# Patient Record
Sex: FEMALE | Race: WHITE | NOT HISPANIC OR LATINO | Employment: OTHER | URBAN - METROPOLITAN AREA
[De-identification: names, ages, dates, MRNs, and addresses within clinical notes are randomized per-mention and may not be internally consistent; named-entity substitution may affect disease eponyms.]

---

## 2017-01-27 ENCOUNTER — HOSPITAL ENCOUNTER (OUTPATIENT)
Dept: CARDIOLOGY | Facility: HOSPITAL | Age: 72
Discharge: HOME | End: 2017-01-27

## 2017-03-23 ENCOUNTER — OFFICE VISIT (OUTPATIENT)
Dept: INTERNAL MEDICINE | Facility: CLINIC | Age: 72
End: 2017-03-23

## 2017-03-23 VITALS
HEART RATE: 88 BPM | WEIGHT: 133 LBS | DIASTOLIC BLOOD PRESSURE: 72 MMHG | BODY MASS INDEX: 26.11 KG/M2 | OXYGEN SATURATION: 98 % | SYSTOLIC BLOOD PRESSURE: 138 MMHG | RESPIRATION RATE: 16 BRPM | HEIGHT: 60 IN

## 2017-03-23 DIAGNOSIS — J30.1 SEASONAL ALLERGIC RHINITIS DUE TO POLLEN: ICD-10-CM

## 2017-03-23 DIAGNOSIS — Z12.39 ENCOUNTER FOR SCREENING BREAST EXAMINATION: ICD-10-CM

## 2017-03-23 DIAGNOSIS — Z13.9 SCREENING: ICD-10-CM

## 2017-03-23 DIAGNOSIS — Z12.39 ENCOUNTER FOR SPECIAL SCREENING EXAMINATION FOR NEOPLASM OF BREAST: ICD-10-CM

## 2017-03-23 DIAGNOSIS — Z78.0 MENOPAUSE: ICD-10-CM

## 2017-03-23 DIAGNOSIS — Z12.39 SCREENING BREAST EXAMINATION: Primary | ICD-10-CM

## 2017-03-23 DIAGNOSIS — F41.9 ANXIETY: Primary | ICD-10-CM

## 2017-03-23 PROCEDURE — 99214 OFFICE O/P EST MOD 30 MIN: CPT | Performed by: INTERNAL MEDICINE

## 2017-03-23 RX ORDER — FLUTICASONE PROPIONATE 50 MCG
2 SPRAY, SUSPENSION (ML) NASAL DAILY
Qty: 1 EACH | Refills: 0 | Status: SHIPPED | OUTPATIENT
Start: 2017-03-23 | End: 2017-04-22

## 2017-03-23 NOTE — PATIENT INSTRUCTIONS
Assessment/Plan   Diagnoses and all orders for this visit:    Anxiety    Screening  -     Ambulatory Referral For Screening Colonoscopy        BP at goal, think anxiety related - no meds indicated today.  She has agreed to schedule her colonoscopy.  Will rerefer to day.    HM - get old records, patient to .  Fu 6month with medicare wellness.

## 2017-03-23 NOTE — PROGRESS NOTES
Subjective   Cindy Mejia is a 71 y.o. female.     History of Present Illness   72 yo female with longstanding anxiety - stopped her lexapro last visit. BP better today, running low at home. Has her paperwork at  Home.  She was referred to Dr. Gonzalez.  She is scared because a family member had a complication from her colonoscopy.     Still having some stress incontinence today.  The following portions of the patient's history were reviewed and updated as appropriate: allergies, current medications, past family history, past medical history, past social history, past surgical history and problem list.    Review of Systems   HENT: Negative.    Respiratory: Negative.    Cardiovascular: Negative.    Gastrointestinal: Negative.    Genitourinary: Negative.         Stress incontinence   Musculoskeletal: Negative.    Neurological: Negative.    Hematological: Negative.    Psychiatric/Behavioral: Negative.    All other systems reviewed and are negative.      Objective   Physical Exam   Constitutional: She is oriented to person, place, and time. She appears well-developed and well-nourished. No distress.   HENT:   Head: Normocephalic.   Right Ear: External ear normal.   Left Ear: External ear normal.   Nose: Nose normal.   Mouth/Throat: No oropharyngeal exudate.   TM clear   Cardiovascular: Normal rate and regular rhythm.    Pulmonary/Chest: Effort normal and breath sounds normal. No respiratory distress.   Neurological: She is alert and oriented to person, place, and time.   Skin: She is not diaphoretic.   Psychiatric: She has a normal mood and affect. Her behavior is normal.   Nursing note and vitals reviewed.      Assessment/Plan   Diagnoses and all orders for this visit:    Anxiety    Screening  -     Ambulatory Referral For Screening Colonoscopy        BP at goal, think anxiety related - no meds indicated today.  She has agreed to schedule her colonoscopy.  Will rerefer to day.    HM - get old records, patient to pick  up.  Fu 6month with medicare wellness.      Mammogram and bone density ordered today.

## 2017-04-04 ENCOUNTER — APPOINTMENT (OUTPATIENT)
Dept: BONE DENSITY | Facility: HOSPITAL | Age: 72
End: 2017-04-04

## 2017-04-04 PROCEDURE — 77080 DXA BONE DENSITY AXIAL: CPT

## 2017-04-05 ENCOUNTER — TELEPHONE (OUTPATIENT)
Dept: INTERNAL MEDICINE | Facility: CLINIC | Age: 72
End: 2017-04-05

## 2017-04-05 NOTE — TELEPHONE ENCOUNTER
Patient notified.    ----- Message from Jeb Diaz MD sent at 4/4/2017  4:19 PM EDT -----  Mild osteopenia.   Make sure on calcium and vitamin D

## 2017-04-20 ENCOUNTER — OFFICE VISIT (OUTPATIENT)
Dept: GASTROENTEROLOGY | Facility: CLINIC | Age: 72
End: 2017-04-20

## 2017-04-20 VITALS
WEIGHT: 133.2 LBS | BODY MASS INDEX: 26.15 KG/M2 | HEIGHT: 60 IN | OXYGEN SATURATION: 94 % | HEART RATE: 98 BPM | TEMPERATURE: 98.1 F

## 2017-04-20 DIAGNOSIS — Z12.11 ENCOUNTER FOR SCREENING FOR MALIGNANT NEOPLASM OF COLON: ICD-10-CM

## 2017-04-20 DIAGNOSIS — K59.00 CONSTIPATION, UNSPECIFIED CONSTIPATION TYPE: Primary | ICD-10-CM

## 2017-04-20 PROCEDURE — 99203 OFFICE O/P NEW LOW 30 MIN: CPT | Performed by: INTERNAL MEDICINE

## 2017-04-20 NOTE — PROGRESS NOTES
PATIENT INFORMATION  Cindy Mejia       - 1945    CHIEF COMPLAINT  Chief Complaint   Patient presents with   • Constipation       HISTORY OF PRESENT ILLNESS  Constipation       70 yo with chronic constipation for over 20 years. BM are once every 3 days. No blood in the stool, abdominal pain or weight loss. No previous cls. No family history of colon cancer. She has tried colace before which has helped.    Severity is mild to moderate. No aggravating factors.  REVIEW OF SYSTEMS  Review of Systems   HENT: Positive for postnasal drip.    Gastrointestinal: Positive for constipation.   All other systems reviewed and are negative.        ACTIVE PROBLEMS  Patient Active Problem List    Diagnosis   • Menopause [Z78.0]   • Health maintenance alteration [Z78.9]   • Needs flu shot [Z23]   • Anxiety [F41.9]   • Uterine prolapse [N81.4]         PAST MEDICAL HISTORY  Past Medical History:   Diagnosis Date   • Anxiety    • Anxiety 2016   • Health maintenance alteration 2016   • Menopause 3/23/2017   • Needs flu shot 2016         SURGICAL HISTORY  Past Surgical History:   Procedure Laterality Date   • BLADDER SURGERY     • HYSTERECTOMY     • TUBAL ABDOMINAL LIGATION           FAMILY HISTORY  History reviewed. No pertinent family history.      SOCIAL HISTORY  Social History     Occupational History   • Not on file.     Social History Main Topics   • Smoking status: Never Smoker   • Smokeless tobacco: Not on file   • Alcohol use No   • Drug use: No   • Sexual activity: Not on file         CURRENT MEDICATIONS    Current Outpatient Prescriptions:   •  escitalopram (LEXAPRO) 10 MG tablet, Take 1 tablet by mouth daily., Disp: 30 tablet, Rfl: 0  •  fluticasone (FLONASE) 50 MCG/ACT nasal spray, 2 sprays into each nostril Daily for 30 days., Disp: 1 each, Rfl: 0    ALLERGIES  Penicillins    VITALS  Vitals:    17 1140   Pulse: 98   Temp: 98.1 °F (36.7 °C)   TempSrc: Oral   SpO2: 94%   Weight: 133 lb 3.2 oz  "(60.4 kg)   Height: 60\" (152.4 cm)       LAST RESULTS   Admission on 07/07/2016, Discharged on 07/07/2016   Component Date Value Ref Range Status   • Color,  07/07/2016 Yellow  Yellow, Straw Final   • Appearance,  07/07/2016 Clear  Clear Final   • pH,  07/07/2016 6.5  4.5 - 8.0 Final   • Specific Gravity,  07/07/2016 1.010  1.003 - 1.030 Final   • Glucose, UA 07/07/2016 Negative  Negative Final   • Ketones, UA 07/07/2016 Negative  Negative Final   • Bilirubin, UA 07/07/2016 Negative  Negative Final   • Blood,  07/07/2016 Trace* Negative Final   • Protein,  07/07/2016 Negative  Negative Final   • Leuk Esterase,  07/07/2016 Trace* Negative Final   • Nitrite,  07/07/2016 Negative  Negative Final   • Urobilinogen,  07/07/2016 0.2 E.U./dL  0.2 E.U./dL, 1.0 E.U./dL Final   • Urine Culture 07/07/2016 <25,000 CFU/mL Corynebacterium species*  Final      No definitive guidelines. All are susceptible to vancomycin. Resistance to penicillins & cephalosporins does occur.   • RBC,  07/07/2016 3-5* None Seen /HPF Final   • WBC,  07/07/2016 6-12* None Seen /HPF Final   • Bacteria,  07/07/2016 Trace* None Seen /HPF Final   • Squamous Epithelial Cells,  07/07/2016 3-6* None Seen, 0-2 /HPF Final   • Hyaline Casts,  07/07/2016 None Seen  None Seen /LPF Final   • WBC Clumps,  07/07/2016 Small/1+  None Seen /HPF Final   • Methodology 07/07/2016 Manual Light Microscopy   Final     Dexa Bone Density Axial    Result Date: 4/4/2017  Narrative: EXAM: DEXA scan  DATE: 04/04/2017  HISTORY: 71-year-old postmenopausal female for osteoporosis screening.  COMPARISON: DEXA scan 10/07/2004.  FINDINGS: L1-L4 total bone mineral density is 0.827 gm/cm2, with T score -2.0 and Z score 0.1, corresponding to the range of osteopenia. This represents a 14.3% decrease in bone mineral density since 10/07/2004.  The left femoral neck bone mineral density is 0.756 gm/cm2, with T score -0.8 and Z score 1.0, within normal limits. This " represents a 3.7% increase in bone mineral density since 10/07/2004.      Impression: 1. Osteopenia in the lumbar spine. 2. Normal bone mineral density in the left femoral neck. 3. 14.3% decrease in bone mineral density of the lumbar spine in comparison to 10/07/2004.  This report was finalized on 4/4/2017 10:06 AM by Dr. Aline Nichols MD.        PHYSICAL EXAM  Physical Exam   Constitutional: She is oriented to person, place, and time. She appears well-developed and well-nourished. No distress.   HENT:   Head: Normocephalic and atraumatic.   Mouth/Throat: Oropharynx is clear and moist.   Eyes: EOM are normal. Pupils are equal, round, and reactive to light.   Neck: Normal range of motion. No tracheal deviation present.   Cardiovascular: Normal rate, regular rhythm, normal heart sounds and intact distal pulses.  Exam reveals no gallop and no friction rub.    No murmur heard.  Pulmonary/Chest: Effort normal and breath sounds normal. No stridor. No respiratory distress. She has no wheezes. She has no rales. She exhibits no tenderness.   Abdominal: Soft. Bowel sounds are normal. She exhibits no distension. There is no tenderness. There is no rebound and no guarding.   Musculoskeletal: She exhibits no edema.   Lymphadenopathy:     She has no cervical adenopathy.   Neurological: She is alert and oriented to person, place, and time.   Skin: Skin is warm. She is not diaphoretic.   Psychiatric: She has a normal mood and affect. Her behavior is normal. Judgment and thought content normal.   Nursing note and vitals reviewed.      ASSESSMENT  Diagnoses and all orders for this visit:    Constipation, unspecified constipation type    Encounter for screening for malignant neoplasm of colon  -     Case Request; Standing  -     Case Request    Other orders  -     Implement Anesthesia orders day of procedure.; Standing  -     Obtain informed consent; Standing  -     Verify informed consent; Standing          PLAN  No Follow-up on  file.      High fiber diet. Can do miralax once daily.    Risks, benefits and alternatives discussed including but not limited to the complications of bleeding, perforation and sedation related problems.

## 2017-05-19 ENCOUNTER — ANESTHESIA EVENT (OUTPATIENT)
Dept: PERIOP | Facility: HOSPITAL | Age: 72
End: 2017-05-19

## 2017-05-22 ENCOUNTER — HOSPITAL ENCOUNTER (OUTPATIENT)
Facility: HOSPITAL | Age: 72
Setting detail: HOSPITAL OUTPATIENT SURGERY
Discharge: HOME | End: 2017-05-22
Attending: INTERNAL MEDICINE | Admitting: INTERNAL MEDICINE

## 2017-05-22 ENCOUNTER — ANESTHESIA (OUTPATIENT)
Dept: PERIOP | Facility: HOSPITAL | Age: 72
End: 2017-05-22

## 2017-05-22 VITALS
RESPIRATION RATE: 12 BRPM | DIASTOLIC BLOOD PRESSURE: 68 MMHG | HEIGHT: 61 IN | SYSTOLIC BLOOD PRESSURE: 140 MMHG | TEMPERATURE: 98.2 F | BODY MASS INDEX: 24.17 KG/M2 | HEART RATE: 85 BPM | WEIGHT: 128 LBS | OXYGEN SATURATION: 97 %

## 2017-05-22 DIAGNOSIS — Z12.11 ENCOUNTER FOR SCREENING FOR MALIGNANT NEOPLASM OF COLON: ICD-10-CM

## 2017-05-22 PROCEDURE — 25010000002 PROPOFOL 10 MG/ML EMULSION: Performed by: NURSE ANESTHETIST, CERTIFIED REGISTERED

## 2017-05-22 PROCEDURE — 45385 COLONOSCOPY W/LESION REMOVAL: CPT | Performed by: INTERNAL MEDICINE

## 2017-05-22 PROCEDURE — 45380 COLONOSCOPY AND BIOPSY: CPT | Performed by: INTERNAL MEDICINE

## 2017-05-22 RX ORDER — LIDOCAINE HYDROCHLORIDE 10 MG/ML
INJECTION, SOLUTION EPIDURAL; INFILTRATION; INTRACAUDAL; PERINEURAL
Status: COMPLETED
Start: 2017-05-22 | End: 2017-05-22

## 2017-05-22 RX ORDER — LIDOCAINE HYDROCHLORIDE 20 MG/ML
INJECTION, SOLUTION INFILTRATION; PERINEURAL AS NEEDED
Status: DISCONTINUED | OUTPATIENT
Start: 2017-05-22 | End: 2017-05-22 | Stop reason: SURG

## 2017-05-22 RX ORDER — SODIUM CHLORIDE 0.9 % (FLUSH) 0.9 %
1-10 SYRINGE (ML) INJECTION AS NEEDED
Status: DISCONTINUED | OUTPATIENT
Start: 2017-05-22 | End: 2017-05-22 | Stop reason: HOSPADM

## 2017-05-22 RX ORDER — SODIUM CHLORIDE, SODIUM LACTATE, POTASSIUM CHLORIDE, CALCIUM CHLORIDE 600; 310; 30; 20 MG/100ML; MG/100ML; MG/100ML; MG/100ML
9 INJECTION, SOLUTION INTRAVENOUS CONTINUOUS
Status: DISCONTINUED | OUTPATIENT
Start: 2017-05-22 | End: 2017-05-22 | Stop reason: HOSPADM

## 2017-05-22 RX ORDER — PROPOFOL 10 MG/ML
VIAL (ML) INTRAVENOUS AS NEEDED
Status: DISCONTINUED | OUTPATIENT
Start: 2017-05-22 | End: 2017-05-22 | Stop reason: SURG

## 2017-05-22 RX ORDER — LIDOCAINE HYDROCHLORIDE 10 MG/ML
0.5 INJECTION, SOLUTION EPIDURAL; INFILTRATION; INTRACAUDAL; PERINEURAL ONCE AS NEEDED
Status: COMPLETED | OUTPATIENT
Start: 2017-05-22 | End: 2017-05-22

## 2017-05-22 RX ADMIN — LIDOCAINE HYDROCHLORIDE 0.5 ML: 10 INJECTION, SOLUTION EPIDURAL; INFILTRATION; INTRACAUDAL; PERINEURAL at 10:11

## 2017-05-22 RX ADMIN — PROPOFOL 30 MG: 10 INJECTION, EMULSION INTRAVENOUS at 10:48

## 2017-05-22 RX ADMIN — PROPOFOL 50 MG: 10 INJECTION, EMULSION INTRAVENOUS at 10:26

## 2017-05-22 RX ADMIN — SODIUM CHLORIDE, POTASSIUM CHLORIDE, SODIUM LACTATE AND CALCIUM CHLORIDE 9 ML/HR: 600; 310; 30; 20 INJECTION, SOLUTION INTRAVENOUS at 10:10

## 2017-05-22 RX ADMIN — PROPOFOL 50 MG: 10 INJECTION, EMULSION INTRAVENOUS at 10:32

## 2017-05-22 RX ADMIN — PROPOFOL 50 MG: 10 INJECTION, EMULSION INTRAVENOUS at 10:28

## 2017-05-22 RX ADMIN — LIDOCAINE HYDROCHLORIDE 60 MG: 20 INJECTION, SOLUTION INFILTRATION; PERINEURAL at 10:26

## 2017-05-22 RX ADMIN — PROPOFOL 50 MG: 10 INJECTION, EMULSION INTRAVENOUS at 10:36

## 2017-05-22 RX ADMIN — PROPOFOL 50 MG: 10 INJECTION, EMULSION INTRAVENOUS at 10:43

## 2017-05-22 RX ADMIN — PROPOFOL 30 MG: 10 INJECTION, EMULSION INTRAVENOUS at 10:54

## 2017-05-22 RX ADMIN — SODIUM CHLORIDE, POTASSIUM CHLORIDE, SODIUM LACTATE AND CALCIUM CHLORIDE: 600; 310; 30; 20 INJECTION, SOLUTION INTRAVENOUS at 10:21

## 2017-06-01 ENCOUNTER — TELEPHONE (OUTPATIENT)
Dept: GASTROENTEROLOGY | Facility: CLINIC | Age: 72
End: 2017-06-01

## 2017-06-01 DIAGNOSIS — K63.5 COLON POLYPS: Primary | ICD-10-CM

## 2017-06-03 LAB
LAB AP CASE REPORT: NORMAL
LAB AP CLINICAL INFORMATION: NORMAL
Lab: NORMAL
PATH REPORT.FINAL DX SPEC: NORMAL

## 2017-08-25 ENCOUNTER — ANESTHESIA EVENT (OUTPATIENT)
Dept: PERIOP | Facility: HOSPITAL | Age: 72
End: 2017-08-25

## 2017-08-28 ENCOUNTER — ANESTHESIA (OUTPATIENT)
Dept: PERIOP | Facility: HOSPITAL | Age: 72
End: 2017-08-28

## 2017-08-28 ENCOUNTER — HOSPITAL ENCOUNTER (OUTPATIENT)
Facility: HOSPITAL | Age: 72
Setting detail: HOSPITAL OUTPATIENT SURGERY
Discharge: HOME | End: 2017-08-28
Attending: INTERNAL MEDICINE | Admitting: INTERNAL MEDICINE

## 2017-08-28 VITALS
RESPIRATION RATE: 16 BRPM | WEIGHT: 126.2 LBS | TEMPERATURE: 98 F | OXYGEN SATURATION: 97 % | DIASTOLIC BLOOD PRESSURE: 65 MMHG | HEART RATE: 79 BPM | HEIGHT: 61 IN | BODY MASS INDEX: 23.83 KG/M2 | SYSTOLIC BLOOD PRESSURE: 154 MMHG

## 2017-08-28 DIAGNOSIS — K63.5 COLON POLYPS: ICD-10-CM

## 2017-08-28 PROCEDURE — 45380 COLONOSCOPY AND BIOPSY: CPT | Performed by: INTERNAL MEDICINE

## 2017-08-28 PROCEDURE — 45381 COLONOSCOPY SUBMUCOUS NJX: CPT | Performed by: INTERNAL MEDICINE

## 2017-08-28 PROCEDURE — 25010000002 PROPOFOL 10 MG/ML EMULSION: Performed by: NURSE ANESTHETIST, CERTIFIED REGISTERED

## 2017-08-28 RX ORDER — LIDOCAINE HYDROCHLORIDE 10 MG/ML
0.5 INJECTION, SOLUTION EPIDURAL; INFILTRATION; INTRACAUDAL; PERINEURAL ONCE AS NEEDED
Status: COMPLETED | OUTPATIENT
Start: 2017-08-28 | End: 2017-08-28

## 2017-08-28 RX ORDER — LIDOCAINE HYDROCHLORIDE 20 MG/ML
INJECTION, SOLUTION INFILTRATION; PERINEURAL AS NEEDED
Status: DISCONTINUED | OUTPATIENT
Start: 2017-08-28 | End: 2017-08-28 | Stop reason: SURG

## 2017-08-28 RX ORDER — PROPOFOL 10 MG/ML
VIAL (ML) INTRAVENOUS AS NEEDED
Status: DISCONTINUED | OUTPATIENT
Start: 2017-08-28 | End: 2017-08-28 | Stop reason: SURG

## 2017-08-28 RX ORDER — MAGNESIUM HYDROXIDE 1200 MG/15ML
LIQUID ORAL AS NEEDED
Status: DISCONTINUED | OUTPATIENT
Start: 2017-08-28 | End: 2017-08-28 | Stop reason: HOSPADM

## 2017-08-28 RX ORDER — SODIUM CHLORIDE 0.9 % (FLUSH) 0.9 %
1-10 SYRINGE (ML) INJECTION AS NEEDED
Status: DISCONTINUED | OUTPATIENT
Start: 2017-08-28 | End: 2017-08-28 | Stop reason: HOSPADM

## 2017-08-28 RX ORDER — SODIUM CHLORIDE, SODIUM LACTATE, POTASSIUM CHLORIDE, CALCIUM CHLORIDE 600; 310; 30; 20 MG/100ML; MG/100ML; MG/100ML; MG/100ML
9 INJECTION, SOLUTION INTRAVENOUS CONTINUOUS
Status: DISCONTINUED | OUTPATIENT
Start: 2017-08-28 | End: 2017-08-28 | Stop reason: HOSPADM

## 2017-08-28 RX ADMIN — PROPOFOL 50 MG: 10 INJECTION, EMULSION INTRAVENOUS at 09:50

## 2017-08-28 RX ADMIN — PROPOFOL 50 MG: 10 INJECTION, EMULSION INTRAVENOUS at 09:58

## 2017-08-28 RX ADMIN — LIDOCAINE HYDROCHLORIDE 100 MG: 20 INJECTION, SOLUTION INFILTRATION; PERINEURAL at 08:28

## 2017-08-28 RX ADMIN — PROPOFOL 50 MG: 10 INJECTION, EMULSION INTRAVENOUS at 08:50

## 2017-08-28 RX ADMIN — SODIUM CHLORIDE, POTASSIUM CHLORIDE, SODIUM LACTATE AND CALCIUM CHLORIDE 9 ML/HR: 600; 310; 30; 20 INJECTION, SOLUTION INTRAVENOUS at 07:57

## 2017-08-28 RX ADMIN — LIDOCAINE HYDROCHLORIDE 0.5 ML: 10 INJECTION, SOLUTION EPIDURAL; INFILTRATION; INTRACAUDAL; PERINEURAL at 07:57

## 2017-08-28 RX ADMIN — PROPOFOL 50 MG: 10 INJECTION, EMULSION INTRAVENOUS at 09:32

## 2017-08-28 RX ADMIN — PROPOFOL 50 MG: 10 INJECTION, EMULSION INTRAVENOUS at 09:22

## 2017-08-28 RX ADMIN — PROPOFOL 50 MG: 10 INJECTION, EMULSION INTRAVENOUS at 09:39

## 2017-08-28 RX ADMIN — PROPOFOL 50 MG: 10 INJECTION, EMULSION INTRAVENOUS at 08:37

## 2017-08-28 RX ADMIN — PROPOFOL 50 MG: 10 INJECTION, EMULSION INTRAVENOUS at 10:31

## 2017-08-28 RX ADMIN — PROPOFOL 50 MG: 10 INJECTION, EMULSION INTRAVENOUS at 10:10

## 2017-08-28 RX ADMIN — PROPOFOL 50 MG: 10 INJECTION, EMULSION INTRAVENOUS at 09:04

## 2017-08-28 RX ADMIN — PROPOFOL 100 MG: 10 INJECTION, EMULSION INTRAVENOUS at 08:30

## 2017-08-28 RX ADMIN — PROPOFOL 50 MG: 10 INJECTION, EMULSION INTRAVENOUS at 08:42

## 2017-08-28 RX ADMIN — PROPOFOL 50 MG: 10 INJECTION, EMULSION INTRAVENOUS at 09:25

## 2017-08-28 RX ADMIN — PROPOFOL 50 MG: 10 INJECTION, EMULSION INTRAVENOUS at 09:29

## 2017-08-28 RX ADMIN — PROPOFOL 50 MG: 10 INJECTION, EMULSION INTRAVENOUS at 09:15

## 2017-08-28 RX ADMIN — PROPOFOL 50 MG: 10 INJECTION, EMULSION INTRAVENOUS at 09:09

## 2017-08-28 RX ADMIN — PROPOFOL 50 MG: 10 INJECTION, EMULSION INTRAVENOUS at 09:18

## 2017-08-28 RX ADMIN — PROPOFOL 50 MG: 10 INJECTION, EMULSION INTRAVENOUS at 09:00

## 2017-08-28 RX ADMIN — PROPOFOL 50 MG: 10 INJECTION, EMULSION INTRAVENOUS at 08:55

## 2017-08-28 RX ADMIN — PROPOFOL 50 MG: 10 INJECTION, EMULSION INTRAVENOUS at 10:25

## 2017-08-28 NOTE — ANESTHESIA POSTPROCEDURE EVALUATION
Patient: Cindy Mejia    Procedure Summary     Date Anesthesia Start Anesthesia Stop Room / Location    08/28/17 0827 1048 [facility] / [facility]       Procedure Diagnosis Surgeon Provider    COLONOSCOPY, polypectomy, biopsy, sclerotherapy injection (N/A ) Colon polyps  (Colon polyps [K63.5]) MD Kayla Ayers CRNA          Anesthesia Type: MAC  Last vitals  BP   131/71 (08/28/17 1110)    Temp        Pulse   84 (08/28/17 1110)   Resp   14 (08/28/17 1110)    SpO2   97 % (08/28/17 1110)      Post Anesthesia Care and Evaluation    Patient location during evaluation: PHASE II  Patient participation: complete - patient participated  Level of consciousness: awake and alert  Pain score: 0  Pain management: adequate  Airway patency: patent  Anesthetic complications: No anesthetic complications  PONV Status: none  Cardiovascular status: acceptable  Respiratory status: acceptable  Hydration status: acceptable

## 2017-08-28 NOTE — ANESTHESIA PREPROCEDURE EVALUATION
Anesthesia Evaluation     Patient summary reviewed and Nursing notes reviewed   no history of anesthetic complications:  NPO Solid Status: > 8 hours  NPO Liquid Status: > 8 hours     Airway   Mallampati: I  TM distance: >3 FB  Neck ROM: full  no difficulty expected  Dental - normal exam     Pulmonary - negative pulmonary ROS and normal exam    breath sounds clear to auscultation  Sleep apnea: snores, lightly.  Cardiovascular - negative cardio ROS and normal exam  Exercise tolerance: good (4-7 METS)    Rhythm: regular  Rate: normal        Neuro/Psych  (+) psychiatric history Anxiety,    GI/Hepatic/Renal/Endo - negative ROS     Musculoskeletal     Abdominal  - normal exam   Substance History - negative use     OB/GYN negative ob/gyn ROS         Other   (+) arthritis (fingers)                                   Anesthesia Plan    ASA 2     MAC     intravenous induction   Anesthetic plan and risks discussed with patient and spouse/significant other.

## 2017-08-30 LAB
LAB AP CASE REPORT: NORMAL
Lab: NORMAL
PATH REPORT.FINAL DX SPEC: NORMAL

## 2017-09-07 PROBLEM — K63.5 COLON POLYPS: Status: RESOLVED | Noted: 2017-06-01 | Resolved: 2017-09-07

## 2017-09-07 PROBLEM — D36.9 TUBULAR ADENOMA: Status: ACTIVE | Noted: 2017-09-07

## 2017-12-04 DIAGNOSIS — Z12.31 SCREENING MAMMOGRAM, ENCOUNTER FOR: ICD-10-CM

## 2017-12-04 DIAGNOSIS — Z80.3 FAMILY HISTORY OF BREAST CANCER: Primary | ICD-10-CM

## 2017-12-27 ENCOUNTER — HOSPITAL ENCOUNTER (OUTPATIENT)
Dept: MAMMOGRAPHY | Facility: HOSPITAL | Age: 72
Discharge: HOME | End: 2017-12-27
Attending: INTERNAL MEDICINE | Admitting: INTERNAL MEDICINE

## 2017-12-27 DIAGNOSIS — Z80.3 FAMILY HISTORY OF BREAST CANCER: ICD-10-CM

## 2017-12-27 DIAGNOSIS — Z12.31 SCREENING MAMMOGRAM, ENCOUNTER FOR: ICD-10-CM

## 2017-12-27 PROCEDURE — 77063 BREAST TOMOSYNTHESIS BI: CPT

## 2017-12-27 PROCEDURE — G0202 SCR MAMMO BI INCL CAD: HCPCS

## 2018-01-02 ENCOUNTER — TELEPHONE (OUTPATIENT)
Dept: INTERNAL MEDICINE | Facility: CLINIC | Age: 73
End: 2018-01-02

## 2018-01-10 ENCOUNTER — LAB (OUTPATIENT)
Dept: INTERNAL MEDICINE | Facility: CLINIC | Age: 73
End: 2018-01-10

## 2018-01-10 DIAGNOSIS — R63.5 WEIGHT GAIN: ICD-10-CM

## 2018-01-10 DIAGNOSIS — Z13.220 SCREENING, LIPID: Primary | ICD-10-CM

## 2018-01-10 DIAGNOSIS — R03.0 PREHYPERTENSION: ICD-10-CM

## 2018-01-10 DIAGNOSIS — Z13.220 SCREENING, LIPID: ICD-10-CM

## 2018-01-10 DIAGNOSIS — F41.9 ANXIETY: Primary | ICD-10-CM

## 2018-01-10 DIAGNOSIS — F41.9 ANXIETY: ICD-10-CM

## 2018-01-10 LAB
ALBUMIN SERPL-MCNC: 4.6 G/DL (ref 3.5–5.2)
ALBUMIN/GLOB SERPL: 2.2 G/DL
ALP SERPL-CCNC: 85 U/L (ref 40–129)
ALT SERPL-CCNC: 11 U/L (ref 5–33)
AST SERPL-CCNC: 18 U/L (ref 5–32)
BASOPHILS # BLD AUTO: 0.05 10*3/MM3 (ref 0–0.2)
BASOPHILS NFR BLD AUTO: 0.8 % (ref 0–2)
BILIRUB SERPL-MCNC: 0.4 MG/DL (ref 0.2–1.2)
BUN SERPL-MCNC: 14 MG/DL (ref 8–23)
BUN/CREAT SERPL: 24.6 (ref 7–25)
CALCIUM SERPL-MCNC: 9.5 MG/DL (ref 8.8–10.5)
CHLORIDE SERPL-SCNC: 104 MMOL/L (ref 98–107)
CO2 SERPL-SCNC: 29.1 MMOL/L (ref 22–29)
CREAT SERPL-MCNC: 0.57 MG/DL (ref 0.57–1)
EOSINOPHIL # BLD AUTO: 0.22 10*3/MM3 (ref 0.1–0.3)
EOSINOPHIL NFR BLD AUTO: 3.4 % (ref 0–4)
ERYTHROCYTE [DISTWIDTH] IN BLOOD BY AUTOMATED COUNT: 13 % (ref 11.5–14.5)
GLOBULIN SER CALC-MCNC: 2.1 GM/DL
GLUCOSE SERPL-MCNC: 95 MG/DL (ref 65–99)
HCT VFR BLD AUTO: 38.6 % (ref 37–47)
HGB BLD-MCNC: 12.5 G/DL (ref 12–16)
IMM GRANULOCYTES # BLD: 0.02 10*3/MM3 (ref 0–0.03)
IMM GRANULOCYTES NFR BLD: 0.3 % (ref 0–0.5)
LYMPHOCYTES # BLD AUTO: 0.86 10*3/MM3 (ref 0.6–4.8)
LYMPHOCYTES NFR BLD AUTO: 13.4 % (ref 20–45)
MCH RBC QN AUTO: 29.4 PG (ref 27–31)
MCHC RBC AUTO-ENTMCNC: 32.4 G/DL (ref 31–37)
MCV RBC AUTO: 90.8 FL (ref 81–99)
MONOCYTES # BLD AUTO: 0.64 10*3/MM3 (ref 0–1)
MONOCYTES NFR BLD AUTO: 10 % (ref 3–8)
NEUTROPHILS # BLD AUTO: 4.62 10*3/MM3 (ref 1.5–8.3)
NEUTROPHILS NFR BLD AUTO: 72.1 % (ref 45–70)
NRBC BLD AUTO-RTO: 0 /100 WBC (ref 0–0)
PLATELET # BLD AUTO: 315 10*3/MM3 (ref 140–500)
POTASSIUM SERPL-SCNC: 4.5 MMOL/L (ref 3.5–5.2)
PROT SERPL-MCNC: 6.7 G/DL (ref 6–8.5)
RBC # BLD AUTO: 4.25 10*6/MM3 (ref 4.2–5.4)
SODIUM SERPL-SCNC: 142 MMOL/L (ref 136–145)
TSH SERPL DL<=0.005 MIU/L-ACNC: 3.13 MIU/ML (ref 0.27–4.2)
WBC # BLD AUTO: 6.41 10*3/MM3 (ref 4.8–10.8)

## 2018-01-25 ENCOUNTER — OFFICE VISIT (OUTPATIENT)
Dept: INTERNAL MEDICINE | Facility: CLINIC | Age: 73
End: 2018-01-25

## 2018-01-25 VITALS
SYSTOLIC BLOOD PRESSURE: 150 MMHG | HEART RATE: 99 BPM | BODY MASS INDEX: 24.55 KG/M2 | HEIGHT: 61 IN | WEIGHT: 130 LBS | DIASTOLIC BLOOD PRESSURE: 70 MMHG | OXYGEN SATURATION: 98 % | RESPIRATION RATE: 18 BRPM

## 2018-01-25 DIAGNOSIS — L71.8 OTHER ROSACEA: ICD-10-CM

## 2018-01-25 DIAGNOSIS — Z00.00 MEDICARE ANNUAL WELLNESS VISIT, SUBSEQUENT: Primary | ICD-10-CM

## 2018-01-25 DIAGNOSIS — C18.9 CARCINOMA OF COLON (HCC): ICD-10-CM

## 2018-01-25 DIAGNOSIS — K64.8 OTHER HEMORRHOIDS: ICD-10-CM

## 2018-01-25 PROCEDURE — 99214 OFFICE O/P EST MOD 30 MIN: CPT | Performed by: INTERNAL MEDICINE

## 2018-01-25 PROCEDURE — G0439 PPPS, SUBSEQ VISIT: HCPCS | Performed by: INTERNAL MEDICINE

## 2018-01-25 RX ORDER — METRONIDAZOLE 10 MG/G
GEL TOPICAL DAILY
Qty: 60 G | Refills: 0 | Status: SHIPPED | OUTPATIENT
Start: 2018-01-25 | End: 2018-10-05

## 2018-01-25 NOTE — PATIENT INSTRUCTIONS
ppps    Medicare Wellness  Personal Prevention Plan of Service     Date of Office Visit:  2018  Encounter Provider:  Jeb Diaz MD  Place of Service:  [facility]  Patient Name: Cindy Mejia  :  1945    As part of the Medicare Wellness portion of your visit today, we are providing you with this personalized preventive plan of services (PPPS). This plan is based upon recommendations of the [place] Preventive Services Task Force (USPSTF) and the Advisory Committee on Immunization Practices (ACIP).    This lists the preventive care services that should be considered, and provides dates of when you are due. Items listed as completed are up-to-date and do not require any further intervention.    Health Maintenance   Topic Date Due   • TDAP/TD VACCINES (1 - Tdap) 1965   • PNEUMOCOCCAL VACCINES (65+ LOW/MEDIUM RISK) (1 of 2 - PCV13) 2011   • HEPATITIS C SCREENING  2016   • ZOSTER VACCINE  2016   • INFLUENZA VACCINE  2017   • COLONOSCOPY  2018   • MEDICARE ANNUAL WELLNESS  2019   • MAMMOGRAM  2019       Orders Placed This Encounter   Procedures   • CEA       Return in about 6 months (around 2018).

## 2018-01-25 NOTE — PROGRESS NOTES
QUICK REFERENCE INFORMATION:  The ABCs of the Annual Wellness Visit    Subsequent Medicare Wellness Visit    HEALTH RISK ASSESSMENT    1945    Recent Hospitalizations:  Recently treated at the following:  [facility].        Current Medical Providers:  Patient Care Team:  Jeb Diaz MD as PCP - General (Internal Medicine & Pediatrics)  Jeb Diaz MD as PCP - Claims Attributed        Smoking Status:  History   Smoking Status   • Never Smoker   Smokeless Tobacco   • Never Used       Alcohol Consumption:  History   Alcohol Use No       Depression Screen:   PHQ-2/PHQ-9 Depression Screening 1/25/2018   Little interest or pleasure in doing things 0   Feeling down, depressed, or hopeless 0   Total Score 0       Health Habits and Functional and Cognitive Screening:  Functional & Cognitive Status 1/25/2018   Do you have difficulty preparing food and eating? No   Do you have difficulty bathing yourself, getting dressed or grooming yourself? No   Do you have difficulty using the toilet? No   Do you have difficulty moving around from place to place? No   Do you have trouble with steps or getting out of a bed or a chair? No   In the past year have you fallen or experienced a near fall? No   Do you need help using the phone?  No   Are you deaf or do you have serious difficulty hearing?  No   Do you need help with transportation? No   Do you need help shopping? No   Do you need help preparing meals?  No   Do you need help with housework?  No   Do you need help with laundry? No   Do you need help taking your medications? No   Do you need help managing money? No   Have you felt unusual stress, anger or loneliness in the last month? No   Who do you live with? Spouse   If you need help, do you have trouble finding someone available to you? No   Have you been bothered in the last four weeks by sexual problems? No   Do you have difficulty concentrating, remembering or making decisions? No           Does the  patient have evidence of cognitive impairment? No    Aspirin use counseling: Does not need ASA (and currently is not on it)      Recent Lab Results:  CMP:  Lab Results   Component Value Date    GLU 95 01/10/2018    BUN 14 01/10/2018    CREATININE 0.57 01/10/2018    EGFRIFNONA 105 01/10/2018    EGFRIFAFRI 127 01/10/2018    BCR 24.6 01/10/2018     01/10/2018    K 4.5 01/10/2018    CO2 29.1 (H) 01/10/2018    CALCIUM 9.5 01/10/2018    PROTENTOTREF 6.7 01/10/2018    ALBUMIN 4.60 01/10/2018    LABGLOBREF 2.1 01/10/2018    LABIL2 2.2 01/10/2018    BILITOT 0.4 01/10/2018    ALKPHOS 85 01/10/2018    AST 18 01/10/2018    ALT 11 01/10/2018     Lipid Panel:     HbA1c:       Visual Acuity:  No exam data present    Age-appropriate Screening Schedule:  Refer to the list below for future screening recommendations based on patient's age, sex and/or medical conditions. Orders for these recommended tests are listed in the plan section. The patient has been provided with a written plan.    Health Maintenance   Topic Date Due   • TDAP/TD VACCINES (1 - Tdap) 11/21/1965   • PNEUMOCOCCAL VACCINES (65+ LOW/MEDIUM RISK) (1 of 2 - PCV13) 11/21/2011   • ZOSTER VACCINE  09/07/2016   • INFLUENZA VACCINE  08/01/2017   • COLONOSCOPY  02/28/2018   • MAMMOGRAM  12/27/2019        Subjective   History of Present Illness    Cindy Mejia is a 72 y.o. female who presents for an Subsequent Wellness Visit.  71 yo female here for medicare wellness    Also here for new facial rash to me. Comes and goes, but recently triggered by aquaphor. Denies pain, peeling. Previously told eczema. It is clearly getting worse.  She does see Lexi for dermatology.  Has not been seen for this particular rash. NO associated increase in mouth sores, aches, etc.     She denies any facial peeling.     She is also concerned about flair of hemorrhoids and trace blood/burning following recent colonoscopy. Use otc steroid and prep h without any pain, but is still having some  burning. She is stooling normally with normal bulk.  No large bleeds that she has seen.     She had colonoscopy with Dr. Gonzalez, scheduled for FU with her in February. She is having no symptoms but is very concerned about her next colonoscopy. No blood visible in stool. No weight change or change in appetite. Dad and mom with history of cancer as noted. She is very anxious today regarding that diagnosis.       The following portions of the patient's history were reviewed and updated as appropriate: allergies, current medications, past family history, past medical history, past social history, past surgical history and problem list.    No outpatient prescriptions prior to visit.     No facility-administered medications prior to visit.        Patient Active Problem List   Diagnosis   • Health maintenance alteration   • Needs flu shot   • Anxiety   • Menopause   • Uterine prolapse   • Tubular adenoma   • Medicare annual wellness visit, subsequent   • Other rosacea   • Carcinoma of colon   • Other hemorrhoids       Advance Care Planning:  power of  for healthcare on file,     Identification of Risk Factors:  Risk factors include: inactivity.    Review of Systems   Constitutional: Negative.  Negative for appetite change, fatigue and unexpected weight change.   HENT: Negative.    Gastrointestinal: Negative for blood in stool, constipation, nausea and vomiting.        Hemorrhoids, got painful after last colonscopy using otc prep h   Genitourinary: Negative.    Skin: Positive for color change and rash.   Neurological: Negative.  Negative for headaches.   Hematological: Negative.    Psychiatric/Behavioral: Negative.    All other systems reviewed and are negative.      Compared to one year ago, the patient feels her physical health is worse.  Compared to one year ago, the patient feels her mental health is the same.    Objective     Physical Exam   Constitutional: She appears well-developed.   HENT:   Head:  "Normocephalic.   Right Ear: External ear normal.   Left Ear: External ear normal.   Mouth/Throat: Oropharynx is clear and moist.   Eyes: Pupils are equal, round, and reactive to light. No scleral icterus.   Neck: Normal range of motion. Neck supple. No thyromegaly present.   Cardiovascular: Normal rate and regular rhythm.    Pulmonary/Chest: Effort normal. No respiratory distress.   Abdominal: Soft.   Skin:   Erythema and some scaline to glabella and bilateral nasolabial folds. NO induration. No acneiform lesion.   Psychiatric: She has a normal mood and affect. Her behavior is normal.   Nursing note and vitals reviewed.      Vitals:    01/25/18 0951   BP: 150/70   Pulse: 99   Resp: 18   SpO2: 98%   Weight: 59 kg (130 lb)   Height: 154.9 cm (61\")   Labs reviewed, TSH normal. CBC and CMP normal.   Pathology and GI notes reviewed.     Body mass index is 24.56 kg/(m^2).  Discussed the patient's BMI with her. BMI is within normal parameters. No follow-up required.    Assessment/Plan   Patient Self-Management and Personalized Health Advice  The patient has been provided with information about: diet and exercise and preventive services including:   · reassurance about colonoscopy.    Visit Diagnoses:    ICD-10-CM ICD-9-CM   1. Medicare annual wellness visit, subsequent Z00.00 V70.0   2. Other rosacea L71.8 695.3   3. Carcinoma of colon C18.9 153.9   4. Other hemorrhoids K64.8 455.6       Orders Placed This Encounter   Procedures   • CEA       Outpatient Encounter Prescriptions as of 1/25/2018   Medication Sig Dispense Refill   • hydrocortisone (PROCTO-JENS) 1 % cream rectal cream Insert  into the rectum 2 (Two) Times a Day. 1 tube 3   • metroNIDAZOLE (METROGEL) 1 % gel Apply  topically Daily. 60 g 0     No facility-administered encounter medications on file as of 1/25/2018.        I reassured patient that surveillance was appropriate for her colonic lesion, did electronically message her GI physician Dr. Ríos to ensure " we are on the same page  New meds for rash and hemorrhoids  Full medicare wellness questions done.       Reviewed use of high risk medication in the elderly: yes  Reviewed for potential of harmful drug interactions in the elderly: yes    Follow Up:  Return in about 6 months (around 7/25/2018).     An After Visit Summary and PPPS with all of these plans were given to the patient.

## 2018-01-26 LAB — CEA SERPL-MCNC: 1.98 NG/ML

## 2018-02-06 ENCOUNTER — TELEPHONE (OUTPATIENT)
Dept: INTERNAL MEDICINE | Facility: CLINIC | Age: 73
End: 2018-02-06

## 2018-02-06 NOTE — TELEPHONE ENCOUNTER
Patient is aware    ----- Message from Jeb Diaz MD sent at 1/26/2018  7:50 AM EST -----  Cancer marker is low. Let her know.  Waiting on response from GI, but so far keep scope appt.

## 2018-03-08 ENCOUNTER — OFFICE VISIT (OUTPATIENT)
Dept: GASTROENTEROLOGY | Facility: CLINIC | Age: 73
End: 2018-03-08

## 2018-03-08 VITALS
DIASTOLIC BLOOD PRESSURE: 68 MMHG | WEIGHT: 129.2 LBS | BODY MASS INDEX: 24.39 KG/M2 | HEIGHT: 61 IN | SYSTOLIC BLOOD PRESSURE: 148 MMHG

## 2018-03-08 DIAGNOSIS — K59.00 CONSTIPATION, UNSPECIFIED CONSTIPATION TYPE: Primary | ICD-10-CM

## 2018-03-08 PROCEDURE — 99213 OFFICE O/P EST LOW 20 MIN: CPT | Performed by: INTERNAL MEDICINE

## 2018-03-08 NOTE — PROGRESS NOTES
PATIENT INFORMATION  Cindy Mejia       - 1945    CHIEF COMPLAINT  Chief Complaint   Patient presents with   • Constipation       HISTORY OF PRESENT ILLNESS  Constipation       She is here today in follow up and is doing well. No new issues. She is due for her next flexible sigmoidoscopy. She has chronic constipation and is having bm every 3 days.      REVIEW OF SYSTEMS  Review of Systems   Gastrointestinal: Positive for constipation.   All other systems reviewed and are negative.        ACTIVE PROBLEMS  Patient Active Problem List    Diagnosis   • Medicare annual wellness visit, subsequent [Z00.00]   • Other rosacea [L71.8]   • Carcinoma of colon [C18.9]   • Other hemorrhoids [K64.8]   • Tubular adenoma [D36.9]   • Menopause [Z78.0]   • Health maintenance alteration [Z78.9]   • Needs flu shot [Z23]   • Anxiety [F41.9]   • Uterine prolapse [N81.4]         PAST MEDICAL HISTORY  Past Medical History:   Diagnosis Date   • Anxiety    • Anxiety 2016   • Carcinoma of colon 2018   • Colon polyp    • Constipation    • Health maintenance alteration 2016   • Medicare annual wellness visit, subsequent 2018   • Menopause 3/23/2017   • Needs flu shot 2016   • Other hemorrhoids 2018   • Other rosacea 2018         SURGICAL HISTORY  Past Surgical History:   Procedure Laterality Date   • BLADDER SURGERY     • COLONOSCOPY N/A 2017    Procedure: COLONOSCOPY, polypectomy;  Surgeon: Rabia Mcelroy MD;  Location: [facility] OR;  Service:    • COLONOSCOPY N/A 2017    Procedure: COLONOSCOPY, polypectomy, biopsy, sclerotherapy injection;  Surgeon: Rabia Mcelroy MD;  Location: [facility] OR;  Service:    • HYSTERECTOMY     • TUBAL ABDOMINAL LIGATION           FAMILY HISTORY  Family History   Problem Relation Age of Onset   • Breast cancer Mother    • Pancreatic cancer Father    • Thyroid disease Sister          SOCIAL HISTORY  Social History     Occupational History   • Not on file.  "    Social History Main Topics   • Smoking status: Never Smoker   • Smokeless tobacco: Never Used   • Alcohol use No   • Drug use: No   • Sexual activity: Defer         CURRENT MEDICATIONS    Current Outpatient Prescriptions:   •  hydrocortisone (PROCTO-JENS) 1 % cream rectal cream, Insert  into the rectum 2 (Two) Times a Day., Disp: 1 tube, Rfl: 3  •  metroNIDAZOLE (METROGEL) 1 % gel, Apply  topically Daily., Disp: 60 g, Rfl: 0  •  triamcinolone (KENALOG) 0.025 % cream, , Disp: , Rfl:     ALLERGIES  Penicillins    VITALS  Vitals:    03/08/18 1002   BP: 148/68   Weight: 58.6 kg (129 lb 3.2 oz)   Height: 154.9 cm (60.98\")       LAST RESULTS   Office Visit on 01/25/2018   Component Date Value Ref Range Status   • CEA 01/25/2018 1.98  ng/mL Final    Comment: CEA Reference Range:  Non Smokers:   Less than 3 ng/mL  Smokers:       Less than 5 ng/mL       No results found.    PHYSICAL EXAM  Physical Exam   Constitutional: She is oriented to person, place, and time. She appears well-developed and well-nourished. No distress.   HENT:   Head: Normocephalic and atraumatic.   Mouth/Throat: Oropharynx is clear and moist.   Eyes: EOM are normal. Pupils are equal, round, and reactive to light.   Neck: Normal range of motion. No tracheal deviation present.   Cardiovascular: Normal rate, regular rhythm, normal heart sounds and intact distal pulses.  Exam reveals no gallop and no friction rub.    No murmur heard.  Pulmonary/Chest: Effort normal and breath sounds normal. No stridor. No respiratory distress. She has no wheezes. She has no rales. She exhibits no tenderness.   Abdominal: Soft. Bowel sounds are normal. She exhibits no distension. There is no tenderness. There is no rebound and no guarding.   Musculoskeletal: She exhibits no edema.   Lymphadenopathy:     She has no cervical adenopathy.   Neurological: She is alert and oriented to person, place, and time.   Skin: Skin is warm. She is not diaphoretic.   Psychiatric: She has a " normal mood and affect. Her behavior is normal. Judgment and thought content normal.   Nursing note and vitals reviewed.      ASSESSMENT  Diagnoses and all orders for this visit:    Constipation, unspecified constipation type          PLAN  No Follow-up on file.    Add metamucil   Colace as needed  Schedule flexible sigmoidoscopy-she wants to wait until after Radha zurita discussed.

## 2018-05-24 ENCOUNTER — OFFICE VISIT (OUTPATIENT)
Dept: INTERNAL MEDICINE | Facility: CLINIC | Age: 73
End: 2018-05-24

## 2018-05-24 VITALS
RESPIRATION RATE: 16 BRPM | BODY MASS INDEX: 23.33 KG/M2 | OXYGEN SATURATION: 98 % | HEIGHT: 61 IN | HEART RATE: 73 BPM | DIASTOLIC BLOOD PRESSURE: 80 MMHG | WEIGHT: 123.6 LBS | SYSTOLIC BLOOD PRESSURE: 110 MMHG

## 2018-05-24 DIAGNOSIS — R07.89 OTHER CHEST PAIN: ICD-10-CM

## 2018-05-24 DIAGNOSIS — M25.511 RIGHT SHOULDER PAIN, UNSPECIFIED CHRONICITY: ICD-10-CM

## 2018-05-24 DIAGNOSIS — R25.1 TREMOR OF BOTH HANDS: Primary | ICD-10-CM
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
## 2018-05-24 PROCEDURE — 99215 OFFICE O/P EST HI 40 MIN: CPT | Performed by: INTERNAL MEDICINE

## 2018-05-24 RX ORDER — OMEPRAZOLE 20 MG/1
20 CAPSULE, DELAYED RELEASE ORAL DAILY
Qty: 30 CAPSULE | Refills: 1 | Status: SHIPPED | OUTPATIENT
Start: 2018-05-24 | End: 2018-10-05

## 2018-05-24 NOTE — PATIENT INSTRUCTIONS
Assessment/Plan   Cindy was seen today for shoulder pain.    Diagnoses and all orders for this visit:    Tremor of both hands  -     MRI Brain Without Contrast; Future  -     Ambulatory Referral to Neurology    Fall, initial encounter  -     MRI Brain Without Contrast; Future  -     Ambulatory Referral to Neurology    Right shoulder pain, unspecified chronicity    Other chest pain  -     omeprazole (PRILOSEC) 20 MG capsule; Take 1 capsule by mouth Daily.      Cindy Mejia is a 72 y.o. female who presents with numerous complaints.      1. Shoulder pain is likely MSK: no involvement of rotator cuff on exam.  Pain is located around scapula on back.  Would just monitor for now.  She has good ROM and pain lasts only for a little bit.  Could recommend PT.     2. Chest discomfort, very atypical.  While I was examining patient, she kept breathing in deeply to recreate the sensation.  I suspect a MSK or pleuritic component or esophageal component.  Will check ECG today for evaluation.      3. Fall from bent position backwards: will monitor.  No red flags or signs concerning for intracranial trauma.  No indication for imaging.  Educated patient on signs/symptoms to watch out for.     4. Tremors: Patient does have slight tremors on exam (R>L hand).  She had some cog-wheel rigidity and hand-writing was worse after a few lines on my exam. I am concerned about essential tremors vs parkinson's.  Will refer out to Neurology for evaluation and order MRI brain for further evaluation.        Dev Guevara MD  Resident provider   PGY-4  IM/Ped        Patient seen and examined with resident physician, as well as independently of resident physician. Agree with assessment and plan.    Will start work up. Labs reviewed from wellness in January. She has new cogwheeling and tremor, more anxiety. Worried atypical cp may be esophageal dysmotility. Start ppi.  Will recommend close FU 4 weeks. Possible swallow study should it progress.

## 2018-05-24 NOTE — PROGRESS NOTES
"Subjective   Cindy Mejia is a 72 y.o. female.     History of Present Illness     The following portions of the patient's history were reviewed and updated as appropriate: allergies, current medications, past family history, past medical history, past social history, past surgical history and problem list.     Cindy Mejia is a 72 y.o. female who presents with 4 problems.    She fell and hit her head on Sunday (no N/V, no LOC).  She was bent down and working on a cabinet and fell backwards (hit the back of her head).  She hit her head on concrete.      She has had right shoulder stinging (\"feels pulls like\").  This has been ongoing for 3 months.  If feels sensitive to rub her shoulder.  Shoulder pain is worse in the morning (unable to give a time length).  It pops up randomly.  Pain isn't better with anything b/c resolves so quickly.  No weakness with sensation.      She has an ache in the center of her chest.  It lasts for seconds.  It has been ongoing for 2 months.  She states it is related to the shoulder but can't describe how.  She says she \"maybe\" will have SOA with chest discomfort.  No pain on palpation.  No burning sensation.  Never had stress test before.  No GUADALUPE.  No PND.  It comes and goes and isn't associated with anything she can remark on.      She admits to shakiness.  She has problems writing name.  Hands are very shaky and she can't write very well.  She writes very small.  Feels balance is getting worse.  Right hand is worse than left.  She denies any shuffling gait.  She denies any FH of essential tremor or parkinson's.      Review of Systems   Constitutional: Negative for chills, diaphoresis and fever.   HENT: Negative for congestion and rhinorrhea.    Eyes: Negative for pain and redness.   Respiratory: Positive for shortness of breath. Negative for cough.    Cardiovascular: Positive for chest pain. Negative for palpitations and leg swelling.   Gastrointestinal: Negative for abdominal pain, " constipation, diarrhea, nausea and vomiting.   Neurological: Positive for tremors and headache. Negative for dizziness, seizures, syncope, light-headedness, numbness and memory problem.       Objective   Physical Exam   Constitutional: She is oriented to person, place, and time. She appears well-developed and well-nourished. No distress.   HENT:   Head: Normocephalic and atraumatic.   Right Ear: External ear normal.   Left Ear: External ear normal.   Nose: Nose normal.   Mouth/Throat: Oropharynx is clear and moist. No oropharyngeal exudate.   Eyes: Conjunctivae and EOM are normal. Pupils are equal, round, and reactive to light. Right eye exhibits no discharge. Left eye exhibits no discharge.   Neck: Normal range of motion. Neck supple.   Cardiovascular: Normal rate, regular rhythm and normal heart sounds.  Exam reveals no gallop and no friction rub.    No murmur heard.  Pulmonary/Chest: Effort normal and breath sounds normal. No respiratory distress. She has no wheezes. She has no rales. She exhibits no tenderness.   Abdominal: Soft. Bowel sounds are normal. She exhibits no distension and no mass. There is no tenderness. There is no rebound and no guarding. No hernia.   Musculoskeletal: Normal range of motion. She exhibits no tenderness or deformity.   Lymphadenopathy:     She has no cervical adenopathy.   Neurological: She is alert and oriented to person, place, and time. She exhibits normal muscle tone.   Positive for R>L hand tremor on exam.  ?cogwheel rigidity, pt couldn't relax.  Patient blinked eyes once when I flicked her head but did not blink afterwards.  Handwriting declines after a few lines on exam (messier on exam, somewhat smaller).  No issues with balance on exam.  Coordination intact.     Skin: Skin is warm. Capillary refill takes less than 2 seconds. No rash noted. She is not diaphoretic.   Psychiatric: She has a normal mood and affect. Her behavior is normal.   Nursing note and vitals  reviewed.    Assessment/Plan   Cindy was seen today for shoulder pain.    Diagnoses and all orders for this visit:    Tremor of both hands  -     MRI Brain Without Contrast; Future  -     Ambulatory Referral to Neurology    Fall, initial encounter  -     MRI Brain Without Contrast; Future  -     Ambulatory Referral to Neurology    Right shoulder pain, unspecified chronicity    Other chest pain  -     omeprazole (PRILOSEC) 20 MG capsule; Take 1 capsule by mouth Daily.      Cindy Mejia is a 72 y.o. female who presents with numerous complaints.      1. Shoulder pain is likely MSK: no involvement of rotator cuff on exam.  Pain is located around scapula on back.  Would just monitor for now.  She has good ROM and pain lasts only for a little bit.  Could recommend PT.     2. Chest discomfort, very atypical.  While I was examining patient, she kept breathing in deeply to recreate the sensation.  I suspect a MSK or pleuritic component or esophageal component.  Will check ECG today for evaluation.      3. Fall from bent position backwards: will monitor.  No red flags or signs concerning for intracranial trauma.  No indication for imaging.  Educated patient on signs/symptoms to watch out for.     4. Tremors: Patient does have slight tremors on exam (R>L hand).  She had some cog-wheel rigidity and hand-writing was worse after a few lines on my exam. I am concerned about essential tremors vs parkinson's.  Will refer out to Neurology for evaluation and order MRI brain for further evaluation.        Dev Guevara MD  Resident provider   PGY-4  IM/Ped        Patient seen and examined with resident physician, as well as independently of resident physician. Agree with assessment and plan.    Will start work up. Labs reviewed from wellness in January. She has new cogwheeling and tremor, more anxiety. Worried atypical cp may be esophageal dysmotility. Start ppi.  Will recommend close FU 4 weeks. Possible swallow study should it  progress.

## 2018-06-01 ENCOUNTER — APPOINTMENT (OUTPATIENT)
Dept: MRI IMAGING | Facility: HOSPITAL | Age: 73
End: 2018-06-01
Attending: INTERNAL MEDICINE

## 2018-06-01 RX ORDER — ALPRAZOLAM 0.25 MG/1
0.25 TABLET ORAL 2 TIMES DAILY PRN
Qty: 12 TABLET | Refills: 0 | Status: CANCELLED | OUTPATIENT
Start: 2018-06-01

## 2018-06-01 RX ORDER — ALPRAZOLAM 0.25 MG/1
0.25 TABLET ORAL 2 TIMES DAILY PRN
Qty: 12 TABLET | Refills: 0 | Status: SHIPPED | OUTPATIENT
Start: 2018-06-01 | End: 2018-06-04 | Stop reason: SDUPTHER

## 2018-06-04 NOTE — TELEPHONE ENCOUNTER
PT  AWARE,  DR CRISOSTOMO, PUT MEDICATION ON MED [facility] ON Friday 6/1/18 PRINTED SCRIPT BUT DID NOT SENT TO [facility], TEST TOMORROW,  I CALLED THIS TO JEAN CLAUDE       ----- Message from Eva Meza sent at 5/31/2018  4:07 PM EDT -----  Regarding: MED FOR MRI  ANDRESSA    Patient says she is supposed to get something from PCP for her MRI scheduled Tuesday June 5th.  Unsure of what it is.    [phone]  [facility]  [facility] - [facility]

## 2018-06-05 ENCOUNTER — HOSPITAL ENCOUNTER (OUTPATIENT)
Dept: MRI IMAGING | Facility: HOSPITAL | Age: 73
Discharge: HOME | End: 2018-06-05
Admitting: INTERNAL MEDICINE

## 2018-06-05 DIAGNOSIS — R25.1 TREMOR OF BOTH HANDS: ICD-10-CM
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
## 2018-06-05 PROCEDURE — 70551 MRI BRAIN STEM W/O DYE: CPT

## 2018-06-05 RX ORDER — ALPRAZOLAM 0.25 MG/1
0.25 TABLET ORAL 2 TIMES DAILY PRN
Qty: 12 TABLET | Refills: 0 | OUTPATIENT
Start: 2018-06-05 | End: 2018-10-05

## 2018-06-07 ENCOUNTER — TELEPHONE (OUTPATIENT)
Dept: INTERNAL MEDICINE | Facility: CLINIC | Age: 73
End: 2018-06-07

## 2018-06-07 NOTE — TELEPHONE ENCOUNTER
Patient advised. She is going to call us for f/u after neuro appt tomorrow.    ----- Message from Jeb Diaz MD sent at 6/6/2018  8:14 AM EDT -----  The MRI is in good shape.  I want to see her after her neurology consult.  She has typical changes found with age.

## 2018-06-12 ENCOUNTER — OFFICE VISIT (OUTPATIENT)
Dept: INTERNAL MEDICINE | Facility: CLINIC | Age: 73
End: 2018-06-12

## 2018-06-12 VITALS
OXYGEN SATURATION: 99 % | HEIGHT: 61 IN | RESPIRATION RATE: 16 BRPM | HEART RATE: 66 BPM | WEIGHT: 126 LBS | BODY MASS INDEX: 23.79 KG/M2

## 2018-06-12 DIAGNOSIS — R25.1 TREMOR OF BOTH HANDS: ICD-10-CM

## 2018-06-12 DIAGNOSIS — E53.8 B12 DEFICIENCY: Primary | ICD-10-CM

## 2018-06-12 PROCEDURE — 96372 THER/PROPH/DIAG INJ SC/IM: CPT | Performed by: INTERNAL MEDICINE

## 2018-06-12 PROCEDURE — 99214 OFFICE O/P EST MOD 30 MIN: CPT | Performed by: INTERNAL MEDICINE

## 2018-06-12 RX ORDER — CYANOCOBALAMIN 1000 UG/ML
1000 INJECTION, SOLUTION INTRAMUSCULAR; SUBCUTANEOUS
Status: DISCONTINUED | OUTPATIENT
Start: 2018-06-12 | End: 2019-09-24

## 2018-06-12 RX ORDER — PROPRANOLOL HCL 60 MG
60 CAPSULE, EXTENDED RELEASE 24HR ORAL DAILY
COMMUNITY
Start: 2018-06-08 | End: 2022-01-01

## 2018-06-12 RX ADMIN — CYANOCOBALAMIN 1000 MCG: 1000 INJECTION, SOLUTION INTRAMUSCULAR; SUBCUTANEOUS at 11:07

## 2018-06-12 NOTE — PROGRESS NOTES
Cindy Mejia is a 72 y.o. female, who presents with a chief complaint of   Chief Complaint   Patient presents with   • Neurologic Problem       HPI     71 yo female with pmhx recent tremor.  I reviewed her consult note [facility] neurolog[facility] and I am so grateful for their exam and reassurance.  She is taking propranolol with no real change.  She is not having dizziness.. No real change in tremor but only taken 4 days.    She is not taking alprazolam, made her very sleepy, had for MRI. She does have profound fatigue.      The following portions of the patient's history were reviewed and updated as appropriate: allergies, current medications, past family history, past medical history, past social history, past surgical history and problem list.    Allergies: Penicillins    Current Outpatient Prescriptions:   •  ALPRAZolam (XANAX) 0.25 MG tablet, Take 1 tablet by mouth 2 (Two) Times a Day As Needed for Anxiety (prior to scan)., Disp: 12 tablet, Rfl: 0  •  hydrocortisone (PROCTO-JENS) 1 % cream rectal cream, Insert  into the rectum 2 (Two) Times a Day., Disp: 1 tube, Rfl: 3  •  metroNIDAZOLE (METROGEL) 1 % gel, Apply  topically Daily., Disp: 60 g, Rfl: 0  •  omeprazole (PRILOSEC) 20 MG capsule, Take 1 capsule by mouth Daily., Disp: 30 capsule, Rfl: 1  •  propranolol LA (INDERAL LA) 60 MG 24 hr capsule, Take 60 mg by mouth., Disp: , Rfl:   •  triamcinolone (KENALOG) 0.025 % cream, , Disp: , Rfl:     Current Facility-Administered Medications:   •  cyanocobalamin injection 1,000 mcg, 1,000 mcg, Intramuscular, Q28 Days, Jeb Diaz MD  There are no discontinued medications.    Review of Systems   Constitutional: Positive for fatigue. Negative for activity change and diaphoresis.   Respiratory: Negative.    Gastrointestinal: Negative.    Genitourinary: Negative.    Skin: Negative.    Neurological: Positive for tremors. Negative for dizziness and light-headedness.   Hematological: Negative.    Psychiatric/Behavioral: The  "patient is nervous/anxious.    All other systems reviewed and are negative.            Pulse 66   Resp 16   Ht 154.9 cm (61\")   Wt 57.2 kg (126 lb)   SpO2 99%   BMI 23.81 kg/m²       Physical Exam   Constitutional: She is oriented to person, place, and time. She appears well-developed and well-nourished.   HENT:   Head: Normocephalic and atraumatic.   Right Ear: External ear normal.   Left Ear: External ear normal.   Eyes: EOM are normal. Pupils are equal, round, and reactive to light.   Neck: Normal range of motion. Neck supple. No thyromegaly present.   Cardiovascular: Normal rate, regular rhythm and normal heart sounds.    No murmur heard.  Pulmonary/Chest: Effort normal and breath sounds normal. No respiratory distress.   Musculoskeletal: She exhibits edema.   Neurological: She is alert and oriented to person, place, and time.   Some resting tremor   Skin: Skin is warm and dry.   Psychiatric: She has a normal mood and affect. Her behavior is normal.   Vitals reviewed.      Lab Results (most recent)     None      B12 <100 6/22/2018      Results for orders placed or performed in visit on 01/25/18   CEA   Result Value Ref Range    CEA 1.98 ng/mL           Cindy was seen today for neurologic problem.    Diagnoses and all orders for this visit:    B12 deficiency  -     cyanocobalamin injection 1,000 mcg; Inject 1 mL into the shoulder, thigh, or buttocks Every 28 (Twenty-Eight) Days.    Tremor of both hands      Increase fluids  BP by /82  Encouraged to stay on propranolol  Hopefully [id number] support her fatigue.  Shot today, and repeat one week  Then monthly  Start oral sublingual b12    Return in about 3 months (around 9/12/2018) for B12 shot one month.    Jeb Diaz MD  06/12/2018    "

## 2018-06-19 ENCOUNTER — CLINICAL SUPPORT (OUTPATIENT)
Dept: INTERNAL MEDICINE | Facility: CLINIC | Age: 73
End: 2018-06-19

## 2018-06-19 DIAGNOSIS — E53.8 VITAMIN B 12 DEFICIENCY: Primary | ICD-10-CM

## 2018-06-19 PROCEDURE — 96372 THER/PROPH/DIAG INJ SC/IM: CPT | Performed by: INTERNAL MEDICINE

## 2018-06-19 RX ORDER — CYANOCOBALAMIN 1000 UG/ML
1000 INJECTION, SOLUTION INTRAMUSCULAR; SUBCUTANEOUS
Status: DISCONTINUED | OUTPATIENT
Start: 2018-06-19 | End: 2019-09-24

## 2018-06-19 RX ADMIN — CYANOCOBALAMIN 1000 MCG: 1000 INJECTION, SOLUTION INTRAMUSCULAR; SUBCUTANEOUS at 09:12

## 2018-07-05 ENCOUNTER — OFFICE VISIT (OUTPATIENT)
Dept: INTERNAL MEDICINE | Facility: CLINIC | Age: 73
End: 2018-07-05

## 2018-07-05 ENCOUNTER — TELEPHONE (OUTPATIENT)
Dept: INTERNAL MEDICINE | Facility: CLINIC | Age: 73
End: 2018-07-05

## 2018-07-05 VITALS
OXYGEN SATURATION: 97 % | HEIGHT: 61 IN | DIASTOLIC BLOOD PRESSURE: 66 MMHG | HEART RATE: 67 BPM | WEIGHT: 126 LBS | BODY MASS INDEX: 23.79 KG/M2 | SYSTOLIC BLOOD PRESSURE: 114 MMHG | RESPIRATION RATE: 18 BRPM

## 2018-07-05 DIAGNOSIS — E53.8 B12 DEFICIENCY: ICD-10-CM

## 2018-07-05 DIAGNOSIS — R25.1 TREMOR OF BOTH HANDS: Primary | ICD-10-CM

## 2018-07-05 LAB — VIT B12 SERPL-MCNC: 354 PG/ML

## 2018-07-05 PROCEDURE — 99214 OFFICE O/P EST MOD 30 MIN: CPT | Performed by: INTERNAL MEDICINE

## 2018-07-05 PROCEDURE — 96372 THER/PROPH/DIAG INJ SC/IM: CPT | Performed by: INTERNAL MEDICINE

## 2018-07-05 RX ADMIN — CYANOCOBALAMIN 1000 MCG: 1000 INJECTION, SOLUTION INTRAMUSCULAR; SUBCUTANEOUS at 09:12

## 2018-07-05 NOTE — PATIENT INSTRUCTIONS
Vitamin B12 Deficiency  Vitamin B12 deficiency means that your body is not getting enough vitamin B12. Your body needs vitamin B12 for important bodily functions. If you do not have enough vitamin B12 in your body, you can have health problems.  Follow these instructions at home:  · Take supplements only as told by your doctor. Follow the directions carefully.  · Get any shots (injections) as told by your doctor. Do not miss your visits to the doctor.  · Eat lots of healthy foods that contain vitamin B12. Ask your doctor if you should work with someone who is trained in how food affects health (dietitian). Foods that contain vitamin B12 include:  ? Meat.  ? Meat from birds (poultry).  ? Fish.  ? Eggs.  ? Cereal and dairy products that are fortified. This means that vitamin B12 has been added to the food. Check the label on the package to see if the food is fortified.  · Do not drink too much (do not abuse) alcohol.  · Keep all follow-up visits as told by your doctor. This is important.  Contact a doctor if:  · Your symptoms come back.  Get help right away if:  · You have trouble breathing.  · You have chest pain.  · You get dizzy.  · You pass out (lose consciousness).  This information is not intended to replace advice given to you by your health care provider. Make sure you discuss any questions you have with your health care provider.  Document Released: 12/06/2012 Document Revised: 05/25/2017 Document Reviewed: 05/04/2016  [employer] Interactive Patient Education © 2018 [employer]    NH B12 <1      Cindy was seen today for tremors.    Diagnoses and all orders for this visit:    Tremor of both hands    B12 deficiency  -     Vitamin B12    Some confusion over B12 oral.   Will check level today  Start 1000 sublingual  Recommend healthy eating and include b12 in diet.   Shot today after level drawn  Reassurance about scan.   Continue propranolol does see to be helping.      Return in about 3 months (around  10/5/2018).    Jeb Diaz MD  07/05/2018

## 2018-07-05 NOTE — TELEPHONE ENCOUNTER
Patient notified.   ----- Message from Jeb Diaz MD sent at 7/5/2018  4:57 PM EDT -----  B12 better but we are not quite there. Tell her start oral B12 like I said, will recheck in 2 months on oral.

## 2018-07-05 NOTE — PROGRESS NOTES
Cindy Mejia is a 72 y.o. female, who presents with a chief complaint of   Chief Complaint   Patient presents with   • Tremors       HPI   71 yo female with tremor and cogwheeling, found with dr. Baxter to be very B12 def despite no anemia or macrocytosis. On oral B12 trial here for recheck. She heard me say that but has not started her pills. Had 2 shots so far.     She also has imaging study ordered with dr. Baxter.  She did have an episode of falling back since our last visit while pulling down a drawer.       The following portions of the patient's history were reviewed and updated as appropriate: allergies, current medications, past family history, past medical history, past social history, past surgical history and problem list.    Allergies: Penicillins    Current Outpatient Prescriptions:   •  ALPRAZolam (XANAX) 0.25 MG tablet, Take 1 tablet by mouth 2 (Two) Times a Day As Needed for Anxiety (prior to scan)., Disp: 12 tablet, Rfl: 0  •  hydrocortisone (PROCTO-JENS) 1 % cream rectal cream, Insert  into the rectum 2 (Two) Times a Day., Disp: 1 tube, Rfl: 3  •  metroNIDAZOLE (METROGEL) 1 % gel, Apply  topically Daily., Disp: 60 g, Rfl: 0  •  omeprazole (PRILOSEC) 20 MG capsule, Take 1 capsule by mouth Daily., Disp: 30 capsule, Rfl: 1  •  propranolol LA (INDERAL LA) 60 MG 24 hr capsule, Take 60 mg by mouth., Disp: , Rfl:   •  triamcinolone (KENALOG) 0.025 % cream, , Disp: , Rfl:     Current Facility-Administered Medications:   •  cyanocobalamin injection 1,000 mcg, 1,000 mcg, Intramuscular, Q28 Days, Jeb Diaz MD, 1,000 mcg at 06/12/18 1107  •  cyanocobalamin injection 1,000 mcg, 1,000 mcg, Intramuscular, Q28 Days, Jeb Diaz MD, 1,000 mcg at 06/19/18 0912  There are no discontinued medications.    Review of Systems   Constitutional: Negative.    HENT: Negative.    Respiratory: Negative.    Cardiovascular: Negative.    Gastrointestinal: Negative.    Neurological: Positive for dizziness and  "tremors.   Psychiatric/Behavioral: The patient is nervous/anxious.    All other systems reviewed and are negative.            /66   Pulse 67   Resp 18   Ht 154.9 cm (61\")   Wt 57.2 kg (126 lb)   SpO2 97%   BMI 23.81 kg/m²       Physical Exam   Constitutional: She is oriented to person, place, and time. She appears well-developed and well-nourished.   HENT:   Head: Normocephalic and atraumatic.   Right Ear: External ear normal.   Eyes: Pupils are equal, round, and reactive to light. Left eye exhibits no discharge.   Abdominal: Soft.   Musculoskeletal: She exhibits no edema.   Neurological: She is alert and oriented to person, place, and time.   I think tremor is better today  ++cogwheeling   Psychiatric: She has a normal mood and affect. Her behavior is normal.   Some forgetfulness today but oriented   Nursing note and vitals reviewed.      Lab Results (most recent)     None          Results for orders placed or performed in visit on 01/25/18   CEA   Result Value Ref Range    CEA 1.98 ng/mL     NH B12 <1      Cindy was seen today for tremors.    Diagnoses and all orders for this visit:    Tremor of both hands    B12 deficiency  -     Vitamin B12    Some confusion over B12 oral.   Will check level today  Start 1000 sublingual  Recommend healthy eating and include b12 in diet.   Shot today after level drawn  Reassurance about scan.   Continue propranolol does see to be helping.      Return in about 3 months (around 10/5/2018).    Jeb Diaz MD  07/05/2018    "

## 2018-10-05 ENCOUNTER — OFFICE VISIT (OUTPATIENT)
Dept: INTERNAL MEDICINE | Facility: CLINIC | Age: 73
End: 2018-10-05

## 2018-10-05 VITALS
HEIGHT: 61 IN | SYSTOLIC BLOOD PRESSURE: 150 MMHG | WEIGHT: 127 LBS | RESPIRATION RATE: 16 BRPM | TEMPERATURE: 98.4 F | HEART RATE: 65 BPM | BODY MASS INDEX: 23.98 KG/M2 | DIASTOLIC BLOOD PRESSURE: 80 MMHG | OXYGEN SATURATION: 96 %

## 2018-10-05 DIAGNOSIS — E53.8 B12 DEFICIENCY: Primary | ICD-10-CM

## 2018-10-05 DIAGNOSIS — F41.9 ANXIETY: ICD-10-CM

## 2018-10-05 DIAGNOSIS — Z23 NEEDS FLU SHOT: ICD-10-CM

## 2018-10-05 LAB — VIT B12 SERPL-MCNC: 857 PG/ML

## 2018-10-05 PROCEDURE — G0008 ADMIN INFLUENZA VIRUS VAC: HCPCS | Performed by: INTERNAL MEDICINE

## 2018-10-05 PROCEDURE — 99214 OFFICE O/P EST MOD 30 MIN: CPT | Performed by: INTERNAL MEDICINE

## 2018-10-05 PROCEDURE — 90662 IIV NO PRSV INCREASED AG IM: CPT | Performed by: INTERNAL MEDICINE

## 2018-10-05 RX ORDER — ESCITALOPRAM OXALATE 5 MG/1
TABLET ORAL
COMMUNITY
Start: 2018-07-23 | End: 2018-10-05

## 2018-10-05 RX ORDER — ESCITALOPRAM OXALATE 10 MG/1
10 TABLET ORAL DAILY
Qty: 30 TABLET | Refills: 3 | Status: SHIPPED | OUTPATIENT
Start: 2018-10-05 | End: 2019-02-01 | Stop reason: SDUPTHER

## 2018-10-05 NOTE — PATIENT INSTRUCTIONS
Cindy was seen today for follow-up.    Diagnoses and all orders for this visit:    B12 deficiency  -     Vitamin B12    Needs flu shot  -     Fluzone High Dose =>65Years    Anxiety  -     escitalopram (LEXAPRO) 10 MG tablet; Take 1 tablet by mouth Daily.        Due to concerns about absorption and if appropriately increased will stay on oral, otherwise start injections monthly fo rher.   Will increase her to lexapro 10 mg po daily    Return in about 3 months (around 1/5/2019).

## 2018-10-05 NOTE — PROGRESS NOTES
Cindy Mejia is a 72 y.o. female, who presents with a chief complaint of   Chief Complaint   Patient presents with   • Follow-up     Vit B 12 Def       HPI     73 yo female with recent PD followed Dr. Baxter at [facility]. She is still very anxious about her health and reports tremor with her writing. She is not yet on sinemet as her symptoms seems more from anxiety than her PD.    She is open to increasing her lexapro but is not getting much relief from her propranolol.     She is exercising regularly.     The following portions of the patient's history were reviewed and updated as appropriate: allergies, current medications, past family history, past medical history, past social history, past surgical history and problem list.    Allergies: Penicillins    Current Outpatient Prescriptions:   •  propranolol LA (INDERAL LA) 60 MG 24 hr capsule, Take 60 mg by mouth., Disp: , Rfl:   •  escitalopram (LEXAPRO) 10 MG tablet, Take 1 tablet by mouth Daily., Disp: 30 tablet, Rfl: 3    Current Facility-Administered Medications:   •  cyanocobalamin injection 1,000 mcg, 1,000 mcg, Intramuscular, Q28 Days, Jeb Diaz MD, 1,000 mcg at 06/12/18 1107  •  cyanocobalamin injection 1,000 mcg, 1,000 mcg, Intramuscular, Q28 Days, Jeb Diaz MD, 1,000 mcg at 07/05/18 0912  Medications Discontinued During This Encounter   Medication Reason   • ALPRAZolam (XANAX) 0.25 MG tablet *Therapy completed   • omeprazole (PRILOSEC) 20 MG capsule *Therapy completed   • triamcinolone (KENALOG) 0.025 % cream *Therapy completed   • metroNIDAZOLE (METROGEL) 1 % gel *Therapy completed   • hydrocortisone (PROCTO-JENS) 1 % cream rectal cream *Therapy completed   • escitalopram (LEXAPRO) 5 MG tablet        Review of Systems   Constitutional: Negative.    HENT: Negative.    Respiratory: Negative.    Cardiovascular: Negative.         Bp elevated today due to anxiety   Gastrointestinal: Negative.    Genitourinary: Negative.    Neurological:  "Positive for tremors.   Psychiatric/Behavioral: The patient is nervous/anxious.              /80   Pulse 65   Temp 98.4 °F (36.9 °C)   Resp 16   Ht 154.9 cm (61\")   Wt 57.6 kg (127 lb)   SpO2 96%   BMI 24.00 kg/m²       Physical Exam   Constitutional: She is oriented to person, place, and time. She appears well-developed and well-nourished.   HENT:   Head: Normocephalic and atraumatic.   Right Ear: External ear normal.   Left Ear: External ear normal.   Eyes: Pupils are equal, round, and reactive to light. EOM are normal.   Neck: Normal range of motion. Neck supple.   Cardiovascular: Normal rate, regular rhythm and normal heart sounds.    No murmur heard.  Pulmonary/Chest: Effort normal and breath sounds normal.   Musculoskeletal:   No resting tremor, some mild cogwheeling today. Normal gait   Neurological: She is alert and oriented to person, place, and time.   Skin: Skin is warm and dry.   Psychiatric: She has a normal mood and affect. Her behavior is normal.   Vitals reviewed.      Lab Results (most recent)     None          Results for orders placed or performed in visit on 07/05/18   Vitamin B12   Result Value Ref Range    Vitamin B-12 354 232-1,245 pg/mL           Cindy was seen today for follow-up.    Diagnoses and all orders for this visit:    B12 deficiency  -     Vitamin B12    Needs flu shot  -     Fluzone High Dose =>65Years    Anxiety  -     escitalopram (LEXAPRO) 10 MG tablet; Take 1 tablet by mouth Daily.        Due to concerns about absorption and if appropriately increased will stay on oral, otherwise start injections monthly fo rher.   Will increase her to lexapro 10 mg po daily    Return in about 3 months (around 1/5/2019).    Jeb Diaz MD  10/05/2018    "

## 2019-01-04 ENCOUNTER — OFFICE VISIT (OUTPATIENT)
Dept: INTERNAL MEDICINE | Facility: CLINIC | Age: 74
End: 2019-01-04

## 2019-01-04 VITALS
HEIGHT: 61 IN | OXYGEN SATURATION: 96 % | WEIGHT: 127 LBS | HEART RATE: 64 BPM | SYSTOLIC BLOOD PRESSURE: 140 MMHG | DIASTOLIC BLOOD PRESSURE: 80 MMHG | TEMPERATURE: 97.9 F | BODY MASS INDEX: 23.98 KG/M2 | RESPIRATION RATE: 16 BRPM

## 2019-01-04 DIAGNOSIS — F41.9 ANXIETY: Primary | ICD-10-CM

## 2019-01-04 DIAGNOSIS — E53.8 B12 DEFICIENCY: ICD-10-CM

## 2019-01-04 DIAGNOSIS — C18.9 CARCINOMA OF COLON (HCC): ICD-10-CM

## 2019-01-04 DIAGNOSIS — Z23 NEED FOR VACCINATION FOR PNEUMOCOCCUS: ICD-10-CM

## 2019-01-04 DIAGNOSIS — G20 PARKINSON DISEASE (HCC): ICD-10-CM

## 2019-01-04 DIAGNOSIS — I10 ESSENTIAL HYPERTENSION: ICD-10-CM

## 2019-01-04 PROBLEM — G20.A1 PARKINSON DISEASE: Status: ACTIVE | Noted: 2019-01-04

## 2019-01-04 PROCEDURE — G0009 ADMIN PNEUMOCOCCAL VACCINE: HCPCS | Performed by: INTERNAL MEDICINE

## 2019-01-04 PROCEDURE — 90670 PCV13 VACCINE IM: CPT | Performed by: INTERNAL MEDICINE

## 2019-01-04 PROCEDURE — 99214 OFFICE O/P EST MOD 30 MIN: CPT | Performed by: INTERNAL MEDICINE

## 2019-01-04 RX ORDER — UBIDECARENONE 75 MG
50 CAPSULE ORAL DAILY
COMMUNITY

## 2019-01-04 NOTE — PROGRESS NOTES
"      Cindy Mejia is a 73 y.o. female, who presents with a chief complaint of   Chief Complaint   Patient presents with   • Anxiety       HPI     74 yo female with new onset PD doing well carbi-dopa and improved. Does now see Dr. Ant Baxter anually.      She is using CBD oil and does think it calms her. She did not share this with her neurologist.     She is still taking her lexapro as well.  She does not feel like she is having panic attacks. She states she is aways nervous.     She does not have feelings of panic attacks. No SI or HI.    The following portions of the patient's history were reviewed and updated as appropriate: allergies, current medications, past family history, past medical history, past social history, past surgical history and problem list.    Allergies: Penicillins    Current Outpatient Medications:   •  escitalopram (LEXAPRO) 10 MG tablet, Take 1 tablet by mouth Daily., Disp: 30 tablet, Rfl: 3  •  propranolol LA (INDERAL LA) 60 MG 24 hr capsule, Take 60 mg by mouth., Disp: , Rfl:   •  vitamin B-12 (CYANOCOBALAMIN) 100 MCG tablet, Take 50 mcg by mouth Daily., Disp: , Rfl:     Current Facility-Administered Medications:   •  cyanocobalamin injection 1,000 mcg, 1,000 mcg, Intramuscular, Q28 Days, Jeb Diaz MD, 1,000 mcg at 06/12/18 1107  •  cyanocobalamin injection 1,000 mcg, 1,000 mcg, Intramuscular, Q28 Days, Jeb Diaz MD, 1,000 mcg at 07/05/18 0912  There are no discontinued medications.    Review of Systems   Constitutional: Negative.    HENT: Negative.    Respiratory: Negative.    Cardiovascular: Negative.    Gastrointestinal: Negative.    Genitourinary: Negative.    Musculoskeletal: Negative.    Neurological: Positive for tremors.        Very well controlled right now.   Psychiatric/Behavioral: Negative for dysphoric mood. The patient is nervous/anxious.              /80   Pulse 64   Temp 97.9 °F (36.6 °C)   Resp 16   Ht 154.9 cm (61\")   Wt 57.6 kg (127 lb)   " SpO2 96%   BMI 24.00 kg/m²       Physical Exam   Constitutional: She is oriented to person, place, and time. She appears well-developed and well-nourished.   HENT:   Head: Normocephalic and atraumatic.   Right Ear: External ear normal.   Left Ear: External ear normal.   Mouth/Throat: Oropharynx is clear and moist.   Eyes: EOM are normal. Pupils are equal, round, and reactive to light.   Neck: Normal range of motion. Neck supple. No thyromegaly present.   Cardiovascular: Normal rate, regular rhythm and normal heart sounds.   No murmur heard.  Pulmonary/Chest: Effort normal and breath sounds normal. No respiratory distress.   Musculoskeletal: She exhibits no edema.   Neurological: She is alert and oriented to person, place, and time.   Skin: Skin is warm and dry. Capillary refill takes less than 2 seconds.   Psychiatric: She has a normal mood and affect. Her behavior is normal.   Vitals reviewed.      Lab Results (most recent)     None          Results for orders placed or performed in visit on 10/05/18   Vitamin B12   Result Value Ref Range    Vitamin B-12 857 232-1,245 pg/mL           Cindy was seen today for anxiety.    Diagnoses and all orders for this visit:    Anxiety  -     TSH  -     Urinalysis With Culture If Indicated - Urine, Clean Catch    B12 deficiency  -     Vitamin B12  -     CBC & Differential    Parkinson disease (CMS/HCC)  -     Urinalysis With Culture If Indicated - Urine, Clean Catch    Essential hypertension  -     Comprehensive Metabolic Panel  -     CBC & Differential  -     Urinalysis With Culture If Indicated - Urine, Clean Catch    Carcinoma of colon (CMS/HCC)    Need for vaccination for pneumococcus  -     Pneumococcal Conjugate Vaccine 13-Valent All      Continue lexapro for now  Continue B12 sublingual for now  Ok for cbd oil  Reiterate need for colonoscopy, will send referral to ensure she gets scheduled  Prevnar today.    Return in about 4 months (around 5/4/2019), or if symptoms  worsen or fail to improve, for Recheck.    Jeb Diaz MD  01/04/2019

## 2019-01-09 DIAGNOSIS — N30.90 CYSTITIS: Primary | ICD-10-CM

## 2019-01-09 DIAGNOSIS — Z12.31 ENCOUNTER FOR SCREENING MAMMOGRAM FOR MALIGNANT NEOPLASM OF BREAST: ICD-10-CM

## 2019-01-09 DIAGNOSIS — Z78.0 MENOPAUSE: Primary | ICD-10-CM

## 2019-01-09 LAB
ALBUMIN SERPL-MCNC: 4.5 G/DL (ref 3.5–5.2)
ALBUMIN/GLOB SERPL: 1.7 G/DL
ALP SERPL-CCNC: 77 U/L (ref 40–129)
ALT SERPL-CCNC: 9 U/L (ref 5–33)
APPEARANCE UR: ABNORMAL
AST SERPL-CCNC: 17 U/L (ref 5–32)
BACTERIA #/AREA URNS HPF: ABNORMAL /HPF
BACTERIA UR CULT: ABNORMAL
BACTERIA UR CULT: ABNORMAL
BASOPHILS # BLD AUTO: 0.07 10*3/MM3 (ref 0–0.2)
BASOPHILS NFR BLD AUTO: 1 % (ref 0–2)
BILIRUB SERPL-MCNC: 0.4 MG/DL (ref 0.2–1.2)
BILIRUB UR QL STRIP: NEGATIVE
BUN SERPL-MCNC: 13 MG/DL (ref 8–23)
BUN/CREAT SERPL: 21.7 (ref 7–25)
CALCIUM SERPL-MCNC: 9.4 MG/DL (ref 8.8–10.5)
CASTS URNS MICRO: ABNORMAL
CASTS URNS QL MICRO: PRESENT /LPF
CHLORIDE SERPL-SCNC: 102 MMOL/L (ref 98–107)
CO2 SERPL-SCNC: 29 MMOL/L (ref 22–29)
COLOR UR: YELLOW
CREAT SERPL-MCNC: 0.6 MG/DL (ref 0.57–1)
CRYSTALS URNS MICRO: ABNORMAL
EOSINOPHIL # BLD AUTO: 0.24 10*3/MM3 (ref 0.1–0.3)
EOSINOPHIL NFR BLD AUTO: 3.4 % (ref 0–4)
EPI CELLS #/AREA URNS HPF: ABNORMAL /HPF
ERYTHROCYTE [DISTWIDTH] IN BLOOD BY AUTOMATED COUNT: 12.9 % (ref 11.5–14.5)
GLOBULIN SER CALC-MCNC: 2.6 GM/DL
GLUCOSE SERPL-MCNC: 88 MG/DL (ref 65–99)
GLUCOSE UR QL: NEGATIVE
HCT VFR BLD AUTO: 38.6 % (ref 37–47)
HGB BLD-MCNC: 12 G/DL (ref 12–16)
HGB UR QL STRIP: NEGATIVE
IMM GRANULOCYTES # BLD AUTO: 0.02 10*3/MM3 (ref 0–0.03)
IMM GRANULOCYTES NFR BLD AUTO: 0.3 % (ref 0–0.5)
KETONES UR QL STRIP: NEGATIVE
LEUKOCYTE ESTERASE UR QL STRIP: ABNORMAL
LYMPHOCYTES # BLD AUTO: 0.96 10*3/MM3 (ref 0.6–4.8)
LYMPHOCYTES NFR BLD AUTO: 13.7 % (ref 20–45)
MCH RBC QN AUTO: 29.3 PG (ref 27–31)
MCHC RBC AUTO-ENTMCNC: 31.1 G/DL (ref 31–37)
MCV RBC AUTO: 94.1 FL (ref 81–99)
MICRO URNS: ABNORMAL
MONOCYTES # BLD AUTO: 0.69 10*3/MM3 (ref 0–1)
MONOCYTES NFR BLD AUTO: 9.8 % (ref 3–8)
MUCOUS THREADS URNS QL MICRO: PRESENT /HPF
NEUTROPHILS # BLD AUTO: 5.03 10*3/MM3 (ref 1.5–8.3)
NEUTROPHILS NFR BLD AUTO: 71.8 % (ref 45–70)
NITRITE UR QL STRIP: POSITIVE
NRBC BLD AUTO-RTO: 0 /100 WBC (ref 0–0)
OTHER ANTIBIOTIC SUSC ISLT: ABNORMAL
PH UR STRIP: 6 [PH] (ref 5–7.5)
PLATELET # BLD AUTO: 299 10*3/MM3 (ref 140–500)
POTASSIUM SERPL-SCNC: 4.3 MMOL/L (ref 3.5–5.2)
PROT SERPL-MCNC: 7.1 G/DL (ref 6–8.5)
PROT UR QL STRIP: NEGATIVE
RBC # BLD AUTO: 4.1 10*6/MM3 (ref 4.2–5.4)
RBC #/AREA URNS HPF: ABNORMAL /HPF
SODIUM SERPL-SCNC: 140 MMOL/L (ref 136–145)
SP GR UR: 1.01 (ref 1–1.03)
TSH SERPL DL<=0.005 MIU/L-ACNC: 2.77 MIU/ML (ref 0.27–4.2)
UNIDENT CRYS URNS QL MICRO: PRESENT
URINALYSIS REFLEX: ABNORMAL
UROBILINOGEN UR STRIP-MCNC: 0.2 MG/DL (ref 0.2–1)
VIT B12 SERPL-MCNC: 978 PG/ML
WBC # BLD AUTO: 7.01 10*3/MM3 (ref 4.8–10.8)
WBC #/AREA URNS HPF: ABNORMAL /HPF

## 2019-01-09 RX ORDER — NITROFURANTOIN 25; 75 MG/1; MG/1
100 CAPSULE ORAL 2 TIMES DAILY
Qty: 14 CAPSULE | Refills: 0 | Status: SHIPPED | OUTPATIENT
Start: 2019-01-09 | End: 2019-09-24

## 2019-01-10 ENCOUNTER — TELEPHONE (OUTPATIENT)
Dept: INTERNAL MEDICINE | Facility: CLINIC | Age: 74
End: 2019-01-10

## 2019-01-10 NOTE — TELEPHONE ENCOUNTER
Spoke with patient she said that it is improving with hydrocortisone cream. Told patient to call me back if any any further symptoms.     ----- Message from Lilliam Oro sent at 1/9/2019  1:53 PM EST -----  Regarding: REACTION TO PNEUMONIA SHOT  Contact: [phone]  ANDRESSA PT    Patient said that she got a shot here last week and now she has a little red rash around the injection site.  She is asking that someone call her back. She is also inquiring about orders for her mammogram    Thanks!  lilliam

## 2019-01-11 ENCOUNTER — TELEPHONE (OUTPATIENT)
Dept: INTERNAL MEDICINE | Facility: CLINIC | Age: 74
End: 2019-01-11

## 2019-01-11 NOTE — TELEPHONE ENCOUNTER
Patient is aware and on meds      ----- Message from Jeb Diaz MD sent at 1/9/2019  4:19 PM EST -----  Vitamin B12 coming up. Her urine is infected, sent in antibiotic.  Her thyroid is good.

## 2019-01-11 NOTE — TELEPHONE ENCOUNTER
Advised Efrain to reschedule when actually due in April.    ----- Message from All Oro sent at 1/11/2019  3:14 PM EST -----  Regarding: DEXASCAN SCHEDULED FOR 01-14 ? important  KINDIG PT    I received a call from Efrain, in scheduling to say that this patient is scheduled for a dexascan on Monday, 01-14, and the insurance will not pay for this until April unless it is recoded as necessary, and the diagnosis is changed.    Please call him to change this before 4 pm today, or he is going to contact the patient to cancel it. ( [phone])    Thanks!  all

## 2019-01-14 ENCOUNTER — APPOINTMENT (OUTPATIENT)
Dept: BONE DENSITY | Facility: HOSPITAL | Age: 74
End: 2019-01-14
Attending: INTERNAL MEDICINE

## 2019-01-14 ENCOUNTER — HOSPITAL ENCOUNTER (OUTPATIENT)
Dept: MAMMOGRAPHY | Facility: HOSPITAL | Age: 74
Discharge: HOME | End: 2019-01-14
Attending: INTERNAL MEDICINE | Admitting: INTERNAL MEDICINE

## 2019-01-14 PROCEDURE — 77067 SCR MAMMO BI INCL CAD: CPT

## 2019-01-14 PROCEDURE — 77063 BREAST TOMOSYNTHESIS BI: CPT

## 2019-01-15 ENCOUNTER — TELEPHONE (OUTPATIENT)
Dept: INTERNAL MEDICINE | Facility: CLINIC | Age: 74
End: 2019-01-15

## 2019-01-15 NOTE — TELEPHONE ENCOUNTER
Patient is aware    ----- Message from Jeb Diaz MD sent at 1/15/2019  7:52 AM EST -----  Mammogram neg, repeat one year

## 2019-01-15 NOTE — TELEPHONE ENCOUNTER
----- Message from Jeb Diaz MD sent at 1/9/2019  4:45 PM EST -----  Regarding: RE: REACTION TO PNEUMONIA SHOT  Contact: [phone]  I think a local rreaction is okay, just needs to do warm compress and benadryl if itchy.  Her mammogram and dexa should be ordered.  ----- Message -----  From: Geena Olivera MA  Sent: 1/9/2019   2:44 PM  To: Jeb Diaz MD  Subject: FW: REACTION TO PNEUMONIA SHOT                   Would you put then order in for her Luciano..Dg  ----- Message -----  From: Lilliam Oro  Sent: 1/9/2019   1:53 PM  To: Eric Jeter [facility]  Subject: REACTION TO PNEUMONIA SHOT                       ANDRESSA PT    Patient said that she got a shot here last week and now she has a little red rash around the injection site.  She is asking that someone call her back. She is also inquiring about orders for her mammogram    Thanks!    Lilliam Peña said she had taken care of this and orders are in.dg

## 2019-01-15 NOTE — TELEPHONE ENCOUNTER
----- Message from Jeb Diaz MD sent at 1/9/2019  4:45 PM EST -----  Regarding: RE: REACTION TO PNEUMONIA SHOT  Contact: [phone]  I think a local rreaction is okay, just needs to do warm compress and benadryl if itchy.  Her mammogram and dexa should be ordered.  ----- Message -----  From: Geena Olivera MA  Sent: 1/9/2019   2:44 PM  To: Jeb Diaz MD  Subject: FW: REACTION TO PNEUMONIA SHOT                   Would you put then order in for her Luciano..Dg  ----- Message -----  From: All Oro  Sent: 1/9/2019   1:53 PM  To: Eric Jeter [facility]  Subject: REACTION TO PNEUMONIA SHOT                       ANDRESSA PT    Patient said that she got a shot here last week and now she has a little red rash around the injection site.  She is asking that someone call her back. She is also inquiring about orders for her mammogram    Thanks!  all

## 2019-02-01 DIAGNOSIS — F41.9 ANXIETY: ICD-10-CM

## 2019-02-01 RX ORDER — ESCITALOPRAM OXALATE 10 MG/1
TABLET ORAL
Qty: 90 TABLET | Refills: 1 | Status: SHIPPED | OUTPATIENT
Start: 2019-02-01 | End: 2019-08-02 | Stop reason: SDUPTHER

## 2019-04-05 ENCOUNTER — APPOINTMENT (OUTPATIENT)
Dept: BONE DENSITY | Facility: HOSPITAL | Age: 74
End: 2019-04-05
Attending: INTERNAL MEDICINE

## 2019-04-05 PROCEDURE — 77080 DXA BONE DENSITY AXIAL: CPT

## 2019-08-02 ENCOUNTER — TELEPHONE (OUTPATIENT)
Dept: INTERNAL MEDICINE | Facility: CLINIC | Age: 74
End: 2019-08-02

## 2019-08-02 DIAGNOSIS — F41.9 ANXIETY: ICD-10-CM

## 2019-08-02 RX ORDER — ESCITALOPRAM OXALATE 10 MG/1
10 TABLET ORAL DAILY
Qty: 90 TABLET | Refills: 0 | Status: SHIPPED | OUTPATIENT
Start: 2019-08-02 | End: 2019-12-30

## 2019-08-02 NOTE — TELEPHONE ENCOUNTER
----- Message from Eva Meza sent at 8/2/2019 10:41 AM EDT -----  Contact: patient  ANAND    Patient scheduled to see Dr. Woods as a [facility] transfer next month.  She has run out of escitalopram (LEXAPRO) 10 MG tablet as of today.  Please contact patient about same.    [facility]

## 2019-09-24 ENCOUNTER — OFFICE VISIT (OUTPATIENT)
Dept: INTERNAL MEDICINE | Facility: CLINIC | Age: 74
End: 2019-09-24

## 2019-09-24 VITALS
TEMPERATURE: 97.6 F | HEIGHT: 61 IN | RESPIRATION RATE: 16 BRPM | BODY MASS INDEX: 23.33 KG/M2 | WEIGHT: 123.6 LBS | HEART RATE: 68 BPM | SYSTOLIC BLOOD PRESSURE: 124 MMHG | OXYGEN SATURATION: 98 % | DIASTOLIC BLOOD PRESSURE: 76 MMHG

## 2019-09-24 DIAGNOSIS — N39.46 MIXED STRESS AND URGE URINARY INCONTINENCE: ICD-10-CM

## 2019-09-24 DIAGNOSIS — E53.8 B12 DEFICIENCY: ICD-10-CM

## 2019-09-24 DIAGNOSIS — F41.9 ANXIETY: ICD-10-CM

## 2019-09-24 DIAGNOSIS — Z13.220 SCREENING FOR HYPERLIPIDEMIA: ICD-10-CM

## 2019-09-24 DIAGNOSIS — G20 PARKINSON DISEASE (HCC): ICD-10-CM

## 2019-09-24 DIAGNOSIS — I10 ESSENTIAL HYPERTENSION: Primary | ICD-10-CM

## 2019-09-24 DIAGNOSIS — Z13.29 SCREENING FOR HYPOTHYROIDISM: ICD-10-CM

## 2019-09-24 PROBLEM — I83.813 VARICOSE VEINS OF BOTH LOWER EXTREMITIES WITH PAIN: Status: ACTIVE | Noted: 2019-09-24

## 2019-09-24 PROBLEM — Z00.00 MEDICARE ANNUAL WELLNESS VISIT, SUBSEQUENT: Status: RESOLVED | Noted: 2018-01-25 | Resolved: 2019-09-24

## 2019-09-24 PROBLEM — G25.2 ACTION TREMOR: Status: ACTIVE | Noted: 2018-07-23

## 2019-09-24 PROCEDURE — 99214 OFFICE O/P EST MOD 30 MIN: CPT | Performed by: INTERNAL MEDICINE

## 2019-09-24 NOTE — PROGRESS NOTES
Cindy Mejia is a 73 y.o. female, who presents with a chief complaint of   Chief Complaint   Patient presents with   • Follow-up     Joe transfer   • Anxiety   • Hypertension       72 yo F with anxiety and Parkinsons here to establish care. She is previous patient of Dr. Diaz. She has Parkinson's and was diagnosed two years ago. She feels well on her medication and sees Dr. Baxter. She is taking Sinemet. Does feel that her tremor is worse with anxiety as well. Tolerates her Lexapro.     She has varicose veins and has been seeing dermatologist/vein doctor for this.    She has chronic HTN and is taking Proprancolol for this. She does well with this. No CP or SOB.      She is s/p bladder and uterine prolapse. She still has some bladder leakage with coughing and straining and urgency at night. No pain. Does wear a pad.     She does take B12 oral tablet daily. Feels well on this.     No longer getting pap smears. DEXA normal in 4/2019. Mild ostoepenia in her lumbar spine. Hips are normal.          The following portions of the patient's history were reviewed and updated as appropriate: allergies, current medications, past family history, past medical history, past social history, past surgical history and problem list.    Allergies: Penicillins    Current Outpatient Medications:   •  carbidopa-levodopa (SINEMET)  MG per tablet, Take 1 tablet by mouth 3 (Three) Times a Day., Disp: , Rfl:   •  escitalopram (LEXAPRO) 10 MG tablet, Take 1 tablet by mouth Daily., Disp: 90 tablet, Rfl: 0  •  propranolol LA (INDERAL LA) 60 MG 24 hr capsule, Take 60 mg by mouth., Disp: , Rfl:   •  vitamin B-12 (CYANOCOBALAMIN) 100 MCG tablet, Take 50 mcg by mouth Daily., Disp: , Rfl:   No current facility-administered medications for this visit.   Medications Discontinued During This Encounter   Medication Reason   • nitrofurantoin, macrocrystal-monohydrate, (MACROBID) 100 MG capsule *Therapy completed   • cyanocobalamin  "injection 1,000 mcg    • cyanocobalamin injection 1,000 mcg        Review of Systems   Constitutional: Negative for chills, fatigue and fever.   HENT: Negative for congestion and rhinorrhea.    Respiratory: Negative for cough and shortness of breath.    Gastrointestinal: Negative for abdominal pain, diarrhea and nausea.   Genitourinary: Negative for difficulty urinating and dysuria.   Neurological: Positive for tremors.   Psychiatric/Behavioral: Negative for sleep disturbance. The patient is nervous/anxious.              /76 (BP Location: Left arm, Patient Position: Sitting, Cuff Size: Adult)   Pulse 68   Temp 97.6 °F (36.4 °C) (Oral)   Resp 16   Ht 154.9 cm (61\")   Wt 56.1 kg (123 lb 9.6 oz)   SpO2 98%   BMI 23.35 kg/m²       Physical Exam   Constitutional: She is oriented to person, place, and time. She appears well-developed and well-nourished. No distress.   HENT:   Head: Normocephalic and atraumatic.   Right Ear: External ear normal.   Left Ear: External ear normal.   Mouth/Throat: Oropharynx is clear and moist. No oropharyngeal exudate.   Eyes: Conjunctivae are normal. Right eye exhibits no discharge. Left eye exhibits no discharge. No scleral icterus.   Neck: Neck supple.   Cardiovascular: Normal rate, regular rhythm and normal heart sounds. Exam reveals no gallop and no friction rub.   No murmur heard.  Pulmonary/Chest: Effort normal and breath sounds normal. No respiratory distress. She has no wheezes. She has no rales.   Abdominal: Soft. Bowel sounds are normal. She exhibits no distension and no mass. There is no tenderness. There is no guarding.   Lymphadenopathy:     She has no cervical adenopathy.   Neurological: She is alert and oriented to person, place, and time.   Skin: Skin is warm. No rash noted.   Psychiatric: She has a normal mood and affect. Her behavior is normal.   Nursing note and vitals reviewed.        Results for orders placed or performed in visit on 01/04/19   Urine " culture, Comprehensive - ,   Result Value Ref Range    Urine Culture Final report (A)     Result 1 Escherichia coli (A)     Susceptibility Testing Comment    Vitamin B12   Result Value Ref Range    Vitamin B-12 978 232-1,245 pg/mL   Comprehensive Metabolic Panel   Result Value Ref Range    Glucose 88 65 - 99 mg/dL    BUN 13 8 - 23 mg/dL    Creatinine 0.60 0.57 - 1.00 mg/dL    eGFR Non African Am 98 >60 mL/min/1.73    eGFR African Am 119 >60 mL/min/1.73    BUN/Creatinine Ratio 21.7 7.0 - 25.0    Sodium 140 136 - 145 mmol/L    Potassium 4.3 3.5 - 5.2 mmol/L    Chloride 102 98 - 107 mmol/L    Total CO2 29.0 22.0 - 29.0 mmol/L    Calcium 9.4 8.8 - 10.5 mg/dL    Total Protein 7.1 6.0 - 8.5 g/dL    Albumin 4.50 3.50 - 5.20 g/dL    Globulin 2.6 gm/dL    A/G Ratio 1.7 g/dL    Total Bilirubin 0.4 0.2 - 1.2 mg/dL    Alkaline Phosphatase 77 40 - 129 U/L    AST (SGOT) 17 5 - 32 U/L    ALT (SGPT) 9 5 - 33 U/L   TSH   Result Value Ref Range    TSH 2.770 0.270 - 4.200 mIU/mL   Urinalysis With Culture If Indicated - Urine, Clean Catch   Result Value Ref Range    Specific Gravity, UA 1.014 1.005 - 1.030    pH, UA 6.0 5.0 - 7.5    Color, UA Yellow Yellow    Appearance, UA Cloudy (A) Clear    Leukocytes, UA 1+ (A) Negative    Protein Negative Negative/Trace    Glucose, UA Negative Negative    Ketones Negative Negative    Blood, UA Negative Negative    Bilirubin, UA Negative Negative    Urobilinogen, UA 0.2 0.2 - 1.0 mg/dL    Nitrite, UA Positive (A) Negative    Microscopic Examination See below:     Urinalysis Reflex Comment    Microscopic Examination -   Result Value Ref Range    WBC, UA 6-10 (A) 0 - 5 /hpf    RBC, UA 0-2 0 - 2 /hpf    Epithelial Cells (non renal) 0-10 0 - 10 /hpf    Casts Present (A) None seen /lpf    Cast Type Hyaline casts N/A    Crystals, UA Present (A) N/A    Crystal Type Amorphous Sediment N/A    Mucus, UA Present Not Estab. /hpf    Bacteria, UA Moderate (A) None seen/Few /hpf   CBC & Differential   Result  Value Ref Range    WBC 7.01 4.80 - 10.80 10*3/mm3    RBC 4.10 (L) 4.20 - 5.40 10*6/mm3    Hemoglobin 12.0 12.0 - 16.0 g/dL    Hematocrit 38.6 37.0 - 47.0 %    MCV 94.1 81.0 - 99.0 fL    MCH 29.3 27.0 - 31.0 pg    MCHC 31.1 31.0 - 37.0 g/dL    RDW 12.9 11.5 - 14.5 %    Platelets 299 140 - 500 10*3/mm3    Neutrophil Rel % 71.8 (H) 45.0 - 70.0 %    Lymphocyte Rel % 13.7 (L) 20.0 - 45.0 %    Monocyte Rel % 9.8 (H) 3.0 - 8.0 %    Eosinophil Rel % 3.4 0.0 - 4.0 %    Basophil Rel % 1.0 0.0 - 2.0 %    Neutrophils Absolute 5.03 1.50 - 8.30 10*3/mm3    Lymphocytes Absolute 0.96 0.60 - 4.80 10*3/mm3    Monocytes Absolute 0.69 0.00 - 1.00 10*3/mm3    Eosinophils Absolute 0.24 0.10 - 0.30 10*3/mm3    Basophils Absolute 0.07 0.00 - 0.20 10*3/mm3    Immature Granulocyte Rel % 0.3 0.0 - 0.5 %    Immature Grans Absolute 0.02 0.00 - 0.03 10*3/mm3    nRBC 0.0 0.0 - 0.0 /100 WBC           Cindy was seen today for follow-up, anxiety and hypertension.    Diagnoses and all orders for this visit:    Essential hypertension  -     CBC & Differential  -     Comprehensive Metabolic Panel  -     Urinalysis With Culture If Indicated - Urine, Clean Catch    Parkinson disease (CMS/HCC)    Anxiety    B12 deficiency  -     Vitamin B12    Mixed stress and urge urinary incontinence    Screening for hyperlipidemia  -     Lipid Panel With LDL / HDL Ratio    Screening for hypothyroidism  -     TSH Rfx On Abnormal To Free T4      HTN is under good control and patient will continue to monitor with home BP cuff. No side effects from medications. Will continue current regimen of Propranolol. Will follow up in 6 months      Dr. Baxter is seeing her for Parkinson's.     Will send to PT for her urinary urgency. Check urine today.     Anxiety is stable on Lexapro. Will continue at current dose.     Dr. Mcelroy wanted her to have repeat c-scope. She was noted to have possible colon cancer on the first on and then repeated in 3 months. Patient to call and  schedule appointment.       Return in about 6 months (around 3/24/2020) for Recheck, Medicare Wellness.    Kacie Woods MD  09/24/2019

## 2019-09-25 ENCOUNTER — TELEPHONE (OUTPATIENT)
Dept: INTERNAL MEDICINE | Facility: CLINIC | Age: 74
End: 2019-09-25

## 2019-09-25 LAB
ALBUMIN SERPL-MCNC: 4.5 G/DL (ref 3.5–5.2)
ALBUMIN/GLOB SERPL: 2.1 G/DL
ALP SERPL-CCNC: 67 U/L (ref 39–117)
ALT SERPL-CCNC: 10 U/L (ref 1–33)
AST SERPL-CCNC: 15 U/L (ref 1–32)
BASOPHILS # BLD AUTO: 0.07 10*3/MM3 (ref 0–0.2)
BASOPHILS NFR BLD AUTO: 1 % (ref 0–1.5)
BILIRUB SERPL-MCNC: 0.4 MG/DL (ref 0.2–1.2)
BUN SERPL-MCNC: 12 MG/DL (ref 8–23)
BUN/CREAT SERPL: 21.4 (ref 7–25)
CALCIUM SERPL-MCNC: 9.2 MG/DL (ref 8.6–10.5)
CHLORIDE SERPL-SCNC: 103 MMOL/L (ref 98–107)
CHOLEST SERPL-MCNC: 196 MG/DL (ref 0–200)
CO2 SERPL-SCNC: 27.9 MMOL/L (ref 22–29)
CREAT SERPL-MCNC: 0.56 MG/DL (ref 0.57–1)
EOSINOPHIL # BLD AUTO: 0.25 10*3/MM3 (ref 0–0.4)
EOSINOPHIL NFR BLD AUTO: 3.5 % (ref 0.3–6.2)
ERYTHROCYTE [DISTWIDTH] IN BLOOD BY AUTOMATED COUNT: 12.7 % (ref 12.3–15.4)
GLOBULIN SER CALC-MCNC: 2.1 GM/DL
GLUCOSE SERPL-MCNC: 88 MG/DL (ref 65–99)
HCT VFR BLD AUTO: 35.9 % (ref 34–46.6)
HDLC SERPL-MCNC: 51 MG/DL (ref 40–60)
HGB BLD-MCNC: 11.7 G/DL (ref 12–15.9)
IMM GRANULOCYTES # BLD AUTO: 0.02 10*3/MM3 (ref 0–0.05)
IMM GRANULOCYTES NFR BLD AUTO: 0.3 % (ref 0–0.5)
LDLC SERPL CALC-MCNC: 122 MG/DL (ref 0–100)
LDLC/HDLC SERPL: 2.39 {RATIO}
LYMPHOCYTES # BLD AUTO: 0.92 10*3/MM3 (ref 0.7–3.1)
LYMPHOCYTES NFR BLD AUTO: 12.9 % (ref 19.6–45.3)
MCH RBC QN AUTO: 29.1 PG (ref 26.6–33)
MCHC RBC AUTO-ENTMCNC: 32.6 G/DL (ref 31.5–35.7)
MCV RBC AUTO: 89.3 FL (ref 79–97)
MONOCYTES # BLD AUTO: 0.74 10*3/MM3 (ref 0.1–0.9)
MONOCYTES NFR BLD AUTO: 10.4 % (ref 5–12)
NEUTROPHILS # BLD AUTO: 5.11 10*3/MM3 (ref 1.7–7)
NEUTROPHILS NFR BLD AUTO: 71.9 % (ref 42.7–76)
NRBC BLD AUTO-RTO: 0 /100 WBC (ref 0–0.2)
PLATELET # BLD AUTO: 290 10*3/MM3 (ref 140–450)
POTASSIUM SERPL-SCNC: 4.6 MMOL/L (ref 3.5–5.2)
PROT SERPL-MCNC: 6.6 G/DL (ref 6–8.5)
RBC # BLD AUTO: 4.02 10*6/MM3 (ref 3.77–5.28)
SODIUM SERPL-SCNC: 144 MMOL/L (ref 136–145)
TRIGL SERPL-MCNC: 116 MG/DL (ref 0–150)
TSH SERPL DL<=0.005 MIU/L-ACNC: 2.02 UIU/ML (ref 0.27–4.2)
VIT B12 SERPL-MCNC: >2000 PG/ML (ref 211–946)
VLDLC SERPL CALC-MCNC: 23.2 MG/DL
WBC # BLD AUTO: 7.11 10*3/MM3 (ref 3.4–10.8)

## 2019-09-25 NOTE — TELEPHONE ENCOUNTER
----- Message from Kacie Woods MD sent at 9/25/2019  1:57 PM EDT -----  Labs look good. Cholesterol just a little high. Would keep working on diet and exercise. Repeat blood work in 6 months.

## 2019-12-30 ENCOUNTER — OFFICE VISIT (OUTPATIENT)
Dept: INTERNAL MEDICINE | Facility: CLINIC | Age: 74
End: 2019-12-30

## 2019-12-30 VITALS
RESPIRATION RATE: 16 BRPM | HEART RATE: 86 BPM | BODY MASS INDEX: 23.03 KG/M2 | WEIGHT: 122 LBS | HEIGHT: 61 IN | DIASTOLIC BLOOD PRESSURE: 86 MMHG | TEMPERATURE: 98.6 F | SYSTOLIC BLOOD PRESSURE: 106 MMHG | OXYGEN SATURATION: 92 %

## 2019-12-30 DIAGNOSIS — M85.80 OSTEOPENIA, UNSPECIFIED LOCATION: ICD-10-CM

## 2019-12-30 DIAGNOSIS — N39.46 MIXED STRESS AND URGE URINARY INCONTINENCE: ICD-10-CM

## 2019-12-30 DIAGNOSIS — F41.9 ANXIETY: ICD-10-CM

## 2019-12-30 DIAGNOSIS — E53.8 B12 DEFICIENCY: ICD-10-CM

## 2019-12-30 DIAGNOSIS — G20 PARKINSON DISEASE (HCC): Primary | ICD-10-CM

## 2019-12-30 DIAGNOSIS — Z00.00 ROUTINE HEALTH MAINTENANCE: ICD-10-CM

## 2019-12-30 DIAGNOSIS — D36.9 TUBULAR ADENOMA: ICD-10-CM

## 2019-12-30 PROBLEM — I83.93 ASYMPTOMATIC VARICOSE VEINS OF BOTH LOWER EXTREMITIES: Status: ACTIVE | Noted: 2019-09-24

## 2019-12-30 PROBLEM — I83.93 ASYMPTOMATIC VARICOSE VEINS OF BOTH LOWER EXTREMITIES: Status: RESOLVED | Noted: 2019-09-24 | Resolved: 2019-12-30

## 2019-12-30 PROCEDURE — 99214 OFFICE O/P EST MOD 30 MIN: CPT | Performed by: NURSE PRACTITIONER

## 2019-12-30 NOTE — PROGRESS NOTES
"Subjective    Cindy Mejia is a 74 y.o. female presenting today for   Chief Complaint   Patient presents with   • Establish Care   • Hypertension       History of Present Illness     Cindy Mejia presents today as a new patient to me to establish care.   Prior PCP was Kacie Woods and prior to that Jeb Diaz, and her current/chronic health conditions include:    Patient Active Problem List   Diagnosis   • Anxiety   • Tubular adenoma   • Tremor of both hands   • B12 deficiency   • Parkinson disease (CMS/HCC)   • Action tremor   • Mixed stress and urge urinary incontinence       Patient Care Team:  Shannon Nguyen APRN as PCP - General (Family Medicine)  Jeb Diaz MD as PCP - Claims Attributed  Ant Baxter MD as Consulting Physician (Neurology)  Rabia Mcelroy MD as Consulting Physician (Gastroenterology)      Current Outpatient Medications:   •  carbidopa-levodopa (SINEMET)  MG per tablet, Take 0.5 tablets by mouth 3 (Three) Times a Day., Disp: , Rfl:   •  propranolol LA (INDERAL LA) 60 MG 24 hr capsule, Take 60 mg by mouth., Disp: , Rfl:   •  vitamin B-12 (CYANOCOBALAMIN) 100 MCG tablet, Take 50 mcg by mouth Daily., Disp: , Rfl:     Parkinson - f/b Dr. Baxter, on Sinemet and margarita this fine; she does feel that her tremors and difficulty w/ voluntary movements seem to be worsening and she is having more trouble w/ ADLs; she reports Propranolol was prescribed for tremors and she has never been diagnosed w/ HTN; she does check BP and \"it is always fine\"    She is s/p bladder/uterine prolapse repair. She still has some leakage and stress incontinence. PT was previously recommended but she did not follow through with this.    H/O tubular adenoma in the colon. Past due for c-scope but has not scheduled.    Vit B12 def on supp and more than sufficient w/ last labs.    Previous h/o social anxiety. Stopped  Lexapro and doing well w/o.    New complaints today include: none    The following " "portions of the patient's history were reviewed and updated as appropriate: allergies, current medications, past family history, past medical history, past social history, past surgical history and problem list.    Review of Systems   Constitutional: Negative.    HENT: Negative.    Eyes: Negative.    Respiratory: Negative.    Cardiovascular: Negative.    Gastrointestinal: Negative.    Endocrine: Negative.    Genitourinary: Positive for urgency and urinary incontinence.   Musculoskeletal: Positive for arthralgias and neck pain.   Skin: Negative.    Neurological: Positive for tremors.   Hematological: Negative.    Psychiatric/Behavioral: Negative.        Objective    Vitals:    12/30/19 0803   BP: 106/86   BP Location: Left arm   Patient Position: Sitting   Cuff Size: Adult   Pulse: 86   Resp: 16   Temp: 98.6 °F (37 °C)   TempSrc: Oral   SpO2: 92%   Weight: 55.3 kg (122 lb)   Height: 154.9 cm (61\")     Body mass index is 23.05 kg/m².  Nursing notes and vitals reviewed.    Physical Exam   Constitutional: She is oriented to person, place, and time. She appears well-developed and well-nourished. No distress.   HENT:   Right Ear: Hearing, tympanic membrane, external ear and ear canal normal.   Left Ear: Hearing, tympanic membrane, external ear and ear canal normal.   Nose: Nose normal.   Mouth/Throat: Uvula is midline, oropharynx is clear and moist and mucous membranes are normal.   Neck: Neck supple. No thyroid mass and no thyromegaly present.   Cardiovascular: Regular rhythm, S1 normal, S2 normal and normal pulses. Exam reveals no gallop and no friction rub.   No murmur heard.  Pulmonary/Chest: Effort normal and breath sounds normal. She has no wheezes. She has no rhonchi. She has no rales.   Lymphadenopathy:     She has no cervical adenopathy.   Neurological: She is alert and oriented to person, place, and time. No cranial nerve deficit or sensory deficit.   Psychiatric: She has a normal mood and affect. Her speech is " normal and behavior is normal. She is attentive.       Assessment and Plan    Cindy was seen today for establish care and hypertension.    Diagnoses and all orders for this visit:    Parkinson disease (CMS/[facility])  -     Ambulatory Referral to Physical Therapy Evaluate and treat, Pelvic Floor, Neuro  -     Ambulatory Referral to Occupational Therapy    Mixed stress and urge urinary incontinence  -     Ambulatory Referral to Physical Therapy Evaluate and treat, Pelvic Floor, Neuro  -     Ambulatory Referral to Occupational Therapy    Tubular adenoma    B12 deficiency    Anxiety    1. Continue to follow  W/ Dr. Baxter. Will refer to PT/OT.    2. Refer to PT    3. Advised to schedule c-scope w/ Dr. Mcelroy ASAP.    4. Continue supp.    5. Stable w/o med.    Medications Discontinued During This Encounter   Medication Reason   • escitalopram (LEXAPRO) 10 MG tablet Historical Med - Therapy completed       Return in about 3 months (around 3/30/2020) for Medicare Wellness.

## 2020-01-04 ENCOUNTER — RESULTS ENCOUNTER (OUTPATIENT)
Dept: INTERNAL MEDICINE | Facility: CLINIC | Age: 75
End: 2020-01-04

## 2020-01-04 DIAGNOSIS — Z00.00 ROUTINE HEALTH MAINTENANCE: ICD-10-CM

## 2020-01-04 DIAGNOSIS — G20 PARKINSON DISEASE (HCC): ICD-10-CM

## 2020-01-04 DIAGNOSIS — N39.46 MIXED STRESS AND URGE URINARY INCONTINENCE: ICD-10-CM

## 2020-01-04 DIAGNOSIS — E53.8 B12 DEFICIENCY: ICD-10-CM

## 2020-01-04 DIAGNOSIS — M85.80 OSTEOPENIA, UNSPECIFIED LOCATION: ICD-10-CM

## 2020-01-06 ENCOUNTER — APPOINTMENT (OUTPATIENT)
Dept: PHYSICAL THERAPY | Facility: HOSPITAL | Age: 75
End: 2020-01-06

## 2020-01-06 ENCOUNTER — APPOINTMENT (OUTPATIENT)
Dept: OCCUPATIONAL THERAPY | Facility: HOSPITAL | Age: 75
End: 2020-01-06

## 2020-01-08 ENCOUNTER — APPOINTMENT (OUTPATIENT)
Dept: PHYSICAL THERAPY | Facility: HOSPITAL | Age: 75
End: 2020-01-08

## 2020-01-08 ENCOUNTER — APPOINTMENT (OUTPATIENT)
Dept: OCCUPATIONAL THERAPY | Facility: HOSPITAL | Age: 75
End: 2020-01-08

## 2020-01-10 ENCOUNTER — APPOINTMENT (OUTPATIENT)
Dept: OCCUPATIONAL THERAPY | Facility: HOSPITAL | Age: 75
End: 2020-01-10

## 2020-01-10 ENCOUNTER — APPOINTMENT (OUTPATIENT)
Dept: PHYSICAL THERAPY | Facility: HOSPITAL | Age: 75
End: 2020-01-10

## 2020-01-15 ENCOUNTER — HOSPITAL ENCOUNTER (OUTPATIENT)
Dept: PHYSICAL THERAPY | Facility: HOSPITAL | Age: 75
Setting detail: THERAPIES SERIES
Discharge: HOME | End: 2020-01-15

## 2020-01-15 ENCOUNTER — HOSPITAL ENCOUNTER (OUTPATIENT)
Dept: OCCUPATIONAL THERAPY | Facility: HOSPITAL | Age: 75
Setting detail: THERAPIES SERIES
Discharge: HOME | End: 2020-01-15

## 2020-01-15 DIAGNOSIS — R29.898 UPPER EXTREMITY WEAKNESS: ICD-10-CM

## 2020-01-15 DIAGNOSIS — G20 PARKINSON DISEASE (HCC): Primary | ICD-10-CM

## 2020-01-15 DIAGNOSIS — Z74.09 IMPAIRED FUNCTIONAL MOBILITY, BALANCE, GAIT, AND ENDURANCE: ICD-10-CM

## 2020-01-15 DIAGNOSIS — R29.6 MULTIPLE FALLS: ICD-10-CM

## 2020-01-15 PROCEDURE — 97161 PT EVAL LOW COMPLEX 20 MIN: CPT

## 2020-01-15 PROCEDURE — 97165 OT EVAL LOW COMPLEX 30 MIN: CPT

## 2020-01-15 NOTE — THERAPY EVALUATION
.Outpatient Physical Therapy Neuro Initial Evaluation   Jil Villagomez     Patient Name: Cindy Mejia  : 1945  MRN: [id number]  Today's Date: 1/15/2020      Visit Date: 01/15/2020    Patient Active Problem List   Diagnosis   • Anxiety   • Tubular adenoma   • Tremor of both hands   • B12 deficiency   • Parkinson disease (CMS/HCC)   • Action tremor   • Mixed stress and urge urinary incontinence        Past Medical History:   Diagnosis Date   • Anxiety 2016   • Asymptomatic varicose veins of both lower extremities 2019   • Carcinoma of colon (CMS/HCC) 2018   • Colon polyp    • Constipation    • Menopause 3/23/2017   • Mixed stress and urge urinary incontinence    • Other hemorrhoids 2018   • Other rosacea 2018   • Uterine prolapse 2016        Past Surgical History:   Procedure Laterality Date   • BLADDER SURGERY     • COLONOSCOPY N/A 2017    Procedure: COLONOSCOPY, polypectomy;  Surgeon: Rabia Mcelroy MD;  Location: [facility] OR;  Service:    • COLONOSCOPY N/A 2017    Procedure: COLONOSCOPY, polypectomy, biopsy, sclerotherapy injection;  Surgeon: Rabia Mcelroy MD;  Location: [facility] OR;  Service:    • HYSTERECTOMY     • TUBAL ABDOMINAL LIGATION           Visit Dx:     ICD-10-CM ICD-9-CM   1. Parkinson disease (CMS/HCC) G20 332.0   2. Multiple falls R29.6 V15.88   3. Impaired functional mobility, balance, gait, and endurance Z74.09 V49.89       Patient History     Row Name 01/15/20 1500             History    Chief Complaint  Balance Problems;Falls/history of falls  -      Date Current Problem(s) Began  -- diagnosed with PD 3 years ago  -      Brief Description of Current Complaint  Patient diagnosed with Parkinsons Disease at age 71.  She reports noticing a decline in balance over last year. Has had 2 falls, one forward and one backwards.  Patient reports no dizziness, no freezing.   Was having dystonic movements of right LE, but improved once MD adjusted meds.   Patient reports she has anxiety, especially in social settings such as therapy clinic.  Patient has not been currently exercising but would like to, has elliptical at home.  -      Previous treatment for THIS PROBLEM  Medication  -      Patient/Caregiver Goals  Improve mobility  -      Current Tobacco Use  no  -      Smoking Status  never  -LH      Patient's Rating of General Health  Good  -      Hand Dominance  right-handed  -      Occupation/sports/leisure activities  retired hairdresser  -      Patient seeing anyone else for problem(s)?  occupational therapist  -      How has patient tried to help current problem?  went to one Parkinsons specific exercise class but felt it was depressing and left  -LH         Pain     Pain Location  -- reports no pain  -         Fall Risk Assessment    Any falls in the past year:  Yes  -      Number of falls reported in the last 12 months  2  -      Factors that contributed to the fall:  Tripped shoe caught on floor, missed rail on steps and fell back  -      Does patient have a fear of falling  Yes (comment)  -         Services    Prior Rehab/Home Health Experiences  No  -      Are you currently receiving Home Health services  No  -         Daily Activities    Primary Language  English  -      Are you able to read  Yes  -      Are you able to write  Yes  -      How does patient learn best?  Listening  -      Teaching needs identified  Home Exercise Program;Management of Condition  -      Patient is concerned about/has problems with  Walking;Performing home management (household chores, shopping, care of dependents);Performing job responsibilities/community activities (work, school,  -      Does patient have problems with the following?  Anxiety  -      Barriers to learning  None  -      Pt Participated in POC and Goals  Yes  -         Safety    Are you being hurt, hit, or frightened by anyone at home or in your life?  No  -       "Are you being neglected by a caregiver  No  -        User Key  (r) = Recorded By, (t) = Taken By, (c) = Cosigned By    Initials Name Provider Type     Leatha Iverson, PT Physical Therapist              PT Neuro     Row Name 01/15/20 1500             Home Living    Living Arrangements  house  -      Home Accessibility  stairs to enter home;stairs within home  -      Living Environment Comment  patient reports full flight of steps with rail.  Lives with .  Does not have or use any assistive device.  -         Vision-Basic Assessment    Current Vision  Wears glasses all the time  -         Cognition    Overall Cognitive Status  WFL  -LH      Memory  Appears intact  -      Orientation Level  Oriented X4  -      Safety Judgment  Good awareness of safety precautions  -      Deficits  Fully aware of deficits  -LH         Sensation    Additional Comments  Patient reports numbness and tingling in left hand/fingers - has EMG scheduled   -LH         Proprioception    Proprioception  intact bilateral LE  -LH         Posture/Observations    Posture- WNL  Posture is WNL  -LH      Posture/Observations Comments  noted limited cervical motion with mobility, patient holding neck stiff. Patient c/o neck pain, \"jarring sensation when I walk.\"  coinciding with numbness and pain and tingling down arm.  -LH         Coordination    Coordination WNL?  WNL toe tapping, heel-shin, finger to nose eyes open and closed  -         General ROM    GENERAL ROM COMMENTS  B LE AROM WFL  -LH         MMT (Manual Muscle Testing)    Rt Lower Ext  Rt Hip Flexion;Rt Hip Extension;Rt Hip ABduction;Rt Hip ADduction;Rt Knee Extension;Rt Knee Flexion;Rt Ankle Plantarflexion;Rt Ankle Dorsiflexion  -      Lt Lower Ext  Lt Hip Flexion;Lt Hip Extension;Lt Hip ABduction;Lt Hip ADduction;Lt Knee Extension;Lt Knee Flexion;Lt Ankle Plantarflexion;Lt Ankle Dorsiflexion  -         MMT Right Lower Ext    Rt Hip Flexion MMT, Gross " Movement  (4/5) good  -LH      Rt Hip Extension MMT, Gross Movement  (3+/5) fair plus  -LH      Rt Hip ABduction MMT, Gross Movement  (4-/5) good minus  -LH      Rt Hip ADduction MMT, Gross Movement  (4/5) good  -LH      Rt Knee Extension MMT, Gross Movement  (4+/5) good plus  -LH      Rt Knee Flexion MMT, Gross Movement  (4-/5) good minus  -LH      Rt Ankle Plantarflexion MMT, Gross Movement  (4-/5) good minus  -LH      Rt Ankle Dorsiflexion MMT, Gross Movement  (4+/5) good plus  -LH         MMT Left Lower Ext    Lt Hip Flexion MMT, Gross Movement  (4/5) good  -LH      Lt Hip Extension MMT, Gross Movement  (3+/5) fair plus  -LH      Lt Hip ABduction MMT, Gross Movement  (4-/5) good minus  -LH      Lt Hip ADduction MMT, Gross Movement  (4/5) good  -LH      Lt Knee Extension MMT, Gross Movement  (4+/5) good plus  -LH      Lt Knee Flexion MMT, Gross Movement  (4-/5) good minus  -LH      Lt Ankle Plantarflexion MMT, Gross Movement  (4-/5) good minus  -LH      Lt Ankle Dorsiflexion MMT, Gross Movement  (4+/5) good plus  -LH         Transfers    Sit-Stand [place] (Transfers)  independent  -      Stand-Sit [place] (Transfers)  independent  -         Gait/Stairs Assessment/Training    [place] Level (Gait)  independent  -      Deviations/Abnormal Patterns (Gait)  -- decreased arm swing bilaterally - increases with cues  -         Balance Skills Training    SLS  15 seconds on left LE, 29 seconds on right LE  -      [place]  feet together, eyes open and eyes closed x 60 seconds on firm surface and foam pad  -      Sharpened [place]  tandem x 60 seconds bilaterally  -      Balance Comments  manual perturbations:  good response in anterior/posterior direction.  Stepping mechanism utilized to regain balance with lateral perturbations right.  -        User Key  (r) = Recorded By, (t) = Taken By, (c) = Cosigned By    Initials Name Provider Type     Leatha Iverson, PT Physical Therapist                   Therapy Education  Education Details: issued written HEP for SLS bilaterally, balance progression on firm and soft surfaces (pillow), using kitchen counter for balance/support asneeded.   Educated on importance of exercise in slowing the progression of her disease - patient reports MD has also educated her on this.  Given: HEP  Program: New  How Provided: Verbal, Demonstration, Written  Provided to: Patient  Level of Understanding: Teach back education performed, Verbalized, Demonstrated    PT OP Goals     Row Name 01/15/20 1500          PT Short Term Goals    STG Date to Achieve  01/29/20  -     STG 1  Patient to be independent with HEP in order to maintain and improve upon gains made in therapy.  -     STG 1 Progress  New  -[facility] 2  Patient to perform gait with head turns on level and unlevel surfaces independently to demonstrate safety with communty mobility.  -     STG 2 Progress  New  [facility]        Long Term Goals    LTG Date to Achieve  02/12/20  -     LTG 1  Patient to score at least 20/32 on mini Bestest to demonstrate decreased fall risk.  -     LTG 1 Progress  New  -     LTG 2  Patient to transition from skilled PT services to regular independent/community exercise program in order to maintain progress and slow progression of disease.  -     LTG 2 Progress  New  [facility]        Time Calculation    PT Goal Re-Cert Due Date  02/12/20  -       User Key  (r) = Recorded By, (t) = Taken By, (c) = Cosigned By    Initials Name Provider Type     Leatha Iverson, PT Physical Therapist          PT Assessment/Plan     Row Name 01/15/20 1500          PT Assessment    Functional Limitations  Impaired gait;Decreased safety during functional activities  -     Impairments  Balance;Gait;Muscle strength  -     Assessment Comments  Mrs. Mejia presents today with a diagnosis of Parkinsons disease and complaints of decreased balance.  She demonstrates Timed Up and Go, 5x Sit to Stand and 6  minute walk test scores all within normal limits for her age.  She does demonstrate increased SLS on right LE compared to left, however both are above average for her age.  Mrs. Mejia demonstrates mild lower extremity weakness and decreased arm swing during gait.  She would benefit from skilled PT services to develop and implement HEP, progressing strength and dynamic balance with a transition to independent/community exercise.  -     Please refer to paper survey for additional self-reported information  Yes  -     Rehab Potential  Good  -     Patient/caregiver participated in establishment of treatment plan and goals  Yes  -     Patient would benefit from skilled therapy intervention  Yes  -LH        PT Plan    PT Frequency  1x/week  -     Predicted Duration of Therapy Intervention (Therapy Eval)  4 weeks  -     Planned CPT's?  PT THER PROC EA 15 MIN: 24244;PT NEUROMUSC RE-EDUCATION EA 15 MIN: 10514;PT GAIT TRAINING EA 15 MIN: 88127  -     Physical Therapy Interventions (Optional Details)  balance training;gait training;home exercise program;neuromuscular re-education;patient/family education;postural re-education;strengthening  -     PT Plan Comments  Patient to perform HEP twice daily, scheduled to return next week.  -       User Key  (r) = Recorded By, (t) = Taken By, (c) = Cosigned By    Initials Name Provider Type     Leatha Iverson, PT Physical Therapist           Outcome Measure Options: 5x Sit to Stand, 6 Minute Walk Test, Timed Up and Go (TUG)  5 Times Sit to Stand  5 Times Sit to Stand (seconds): 8.18 seconds  5 Times Sit to Stand Comments: norm for her age group 12.6 seconds  6 Minute Walk Test  Distance: 1440  Timed Up and Go (TUG)  TUG Test 1: 10.34 seconds  TUG Test 2: 9.34 seconds  Timed Up and Go Comments: norm for her age group: 7.7 +/- 2.3    Time Calculation:   Start Time: 1000  Stop Time: 1100  Time Calculation (min): 60 min   Therapy Charges for Today     Code  Description Service Date Service Provider Modifiers Qty    88208215093 HC PT EVAL LOW COMPLEXITY 4 1/15/2020 Leatha Iverson, PT GP 1          PT G-Codes  Outcome Measure Options: 5x Sit to Stand, 6 Minute Walk Test, Timed Up and Go (TUG)  TUG Test 1: 10.34 seconds  TUG Test 2: 9.34 seconds         Leatha Iverson, PT  1/15/2020

## 2020-01-22 ENCOUNTER — HOSPITAL ENCOUNTER (OUTPATIENT)
Dept: PHYSICAL THERAPY | Facility: HOSPITAL | Age: 75
Setting detail: THERAPIES SERIES
Discharge: HOME | End: 2020-01-22

## 2020-01-22 ENCOUNTER — HOSPITAL ENCOUNTER (OUTPATIENT)
Dept: OCCUPATIONAL THERAPY | Facility: HOSPITAL | Age: 75
Setting detail: THERAPIES SERIES
Discharge: HOME | End: 2020-01-22

## 2020-01-22 DIAGNOSIS — G20 PARKINSON DISEASE (HCC): Primary | ICD-10-CM

## 2020-01-22 DIAGNOSIS — R29.6 MULTIPLE FALLS: ICD-10-CM

## 2020-01-22 DIAGNOSIS — R29.898 UPPER EXTREMITY WEAKNESS: ICD-10-CM

## 2020-01-22 DIAGNOSIS — Z74.09 IMPAIRED FUNCTIONAL MOBILITY, BALANCE, GAIT, AND ENDURANCE: ICD-10-CM

## 2020-01-22 PROCEDURE — 97110 THERAPEUTIC EXERCISES: CPT

## 2020-01-22 PROCEDURE — 97530 THERAPEUTIC ACTIVITIES: CPT

## 2020-01-22 NOTE — THERAPY TREATMENT NOTE
Outpatient Occupational Therapy Neuro Treatment Note   Jil Villagomez     Patient Name: Cindy Mejia  : 1945  MRN: [id number]  Today's Date: 2020       Visit Date: 2020    Patient Active Problem List   Diagnosis   • Anxiety   • Tubular adenoma   • Tremor of both hands   • B12 deficiency   • Parkinson disease (CMS/HCC)   • Action tremor   • Mixed stress and urge urinary incontinence        Past Medical History:   Diagnosis Date   • Anxiety 2016   • Asymptomatic varicose veins of both lower extremities 2019   • Carcinoma of colon (CMS/HCC) 2018   • Colon polyp    • Constipation    • Menopause 3/23/2017   • Mixed stress and urge urinary incontinence    • Other hemorrhoids 2018   • Other rosacea 2018   • Uterine prolapse 2016        Past Surgical History:   Procedure Laterality Date   • BLADDER SURGERY     • COLONOSCOPY N/A 2017    Procedure: COLONOSCOPY, polypectomy;  Surgeon: Rabia Mcelroy MD;  Location: [facility] OR;  Service:    • COLONOSCOPY N/A 2017    Procedure: COLONOSCOPY, polypectomy, biopsy, sclerotherapy injection;  Surgeon: Rabia Mcelroy MD;  Location: [facility] OR;  Service:    • HYSTERECTOMY     • TUBAL ABDOMINAL LIGATION           Visit Dx:    ICD-10-CM ICD-9-CM   1. Parkinson disease (CMS/HCC) G20 332.0   2. Upper extremity weakness R29.898 729.89                   OT Assessment/Plan     Row Name 20 1440          OT Assessment    Impairments  Coordination;Muscle strength  -SD     Assessment Comments  Education provided regarding BUE rom program, functional strengthening program and strategies to compensate for BUE tremors.  Minimal improvement was noted during fmc utilizing proximal/distal weight to dampen tremors. Pt manged FMC activity most effectively when stabilizing UE during activity. Pt with generalized weakness of BUE's. Improvement of UE strength can also allow for improved activity tolerance and functional coordination.  -SD     OT  Diagnosis  BUE tremors, weakness  -SD        OT Plan    OT Frequency  1x/week  -SD     OT Plan Comments  continue with plan  -SD       User Key  (r) = Recorded By, (t) = Taken By, (c) = Cosigned By    Initials Name Provider Type    Camilo Potter OTR Occupational Therapist          OT Goals     Row Name 01/22/20 1000          OT Short Term Goals    STG Date to Achieve  01/29/20  -SD     STG 1  Pt to be independent with HEP for hand rom/strength to increase independence with adl tasks.  -SD     STG 1 Progress  Ongoing;Progressing  -SD     STG 2  Pt to improve functional coordination for writing activity using compensatory strategies to allow for participation in preferred leisure activity (soduko)  -SD     STG 2 Progress  Ongoing  -SD        Long Term Goals    LTG Date to Achieve  02/12/20  -SD     LTG 1  Pt to utilize AE/compensatory strategies to increase functional indepence with light grooming/adl tasks.  -SD     LTG 1 Progress  Ongoing  -SD     LTG 2  Pt to improve Bilateral hand strength by 5# to allow for improved performance of home management activity.  -SD     LTG 2 Progress  Ongoing  -SD       User Key  (r) = Recorded By, (t) = Taken By, (c) = Cosigned By    Initials Name Provider Type    Camilo Potter, OTR Occupational Therapist                 Therapy Education  Education Details: Education regarding strategies to compensate for BUE tremors and weakness during daily tasks. Pt provided with BUE HEP using light resistive theraband.  Given: HEP, Symptoms/condition management  Program: Reinforced, Progressed(progressed with BUE HEP for strengthening with theraband)  How Provided: Verbal, Demonstration  Provided to: Patient  Level of Understanding: Teach back education performed, Verbalized, Demonstrated    Modalities     Row Name 01/22/20 1045             Subjective Comments    Subjective Comments  Pt c/o joint stiffness. Education provided regarding benefits of MH to address joint stiffness   -SD         Moist Heat    MH Applied  Yes  -SD      Location  Bilateral hands  -SD      Rx Minutes  12 mins  -SD      MH Prior to Rx  Yes  -SD        User Key  (r) = Recorded By, (t) = Taken By, (c) = Cosigned By    Initials Name Provider Type    Camilo Potter OTR Occupational Therapist        OT Exercises     Row Name 01/22/20 1418             Exercise 1    Exercise Name 1  AROM program of BUE's  -SD         Exercise 2    Exercise Name 2  Functional coordination activity   -SD         Exercise 3    Exercise Name 3  UE strengthening (BUE HEP for strengthening using yellow and red theraband for horzontal abd/add, elbow flexion/extension)  -SD      Cueing 3  Verbal;Tactile;Demo  -SD      Equipment 3  Theraband  -SD      Resistance 3  Yellow;Red  -SD         Exercise 4    Exercise Name 4  Education with strategies to compensate for BUE tremors. Distal and proximal cuff weight did not seem to improve her functional coordination.  Pt performs best with stabilizing BUE's during activities.  -SD        User Key  (r) = Recorded By, (t) = Taken By, (c) = Cosigned By    Initials Name Provider Type    Camilo Potter OTR Occupational Therapist                      Time Calculation:   OT Start Time: 1045  OT Stop Time: 1133  OT Time Calculation (min): 48 min     Therapy Charges for Today     Code Description Service Date Service Provider Modifiers Qty    89591876468  OT THERAPEUTIC ACT EA 15 MIN 1/22/2020 Camilo Da Silva OTR GO 2    [id number]  OT HOT OR COLD PACK TREAT [facility] 1/22/2020 Camilo Da Silva OTR GO 1                    MILA Corcoran  1/22/2020

## 2020-01-22 NOTE — THERAPY TREATMENT NOTE
"    Outpatient Physical Therapy Neuro Treatment Note   [place]     Patient Name: Cindy Mejia  : 1945  MRN: [id number]  Today's Date: 2020      Visit Date: 2020    Visit Dx:    ICD-10-CM ICD-9-CM   1. Parkinson disease (CMS/HCC) G20 332.0   2. Multiple falls R29.6 V15.88   3. Impaired functional mobility, balance, gait, and endurance Z74.09 V49.89       Patient Active Problem List   Diagnosis   • Anxiety   • Tubular adenoma   • Tremor of both hands   • B12 deficiency   • Parkinson disease (CMS/HCC)   • Action tremor   • Mixed stress and urge urinary incontinence     PT Assessment/Plan     Row Name 20 1000          PT Assessment    Assessment Comments  Patient performed exercises with good technique - reports she has done all of these exercises in past except for HS curls.  Visible anxiety, trembling noted throughout session.  -        PT Plan    PT Plan Comments  Patient scheduled to return next week, to add todays strengthening exercises to previously prescribed balance exercises.  -       User Key  (r) = Recorded By, (t) = Taken By, (c) = Cosigned By    Initials Name Provider Type     Leatha Iverson, PT Physical Therapist             OP Exercises     Row Name 20 1101 20 1000          Subjective Comments    Subjective Comments  --  Patient reports she has been doing balance exercises at home but has not seen any change yet.  Patient c/o anxiety/nerves, \"I'm so nervous, its making me shake all over.\"  -        Total Minutes    36386 - PT Therapeutic Exercise Minutes  44  -LH  --        Exercise 1    Exercise Name 1  --  SLR - bilateral  -     Cueing 1  --  Verbal;Tactile  -     Reps 1  --  20  -LH     Time 1  --  2#  -        Exercise 2    Exercise Name 2  --  sidelying hip abduction - bilateral  -     Cueing 2  --  Verbal;Tactile  -     Reps 2  --  20  -LH     Time 2  --  2#  -        Exercise 3    Exercise Name 3  --  sidelying hip adduction  - " bilateral  -LH     Cueing 3  --  Verbal;Tactile  -LH     Reps 3  --  20  -LH        Exercise 4    Exercise Name 4  --  prone hip extension - bilateral  -LH     Cueing 4  --  Verbal;Tactile  -LH     Reps 4  --  20  -LH        Exercise 5    Exercise Name 5  --  squats - no UE support  -LH     Cueing 5  --  Verbal;Demo  -LH     Reps 5  --  20  -LH        Exercise 6    Exercise Name 6  --  heel raises - one hand, fingertip support  -LH     Cueing 6  --  Verbal;Demo  -LH     Reps 6  --  20  -LH        Exercise 7    Exercise Name 7  --  standing hip abduction - one UE support - bilateral  -LH     Cueing 7  --  Verbal;Demo  -LH     Reps 7  --  20  -LH     Time 7  --  2#  -LH        Exercise 8    Exercise Name 8  --  standing hamstring curls - one UE support, bilateral  -LH     Cueing 8  --  Verbal;Demo  -LH     Reps 8  --  20  -LH     Time 8  --  2#  -LH       User Key  (r) = Recorded By, (t) = Taken By, (c) = Cosigned By    Initials Name Provider Type     Leatha Iverson, PT Physical Therapist        Therapy Education  Education Details: issued written HEP for todays strengthening exercises, to add to balance exercises, performing twice daily.  Given: HEP  Program: Progressed  How Provided: Verbal, Demonstration, Written  Provided to: Patient  Level of Understanding: Teach back education performed, Verbalized, Demonstrated    Time Calculation:   Start Time: 1000  Stop Time: 1045  Time Calculation (min): 45 min   Therapy Charges for Today     Code Description Service Date Service Provider Modifiers Qty    72724167039  PT THER PROC EA 15 MIN 1/22/2020 Leatha Iverson, PT GP 3        Leatha Iverson PT  1/22/2020

## 2020-01-29 ENCOUNTER — HOSPITAL ENCOUNTER (OUTPATIENT)
Dept: PHYSICAL THERAPY | Facility: HOSPITAL | Age: 75
Setting detail: THERAPIES SERIES
Discharge: HOME | End: 2020-01-29

## 2020-01-29 ENCOUNTER — HOSPITAL ENCOUNTER (OUTPATIENT)
Dept: OCCUPATIONAL THERAPY | Facility: HOSPITAL | Age: 75
Setting detail: THERAPIES SERIES
Discharge: HOME | End: 2020-01-29

## 2020-01-29 DIAGNOSIS — G20 PARKINSON DISEASE (HCC): ICD-10-CM

## 2020-01-29 DIAGNOSIS — R29.898 UPPER EXTREMITY WEAKNESS: Primary | ICD-10-CM

## 2020-01-29 PROCEDURE — 97110 THERAPEUTIC EXERCISES: CPT

## 2020-01-29 PROCEDURE — 97530 THERAPEUTIC ACTIVITIES: CPT

## 2020-01-29 NOTE — THERAPY TREATMENT NOTE
Outpatient Occupational Therapy Hand Treatment Note  [facility]     Patient Name: Cindy Mejia  : 1945  MRN: [id number]  Today's Date: 2020         Visit Date: 2020  Patient Active Problem List   Diagnosis   • Anxiety   • Tubular adenoma   • Tremor of both hands   • B12 deficiency   • Parkinson disease (CMS/HCC)   • Action tremor   • Mixed stress and urge urinary incontinence         Visit Dx:    ICD-10-CM ICD-9-CM   1. Upper extremity weakness R29.898 729.89   2. Parkinson disease (CMS/HCC) G20 332.0          Hand Therapy (last 24 hours)      Hand Eval     Row Name 20 1100             Left Flexion AROM    II- MP AROM  86  -SD      II- PIP AROM  54  -SD      II- DIP AROM  46  -SD      II- RAI Left Flexion AROM  186  -SD      III- MP AROM  95  -SD      III- PIP AROM  46  -SD      III- DIP AROM  41  -SD      III- RAI Left Flexion AROM  182  -SD      IV- MP AROM  95  -SD      IV- PIP AROM  40  -SD      IV- DIP AROM  29  -SD      IV- RAI Left Flexion AROM  164  -SD      V- MP AROM  95  -SD      V- PIP AROM  45  -SD      V- DIP AROM  30  -SD      V- RAI Left Flexion AROM  170  -SD         Right Flexion AROM    II- MP AROM  85  -SD      II- PIP AROM  55  -SD      II- DIP AROM  31  -SD      II- RAI Right Flexion AROM  171  -SD      III- MP AROM  91  -SD      III- PIP AROM  51  -SD      III- DIP AROM  31  -SD      III- RAI Right Flexion AROM  173  -SD      IV- MP AROM  86  -SD      IV- PIP AROM  61  -SD      IV- DIP AROM  30  -SD      IV- RAI Right Flexion AROM  177  -SD      V- MP AROM  91  -SD      V- PIP AROM  41  -SD      V- DIP AROM  34  -SD      V- RAI Right Flexion AROM  166  -SD         Right Thumb AROM    MP Flexion - AROM  50  -SD      IP Flexion - AROM  36  -SD         Left Thumb AROM    MP Flexion - AROM  61  -SD      IP Flexion - AROM  36  -SD          Strength Right    # Reps  1  -SD      Right Rung  2  -SD      Right  Test 1  20  -SD       Strength Average Right  20   -SD          Strength Left    # Reps  1  -SD      Left Rung  2  -SD      Left  Test 1  16  -SD       Strength Average Left  16  -SD        User Key  (r) = Recorded By, (t) = Taken By, (c) = Cosigned By    Initials Name Provider Type    Camilo Potter OTCARRIE Occupational Therapist                  OT Assessment/Plan     Row Name 01/29/20 1616          OT Assessment    Assessment Comments  Pt demonstrated improve hand rom and functional  strength; however, she still has significant joint stiffness and limted rom in bilateral hands. Education provided regarding HEP/use of MH to address joint stiffness/limited rom and compensatory strategies/use of equipment to increase her function with basic daily tasks.   -SD     OT Diagnosis  UE tremors, bilateral hand weakness/joint stiffness  -SD        OT Plan    OT Plan Comments  Anticipate one more visit in OT for further education regarding compensatory strategies for tremors and BUE joint stiffness/weakness  -SD       User Key  (r) = Recorded By, (t) = Taken By, (c) = Cosigned By    Initials Name Provider Type    Camilo Potter OTR Occupational Therapist          Modalities     Row Name 01/29/20 1045             Subjective Comments    Subjective Comments  Pt states mh helps to decrease her stiffness in her hands. Rec use of mh at home   -SD         Moist Heat    MH Applied  Yes  -SD      Location  Bilateral hands  -SD      Rx Minutes  12 mins  -SD      MH Prior to Rx  Yes  -SD        User Key  (r) = Recorded By, (t) = Taken By, (c) = Cosigned By    Initials Name Provider Type    Camilo Potter OTR Occupational Therapist        OT Exercises     Row Name 01/29/20 1130             Exercise 1    Exercise Name 1  AROM program of BUE's  -SD         Exercise 2    Exercise Name 2  Functional coordination activity   -SD         Exercise 3    Exercise Name 3  Education regarding strategies/use of adaptive equipment to compensate for joint  stiffness/weakness in hand and due to UE tremors  -SD      Cueing 3  Verbal;Tactile;Demo  -SD        User Key  (r) = Recorded By, (t) = Taken By, (c) = Cosigned By    Initials Name Provider Type    Camilo Potter OTR Occupational Therapist          OT Goals     Row Name 01/29/20 1100          OT Short Term Goals    STG Date to Achieve  01/29/20  -SD     STG 1  Pt to be independent with HEP for hand rom/strength to increase independence with adl tasks.  -SD     STG 1 Progress  Ongoing;Progressing  -SD     STG 2  Pt to improve functional coordination for writing activity using compensatory strategies to allow for participation in preferred leisure activity (soduko)  -SD     STG 2 Progress  Ongoing;Progressing  -SD        Long Term Goals    LTG Date to Achieve  02/12/20  -SD     LTG 1  Pt to utilize AE/compensatory strategies to increase functional indepence with light grooming/adl tasks.  -SD     LTG 1 Progress  Ongoing;Progressing  -SD     LTG 2  Pt to improve Bilateral hand strength by 5# to allow for improved performance of home management activity.  -SD     LTG 2 Progress  Ongoing;Progressing  -SD       User Key  (r) = Recorded By, (t) = Taken By, (c) = Cosigned By    Initials Name Provider Type    Camilo Potter OTR Occupational Therapist          Therapy Education  Education Details: Reinforced use of MH followed by BUE rom program to address joint stiffness of hand. Education with strategies and use of adaptive equipment to compensate for Bilateral hand weakness, tremors and limited rom during daily tasks.  Given: HEP, Symptoms/condition management  Program: Reinforced  How Provided: Verbal, Demonstration  Provided to: Patient  Level of Understanding: Teach back education performed, Verbalized, Demonstrated               Time Calculation:   OT Start Time: 1045  OT Stop Time: 1130  OT Time Calculation (min): 45 min     Therapy Charges for Today     Code Description Service Date Service Provider  Modifiers Qty    39078522047  OT THERAPEUTIC ACT EA 15 MIN 1/29/2020 Camilo Da Silva, MILA GO 2    [id number]  OT HOT OR COLD PACK TREAT MCARE 1/29/2020 Camilo Da Silva OTR GO 1                  MILA Corcoran  1/29/2020

## 2020-01-29 NOTE — THERAPY TREATMENT NOTE
Outpatient Physical Therapy Neuro Treatment Note   [facility]     Patient Name: Cindy Mejia  : 1945  MRN: [id number]  Today's Date: 2020      Visit Date: 2020    Visit Dx:  No diagnosis found.    Patient Active Problem List   Diagnosis   • Anxiety   • Tubular adenoma   • Tremor of both hands   • B12 deficiency   • Parkinson disease (CMS/HCC)   • Action tremor   • Mixed stress and urge urinary incontinence     PT Assessment/Plan     Row Name 20 1300          PT Assessment    Assessment Comments  Patient demonstrates good technique with current and new exercises - reports she is familiar with all exercises from doing them in past  -        PT Plan    PT Plan Comments  Patient scheduled for one more visit.  Perform MiniBestest  and gait with head turns next visit  -       User Key  (r) = Recorded By, (t) = Taken By, (c) = Cosigned By    Initials Name Provider Type     Leatha Iverson, PT Physical Therapist             OP Exercises     Row Name 20 1321 20 1300          Subjective Comments    Subjective Comments  --  Patient reports she has done her exercises at home.  Reports the squats bothered her knees so she has stopped that particular exercise.   -        Total Minutes    25473 - PT Therapeutic Exercise Minutes  40  -LH  --        Exercise 1    Exercise Name 1  --  SLR - bilateral  -LH     Cueing 1  --  Verbal  -LH     Reps 1  --  20  -LH     Time 1  --  2#  -LH        Exercise 2    Exercise Name 2  --  sidelying hip abduction - bilateral  -LH     Cueing 2  --  Verbal;Tactile  -     Reps 2  --  20  -LH     Time 2  --  2#  -LH        Exercise 3    Exercise Name 3  --  sidelying hip adduction  - bilateral  -LH     Cueing 3  --  Verbal  -LH     Reps 3  --  20  -LH        Exercise 4    Exercise Name 4  --  prone hip extension - bilateral  -LH     Cueing 4  --  Verbal;Tactile  -LH     Reps 4  --  20  -LH        Exercise 5    Exercise Name 5  --  HS curls with  ball  -     Cueing 5  --  Verbal;Demo  -     Reps 5  --  20  -LH        Exercise 6    Exercise Name 6  --  bridges with ball  -     Cueing 6  --  Demo;Verbal  -     Reps 6  --  20  -LH        Exercise 7    Exercise Name 7  --  Ab roll out with ball (forward)  -     Cueing 7  --  Verbal;Demo;Tactile  -     Reps 7  --  20  -LH        Exercise 8    Exercise Name 8  --  seated MIP on ball with one UE support and CGA  -     Cueing 8  --  Demo;Verbal  -     Reps 8  --  20  -LH        Exercise 9    Exercise Name 9  --  Seated ball walk outs with one UE support  -     Cueing 9  --  Verbal;Demo  -     Reps 9  --  20  -LH       User Key  (r) = Recorded By, (t) = Taken By, (c) = Cosigned By    Initials Name Provider Type     Leatha Iverson, PT Physical Therapist          Time Calculation:   Start Time: 1000  Stop Time: 1045  Time Calculation (min): 45 min   Therapy Charges for Today     Code Description Service Date Service Provider Modifiers Qty    42261686488  PT THER PROC EA 15 MIN 1/29/2020 Leatha Iverson, PT GP 3        Leatha Iverson PT  1/29/2020

## 2020-02-05 ENCOUNTER — HOSPITAL ENCOUNTER (OUTPATIENT)
Dept: OCCUPATIONAL THERAPY | Facility: HOSPITAL | Age: 75
Setting detail: THERAPIES SERIES
Discharge: HOME | End: 2020-02-05

## 2020-02-05 ENCOUNTER — HOSPITAL ENCOUNTER (OUTPATIENT)
Dept: PHYSICAL THERAPY | Facility: HOSPITAL | Age: 75
Setting detail: THERAPIES SERIES
Discharge: HOME | End: 2020-02-05

## 2020-02-05 DIAGNOSIS — R29.6 MULTIPLE FALLS: Primary | ICD-10-CM

## 2020-02-05 DIAGNOSIS — G20 PARKINSON DISEASE (HCC): ICD-10-CM

## 2020-02-05 DIAGNOSIS — Z74.09 IMPAIRED FUNCTIONAL MOBILITY, BALANCE, GAIT, AND ENDURANCE: ICD-10-CM

## 2020-02-05 DIAGNOSIS — R29.898 UPPER EXTREMITY WEAKNESS: Primary | ICD-10-CM

## 2020-02-05 PROCEDURE — 97530 THERAPEUTIC ACTIVITIES: CPT

## 2020-02-05 PROCEDURE — 97110 THERAPEUTIC EXERCISES: CPT

## 2020-02-05 NOTE — THERAPY DISCHARGE NOTE
Outpatient Occupational Therapy Ortho Treatment Note/Discharge Summary   [place]     Patient Name: Cindy Mejia  : 1945  MRN: [id number]  Today's Date: 2020        Visit Date: 2020    Patient Active Problem List   Diagnosis   • Anxiety   • Tubular adenoma   • Tremor of both hands   • B12 deficiency   • Parkinson disease (CMS/HCC)   • Action tremor   • Mixed stress and urge urinary incontinence        Past Medical History:   Diagnosis Date   • Anxiety 2016   • Asymptomatic varicose veins of both lower extremities 2019   • Carcinoma of colon (CMS/HCC) 2018   • Colon polyp    • Constipation    • Menopause 3/23/2017   • Mixed stress and urge urinary incontinence    • Other hemorrhoids 2018   • Other rosacea 2018   • Uterine prolapse 2016        Past Surgical History:   Procedure Laterality Date   • BLADDER SURGERY     • COLONOSCOPY N/A 2017    Procedure: COLONOSCOPY, polypectomy;  Surgeon: Rabia Mcelroy MD;  Location: [facility] OR;  Service:    • COLONOSCOPY N/A 2017    Procedure: COLONOSCOPY, polypectomy, biopsy, sclerotherapy injection;  Surgeon: Rabia Mcelroy MD;  Location: [facility] OR;  Service:    • HYSTERECTOMY     • TUBAL ABDOMINAL LIGATION           Visit Dx:    ICD-10-CM ICD-9-CM   1. Upper extremity weakness R29.898 729.89   2. Parkinson disease (CMS/HCC) G20 332.0                        OT Assessment/Plan     Row Name 20 1121          OT Assessment    Assessment Comments  Pt continues with joint stiffness of bilateral hands and mild UE tremors. Education provided regarding compensatory strategies and use of adaptive equipment to increase function of daily tasks. Pt reports large handled utensils/pens improve her function. Pt demonstrated independence with HEP  -SD     OT Diagnosis  UE tremors, joint stiffness of hands  -SD        OT Plan    OT Plan Comments  Pt to continue with HEP. Rec discharge from clinic based services  -SD       User Key   (r) = Recorded By, (t) = Taken By, (c) = Cosigned By    Initials Name Provider Type    Camilo Potter, OTCARRIE Occupational Therapist           OT Goals     Row Name 02/05/20 1045          OT Short Term Goals    STG Date to Achieve  01/29/20  -SD     STG 1  Pt to be independent with HEP for hand rom/strength to increase independence with adl tasks.  -SD     STG 1 Progress  Met  -SD     STG 2  Pt to improve functional coordination for writing activity using compensatory strategies to allow for participation in preferred leisure activity (soduko)  -SD     STG 2 Progress  Met  -SD     STG 2 Progress Comments  Pt report large handled writing utensils help to compensate for joint stiffness of hands  -SD        Long Term Goals    LTG Date to Achieve  02/12/20  -SD     LTG 1  Pt to utilize AE/compensatory strategies to increase functional indepence with light grooming/adl tasks.  -SD     LTG 1 Progress  Met  -SD     LTG 1 Progress Comments  Education provided regarding compensatory strategies/adaptive equipment  -SD     LTG 2  Pt to improve Bilateral hand strength by 5# to allow for improved performance of home management activity.  -SD     LTG 2 Progress  Progressing;Not Met  -SD     LTG 2 Progress Comments  Pt has HEP for hand strengthening   -SD       User Key  (r) = Recorded By, (t) = Taken By, (c) = Cosigned By    Initials Name Provider Type    Camilo Potter OTR Occupational Therapist          Modalities     Row Name 02/05/20 1030             Moist Heat    MH Applied  Yes  -SD      Location  Bilateral hands  -SD      Rx Minutes  12 mins  -SD      MH Prior to Rx  Yes  -SD        User Key  (r) = Recorded By, (t) = Taken By, (c) = Cosigned By    Initials Name Provider Type    Camilo Potter OTR Occupational Therapist          OT Exercises     Row Name 02/05/20 1045             Exercise 1    Exercise Name 1  AROM program of BUE's  -SD         Exercise 2    Exercise Name 2  Functional coordination activity    -SD         Exercise 3    Exercise Name 3  Education regarding strategies/use of adaptive equipment to compensate for joint stiffness/weakness in hand and due to UE tremors  -SD      Cueing 3  Verbal;Tactile;Demo  -SD         Exercise 4    Exercise Name 4  hand strengthening activity  -SD      Equipment 4  Other digiflex  -SD      Resistance 4  Yellow  -SD         Exercise 5    Exercise Name 5  Review of HEP for hand strengthening with theraputty and BUE strengthening with theraband  -SD        User Key  (r) = Recorded By, (t) = Taken By, (c) = Cosigned By    Initials Name Provider Type    Camilo Potter OTR Occupational Therapist                        Time Calculation:   OT Start Time: 1030  OT Stop Time: 1115  OT Time Calculation (min): 45 min     Therapy Charges for Today     Code Description Service Date Service Provider Modifiers Qty    68026951823  OT THERAPEUTIC ACT EA 15 MIN 2/5/2020 Camilo Da Silva OTR GO 2    [id number]  OT HOT OR COLD PACK TREAT [facility]ARE 2/5/2020 Camilo Da Silva OTR GO 1                OP OT Discharge Summary  Date of Discharge: 02/05/20  Reason for Discharge: Maximum functional potential achieved  Outcomes Achieved: Patient able to partially acheive established goals  Discharge Destination: Home with home program        MILA Corcoran  2/5/2020

## 2020-02-05 NOTE — THERAPY DISCHARGE NOTE
"      Outpatient Physical Therapy Neuro Treatment Note/Discharge Summary   [facility]     Patient Name: Cindy Mejia  : 1945  MRN: [id number]  Today's Date: 2020      Visit Date: 2020    Visit Dx:    ICD-10-CM ICD-9-CM   1. Multiple falls R29.6 V15.88   2. Parkinson disease (CMS/HCC) G20 332.0   3. Impaired functional mobility, balance, gait, and endurance Z74.09 V49.89       Patient Active Problem List   Diagnosis   • Anxiety   • Tubular adenoma   • Tremor of both hands   • B12 deficiency   • Parkinson disease (CMS/HCC)   • Action tremor   • Mixed stress and urge urinary incontinence       PT Assessment/Plan     Row Name 20 1000          PT Assessment    Assessment Comments  Mrs. Mejia has met her long term goals and is independent with her HEP, reporting no questions/concerns.  Skilled PT no longer indicated at this time.  -        PT Plan    PT Plan Comments  Patient to continue with home/community exercise.  Recommend return in one year for reassessment or sooner if any concerns arise.  -       User Key  (r) = Recorded By, (t) = Taken By, (c) = Cosigned By    Initials Name Provider Type     Leatha Iverson, PT Physical Therapist               OP Exercises     Row Name 20 1038 20 1000          Subjective Comments    Subjective Comments  --  Patient reports she has not done exercise \"as much as I should\" but reports no questions/concerns.  \"I know how to do them all.  I was doing them before and I know how, I just need to make myself do them.\"    -        Total Minutes    28275 - PT Therapeutic Exercise Minutes  30  -  --        Exercise 1    Exercise Name 1  --  Mini-BESTest administered - see paper score sheet in chart for details.  -       User Key  (r) = Recorded By, (t) = Taken By, (c) = Cosigned By    Initials Name Provider Type     Leatha Iverson, PT Physical Therapist                        PT OP Goals     Row Name 20 1000          PT Short " Term Goals    STG Date to Achieve  01/29/20  -     STG 1  Patient to be independent with HEP in order to maintain and improve upon gains made in therapy.  -     STG 1 Progress  Met  -     STG 2  Patient to perform gait with head turns on level and unlevel surfaces independently to demonstrate safety with communty mobility.  -     STG 2 Progress  Not Met unable to assess outdoor due to weather  -        Long Term Goals    LTG Date to Achieve  02/12/20  -     LTG 1  Patient to score at least 20/32 on mini Bestest to demonstrate decreased fall risk.  -     LTG 1 Progress  Met  -     LTG 2  Patient to transition from skilled PT services to regular independent/community exercise program in order to maintain progress and slow progression of disease.  -     LTG 2 Progress  Met  -       User Key  (r) = Recorded By, (t) = Taken By, (c) = Cosigned By    Initials Name Provider Type     Leatha Iverson, PT Physical Therapist          Therapy Education  Education Details: Reinforced importance of regular exercise to maintain current strength and balance and to assist in slowing progression of disease.  Given: HEP  Program: Reinforced  How Provided: Verbal  Provided to: Patient  Level of Understanding: Teach back education performed, Verbalized    Outcome Measure Options: Mini-Best Test  Mini Best  Mini Best Score/Comments: 20/28 - see paper score sheet in chart for details    Time Calculation:   Start Time: 1000  Stop Time: 1030  Time Calculation (min): 30 min     Therapy Charges for Today     Code Description Service Date Service Provider Modifiers Qty    98827565189 HC PT THER PROC EA 15 MIN 2/5/2020 Leatha Iverson, PT GP 2          PT G-Codes  Outcome Measure Options: Mini-Best Test     OP PT Discharge Summary  Date of Discharge: 02/05/20  Reason for Discharge: Maximum functional potential achieved, All goals achieved  Outcomes Achieved: Able to achieve all goals within established timeline(with  exception of outdoor/unlevel gait - unable to assess due to weather.  Patient reports no concerns.)  Discharge Destination: Home with home program        Leatha Iverson, PT  2/5/2020

## 2020-02-25 ENCOUNTER — OFFICE VISIT (OUTPATIENT)
Dept: INTERNAL MEDICINE | Facility: CLINIC | Age: 75
End: 2020-02-25

## 2020-02-25 VITALS
OXYGEN SATURATION: 98 % | BODY MASS INDEX: 23.22 KG/M2 | TEMPERATURE: 98.5 F | DIASTOLIC BLOOD PRESSURE: 68 MMHG | HEIGHT: 61 IN | RESPIRATION RATE: 14 BRPM | HEART RATE: 72 BPM | WEIGHT: 123 LBS | SYSTOLIC BLOOD PRESSURE: 116 MMHG

## 2020-02-25 DIAGNOSIS — G20 PARKINSON DISEASE (HCC): ICD-10-CM
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
## 2020-02-25 PROCEDURE — 99213 OFFICE O/P EST LOW 20 MIN: CPT | Performed by: NURSE PRACTITIONER

## 2020-02-25 RX ORDER — CITALOPRAM 20 MG/1
20 TABLET ORAL DAILY
COMMUNITY
End: 2020-06-18

## 2020-02-25 NOTE — PROGRESS NOTES
"Subjective   Cindy Mejia is a 74 y.o. female presenting today for   Chief Complaint   Patient presents with   • Parkinson's Disease   • Fall     more recent, last week, hit head, denies LOC, pt unsure how she fell       History of Present Illness     Pt presents today reporting that she has fallen twice since her previous visit in December. The most recent fall was one week ago. She denies dizziness prior to falling. She feels that her \"feet got tangled up.\" She did hit the back of her head during this fall. No LOC. No HA or dizziness since then. She did not seek eval at time of fall.        The following portions of the patient's history were reviewed and updated as appropriate: allergies, current medications, past family history, past medical history, past social history, past surgical history and problem list.    Review of Systems   HENT: Negative.    Eyes: Negative.    Respiratory: Negative.    Cardiovascular: Negative.    Gastrointestinal: Negative.    Endocrine: Negative.    Genitourinary: Negative.    Musculoskeletal: Negative.    Skin: Negative.    Neurological: Positive for tremors, weakness and numbness. Negative for dizziness, syncope, headache and confusion.   Hematological: Negative.    Psychiatric/Behavioral: Negative.        Objective   Vitals:    02/25/20 1116   BP: 116/68   BP Location: Left arm   Patient Position: Sitting   Cuff Size: Adult   Pulse: 72   Resp: 14   Temp: 98.5 °F (36.9 °C)   TempSrc: Oral   SpO2: 98%   Weight: 55.8 kg (123 lb)   Height: 154.9 cm (61\")     Body mass index is 23.24 kg/m².  Nursing notes and vitals reviewed.    Physical Exam   Constitutional: She is oriented to person, place, and time. She appears well-developed and well-nourished. No distress.   HENT:   Right Ear: Hearing, tympanic membrane, external ear and ear canal normal.   Left Ear: Hearing, tympanic membrane, external ear and ear canal normal.   Nose: Nose normal.   Mouth/Throat: Uvula is midline, oropharynx " is clear and moist and mucous membranes are normal.   Neck: Neck supple. No thyroid mass and no thyromegaly present.   Cardiovascular: Regular rhythm, S1 normal, S2 normal and normal pulses. Exam reveals no gallop and no friction rub.   No murmur heard.  Pulmonary/Chest: Effort normal and breath sounds normal. She has no wheezes. She has no rhonchi. She has no rales.   Lymphadenopathy:     She has no cervical adenopathy.   Neurological: She is alert and oriented to person, place, and time. No cranial nerve deficit or sensory deficit.   Psychiatric: She has a normal mood and affect. Her speech is normal and behavior is normal. Thought content normal. She is attentive.         Assessment/Plan   Cindy was seen today for parkinson's disease and fall.    Diagnoses and all orders for this visit:    Fall, initial encounter    Parkinson disease (CMS/[facility])      Remote fall. No residual S&S. Neuro exam no change form baseline.   Advised to keep March f/u w/ Neuro. Will defer to him in terms of further diagnostic imaging r/t balance.  Advised to seek immediate medical attention for any fall involving a blow to the head.  Counseled regarding fall risk and safety precautions at home.      Return for As Previously Scheduled.

## 2020-06-18 ENCOUNTER — OFFICE VISIT (OUTPATIENT)
Dept: INTERNAL MEDICINE | Facility: CLINIC | Age: 75
End: 2020-06-18

## 2020-06-18 VITALS
HEIGHT: 61 IN | DIASTOLIC BLOOD PRESSURE: 78 MMHG | HEART RATE: 72 BPM | OXYGEN SATURATION: 95 % | TEMPERATURE: 96.1 F | WEIGHT: 121.2 LBS | BODY MASS INDEX: 22.88 KG/M2 | SYSTOLIC BLOOD PRESSURE: 120 MMHG | RESPIRATION RATE: 16 BRPM

## 2020-06-18 DIAGNOSIS — Z12.31 ENCOUNTER FOR SCREENING MAMMOGRAM FOR MALIGNANT NEOPLASM OF BREAST: ICD-10-CM

## 2020-06-18 DIAGNOSIS — F41.9 ANXIETY: ICD-10-CM

## 2020-06-18 DIAGNOSIS — Z12.11 SCREENING FOR MALIGNANT NEOPLASM OF COLON: ICD-10-CM

## 2020-06-18 DIAGNOSIS — D36.9 TUBULAR ADENOMA: ICD-10-CM

## 2020-06-18 DIAGNOSIS — Z23 NEED FOR PNEUMOCOCCAL VACCINATION: ICD-10-CM

## 2020-06-18 DIAGNOSIS — Z00.00 ENCOUNTER FOR MEDICARE ANNUAL WELLNESS EXAM: Primary | ICD-10-CM

## 2020-06-18 DIAGNOSIS — G20 PARKINSON DISEASE (HCC): ICD-10-CM

## 2020-06-18 PROCEDURE — 90732 PPSV23 VACC 2 YRS+ SUBQ/IM: CPT | Performed by: NURSE PRACTITIONER

## 2020-06-18 PROCEDURE — G0009 ADMIN PNEUMOCOCCAL VACCINE: HCPCS | Performed by: NURSE PRACTITIONER

## 2020-06-18 PROCEDURE — 99213 OFFICE O/P EST LOW 20 MIN: CPT | Performed by: NURSE PRACTITIONER

## 2020-06-18 PROCEDURE — G0439 PPPS, SUBSEQ VISIT: HCPCS | Performed by: NURSE PRACTITIONER

## 2020-06-18 RX ORDER — ESCITALOPRAM OXALATE 10 MG/1
10 TABLET ORAL DAILY
Qty: 90 TABLET | Refills: 3 | Status: SHIPPED | OUTPATIENT
Start: 2020-06-18 | End: 2022-01-01

## 2020-06-18 RX ORDER — ESCITALOPRAM OXALATE 10 MG/1
10 TABLET ORAL DAILY
COMMUNITY
Start: 2020-03-25 | End: 2020-06-18 | Stop reason: SDUPTHER

## 2020-06-18 NOTE — PROGRESS NOTES
The ABCs of the Annual Wellness Visit  Subsequent Medicare Wellness Visit    Chief Complaint   Patient presents with   • Medicare Wellness-subsequent       Subjective   History of Present Illness:  Cindy Mejia is a 74 y.o. female who presents for a Subsequent Medicare Wellness Visit.    HEALTH RISK ASSESSMENT    Recent Hospitalizations:  No hospitalization(s) within the last year.    Current Medical Providers:  Patient Care Team:  Shannon Nguyen APRN as PCP - General (Family Medicine)  Ant Baxter MD as PCP - Claims Attributed  Ant Baxter MD as Consulting Physician (Neurology)  Rabia Mcelroy MD as Consulting Physician (Gastroenterology)    Smoking Status:  Social History     Tobacco Use   Smoking Status Never Smoker   Smokeless Tobacco Never Used       Alcohol Consumption:  Social History     Substance and Sexual Activity   Alcohol Use No       Depression Screen:   PHQ-2/PHQ-9 Depression Screening 6/18/2020   Little interest or pleasure in doing things 0   Feeling down, depressed, or hopeless 0   Total Score 0       Fall Risk Screen:  MASONADI Fall Risk Assessment was completed, and patient is at MODERATE risk for falls. Assessment completed on:6/18/2020    Health Habits and Functional and Cognitive Screening:  Functional & Cognitive Status 6/18/2020   Do you have difficulty preparing food and eating? No   Do you have difficulty bathing yourself, getting dressed or grooming yourself? No   Do you have difficulty using the toilet? No   Do you have difficulty moving around from place to place? No   Do you have trouble with steps or getting out of a bed or a chair? No   Current Diet Well Balanced Diet   Dental Exam Up to date   Eye Exam Up to date   Exercise (times per week) 5 times per week   Current Exercise Activities Include Walking   Do you need help using the phone?  No   Are you deaf or do you have serious difficulty hearing?  No   Do you need help with transportation? No   Do you  need help shopping? No   Do you need help preparing meals?  No   Do you need help with housework?  No   Do you need help with laundry? No   Do you need help taking your medications? No   Do you need help managing money? No   Do you ever drive or ride in a car without wearing a seat belt? No   Have you felt unusual stress, anger or loneliness in the last month? No   Who do you live with? Spouse   If you need help, do you have trouble finding someone available to you? No   Have you been bothered in the last four weeks by sexual problems? No   Do you have difficulty concentrating, remembering or making decisions? No         Does the patient have evidence of cognitive impairment? No    Asprin use counseling:Does not need ASA (and currently is not on it)    Age-appropriate Screening Schedule:  Refer to the list below for future screening recommendations based on patient's age, sex and/or medical conditions. Orders for these recommended tests are listed in the plan section. The patient has been provided with a written plan.    Health Maintenance   Topic Date Due   • COLONOSCOPY  02/28/2018   • ZOSTER VACCINE (1 of 2) 10/02/2020 (Originally 11/21/1996)   • TDAP/TD VACCINES (1 - Tdap) 06/18/2021 (Originally 11/21/1956)   • INFLUENZA VACCINE  08/01/2020   • MAMMOGRAM  01/14/2021          The following portions of the patient's history were reviewed and updated as appropriate: allergies, current medications, past family history, past medical history, past social history, past surgical history and problem list.    Outpatient Medications Prior to Visit   Medication Sig Dispense Refill   • carbidopa-levodopa (SINEMET)  MG per tablet Take 0.5 tablets by mouth Daily.     • propranolol LA (INDERAL LA) 60 MG 24 hr capsule Take 60 mg by mouth.     • vitamin B-12 (CYANOCOBALAMIN) 100 MCG tablet Take 50 mcg by mouth Daily.     • escitalopram (LEXAPRO) 10 MG tablet Take 10 mg by mouth Daily.     • citalopram (CeleXA) 20 MG tablet  "Take 20 mg by mouth Daily.       No facility-administered medications prior to visit.        Patient Active Problem List   Diagnosis   • Anxiety   • Tubular adenoma   • Tremor of both hands   • B12 deficiency   • Parkinson disease (CMS/HCC)   • Action tremor   • Mixed stress and urge urinary incontinence     Chronic Health Conditions Addressed:  Pt reports her anxiety is currently well controlled w/ 10mg of Lexapro. She tried to d/c and this was not successful. She restarted at 20mg and this caused some SEs.    She has prior h/o tubular adenoma. Past due for f/u scope. Has not scheduled.      Advanced Care Planning:  ACP discussion was held with the patient during this visit. Patient has an advance directive (not in EMR), copy requested.    Review of Systems   Constitutional: Negative.    HENT: Negative.    Eyes: Negative.    Respiratory: Negative.    Cardiovascular: Negative.    Gastrointestinal: Negative.    Endocrine: Negative.    Genitourinary: Negative.    Musculoskeletal: Negative.    Skin: Negative.    Allergic/Immunologic: Negative.    Neurological: Positive for tremors.   Hematological: Negative.    Psychiatric/Behavioral: The patient is nervous/anxious.        Compared to one year ago, the patient feels her physical health is worse.  Compared to one year ago, the patient feels her mental health is the same.    Reviewed chart for potential of high risk medication in the elderly: yes  Reviewed chart for potential of harmful drug interactions in the elderly:yes    Objective         Vitals:    06/18/20 0837   BP: 120/78   BP Location: Left arm   Patient Position: Sitting   Cuff Size: Adult   Pulse: 72   Resp: 16   Temp: 96.1 °F (35.6 °C)   TempSrc: Temporal   SpO2: 95%   Weight: 55 kg (121 lb 3.2 oz)   Height: 154.9 cm (61\")       Body mass index is 22.9 kg/m².  Discussed the patient's BMI with her. The BMI is in the acceptable range.    Physical Exam   Constitutional: She is oriented to person, place, and " time. She appears well-developed and well-nourished. No distress.   HENT:   Head: Normocephalic.   Right Ear: Hearing, tympanic membrane, external ear and ear canal normal.   Left Ear: Hearing, external ear and ear canal normal. Tympanic membrane is scarred.   Nose: Nose normal. No mucosal edema or rhinorrhea.   Mouth/Throat: Uvula is midline, oropharynx is clear and moist and mucous membranes are normal. No tonsillar exudate.   Eyes: Pupils are equal, round, and reactive to light. Conjunctivae, EOM and lids are normal.   Neck: Normal range of motion. Neck supple. No thyroid mass and no thyromegaly present.   Cardiovascular: Regular rhythm, S1 normal and S2 normal. Exam reveals no gallop and no friction rub.   No murmur heard.  Pulmonary/Chest: Effort normal and breath sounds normal. She has no wheezes. She has no rhonchi. She has no rales.   Abdominal: Soft. Bowel sounds are normal. There is no hepatomegaly. There is no tenderness. There is no guarding. No hernia.   Musculoskeletal: Normal range of motion. She exhibits no edema or deformity.   Lymphadenopathy:     She has no cervical adenopathy.   Neurological: She is alert and oriented to person, place, and time. She has normal strength and normal reflexes. She displays tremor. No cranial nerve deficit or sensory deficit.   Skin: Skin is warm, dry and intact. No lesion and no rash noted.   Psychiatric: She has a normal mood and affect. Her speech is normal and behavior is normal. Thought content normal. She is attentive.             Assessment/Plan   Medicare Risks and Personalized Health Plan  CMS Preventative Services Quick Reference  Advance Directive Discussion  Breast Cancer/Mammogram Screening  Cardiovascular risk  Colon Cancer Screening  Depression/Dysphoria  Immunizations Discussed/Encouraged (specific immunizations; adacel Tdap, Pneumococcal 23 and Shingrix )  Osteoprorosis Risk    The above risks/problems have been discussed with the patient.  Pertinent  information has been shared with the patient in the After Visit Summary.  Follow up plans and orders are seen below in the Assessment/Plan Section.    Diagnoses and all orders for this visit:    1. Encounter for Medicare annual wellness exam (Primary)    2. Screening for malignant neoplasm of colon  -     Mammo Screening Digital Tomosynthesis Bilateral With CAD; Future    3. Tubular adenoma  -     Ambulatory Referral to Gastroenterology    4. Parkinson disease (CMS/[facility])    5. Anxiety  -     escitalopram (LEXAPRO) 10 MG tablet; Take 1 tablet by mouth Daily.  Dispense: 90 tablet; Refill: 3    6. Encounter for screening mammogram for malignant neoplasm of breast   -     Mammo Screening Digital Tomosynthesis Bilateral With CAD; Future    7. Need for pneumococcal vaccination  -     Pneumococcal Polysaccharide Vaccine 23-Valent Greater Than or Equal To 3yo Subcutaneous / IM      Pt had labs done two days ago but these results are not yet available to review. Will call.  Preventative counseling completed.   Lexapro refilled.  Except as noted above, pt will continue current medications as noted in the medication list.  Continue to follow with specialty care as directed by them.      Follow Up:  Return in about 1 year (around 6/18/2021) for Medicare Wellness.     An After Visit Summary and PPPS were given to the patient.

## 2020-06-19 ENCOUNTER — TELEPHONE (OUTPATIENT)
Dept: INTERNAL MEDICINE | Facility: CLINIC | Age: 75
End: 2020-06-19

## 2020-06-19 DIAGNOSIS — N30.00 ACUTE CYSTITIS WITHOUT HEMATURIA: Primary | ICD-10-CM

## 2020-06-19 DIAGNOSIS — E55.9 VITAMIN D DEFICIENCY: ICD-10-CM

## 2020-06-19 LAB
25(OH)D3+25(OH)D2 SERPL-MCNC: 19.9 NG/ML (ref 30–100)
ALBUMIN SERPL-MCNC: 4.5 G/DL (ref 3.5–5.2)
ALBUMIN/GLOB SERPL: 2 G/DL
ALP SERPL-CCNC: 57 U/L (ref 39–117)
ALT SERPL-CCNC: 6 U/L (ref 1–33)
APPEARANCE UR: ABNORMAL
AST SERPL-CCNC: 13 U/L (ref 1–32)
BACTERIA #/AREA URNS HPF: ABNORMAL /HPF
BACTERIA UR CULT: ABNORMAL
BASOPHILS # BLD AUTO: 0.06 10*3/MM3 (ref 0–0.2)
BASOPHILS NFR BLD AUTO: 0.8 % (ref 0–1.5)
BILIRUB SERPL-MCNC: 0.3 MG/DL (ref 0.2–1.2)
BILIRUB UR QL STRIP: NEGATIVE
BUN SERPL-MCNC: 18 MG/DL (ref 8–23)
BUN/CREAT SERPL: 27.3 (ref 7–25)
CALCIUM SERPL-MCNC: 9.3 MG/DL (ref 8.6–10.5)
CHLORIDE SERPL-SCNC: 103 MMOL/L (ref 98–107)
CHOLEST SERPL-MCNC: 173 MG/DL (ref 0–200)
CHOLEST/HDLC SERPL: 3.39 {RATIO}
CO2 SERPL-SCNC: 31 MMOL/L (ref 22–29)
COLOR UR: YELLOW
CREAT SERPL-MCNC: 0.66 MG/DL (ref 0.57–1)
EOSINOPHIL # BLD AUTO: 0.23 10*3/MM3 (ref 0–0.4)
EOSINOPHIL NFR BLD AUTO: 3.3 % (ref 0.3–6.2)
EPI CELLS #/AREA URNS HPF: ABNORMAL /HPF (ref 0–10)
ERYTHROCYTE [DISTWIDTH] IN BLOOD BY AUTOMATED COUNT: 13.1 % (ref 12.3–15.4)
GLOBULIN SER CALC-MCNC: 2.2 GM/DL
GLUCOSE SERPL-MCNC: 95 MG/DL (ref 65–99)
GLUCOSE UR QL: NEGATIVE
HCT VFR BLD AUTO: 35 % (ref 34–46.6)
HCV AB S/CO SERPL IA: <0.1 S/CO RATIO (ref 0–0.9)
HDLC SERPL-MCNC: 51 MG/DL (ref 40–60)
HGB BLD-MCNC: 11.5 G/DL (ref 12–15.9)
HGB UR QL STRIP: NEGATIVE
IMM GRANULOCYTES # BLD AUTO: 0.02 10*3/MM3 (ref 0–0.05)
IMM GRANULOCYTES NFR BLD AUTO: 0.3 % (ref 0–0.5)
KETONES UR QL STRIP: NEGATIVE
LDLC SERPL CALC-MCNC: 99 MG/DL (ref 0–100)
LEUKOCYTE ESTERASE UR QL STRIP: ABNORMAL
LYMPHOCYTES # BLD AUTO: 0.8 10*3/MM3 (ref 0.7–3.1)
LYMPHOCYTES NFR BLD AUTO: 11.3 % (ref 19.6–45.3)
MCH RBC QN AUTO: 29.3 PG (ref 26.6–33)
MCHC RBC AUTO-ENTMCNC: 32.9 G/DL (ref 31.5–35.7)
MCV RBC AUTO: 89.1 FL (ref 79–97)
MICRO URNS: ABNORMAL
MONOCYTES # BLD AUTO: 0.71 10*3/MM3 (ref 0.1–0.9)
MONOCYTES NFR BLD AUTO: 10 % (ref 5–12)
MUCOUS THREADS URNS QL MICRO: PRESENT /HPF
NEUTROPHILS # BLD AUTO: 5.25 10*3/MM3 (ref 1.7–7)
NEUTROPHILS NFR BLD AUTO: 74.3 % (ref 42.7–76)
NITRITE UR QL STRIP: POSITIVE
NRBC BLD AUTO-RTO: 0 /100 WBC (ref 0–0.2)
OTHER ANTIBIOTIC SUSC ISLT: ABNORMAL
PH UR STRIP: 7 [PH] (ref 5–7.5)
PLATELET # BLD AUTO: 287 10*3/MM3 (ref 140–450)
POTASSIUM SERPL-SCNC: 4.5 MMOL/L (ref 3.5–5.2)
PROT SERPL-MCNC: 6.7 G/DL (ref 6–8.5)
PROT UR QL STRIP: ABNORMAL
RBC # BLD AUTO: 3.93 10*6/MM3 (ref 3.77–5.28)
RBC #/AREA URNS HPF: ABNORMAL /HPF (ref 0–2)
SODIUM SERPL-SCNC: 139 MMOL/L (ref 136–145)
SP GR UR: 1.02 (ref 1–1.03)
TRIGL SERPL-MCNC: 116 MG/DL (ref 0–150)
TSH SERPL DL<=0.005 MIU/L-ACNC: 1.84 UIU/ML (ref 0.45–4.5)
URINALYSIS REFLEX: ABNORMAL
UROBILINOGEN UR STRIP-MCNC: 1 MG/DL (ref 0.2–1)
VIT B12 SERPL-MCNC: 1867 PG/ML (ref 211–946)
VLDLC SERPL CALC-MCNC: 23.2 MG/DL
WBC # BLD AUTO: 7.07 10*3/MM3 (ref 3.4–10.8)
WBC #/AREA URNS HPF: >30 /HPF (ref 0–5)

## 2020-06-19 RX ORDER — SULFAMETHOXAZOLE AND TRIMETHOPRIM 800; 160 MG/1; MG/1
1 TABLET ORAL 2 TIMES DAILY
Qty: 14 TABLET | Refills: 0 | Status: SHIPPED | OUTPATIENT
Start: 2020-06-19 | End: 2020-06-26

## 2020-06-19 RX ORDER — ERGOCALCIFEROL 1.25 MG/1
50000 CAPSULE ORAL WEEKLY
Qty: 15 CAPSULE | Refills: 3 | Status: SHIPPED | OUTPATIENT
Start: 2020-06-19 | End: 2022-01-01

## 2020-07-10 ENCOUNTER — HOSPITAL ENCOUNTER (OUTPATIENT)
Dept: MAMMOGRAPHY | Facility: HOSPITAL | Age: 75
Discharge: HOME | End: 2020-07-10
Admitting: NURSE PRACTITIONER

## 2020-07-10 DIAGNOSIS — Z12.11 SCREENING FOR MALIGNANT NEOPLASM OF COLON: ICD-10-CM

## 2020-07-10 DIAGNOSIS — Z12.31 ENCOUNTER FOR SCREENING MAMMOGRAM FOR MALIGNANT NEOPLASM OF BREAST: ICD-10-CM

## 2020-07-10 PROCEDURE — 77063 BREAST TOMOSYNTHESIS BI: CPT

## 2020-07-10 PROCEDURE — 77067 SCR MAMMO BI INCL CAD: CPT

## 2020-12-19 ENCOUNTER — HOSPITAL ENCOUNTER (EMERGENCY)
Facility: HOSPITAL | Age: 75
Discharge: HOME | End: 2020-12-19
Attending: EMERGENCY MEDICINE | Admitting: EMERGENCY MEDICINE

## 2020-12-19 ENCOUNTER — APPOINTMENT (OUTPATIENT)
Dept: CT IMAGING | Facility: HOSPITAL | Age: 75
End: 2020-12-19

## 2020-12-19 VITALS
SYSTOLIC BLOOD PRESSURE: 183 MMHG | DIASTOLIC BLOOD PRESSURE: 90 MMHG | HEART RATE: 79 BPM | OXYGEN SATURATION: 98 % | TEMPERATURE: 98.1 F | RESPIRATION RATE: 16 BRPM

## 2020-12-19 DIAGNOSIS — S09.90XA MINOR HEAD INJURY WITHOUT LOSS OF CONSCIOUSNESS, INITIAL ENCOUNTER: ICD-10-CM

## 2020-12-19 DIAGNOSIS — S00.03XA HEMATOMA OF SCALP, INITIAL ENCOUNTER: Primary | ICD-10-CM

## 2020-12-19 PROCEDURE — 70450 CT HEAD/BRAIN W/O DYE: CPT

## 2020-12-19 PROCEDURE — 99282 EMERGENCY DEPT VISIT SF MDM: CPT

## 2020-12-19 NOTE — ED PROVIDER NOTES
Pt presents to the ED c/o  head injury today.  Patient reports that she tripped and hit her forehead on a brick fireplace.  She has a hematoma on the right side of her forehead.  She denies loss of consciousness.  She has had no nausea or vomiting.  She takes no blood thinner medications.  She denies neck pain.  She had some mild right knee pain prior to arrival that has now improved.     On exam,   Awake and alert, no acute distress, fully oriented.  Right forehead hematoma measuring approximately 4 cm.  Pupils 3 mm reactive bilaterally.  No midline cervical spine tenderness.  The right knee is without point tenderness.  No lacerations or abrasions.     Plan: Obtain CT head without contrast to evaluate for possible intracranial injury.    Ct Head Without Contrast    Result Date: 12/19/2020  EXAM:  CT HEAD WO CONTRAST-  HISTORY:  Fall, head trauma. Swelling on right forehead.  COMPARISON:  MR brain 06/05/2018.  TECHNIQUE: Noncontrast images of the brain were obtained. Reformatted images were reviewed. Radiation dose reduction techniques were utilized, including automated exposure control and exposure modulation based on body size.  FINDINGS:   The ventricles and sulci are within normal limits for patient age.  No acute intracranial hemorrhage or pathologic extra-axial collection is identified.  There is no midline shift or mass effect.  The basal cisterns are patent. Focal areas of hypoattenuation within the left basal ganglia are unchanged from prior MRI. Additional patchy areas of subcortical and periventricular hypoattenuation are suggestive of small vessel ischemic disease, similar to prior MRI when accounting for differences in modality. There is calcific intracranial atherosclerosis.  There is a small anterior right frontal subgaleal hematoma. The calvarium is intact.  The mastoid air cells and visualized portions of the paranasal sinuses are clear.      1.  Small anterior right frontal subgaleal hematoma. No  acute intracranial hemorrhage. 2.  Small vessel ischemic disease, not significantly changed.  Discussed with CHRISTINA Johns at 4:06 PM.  This report was finalized on 12/19/2020 4:06 PM by Dr. Ruchi Blair M.D.          ED Course as of Dec 19 1620   Sat Dec 19, 2020   1508 Patient presents with mechanical fall and head injury.  Plan obtain head CT to rule out intracranial hemorrhage.    [EE]   1606 I discussed CT findings with Dr. Blair.  Patient has no acute intracranial bleed or fracture.  Patient does have a hematoma to the forehead.    [EE]   1619 Patient updated on reassuring CT findings without evidence of intracranial hemorrhage.  I discussed return precautions and the need for PCP follow-up.    [JR]   1620 I independently interpreted the CT head without contrast in PACS that shows no evidence of intracranial hemorrhage, right frontal scalp hematoma.    [JR]      ED Course User Index  [EE] Clark Mcdaniel PA  [JR] Santi Hardin MD         Final diagnoses:   Hematoma of scalp, initial encounter   Minor head injury without loss of consciousness, initial encounter         DISCHARGE    Patient discharged in stable condition.    Reviewed implications of results, diagnosis, meds, responsibility to follow up, warning signs and symptoms of possible worsening, potential complications and reasons to return to ER.    Patient/Family voiced understanding of above instructions.    Discussed plan for discharge, as there is no emergent indication for admission. Patient referred to primary care provider for BP management due to today's BP. Pt/family is agreeable and understands need for follow up and repeat testing.  Pt is aware that discharge does not mean that nothing is wrong but it indicates no emergency is present that requires admission and they must continue care with follow-up as given below or physician of their choice.     FOLLOW-UP  Shannon Nguyen, APRN  [address]  [address]  [place]  [id number]  [phone]      As needed         Medication List      No changes were made to your prescriptions during this visit.             I wore a surgical mask, gloves, and eye protection during this patient encounter.  Patient also wearing a surgical mask.  Hand hygeine performed before and after seeing the patient.     Attestation:  The LIBRA and I have discussed this patient's history, physical exam, and treatment plan.  I have reviewed the documentation and personally had a face to face interaction with the patient. I affirm the documentation and agree with the treatment and plan.  The attached note describes my personal findings.            Santi Hardin MD  12/19/20 9940       Santi Hardin MD  12/19/20 [id number]

## 2020-12-19 NOTE — ED TRIAGE NOTES
Trip and fall into fireplace.  Hit right forehead.  No blood thinners.  No loc.  C/o right knee pain    Patient was placed in face mask during first look triage.  Patient was wearing a face mask throughout encounter.  I wore personal protective equipment throughout the encounter.  Hand hygiene was performed before and after patient encounter.      Abdomen soft, non-tender, no guarding.

## 2020-12-19 NOTE — ED PROVIDER NOTES
EMERGENCY DEPARTMENT ENCOUNTER    Room Number:  09/09  Date of encounter:  12/19/2020  PCP: Shannon Nguyen APRN  Historian: Patient      I used full protective equipment while examining this patient.  This includes face mask, gloves and protective eyewear.  I washed my hands before entering the room and immediately upon leaving the room      HPI:  Chief Complaint: Fall  A complete HPI/ROS/PMH/PSH/SH/FH are unobtainable due to: Nothing    Context: Cindy Mejia is a 75 y.o. female who presents to the ED c/o head injury sustained just prior to arrival.  Patient states she tripped over her shoes and fell forward onto a brick fireplace.  Patient hit the right side of her forehead on the break.  She denies any loss of consciousness, numbness, tingling.  She denies any nausea, vomiting.  She denies any neck pain.  Patient states she does have a history of Parkinson's and generally has poor balance.  Patient also states she hit her right knee, however has been ambulatory since the fall.  She does not take any blood thinners.    Review of Medical Records  Reviewed patient's last office visit with her hand surgeon from 8/31/2020.  Patient being treated for numbness in hands.    PAST MEDICAL HISTORY  Active Ambulatory Problems     Diagnosis Date Noted   • Anxiety 09/22/2016   • Tubular adenoma 09/07/2017   • Tremor of both hands 05/24/2018   • B12 deficiency 06/12/2018   • Parkinson disease (CMS/HCC) 01/04/2019   • Action tremor 07/23/2018   • Mixed stress and urge urinary incontinence      Resolved Ambulatory Problems     Diagnosis Date Noted   • Needs flu shot 09/22/2016   • Uterine prolapse 06/21/2016   • Colon polyps 06/01/2017   • Medicare annual wellness visit, subsequent 01/25/2018   • Asymptomatic varicose veins of both lower extremities 09/24/2019     Past Medical History:   Diagnosis Date   • Carcinoma of colon (CMS/HCC) 1/25/2018   • Colon polyp    • Constipation    • Menopause 3/23/2017   • Other  hemorrhoids 1/25/2018   • Other rosacea 1/25/2018   • Parkinson's disease (CMS/HCC)          PAST SURGICAL HISTORY  Past Surgical History:   Procedure Laterality Date   • BLADDER SURGERY     • COLONOSCOPY N/A 5/22/2017    Procedure: COLONOSCOPY, polypectomy;  Surgeon: Rabia Mcelroy MD;  Location: [facility];  Service:    • COLONOSCOPY N/A 8/28/2017    Procedure: COLONOSCOPY, polypectomy, biopsy, sclerotherapy injection;  Surgeon: Rabia Mcelroy MD;  Location: [facility] OR;  Service:    • HYSTERECTOMY     • TUBAL ABDOMINAL LIGATION           FAMILY HISTORY  Family History   Problem Relation Age of Onset   • Breast cancer Mother    • Pancreatic cancer Father    • Thyroid disease Sister          SOCIAL HISTORY  Social History     Socioeconomic History   • Marital status:      Spouse name: Not on file   • Number of children: Not on file   • Years of education: Not on file   • Highest education level: Not on file   Tobacco Use   • Smoking status: Never Smoker   • Smokeless tobacco: Never Used   Substance and Sexual Activity   • Alcohol use: No   • Drug use: No   • Sexual activity: Defer   Social History Narrative        Mother never took chemo after seeing  get sick with pancreatic cancer treatment         ALLERGIES  Penicillins        REVIEW OF SYSTEMS  All systems reviewed and negative except for those discussed in HPI.       PHYSICAL EXAM    I have reviewed the triage vital signs and nursing notes.    ED Triage Vitals [12/19/20 1429]   Temp Heart Rate Resp BP SpO2   98.1 °F (36.7 °C) 76 16 175/66 98 %      Temp src Heart Rate Source Patient Position BP Location FiO2 (%)   Tympanic Monitor -- -- --       Physical Exam  GENERAL: Alert, oriented, not distressed  HENT: Hematoma noted to right side of forehead.  No cervical tenderness or vertebral step-off.  EYES: no scleral icterus, EOMI  CV: regular rhythm, regular rate, no murmur  RESPIRATORY: normal effort, CTA  ABDOMEN: soft,  nontender  MUSCULOSKELETAL: no deformity, FROM, no calf swelling or tenderness  NEURO: alert, moves all extremities, follows commands.  Mild tremor at rest  SKIN: warm, dry    RADIOLOGY  Ct Head Without Contrast    Result Date: 12/19/2020  EXAM:  CT HEAD WO CONTRAST-  HISTORY:  Fall, head trauma. Swelling on right forehead.  COMPARISON:  MR brain 06/05/2018.  TECHNIQUE: Noncontrast images of the brain were obtained. Reformatted images were reviewed. Radiation dose reduction techniques were utilized, including automated exposure control and exposure modulation based on body size.  FINDINGS:   The ventricles and sulci are within normal limits for patient age.  No acute intracranial hemorrhage or pathologic extra-axial collection is identified.  There is no midline shift or mass effect.  The basal cisterns are patent. Focal areas of hypoattenuation within the left basal ganglia are unchanged from prior MRI. Additional patchy areas of subcortical and periventricular hypoattenuation are suggestive of small vessel ischemic disease, similar to prior MRI when accounting for differences in modality. There is calcific intracranial atherosclerosis.  There is a small anterior right frontal subgaleal hematoma. The calvarium is intact.  The mastoid air cells and visualized portions of the paranasal sinuses are clear.      1.  Small anterior right frontal subgaleal hematoma. No acute intracranial hemorrhage. 2.  Small vessel ischemic disease, not significantly changed.  Discussed with CHRISTINA Johns at 4:06 PM.  This report was finalized on 12/19/2020 4:06 PM by Dr. Ruchi Blair M.D.        I ordered the above noted radiological studies. Reviewed by me and discussed with radiologist.  See dictation for official radiology interpretation.      PROGRESS, DATA ANALYSIS, CONSULTS, AND MEDICAL DECISION MAKING    All labs have been independently reviewed by me.  All radiology studies have been reviewed by me and discussed with  radiologist dictating the report.   EKG's independently viewed and interpreted by me.  Discussion below represents my analysis of pertinent findings related to patient's condition, differential diagnosis, treatment plan and final disposition.    I have discussed case with Dr. Hardin, emergency room physician.  He has performed his own bedside examination and agrees with treatment plan.    ED Course as of Dec 19 1957   Sat Dec 19, 2020   1508 Patient presents with mechanical fall and head injury.  Plan obtain head CT to rule out intracranial hemorrhage.    [EE]   1606 I discussed CT findings with Dr. Blair.  Patient has no acute intracranial bleed or fracture.  Patient does have a hematoma to the forehead.    [EE]   1619 Patient updated on reassuring CT findings without evidence of intracranial hemorrhage.  I discussed return precautions and the need for PCP follow-up.    [JR]   1620 I independently interpreted the CT head without contrast in PACS that shows no evidence of intracranial hemorrhage, right frontal scalp hematoma.    [JR]      ED Course User Index  [EE] Clark Mcdaniel PA  [JR] Santi Hardin MD       AS OF 19:57 EST VITALS:    BP - (!) 183/90  HR - 79  TEMP - 98.1 °F (36.7 °C) (Tympanic)  O2 SATS - 98%        DIAGNOSIS  Final diagnoses:   Hematoma of scalp, initial encounter   Minor head injury without loss of consciousness, initial encounter         DISPOSITION  Discharged           Clark Mcdaniel PA  12/19/20 1958

## 2021-06-03 ENCOUNTER — TRANSCRIBE ORDERS (OUTPATIENT)
Dept: ADMINISTRATIVE | Facility: HOSPITAL | Age: 76
End: 2021-06-03

## 2021-06-03 DIAGNOSIS — N28.89 KIDNEY MASS: Primary | ICD-10-CM

## 2021-06-08 ENCOUNTER — APPOINTMENT (OUTPATIENT)
Dept: GENERAL RADIOLOGY | Facility: HOSPITAL | Age: 76
End: 2021-06-08

## 2021-06-08 ENCOUNTER — HOSPITAL ENCOUNTER (OUTPATIENT)
Facility: HOSPITAL | Age: 76
Setting detail: OBSERVATION
Discharge: HOME HEALTH | End: 2021-06-10
Attending: EMERGENCY MEDICINE | Admitting: HOSPITALIST

## 2021-06-08 DIAGNOSIS — A41.9 SEPSIS, DUE TO UNSPECIFIED ORGANISM, UNSPECIFIED WHETHER ACUTE ORGAN DYSFUNCTION PRESENT (HCC): ICD-10-CM

## 2021-06-08 DIAGNOSIS — N39.0 URINARY TRACT INFECTION WITHOUT HEMATURIA, SITE UNSPECIFIED: Primary | ICD-10-CM

## 2021-06-08 LAB
ALBUMIN SERPL-MCNC: 3.4 G/DL (ref 3.5–5.2)
ALBUMIN/GLOB SERPL: 1.2 G/DL
ALP SERPL-CCNC: 64 U/L (ref 39–117)
ALT SERPL W P-5'-P-CCNC: 30 U/L (ref 1–33)
AMORPH URATE CRY URNS QL MICRO: ABNORMAL /HPF
AMPHET+METHAMPHET UR QL: NEGATIVE
AMPHETAMINES UR QL: NEGATIVE
ANION GAP SERPL CALCULATED.3IONS-SCNC: 10 MMOL/L (ref 5–15)
AST SERPL-CCNC: 70 U/L (ref 1–32)
BACTERIA UR QL AUTO: ABNORMAL /HPF
BARBITURATES UR QL SCN: NEGATIVE
BASOPHILS # BLD AUTO: 0.03 10*3/MM3 (ref 0–0.2)
BASOPHILS NFR BLD AUTO: 0.2 % (ref 0–1.5)
BENZODIAZ UR QL SCN: NEGATIVE
BILIRUB SERPL-MCNC: 0.7 MG/DL (ref 0–1.2)
BILIRUB UR QL STRIP: NEGATIVE
BUN SERPL-MCNC: 26 MG/DL (ref 8–23)
BUN/CREAT SERPL: 40.6 (ref 7–25)
BUPRENORPHINE SERPL-MCNC: NEGATIVE NG/ML
CALCIUM SPEC-SCNC: 8.9 MG/DL (ref 8.6–10.5)
CANNABINOIDS SERPL QL: NEGATIVE
CHLORIDE SERPL-SCNC: 97 MMOL/L (ref 98–107)
CLARITY UR: ABNORMAL
CO2 SERPL-SCNC: 24 MMOL/L (ref 22–29)
COCAINE UR QL: NEGATIVE
COLOR UR: YELLOW
CREAT SERPL-MCNC: 0.64 MG/DL (ref 0.57–1)
D-LACTATE SERPL-SCNC: 1.5 MMOL/L (ref 0.5–2)
DEPRECATED RDW RBC AUTO: 42.7 FL (ref 37–54)
EOSINOPHIL # BLD AUTO: 0 10*3/MM3 (ref 0–0.4)
EOSINOPHIL NFR BLD AUTO: 0 % (ref 0.3–6.2)
ERYTHROCYTE [DISTWIDTH] IN BLOOD BY AUTOMATED COUNT: 12.9 % (ref 12.3–15.4)
ETHANOL BLD-MCNC: <10 MG/DL (ref 0–10)
ETHANOL UR QL: <0.01 %
GFR SERPL CREATININE-BSD FRML MDRD: 91 ML/MIN/1.73
GLOBULIN UR ELPH-MCNC: 2.8 GM/DL
GLUCOSE SERPL-MCNC: 261 MG/DL (ref 65–99)
GLUCOSE UR STRIP-MCNC: NEGATIVE MG/DL
HCT VFR BLD AUTO: 35.3 % (ref 34–46.6)
HGB BLD-MCNC: 11.2 G/DL (ref 12–15.9)
HGB UR QL STRIP.AUTO: ABNORMAL
HYALINE CASTS UR QL AUTO: ABNORMAL /LPF
IMM GRANULOCYTES # BLD AUTO: 0.09 10*3/MM3 (ref 0–0.05)
IMM GRANULOCYTES NFR BLD AUTO: 0.7 % (ref 0–0.5)
KETONES UR QL STRIP: ABNORMAL
L PNEUMO1 AG UR QL IA: NEGATIVE
LEUKOCYTE ESTERASE UR QL STRIP.AUTO: ABNORMAL
LYMPHOCYTES # BLD AUTO: 0.28 10*3/MM3 (ref 0.7–3.1)
LYMPHOCYTES NFR BLD AUTO: 2.1 % (ref 19.6–45.3)
MAGNESIUM SERPL-MCNC: 1.6 MG/DL (ref 1.6–2.4)
MCH RBC QN AUTO: 28.7 PG (ref 26.6–33)
MCHC RBC AUTO-ENTMCNC: 31.7 G/DL (ref 31.5–35.7)
MCV RBC AUTO: 90.5 FL (ref 79–97)
METHADONE UR QL SCN: NEGATIVE
MONOCYTES # BLD AUTO: 0.71 10*3/MM3 (ref 0.1–0.9)
MONOCYTES NFR BLD AUTO: 5.3 % (ref 5–12)
NEUTROPHILS NFR BLD AUTO: 12.35 10*3/MM3 (ref 1.7–7)
NEUTROPHILS NFR BLD AUTO: 91.7 % (ref 42.7–76)
NITRITE UR QL STRIP: POSITIVE
NRBC BLD AUTO-RTO: 0 /100 WBC (ref 0–0.2)
OPIATES UR QL: NEGATIVE
OXYCODONE UR QL SCN: NEGATIVE
PCP UR QL SCN: NEGATIVE
PH UR STRIP.AUTO: 5.5 [PH] (ref 4.5–8)
PLATELET # BLD AUTO: 193 10*3/MM3 (ref 140–450)
PMV BLD AUTO: 9.7 FL (ref 6–12)
POTASSIUM SERPL-SCNC: 3.6 MMOL/L (ref 3.5–5.2)
PROCALCITONIN SERPL-MCNC: 0.35 NG/ML (ref 0–0.25)
PROPOXYPH UR QL: NEGATIVE
PROT SERPL-MCNC: 6.2 G/DL (ref 6–8.5)
PROT UR QL STRIP: ABNORMAL
QT INTERVAL: 322 MS
RBC # BLD AUTO: 3.9 10*6/MM3 (ref 3.77–5.28)
RBC # UR: ABNORMAL /HPF
REF LAB TEST METHOD: ABNORMAL
S PNEUM AG SPEC QL LA: NEGATIVE
SARS-COV-2 RNA PNL SPEC NAA+PROBE: NOT DETECTED
SODIUM SERPL-SCNC: 131 MMOL/L (ref 136–145)
SP GR UR STRIP: 1.03 (ref 1–1.03)
SQUAMOUS #/AREA URNS HPF: ABNORMAL /HPF
TRICYCLICS UR QL SCN: NEGATIVE
TROPONIN T SERPL-MCNC: <0.01 NG/ML (ref 0–0.03)
TSH SERPL DL<=0.05 MIU/L-ACNC: 0.62 UIU/ML (ref 0.27–4.2)
UROBILINOGEN UR QL STRIP: ABNORMAL
WBC # BLD AUTO: 13.46 10*3/MM3 (ref 3.4–10.8)
WBC UR QL AUTO: ABNORMAL /HPF

## 2021-06-08 PROCEDURE — 83735 ASSAY OF MAGNESIUM: CPT | Performed by: EMERGENCY MEDICINE

## 2021-06-08 PROCEDURE — 83605 ASSAY OF LACTIC ACID: CPT | Performed by: EMERGENCY MEDICINE

## 2021-06-08 PROCEDURE — 85025 COMPLETE CBC W/AUTO DIFF WBC: CPT | Performed by: EMERGENCY MEDICINE

## 2021-06-08 PROCEDURE — 87088 URINE BACTERIA CULTURE: CPT | Performed by: EMERGENCY MEDICINE

## 2021-06-08 PROCEDURE — 99285 EMERGENCY DEPT VISIT HI MDM: CPT | Performed by: EMERGENCY MEDICINE

## 2021-06-08 PROCEDURE — 25010000002 VANCOMYCIN 1 G RECONSTITUTED SOLUTION 1 EACH VIAL: Performed by: INTERNAL MEDICINE

## 2021-06-08 PROCEDURE — G0378 HOSPITAL OBSERVATION PER HR: HCPCS

## 2021-06-08 PROCEDURE — 96361 HYDRATE IV INFUSION ADD-ON: CPT

## 2021-06-08 PROCEDURE — 87186 SC STD MICRODIL/AGAR DIL: CPT | Performed by: EMERGENCY MEDICINE

## 2021-06-08 PROCEDURE — 96365 THER/PROPH/DIAG IV INF INIT: CPT

## 2021-06-08 PROCEDURE — 81001 URINALYSIS AUTO W/SCOPE: CPT | Performed by: EMERGENCY MEDICINE

## 2021-06-08 PROCEDURE — 87086 URINE CULTURE/COLONY COUNT: CPT | Performed by: EMERGENCY MEDICINE

## 2021-06-08 PROCEDURE — C9803 HOPD COVID-19 SPEC COLLECT: HCPCS

## 2021-06-08 PROCEDURE — 99284 EMERGENCY DEPT VISIT MOD MDM: CPT

## 2021-06-08 PROCEDURE — 87899 AGENT NOS ASSAY W/OPTIC: CPT | Performed by: INTERNAL MEDICINE

## 2021-06-08 PROCEDURE — 84145 PROCALCITONIN (PCT): CPT | Performed by: EMERGENCY MEDICINE

## 2021-06-08 PROCEDURE — 25010000002 VANCOMYCIN 750 MG RECONSTITUTED SOLUTION: Performed by: INTERNAL MEDICINE

## 2021-06-08 PROCEDURE — 82077 ASSAY SPEC XCP UR&BREATH IA: CPT | Performed by: EMERGENCY MEDICINE

## 2021-06-08 PROCEDURE — 94799 UNLISTED PULMONARY SVC/PX: CPT

## 2021-06-08 PROCEDURE — 99220 PR INITIAL OBSERVATION CARE/DAY 70 MINUTES: CPT | Performed by: INTERNAL MEDICINE

## 2021-06-08 PROCEDURE — 93005 ELECTROCARDIOGRAM TRACING: CPT | Performed by: EMERGENCY MEDICINE

## 2021-06-08 PROCEDURE — 25010000002 CEFEPIME-DEXTROSE 2-5 GM-%(50ML) RECONSTITUTED SOLUTION: Performed by: INTERNAL MEDICINE

## 2021-06-08 PROCEDURE — 25010000002 PIPERACILLIN SOD-TAZOBACTAM PER 1 G: Performed by: EMERGENCY MEDICINE

## 2021-06-08 PROCEDURE — 93010 ELECTROCARDIOGRAM REPORT: CPT | Performed by: INTERNAL MEDICINE

## 2021-06-08 PROCEDURE — 84484 ASSAY OF TROPONIN QUANT: CPT | Performed by: EMERGENCY MEDICINE

## 2021-06-08 PROCEDURE — 84443 ASSAY THYROID STIM HORMONE: CPT | Performed by: EMERGENCY MEDICINE

## 2021-06-08 PROCEDURE — 80306 DRUG TEST PRSMV INSTRMNT: CPT | Performed by: EMERGENCY MEDICINE

## 2021-06-08 PROCEDURE — 80053 COMPREHEN METABOLIC PANEL: CPT | Performed by: EMERGENCY MEDICINE

## 2021-06-08 PROCEDURE — P9612 CATHETERIZE FOR URINE SPEC: HCPCS

## 2021-06-08 PROCEDURE — 87040 BLOOD CULTURE FOR BACTERIA: CPT | Performed by: EMERGENCY MEDICINE

## 2021-06-08 PROCEDURE — 71046 X-RAY EXAM CHEST 2 VIEWS: CPT

## 2021-06-08 PROCEDURE — 87635 SARS-COV-2 COVID-19 AMP PRB: CPT | Performed by: EMERGENCY MEDICINE

## 2021-06-08 RX ORDER — PROPRANOLOL HCL 60 MG
60 CAPSULE, EXTENDED RELEASE 24HR ORAL DAILY
Status: DISCONTINUED | OUTPATIENT
Start: 2021-06-08 | End: 2021-06-10 | Stop reason: HOSPADM

## 2021-06-08 RX ORDER — SODIUM CHLORIDE 0.9 % (FLUSH) 0.9 %
10 SYRINGE (ML) INJECTION AS NEEDED
Status: DISCONTINUED | OUTPATIENT
Start: 2021-06-08 | End: 2021-06-10 | Stop reason: HOSPADM

## 2021-06-08 RX ORDER — ACETAMINOPHEN 160 MG/5ML
650 SOLUTION ORAL EVERY 4 HOURS PRN
Status: DISCONTINUED | OUTPATIENT
Start: 2021-06-08 | End: 2021-06-10 | Stop reason: HOSPADM

## 2021-06-08 RX ORDER — SODIUM CHLORIDE 9 MG/ML
100 INJECTION, SOLUTION INTRAVENOUS CONTINUOUS
Status: DISCONTINUED | OUTPATIENT
Start: 2021-06-08 | End: 2021-06-09

## 2021-06-08 RX ORDER — SODIUM CHLORIDE 0.9 % (FLUSH) 0.9 %
10 SYRINGE (ML) INJECTION EVERY 12 HOURS SCHEDULED
Status: DISCONTINUED | OUTPATIENT
Start: 2021-06-08 | End: 2021-06-10 | Stop reason: HOSPADM

## 2021-06-08 RX ORDER — ACETAMINOPHEN 650 MG/1
650 SUPPOSITORY RECTAL EVERY 4 HOURS PRN
Status: DISCONTINUED | OUTPATIENT
Start: 2021-06-08 | End: 2021-06-10 | Stop reason: HOSPADM

## 2021-06-08 RX ORDER — ONDANSETRON 4 MG/1
4 TABLET, FILM COATED ORAL EVERY 6 HOURS PRN
Status: DISCONTINUED | OUTPATIENT
Start: 2021-06-08 | End: 2021-06-10 | Stop reason: HOSPADM

## 2021-06-08 RX ORDER — PRAMIPEXOLE DIHYDROCHLORIDE 0.25 MG/1
0.25 TABLET ORAL 3 TIMES DAILY
Status: DISCONTINUED | OUTPATIENT
Start: 2021-06-08 | End: 2021-06-10 | Stop reason: HOSPADM

## 2021-06-08 RX ORDER — SODIUM CHLORIDE 9 MG/ML
40 INJECTION, SOLUTION INTRAVENOUS AS NEEDED
Status: DISCONTINUED | OUTPATIENT
Start: 2021-06-08 | End: 2021-06-10 | Stop reason: HOSPADM

## 2021-06-08 RX ORDER — CEFEPIME HYDROCHLORIDE 2 G/50ML
2 INJECTION, SOLUTION INTRAVENOUS EVERY 12 HOURS
Status: DISCONTINUED | OUTPATIENT
Start: 2021-06-08 | End: 2021-06-10 | Stop reason: HOSPADM

## 2021-06-08 RX ORDER — ACETAMINOPHEN 500 MG
1000 TABLET ORAL ONCE
Status: COMPLETED | OUTPATIENT
Start: 2021-06-08 | End: 2021-06-08

## 2021-06-08 RX ORDER — LANOLIN ALCOHOL/MO/W.PET/CERES
1000 CREAM (GRAM) TOPICAL DAILY
Status: DISCONTINUED | OUTPATIENT
Start: 2021-06-08 | End: 2021-06-10 | Stop reason: HOSPADM

## 2021-06-08 RX ORDER — PRAMIPEXOLE DIHYDROCHLORIDE 0.25 MG/1
0.25 TABLET ORAL DAILY
COMMUNITY
End: 2022-01-01

## 2021-06-08 RX ORDER — ACETAMINOPHEN 325 MG/1
650 TABLET ORAL EVERY 4 HOURS PRN
Status: DISCONTINUED | OUTPATIENT
Start: 2021-06-08 | End: 2021-06-10 | Stop reason: HOSPADM

## 2021-06-08 RX ORDER — ONDANSETRON 2 MG/ML
4 INJECTION INTRAMUSCULAR; INTRAVENOUS EVERY 6 HOURS PRN
Status: DISCONTINUED | OUTPATIENT
Start: 2021-06-08 | End: 2021-06-10 | Stop reason: HOSPADM

## 2021-06-08 RX ADMIN — CEFEPIME HYDROCHLORIDE 2 G: 2 INJECTION, SOLUTION INTRAVENOUS at 17:05

## 2021-06-08 RX ADMIN — PRAMIPEXOLE DIHYDROCHLORIDE 0.25 MG: 0.25 TABLET ORAL at 16:07

## 2021-06-08 RX ADMIN — SODIUM CHLORIDE 1000 MG: 900 INJECTION, SOLUTION INTRAVENOUS at 08:37

## 2021-06-08 RX ADMIN — PRAMIPEXOLE DIHYDROCHLORIDE 0.25 MG: 0.25 TABLET ORAL at 20:49

## 2021-06-08 RX ADMIN — PRAMIPEXOLE DIHYDROCHLORIDE 0.25 MG: 0.25 TABLET ORAL at 08:56

## 2021-06-08 RX ADMIN — CARBIDOPA AND LEVODOPA 1 TABLET: 25; 100 TABLET ORAL at 08:56

## 2021-06-08 RX ADMIN — SODIUM CHLORIDE, PRESERVATIVE FREE 10 ML: 5 INJECTION INTRAVENOUS at 20:51

## 2021-06-08 RX ADMIN — SODIUM CHLORIDE 100 ML/HR: 9 INJECTION, SOLUTION INTRAVENOUS at 17:04

## 2021-06-08 RX ADMIN — Medication 1000 MCG: at 08:56

## 2021-06-08 RX ADMIN — SODIUM CHLORIDE, POTASSIUM CHLORIDE, SODIUM LACTATE AND CALCIUM CHLORIDE 1000 ML: 600; 310; 30; 20 INJECTION, SOLUTION INTRAVENOUS at 04:41

## 2021-06-08 RX ADMIN — SODIUM CHLORIDE 750 MG: 900 INJECTION, SOLUTION INTRAVENOUS at 20:49

## 2021-06-08 RX ADMIN — ACETAMINOPHEN 1000 MG: 500 TABLET, FILM COATED ORAL at 04:41

## 2021-06-08 RX ADMIN — SODIUM CHLORIDE, PRESERVATIVE FREE 10 ML: 5 INJECTION INTRAVENOUS at 08:37

## 2021-06-08 NOTE — ED NOTES
This RN preformed inguinal skin care of pt's groin folds along with under the breasts. Bilateral build up noted. Pt had some redness noted near sacral area but was blanchable.      Elo Gavin, RN  06/08/21 0603       Elo Gavin RN  06/08/21 0603

## 2021-06-08 NOTE — ED PROVIDER NOTES
Subjective   75-year-old female presents by EMS with chief complaint of weakness, dysuria, urinary incontinence all for about 2 to 3 days.  Patient and her  report that patient has been fighting a urinary tract infection for several weeks.  Has been off antibiotics for about 2 weeks and is planned to have some sort of urology scan this Friday.  Not currently on antibiotics.  She reports that about 2 days ago she gradually started to become more weak, has developed urinary incontinence, dysuria.  Called EMS tonight because she was too weak to get herself off the toilet.  Patient was not aware that she had fevers but did have one upon arrival here.  She denies nausea or vomiting but states she has had decreased appetite over the past couple of days.  Patient also noted to have oxygen saturations 88% on room air upon arrival.  She denies chest pain or shortness of breath.  Denies cough or congestion.  EMS initially reported altered mental status but patient has been awake, alert, fully oriented throughout her time here and provides most of her history herself.          Review of Systems   Constitutional: Positive for activity change, appetite change and fatigue.   HENT: Negative.    Eyes: Negative.    Respiratory: Negative.    Cardiovascular: Negative.    Gastrointestinal: Negative.    Genitourinary:        Per HPI   Musculoskeletal:        Generalized weakness, otherwise negative   Skin: Negative.    Neurological:        Parkinson's   Psychiatric/Behavioral: Negative.        Past Medical History:   Diagnosis Date   • Anxiety 9/22/2016   • Asymptomatic varicose veins of both lower extremities 9/24/2019   • Carcinoma of colon (CMS/HCC) 1/25/2018   • Colon polyp    • Constipation    • Menopause 3/23/2017   • Mixed stress and urge urinary incontinence    • Other hemorrhoids 1/25/2018   • Other rosacea 1/25/2018   • Parkinson's disease (CMS/[facility])    • Uterine prolapse 6/21/2016       Allergies   Allergen Reactions    • Penicillins Rash       Past Surgical History:   Procedure Laterality Date   • BLADDER SURGERY     • COLONOSCOPY N/A 5/22/2017    Procedure: COLONOSCOPY, polypectomy;  Surgeon: Rabia Mcelroy MD;  Location: [facility] OR;  Service:    • COLONOSCOPY N/A 8/28/2017    Procedure: COLONOSCOPY, polypectomy, biopsy, sclerotherapy injection;  Surgeon: Rabia Mcelroy MD;  Location: [facility] OR;  Service:    • HYSTERECTOMY     • TUBAL ABDOMINAL LIGATION         Family History   Problem Relation Age of Onset   • Breast cancer Mother    • Pancreatic cancer Father    • Thyroid disease Sister        Social History     Socioeconomic History   • Marital status:      Spouse name: Not on file   • Number of children: Not on file   • Years of education: Not on file   • Highest education level: Not on file   Tobacco Use   • Smoking status: Never Smoker   • Smokeless tobacco: Never Used   Substance and Sexual Activity   • Alcohol use: No   • Drug use: No   • Sexual activity: Defer           Objective   Physical Exam  Constitutional:       General: She is not in acute distress.     Appearance: She is ill-appearing.   HENT:      Head: Normocephalic and atraumatic.      Nose: Nose normal. No congestion.      Mouth/Throat:      Mouth: Mucous membranes are moist.      Pharynx: Oropharynx is clear.   Eyes:      Extraocular Movements: Extraocular movements intact.      Pupils: Pupils are equal, round, and reactive to light.   Cardiovascular:      Rate and Rhythm: Normal rate and regular rhythm.      Pulses: Normal pulses.      Heart sounds: Normal heart sounds.   Pulmonary:      Effort: Pulmonary effort is normal. No respiratory distress.      Comments: Diminished throughout  Abdominal:      General: There is no distension.      Palpations: Abdomen is soft.      Tenderness: There is no abdominal tenderness. There is no right CVA tenderness, left CVA tenderness, guarding or rebound.   Musculoskeletal:         General: No tenderness,  deformity or signs of injury.      Cervical back: Normal range of motion and neck supple. No rigidity or tenderness.   Skin:     General: Skin is warm and dry.      Capillary Refill: Capillary refill takes less than 2 seconds.   Neurological:      General: No focal deficit present.      Mental Status: She is oriented to person, place, and time. Mental status is at baseline.   Psychiatric:         Mood and Affect: Mood normal.         Behavior: Behavior normal.         Thought Content: Thought content normal.         Judgment: Judgment normal.         Procedures           ED Course  ED Course as of Jun 08 0626 Tue Jun 08, 2021   0450 EKG at 0449 shows sinus rhythm, rate 96, normal ID and QTC, no ST segment changes.  No acute findings.    [TD]   0624 Patient ill-appearing.  Patient's primary complaint is her generalized weakness.  Does appear to have urinary tract infection and suspect urosepsis, however patient also appears to have infiltrate on x-ray.  She was satting 80% on room air upon arrival but in no distress.  She does not have any subjective shortness of breath.  No chest pain.  Will cover with broad-spectrum antibiotics.  CMP results delayed because of lab equipment issue.    [TD]      ED Course User Index  [TD] German Camilo MD                                           [facility]    Final diagnoses:   Urinary tract infection without hematuria, site unspecified   Sepsis, due to unspecified organism, unspecified whether acute organ dysfunction present (CMS/[facility])       ED Disposition  ED Disposition     ED Disposition Condition Comment    Decision to Admit  Level of Care: Med/Surg [1]   Diagnosis: Urinary tract infection without hematuria, site unspecified [[id number]]   Admitting Physician: PATRICIA LEE [[id number]]   Attending Physician: PATRICIA LEE [[id number]]            No follow-up provider specified.       Medication List      No changes were made to your prescriptions during this visit.          Leslee  German QUINTANILLA MD  06/08/21 0627

## 2021-06-08 NOTE — PLAN OF CARE
Goal Outcome Evaluation:  Plan of Care Reviewed With: patient        Progress: no change  Outcome Summary: pt arrived to unit very weak - pt falling asleep during admission assessment - possible home health due to Parkinsons and inability to properly care for self and lives with older spouse

## 2021-06-08 NOTE — CASE MANAGEMENT/SOCIAL WORK
Continued Stay Note  MIRNA Corey     Patient Name: Cindy Mejia  MRN: [id number]  Today's Date: 6/8/2021    Admit Date: 6/8/2021    Discharge Plan     Row Name 06/08/21 1601       Plan    Plan  Discharge to STR    Plan Comments  I received a call back from the patients  and I advised him of my conversation with the patient regarding her discharge and asked if he knew which agency provided her home health.  He stated that he was unsure and would get back to me later today with the name.  I received a call from the patients nurse, TIGIST Kumar who stated that the patients family requested a referral to TCU.  I confirmed this with the patient.  A short while later I received a follow up phone call from the patients  who advised that the previous home health was [facility].  He also confirmed the request for TCU.  I called Eva from TCU and left a message regarding a referral to TCU.  CM will continue to follow.        Discharge Codes    No documentation.             Eva Banda RN

## 2021-06-08 NOTE — CONSULTS
Adult Nutrition  Assessment/PES    Patient Name:  Cindy Mejia  YOB: 1945  MRN: [id number]  Admit Date:  6/8/2021    Assessment Date:  6/8/2021    Recommend: Add Boost Plus 1 per day chocolate ( drinks at home 3 per week) to supplement po   Comments:    Pt meets criteria for moderate malnutrition based on physical exam with muscle wasting, fat loss, reduced po, and reported wt loss.    Reason for Assessment     Row Name 06/08/21 1337          Reason for Assessment    Reason For Assessment  identified at risk by screening criteria     Diagnosis  infection/sepsis Sepsis, parkinson, UTI     Identified At Risk by Screening Criteria  MST SCORE 2+         Nutrition/Diet History     Row Name 06/08/21 1337          Nutrition/Diet History    Typical Food/Fluid Intake  Spoke w pt at bedside. NKFA. Denies issue chew/swallowing. Somtimes issues w pills. Pt reports her  cooks but she reports self feeding. Pt likes samson boost drinks 3 times per week. Pt hungry at visit lunch prefs obtained & meal provided         Anthropometrics     Row Name 06/08/21 1339          Anthropometrics    Weight  -- 115#        Body Mass Index (BMI)    BMI Assessment  BMI 17-18.4: protein-energy malnutrition grade I         Labs/Tests/Procedures/Meds     Row Name 06/08/21 1340          Labs/Procedures/Meds    Lab Results Reviewed  reviewed     Lab Results Comments  Na 131 L, glu 261 H, BUn 26 H        Diagnostic Tests/Procedures    Diagnostic Test/Procedure Reviewed  reviewed        Medications    Pertinent Medications Reviewed  reviewed     Pertinent Medications Comments  B12         Physical Findings     Row Name 06/08/21 1340          Physical Findings    Overall Physical Appearance  loss of muscle mass;loss of subcutaneous fat         Estimated/Assessed Needs     Row Name 06/08/21 1340          Estimated/Assessed Needs    Additional Documentation  Calorie Requirements (Group);Fluid Requirements (Group);Protein Requirements  (Group)        Calorie Requirements    Estimated Calorie Need Method  [place]     Estimated Calorie Requirement Comment  3904-7691 kcal ( mifflin 1.2-1.4)        Protein Requirements    Est Protein Requirement Amount (gms/kg)  1.0 gm protein 52 gm pro        Fluid Requirements    Estimated Fluid Requirement Method  RDA Method 0296-2081 ml         Nutrition Prescription Ordered     Row Name 06/08/21 1342          Nutrition Prescription PO    Current PO Diet  Regular         Evaluation of Received Nutrient/Fluid Intake     Row Name 06/08/21 1343          Fluid Intake Evaluation    Oral Fluid (mL)  -- insufficient data        PO Evaluation    Number of Days PO Intake Evaluated  Insufficient Data           Malnutrition Severity Assessment     Row Name 06/08/21 1343          Malnutrition Severity Assessment    Malnutrition Type  Acute Disease or Injury - Related Malnutrition        Insufficient Energy Intake     Insufficient Energy Intake   < or equal to 50% of est. energy requirement for > or equal to 5d)        Unintentional Weight Loss     Unintentional Weight Loss   -- reports about 10# wt loss, no EMR data available past 3 to 6 months        Muscle Loss    [place] Region  Moderate - slight depression     Dorsal Hand Region  Moderate - slight depression     Posterior Calf Region  Moderate - some roundness, slight firmness        Fat Loss    Upper Arm Region  Moderate - some fat tissue, not ample        Criteria Met (Must meet criteria for severity in at least 2 of these categories: M Wasting, Fat Loss, Fluid, Secondary Signs, Wt. Status, Intake)    Patient meets criteria for   Moderate (non-severe) Malnutrition           Problem/Interventions:  Problem 1     Row Name 06/08/21 2781          Nutrition Diagnoses Problem 1    Problem 1  Malnutrition     Etiology (related to)  Medical Diagnosis;Factors Affecting Nutrition     Signs/Symptoms (evidenced by)  Report/Observation MSA               Intervention Goal      Row Name 06/08/21 1345          Intervention Goal    General  Meet nutritional needs for age/condition     PO  Establish PO;PO intake (%)     PO Intake %  50 % or greater     Weight  Maintain weight         Nutrition Intervention     Row Name 06/08/21 1345          Nutrition Intervention    RD/Tech Action  Interview for preference;Encourage intake;Recommend/ordered;Supplement provided     Recommended/Ordered  Supplement         Nutrition Prescription     Row Name 06/08/21 1345          Nutrition Prescription PO    PO Prescription  Begin/change supplement     Supplement  Boost Plus     Supplement Frequency  Daily         Education/Evaluation     Row Name 06/08/21 1345          Education    Education  Other (comment) encouraged po , prefs, & add oral supplement. pt agreeable        Monitor/Evaluation    Monitor  Per protocol;I&O;PO intake;Supplement intake;Pertinent labs;Weight;Symptoms           Electronically signed by:  Sarah Spear RD  06/08/21 13:46 EDT

## 2021-06-08 NOTE — PLAN OF CARE
Goal Outcome Evaluation:           Progress: improving     Pt had to be NT suctioned d/t wet, sounded voice.  Assessed swallow. Pt able to drink thin liquid, and pills whole with applesauce.  Pt mentioned she had a CT scheduled, for Friday at [facility] d/t frequent UTI's from Dr. Purcell, urology.  Dr. Harp notified; waiting on urine cx for further orders.  Family expressed concern for rehab after admission to improve increased weakness, pt was ambulatory with walker before recent events, PT consulted for evaluation.

## 2021-06-08 NOTE — H&P
[facility] HOSPITALIST     Shannon Nguyen APRN    CHIEF COMPLAINT:     Weakness     HISTORY OF PRESENT ILLNESS:    Patient is a 76 y/o female with PMH of parkinson's disease and incontinence who presented to the ED due to worsening weakness. Patient reports she has been being seen for recurrent UTI and incontinence. She was treated with an unknown antibiotic about a month ago and referred to urology. She saw Dr. Purcell on Friday but he did not place her on any antibiotics at that time. Over the past couple days patient has become progressively more weak and has developed dysuria, increased urinary frequency. Patients symptoms were severe and was unable to get off of the toilet overnight. She denies any hematuria but does report subjective fever and chills and was found to have a temp of 100.7F in the ED.       Past Medical History:   Diagnosis Date   • Anxiety 9/22/2016   • Asymptomatic varicose veins of both lower extremities 9/24/2019   • Carcinoma of colon (CMS/HCC) 1/25/2018   • Colon polyp    • Constipation    • Menopause 3/23/2017   • Mixed stress and urge urinary incontinence    • Other hemorrhoids 1/25/2018   • Other rosacea 1/25/2018   • Parkinson's disease (CMS/HCC)    • Uterine prolapse 6/21/2016     Past Surgical History:   Procedure Laterality Date   • BLADDER SURGERY     • COLONOSCOPY N/A 5/22/2017    Procedure: COLONOSCOPY, polypectomy;  Surgeon: Rabia Mcelroy MD;  Location: [facility];  Service:    • COLONOSCOPY N/A 8/28/2017    Procedure: COLONOSCOPY, polypectomy, biopsy, sclerotherapy injection;  Surgeon: Rabia Mcelroy MD;  Location: [facility] OR;  Service:    • HYSTERECTOMY     • TUBAL ABDOMINAL LIGATION       Family History   Problem Relation Age of Onset   • Breast cancer Mother    • Pancreatic cancer Father    • Thyroid disease Sister      Social History     Tobacco Use   • Smoking status: Never Smoker   • Smokeless tobacco: Never Used   Substance Use Topics   • Alcohol  "use: No   • Drug use: No     Medications Prior to Admission   Medication Sig Dispense Refill Last Dose   • pramipexole (MIRAPEX) 0.25 MG tablet Take 0.25 mg by mouth 3 (Three) Times a Day.      • carbidopa-levodopa (SINEMET)  MG per tablet Take 0.5 tablets by mouth Daily.      • escitalopram (LEXAPRO) 10 MG tablet Take 1 tablet by mouth Daily. 90 tablet 3    • propranolol LA (INDERAL LA) 60 MG 24 hr capsule Take 60 mg by mouth.      • vitamin B-12 (CYANOCOBALAMIN) 100 MCG tablet Take 50 mcg by mouth Daily.      • vitamin D (ERGOCALCIFEROL) 1.25 MG (89681 UT) capsule capsule Take 1 capsule by mouth 1 (One) Time Per Week. 15 capsule 3      Allergies:  Penicillins    Immunization History   Administered Date(s) Administered   • COVID-19 (PFIZER) 02/23/2021, 03/16/2021   • Fluzone High Dose =>65 Years ([employer] ONLY) 10/05/2018   • Pneumococcal Conjugate 13-Valent (PCV13) 01/04/2019   • Pneumococcal Polysaccharide (PPSV23) 06/18/2020   • Td 10/06/1998           REVIEW OF SYSTEMS:    Please see the above history of present illness for pertinent positives and negatives.  The remainder of the patient's systems have been reviewed and are negative.       Vital Signs  Temp:  [98.9 °F (37.2 °C)-100.7 °F (38.2 °C)] 98.9 °F (37.2 °C)  Heart Rate:  [92-99] 92  Resp:  [16] 16  BP: (106-135)/(45-58) 106/45    Flowsheet Rows      First Filed Value   Admission Height  154.9 cm (61\") Documented at 06/08/2021 0431   Admission Weight  52.2 kg (115 lb) Documented at 06/08/2021 0431             Physical Exam:    Physical Exam  Vitals reviewed.   Constitutional:       General: She is not in acute distress.     Appearance: She is normal weight.   HENT:      Head: Normocephalic and atraumatic.      Mouth/Throat:      Mouth: Mucous membranes are moist.   Eyes:      General: No scleral icterus.     Pupils: Pupils are equal, round, and reactive to light.   Cardiovascular:      Rate and Rhythm: Normal rate and regular rhythm.      Heart " sounds: No murmur heard.     Pulmonary:      Effort: Pulmonary effort is normal. No respiratory distress.      Comments: Diminished aeration in the bases  Abdominal:      General: Bowel sounds are normal. There is no distension.      Palpations: Abdomen is soft.      Tenderness: There is no abdominal tenderness.   Skin:     General: Skin is warm and dry.      Findings: No rash.   Neurological:      Mental Status: She is alert and oriented to person, place, and time. Mental status is at baseline.   Psychiatric:         Mood and Affect: Mood normal.         Behavior: Behavior normal.               Results Review:    I reviewed the patient's new clinical results.  Lab Results (most recent)     Procedure Component Value Units Date/Time    Comprehensive Metabolic Panel [[id number]]  (Abnormal) Collected: 06/08/21 0439    Specimen: Blood Updated: 06/08/21 0546     Glucose 261 mg/dL      BUN 26 mg/dL      Creatinine 0.64 mg/dL      Sodium 131 mmol/L      Potassium 3.6 mmol/L      Chloride 97 mmol/L      CO2 24.0 mmol/L      Calcium 8.9 mg/dL      Total Protein 6.2 g/dL      Albumin 3.40 g/dL      ALT (SGPT) 30 U/L      AST (SGOT) 70 U/L      Alkaline Phosphatase 64 U/L      Total Bilirubin 0.7 mg/dL      eGFR Non African Amer 91 mL/min/1.73      Globulin 2.8 gm/dL      A/G Ratio 1.2 g/dL      BUN/Creatinine Ratio 40.6     Anion Gap 10.0 mmol/L     Narrative:      GFR Normal >60  Chronic Kidney Disease <60  Kidney Failure <15      Ethanol [[id number]] Collected: 06/08/21 0439    Specimen: Blood Updated: 06/08/21 0542     Ethanol <10 mg/dL      Ethanol % <0.010 %     Magnesium [[id number]]  (Normal) Collected: 06/08/21 0439    Specimen: Blood Updated: 06/08/21 0542     Magnesium 1.6 mg/dL     Troponin [[id number]]  (Normal) Collected: 06/08/21 0439    Specimen: Blood Updated: 06/08/21 0538     Troponin T <0.010 ng/mL     Narrative:      Troponin T Reference Range:  <= 0.03 ng/mL-   Negative for AMI  >0.03 ng/mL-     Abnormal for  myocardial necrosis.  Clinicians would have to utilize clinical acumen, EKG, Troponin and serial changes to determine if it is an Acute Myocardial Infarction or myocardial injury due to an underlying chronic condition.       Results may be falsely decreased if patient taking Biotin.      COVID-19,[employer] IN-HOUSE,Nasal Swab No Transport Media 3-4 HR TAT - Swab, Nasal Cavity [[id number]]  (Normal) Collected: 06/08/21 0440    Specimen: Swab from Nasal Cavity Updated: 06/08/21 0537     COVID19 Not Detected    Narrative:      Fact sheet for providers: https://www.fda.gov/media/908119/download     Fact sheet for patients: https://www.fda.gov/media/551145/download    Test performed by PCR.    Consider negative results in combination with clinical observations, patient history, and epidemiological information.    Urinalysis, Microscopic Only - Urine, Catheter [[id number]]  (Abnormal) Collected: 06/08/21 0441    Specimen: Urine, Catheter Updated: 06/08/21 0528     RBC, UA None Seen /HPF      WBC, UA 21-30 /HPF      Bacteria, UA 4+ /HPF      Squamous Epithelial Cells, UA 0-2 /HPF      Hyaline Casts, UA None Seen /LPF      Amorphous Crystals, UA Small/1+ /HPF      Methodology Manual Light Microscopy    Urine Culture - Urine, Urine, Catheter [[id number]] Collected: 06/08/21 0441    Specimen: Urine, Catheter Updated: 06/08/21 0528    Procalcitonin [[id number]]  (Abnormal) Collected: 06/08/21 0439    Specimen: Blood Updated: 06/08/21 0525     Procalcitonin 0.35 ng/mL     Narrative:      Results may be falsely decreased if patient taking Biotin.     TSH [[id number]]  (Normal) Collected: 06/08/21 0439    Specimen: Blood Updated: 06/08/21 0525     TSH 0.625 uIU/mL     Urine Drug Screen - Urine, Clean Catch [[id number]]  (Normal) Collected: 06/08/21 0440    Specimen: Urine, Clean Catch Updated: 06/08/21 0524     THC, Screen, Urine Negative     Phencyclidine (PCP), Urine Negative     Cocaine Screen, Urine Negative     Methamphetamine, Ur  Negative     Opiate Screen Negative     Amphetamine Screen, Urine Negative     Benzodiazepine Screen, Urine Negative     Tricyclic Antidepressants Screen Negative     Methadone Screen, Urine Negative     Barbiturates Screen, Urine Negative     Oxycodone Screen, Urine Negative     Propoxyphene Screen Negative     Buprenorphine, Screen, Urine Negative    Narrative:      Urine drug screen results are to be used for medical purposes only.  They are not to be used for legal purposes such as employment testing.  Negative results do not necessarily mean the complete absence of a subtance, but rather that the result is less than the cutoff for that substance.  Positive results are unconfirmed and considered Preliminary Positive.  [facility] does not automatically confirm Postitive Unconfirmed results.  The physician may request (order) an Unconfirmed Positive result to be sent out for confirmation.      Negative Thresholds for Drugs Screened:    THC screen, urine                          50 ng/ml  Phenycyclidine (PCP), urine                25 ng/ml  Cocaine screen, urine                     150 ng/ml  Methamphetamine, urine                    500 ng/ml  Opiate screen, urine                      100 ng/ml  Amphetamine screen, urine                 500 ng/ml  Benzodiazepine screen, urine              150 ng/ml  Tricyclic Antidepressants screen, urine   300 ng/ml  Methadone screen, urine                   200 ng/ml  Barbiturates screen, urine                200 ng/ml  Oxycodone screen, urine                   100 ng/ml  Propoxyphene screen, urine                300 ng/ml  Buprenorphine screen, urine                10 ng/ml    Lactic Acid, Plasma [[id number]]  (Normal) Collected: 06/08/21 0439    Specimen: Blood Updated: 06/08/21 0517     Lactate 1.5 mmol/L     Urinalysis With Culture If Indicated - Urine, Catheter [[id number]]  (Abnormal) Collected: 06/08/21 0441    Specimen: Urine, Catheter Updated: 06/08/21 0516      Color, UA Yellow     Appearance, UA Cloudy     pH, UA 5.5     Specific [place], UA 1.030     Comment: Result obtained by Refractometer        Glucose, UA Negative     Ketones, UA 40 mg/dL (2+)     Bilirubin, UA Negative     Blood, UA Moderate (2+)     Protein, UA >=300 mg/dL (3+)     Leuk Esterase, UA Small (1+)     Nitrite, UA Positive     Urobilinogen, UA 1.0 E.U./dL    CBC & Differential [[id number]]  (Abnormal) Collected: 06/08/21 0439    Specimen: Blood Updated: 06/08/21 0456    Narrative:      The following orders were created for panel order CBC & Differential.  Procedure                               Abnormality         Status                     ---------                               -----------         ------                     CBC Auto Differential[[id number]]        Abnormal            Final result                 Please view results for these tests on the individual orders.    CBC Auto Differential [[id number]]  (Abnormal) Collected: 06/08/21 0439    Specimen: Blood Updated: 06/08/21 0456     WBC 13.46 10*3/mm3      RBC 3.90 10*6/mm3      Hemoglobin 11.2 g/dL      Hematocrit 35.3 %      MCV 90.5 fL      MCH 28.7 pg      MCHC 31.7 g/dL      RDW 12.9 %      RDW-SD 42.7 fl      MPV 9.7 fL      Platelets 193 10*3/mm3      Neutrophil % 91.7 %      Lymphocyte % 2.1 %      Monocyte % 5.3 %      Eosinophil % 0.0 %      Basophil % 0.2 %      Immature Grans % 0.7 %      Neutrophils, Absolute 12.35 10*3/mm3      Lymphocytes, Absolute 0.28 10*3/mm3      Monocytes, Absolute 0.71 10*3/mm3      Eosinophils, Absolute 0.00 10*3/mm3      Basophils, Absolute 0.03 10*3/mm3      Immature Grans, Absolute 0.09 10*3/mm3      nRBC 0.0 /100 WBC     Blood Culture - Blood, Arm, Right [[id number]] Collected: 06/08/21 0439    Specimen: Blood from Arm, Right Updated: 06/08/21 0452    Blood Culture - Blood, Arm, Left [[id number]] Collected: 06/08/21 0439    Specimen: Blood from Arm, Left Updated: 06/08/21 0452          Imaging  Results (Most Recent)     Procedure Component Value Units Date/Time    XR Chest 2 View [[id number]] Collected: 06/08/21 0547     Updated: 06/08/21 0549    Narrative:      CHEST X-RAY, 6/8/2021      HISTORY:    75-year-old female in the ED with confusion/mental status change. Elevated white blood cell count. COVID-19 testing is negative.    TECHNIQUE:  AP and lateral upright chest series.      FINDINGS:  Diffusely increased interstitial markings with mild patchy airspace disease in the lung bases. This may represent pulmonary edema or diffuse pneumonitis. There is no dense airspace consolidation or visible pleural effusion. Heart size is normal.      Impression:      Diffusely increased interstitial opacity with patchy airspace disease in the lung bases. The findings may represent pulmonary edema or pneumonitis.    Signer Name: Srikanth Raman MD   Signed: 6/8/2021 5:47 AM   Workstation Name: [id number]    [facility]        reviewed    ECG/EMG Results (most recent)     Procedure Component Value Units Date/Time    ECG 12 Lead [[id number]] Collected: 06/08/21 0449     Updated: 06/08/21 0451     QT Interval 322 ms     Narrative:      HEART RATE= 96  bpm  RR Interval= 624  ms  NV Interval= 158  ms  P Horizontal Axis= 16  deg  P Front Axis= 9  deg  QRSD Interval= 73  ms  QT Interval= 322  ms  QRS Axis= -3  deg  T Wave Axis=   deg  - BORDERLINE ECG -  Sinus rhythm  Consider left ventricular hypertrophy  Electronically Signed By:   Date and Time of Study: 2021-06-08 04:49:53        EKG personally reviewed and shows NSR with no acute st-t changes     Assessment/Plan   Active Hospital Problems    Diagnosis  POA   • **Sepsis, unspecified organism (CMS/HCC) [A41.9]  Yes   • Urinary tract infection without hematuria [N39.0]  Yes   • Parkinson disease (CMS/HCC) [G20]  Yes   • B12 deficiency [E53.8]  Yes      Resolved Hospital Problems   No resolved problems to display.       Sepsis likely d/t acute  UTI- POA and possible pneumonia   - Temp 100.7 F, WBC 13, HR 99  - previous urine grew E coli and proteus   - no recent culture data available  -  to find out what antibiotic she received previously  - patient following with urology  - urine cx- pending  - blood cx- pending  - check strep and legionella antigens  - started on Vanc and Zosyn in the ED, will change to Cefepime  - may be able to de-escalate pending results     Parkinson's disease  - continue propranolol, Pramipexole and sinemet     DVT Prophylaxis  - SCDs    I discussed the patient's findings and my recommendations with patient and her .       This patient has been examined wearing appropriate Personal Protective Equipment  06/08/21      Lillie Rausch DO  06/08/21  06:16 EDT

## 2021-06-08 NOTE — ED NOTES
Pt's brief changed at this time by this RN and TIGIST Hill. PT pulled up in bed and resituated.      Elo Gavin RN  06/08/21 0600

## 2021-06-08 NOTE — CASE MANAGEMENT/SOCIAL WORK
Discharge Planning Assessment   Jil Villagomez     Patient Name: Cindy Mejia  MRN: [id number]  Today's Date: 6/8/2021    Admit Date: 6/8/2021    Discharge Needs Assessment     Row Name 06/08/21 1227       Living Environment    Lives With  spouse    Name(s) of Who Lives With Patient  Erlin, spouse    Current Living Arrangements  home/apartment/condo    Duration at Residence  25 Years    Potentially Unsafe Housing Conditions  -- none    Primary Care Provided by  self;spouse/significant other    Provides Primary Care For  no one    Family Caregiver if Needed  spouse    Family Caregiver Names  Erlin, spouse    Quality of Family Relationships  unable to assess       Resource/Environmental Concerns    Resource/Environmental Concerns  none    Transportation Concerns  car, none       Transition Planning    Patient/Family Anticipates Transition to  home with family    Patient/Family Anticipated Services at Transition  home health care    Transportation Anticipated  family or friend will provide       Discharge Needs Assessment    Readmission Within the Last 30 Days  no previous admission in last 30 days    Current Outpatient/Agency/Support Group  -- none    Equipment Currently Used at Home  shower chair;grab bar;walker, rolling    Concerns to be Addressed  no discharge needs identified    Anticipated Changes Related to Illness  none    Equipment Needed After Discharge  none    Outpatient/Agency/Support Group Needs  homecare agency    Discharge Facility/Level of Care Needs  home with home health    Current Discharge Risk  chronically ill        Discharge Plan     Row Name 06/08/21 1255       Plan    Plan Comments  I spoke with the patient at bedside with her permission. I introduced myself and explained my role as . I verified the information on the facesheet and her PCP as EMMA Lorenz.  The patient stated that she uses [facility] in [place] as her pharmacy and she is able to obtain and afford her  medications.  She stated that she has an advanced directive and will provide the paperwork at her convenience.   She advised that she lives in a two story house with her .  She has lived there for the past 25 years.  There are 2 steps to enter into the home through the garage and she is able to enter and maneuver around the home with no issues.  She is independent with her ADL’s.  She stated that she doesn’t drive or have a car.  Her  will pick her up at discharge.  She stated that she has a rolling walker, bedside commode and a shower chair.  She denied the need for further DME or other community resources currently.  She stated that she recently finished PT through home health but couldn’t recall the name of the home health agency.  She advised that the therapy was set up by her PCP.  I contacted the PCP’s office and they advised that they had not set the patient up for PT.  I attempted to contact the patient’s  and left a message for him to return my call. The patient will discharge home with family with home health and she agreeable to that plan.  She will consider short term rehab if needed depending on her hospital course.  She had no further question or concerns at this time.  CM will continue to follow for needs.    Row Name 06/08/21 1232       Plan    Plan  Discharge home with home health        Continued Care and Services - Admitted Since 6/8/2021    Coordination has not been started for this encounter.         Demographic Summary     Row Name 06/08/21 1226       General Information    Admission Type  observation    Arrived From  home    Referral Source  admission list    Reason for Consult  discharge planning    Preferred Language  English     Used During This Interaction  no       Contact Information    Permission Granted to Share Info With          Functional Status    No documentation.       Psychosocial    No documentation.       Abuse/Neglect    No  documentation.       Legal    No documentation.       Substance Abuse    No documentation.       Patient Forms    No documentation.           Eva Banda RN

## 2021-06-09 ENCOUNTER — APPOINTMENT (OUTPATIENT)
Dept: CT IMAGING | Facility: HOSPITAL | Age: 76
End: 2021-06-09

## 2021-06-09 PROBLEM — E44.0 MODERATE MALNUTRITION (HCC): Status: ACTIVE | Noted: 2021-06-09

## 2021-06-09 LAB
ANION GAP SERPL CALCULATED.3IONS-SCNC: 8.6 MMOL/L (ref 5–15)
BASOPHILS # BLD AUTO: 0.04 10*3/MM3 (ref 0–0.2)
BASOPHILS NFR BLD AUTO: 0.4 % (ref 0–1.5)
BUN SERPL-MCNC: 17 MG/DL (ref 8–23)
BUN/CREAT SERPL: 43.6 (ref 7–25)
CALCIUM SPEC-SCNC: 8.7 MG/DL (ref 8.6–10.5)
CHLORIDE SERPL-SCNC: 106 MMOL/L (ref 98–107)
CO2 SERPL-SCNC: 25.4 MMOL/L (ref 22–29)
CREAT SERPL-MCNC: 0.39 MG/DL (ref 0.57–1)
DEPRECATED RDW RBC AUTO: 44.5 FL (ref 37–54)
EOSINOPHIL # BLD AUTO: 0.35 10*3/MM3 (ref 0–0.4)
EOSINOPHIL NFR BLD AUTO: 3.3 % (ref 0.3–6.2)
ERYTHROCYTE [DISTWIDTH] IN BLOOD BY AUTOMATED COUNT: 13.1 % (ref 12.3–15.4)
GFR SERPL CREATININE-BSD FRML MDRD: >150 ML/MIN/1.73
GLUCOSE SERPL-MCNC: 100 MG/DL (ref 65–99)
HCT VFR BLD AUTO: 30.7 % (ref 34–46.6)
HGB BLD-MCNC: 9.4 G/DL (ref 12–15.9)
IMM GRANULOCYTES # BLD AUTO: 0.05 10*3/MM3 (ref 0–0.05)
IMM GRANULOCYTES NFR BLD AUTO: 0.5 % (ref 0–0.5)
LYMPHOCYTES # BLD AUTO: 0.62 10*3/MM3 (ref 0.7–3.1)
LYMPHOCYTES NFR BLD AUTO: 5.8 % (ref 19.6–45.3)
MAGNESIUM SERPL-MCNC: 1.8 MG/DL (ref 1.6–2.4)
MCH RBC QN AUTO: 28.6 PG (ref 26.6–33)
MCHC RBC AUTO-ENTMCNC: 30.6 G/DL (ref 31.5–35.7)
MCV RBC AUTO: 93.3 FL (ref 79–97)
MONOCYTES # BLD AUTO: 0.73 10*3/MM3 (ref 0.1–0.9)
MONOCYTES NFR BLD AUTO: 6.9 % (ref 5–12)
NEUTROPHILS NFR BLD AUTO: 8.84 10*3/MM3 (ref 1.7–7)
NEUTROPHILS NFR BLD AUTO: 83.1 % (ref 42.7–76)
NRBC BLD AUTO-RTO: 0 /100 WBC (ref 0–0.2)
PLATELET # BLD AUTO: 157 10*3/MM3 (ref 140–450)
PMV BLD AUTO: 10.1 FL (ref 6–12)
POTASSIUM SERPL-SCNC: 3.4 MMOL/L (ref 3.5–5.2)
PROCALCITONIN SERPL-MCNC: 3.16 NG/ML (ref 0–0.25)
RBC # BLD AUTO: 3.29 10*6/MM3 (ref 3.77–5.28)
SODIUM SERPL-SCNC: 140 MMOL/L (ref 136–145)
VANCOMYCIN SERPL-MCNC: 12.8 MCG/ML (ref 5–40)
WBC # BLD AUTO: 10.63 10*3/MM3 (ref 3.4–10.8)

## 2021-06-09 PROCEDURE — 97161 PT EVAL LOW COMPLEX 20 MIN: CPT

## 2021-06-09 PROCEDURE — G0378 HOSPITAL OBSERVATION PER HR: HCPCS

## 2021-06-09 PROCEDURE — 74176 CT ABD & PELVIS W/O CONTRAST: CPT

## 2021-06-09 PROCEDURE — 80202 ASSAY OF VANCOMYCIN: CPT | Performed by: INTERNAL MEDICINE

## 2021-06-09 PROCEDURE — 25010000002 VANCOMYCIN 750 MG RECONSTITUTED SOLUTION: Performed by: INTERNAL MEDICINE

## 2021-06-09 PROCEDURE — 84145 PROCALCITONIN (PCT): CPT | Performed by: NURSE PRACTITIONER

## 2021-06-09 PROCEDURE — 99225 PR SBSQ OBSERVATION CARE/DAY 25 MINUTES: CPT | Performed by: INTERNAL MEDICINE

## 2021-06-09 PROCEDURE — 25010000002 CEFEPIME-DEXTROSE 2-5 GM-%(50ML) RECONSTITUTED SOLUTION: Performed by: INTERNAL MEDICINE

## 2021-06-09 PROCEDURE — 85025 COMPLETE CBC W/AUTO DIFF WBC: CPT | Performed by: INTERNAL MEDICINE

## 2021-06-09 PROCEDURE — 80048 BASIC METABOLIC PNL TOTAL CA: CPT | Performed by: INTERNAL MEDICINE

## 2021-06-09 PROCEDURE — 83735 ASSAY OF MAGNESIUM: CPT | Performed by: INTERNAL MEDICINE

## 2021-06-09 PROCEDURE — 96361 HYDRATE IV INFUSION ADD-ON: CPT

## 2021-06-09 PROCEDURE — 94799 UNLISTED PULMONARY SVC/PX: CPT

## 2021-06-09 RX ADMIN — SODIUM CHLORIDE 100 ML/HR: 9 INJECTION, SOLUTION INTRAVENOUS at 04:28

## 2021-06-09 RX ADMIN — SODIUM CHLORIDE, PRESERVATIVE FREE 10 ML: 5 INJECTION INTRAVENOUS at 08:31

## 2021-06-09 RX ADMIN — SODIUM CHLORIDE 750 MG: 900 INJECTION, SOLUTION INTRAVENOUS at 08:30

## 2021-06-09 RX ADMIN — Medication 1000 MCG: at 08:30

## 2021-06-09 RX ADMIN — CEFEPIME HYDROCHLORIDE 2 G: 2 INJECTION, SOLUTION INTRAVENOUS at 04:28

## 2021-06-09 RX ADMIN — CEFEPIME HYDROCHLORIDE 2 G: 2 INJECTION, SOLUTION INTRAVENOUS at 16:58

## 2021-06-09 RX ADMIN — CARBIDOPA AND LEVODOPA 1 TABLET: 25; 100 TABLET ORAL at 08:30

## 2021-06-09 RX ADMIN — PRAMIPEXOLE DIHYDROCHLORIDE 0.25 MG: 0.25 TABLET ORAL at 16:58

## 2021-06-09 RX ADMIN — PRAMIPEXOLE DIHYDROCHLORIDE 0.25 MG: 0.25 TABLET ORAL at 08:30

## 2021-06-09 RX ADMIN — SODIUM CHLORIDE, PRESERVATIVE FREE 10 ML: 5 INJECTION INTRAVENOUS at 21:20

## 2021-06-09 RX ADMIN — PRAMIPEXOLE DIHYDROCHLORIDE 0.25 MG: 0.25 TABLET ORAL at 21:18

## 2021-06-09 RX ADMIN — PROPRANOLOL HYDROCHLORIDE 60 MG: 60 CAPSULE, EXTENDED RELEASE ORAL at 08:30

## 2021-06-09 NOTE — PAYOR COMM NOTE
"Cindy Hamilton (75 y.o. Female)     ATTN: NURSE REVIEWER  RE: UPDATED CLINICALS  REF#[id number]  PLS REPLY TO DINORA GRIER [phone] FAX# [phone]      Date of Birth Social Security Number Address Home Phone MRN    1945  [address] [id number] [phone] [id number]    Yarsanism Marital Status          None        Admission Date Admission Type Admitting Provider Attending Provider Department, Room/Bed    6/8/21 Emergency Lillie Rausch DO Maddox, Birrilla, DO [facility] MED SURG, 1418/1    Discharge Date Discharge Disposition Discharge Destination                       Attending Provider: Lillie Rausch DO    Allergies: Penicillins    Isolation: None   Infection: None   Code Status: CPR    Ht: 154.9 cm (61\")   Wt: 52.2 kg (115 lb)    Admission Cmt: None   Principal Problem: Sepsis, unspecified organism (CMS/[facility]) [A41.9]                 Active Insurance as of 6/8/2021     Primary Coverage     Payor Plan Insurance Group Employer/Plan Group    [facility] MEDICARE REPLACEMENT [facility] MEDICARE REPLACEMENT [id number]     Payor Plan Address Payor Plan Phone Number Payor Plan Fax Number Effective Dates    [address] [address]   1/1/2021 - None Entered    [facility] [id number]       Subscriber Name Subscriber Birth Date Member ID       CINDY HAMILTON 1945 [id number]                 Emergency Contacts      (Rel.) Home Phone Work Phone Mobile Phone    Jd Haimlton (Spouse) [phone] -- --            Vital Signs (last day)     Date/Time   Temp   Temp src   Pulse   Resp   BP   Patient Position   SpO2    06/09/21 1058   97.7 (36.5)   Oral   79   16   155/74   Sitting   95    06/09/21 0830   --   --   78   --   160/75   --   --    06/09/21 0628   97.9 (36.6)   Oral   77   17   169/71   Lying   94    06/08/21 1900   97.3 (36.3)   Oral   73   16   121/60   Lying   94    06/08/21 1543   97.6 (36.4)   Oral   69   16   105/53   Sitting   90    " 06/08/21 0855   --   --   76   --   93/55   --   --    06/08/21 0819   --   --   --   --   --   --   93    06/08/21 0653   98.1 (36.7)   Oral   83   16   128/51   Lying   94    06/08/21 05:52:17   98.9 (37.2)   Oral   92   16   106/45   Lying   95    06/08/21 0516   --   --   --   --   --   --   95    06/08/21 0431   (!) 100.7 (38.2)   Oral   99   16   135/58   Lying   (!) 88              Lines, Drains & Airways    Active LDAs     Name:   Placement date:   Placement time:   Site:   Days:    Peripheral IV 06/08/21 0438 Left Hand   06/08/21 0438    Hand   1                  Current Facility-Administered Medications   Medication Dose Route Frequency Provider Last Rate Last Admin   • acetaminophen (TYLENOL) tablet 650 mg  650 mg Oral Q4H PRN Gatito Rauscha, DO        Or   • acetaminophen (TYLENOL) 160 MG/5ML solution 650 mg  650 mg Oral Q4H PRN Gatito Rauscha, DO        Or   • acetaminophen (TYLENOL) suppository 650 mg  650 mg Rectal Q4H PRN Rausch, Brendalla, DO       • carbidopa-levodopa (SINEMET)  MG per tablet 1 tablet  1 tablet Oral Daily Rausch, Birrilla DO   1 tablet at 06/09/21 0830   • cefepime (MAXIPIME) IVPB 2 g/50ml D5W (premix)  2 g Intravenous Q12H Gatito Rauscha, DO   2 g at 06/09/21 0428   • ondansetron (ZOFRAN) tablet 4 mg  4 mg Oral Q6H PRN RauschBrenda connerlla, DO        Or   • ondansetron (ZOFRAN) injection 4 mg  4 mg Intravenous Q6H PRN Gatito Rauscha, DO       • Pharmacy to dose vancomycin   Does not apply Continuous PRN Rausch, Brendalla, DO       • pramipexole (MIRAPEX) tablet 0.25 mg  0.25 mg Oral TID Gatito Rauscha, DO   0.25 mg at 06/09/21 0830   • propranolol LA (INDERAL LA) 24 hr capsule 60 mg  60 mg Oral Daily Raucsh, Birrilla, DO   60 mg at 06/09/21 0830   • sodium chloride 0.9 % flush 10 mL  10 mL Intravenous PRN Rausch, Birrilla, DO       • sodium chloride 0.9 % flush 10 mL  10 mL Intravenous Q12H Rausch, Birrilla, DO   10 mL at 06/09/21 0831   • sodium chloride 0.9 %  infusion 40 mL  40 mL Intravenous PRN Rausch, Birrilla, DO       • sodium chloride 0.9 % infusion  100 mL/hr Intravenous Continuous Rausch, Birrilla,  mL/hr at 06/09/21 0428 100 mL/hr at 06/09/21 0428   • vitamin B-12 (CYANOCOBALAMIN) tablet 1,000 mcg  1,000 mcg Oral Daily Rausch, Birrilla, DO   1,000 mcg at 06/09/21 0830     Blood Administration Record (From admission, onward)    None          Lab Results (last 24 hours)     Procedure Component Value Units Date/Time    Urine Culture - Urine, Urine, Catheter [[id number]]  (Abnormal) Collected: 06/08/21 0441    Specimen: Urine, Catheter Updated: 06/09/21 1232     Urine Culture >100,000 CFU/mL Escherichia coli    Basic Metabolic Panel [[id number]]  (Abnormal) Collected: 06/09/21 0406    Specimen: Blood Updated: 06/09/21 0552     Glucose 100 mg/dL      BUN 17 mg/dL      Creatinine 0.39 mg/dL      Sodium 140 mmol/L      Potassium 3.4 mmol/L      Chloride 106 mmol/L      CO2 25.4 mmol/L      Calcium 8.7 mg/dL      eGFR Non African Amer >150 mL/min/1.73      BUN/Creatinine Ratio 43.6     Anion Gap 8.6 mmol/L     Narrative:      GFR Normal >60  Chronic Kidney Disease <60  Kidney Failure <15      Magnesium [[id number]]  (Normal) Collected: 06/09/21 0406    Specimen: Blood Updated: 06/09/21 0552     Magnesium 1.8 mg/dL     Procalcitonin [[id number]]  (Abnormal) Collected: 06/09/21 0406    Specimen: Blood Updated: 06/09/21 0523     Procalcitonin 3.16 ng/mL     Narrative:      Results may be falsely decreased if patient taking Biotin.     Blood Culture - Blood, Arm, Right [[id number]] Collected: 06/08/21 0439    Specimen: Blood from Arm, Right Updated: 06/09/21 0501     Blood Culture No growth at 24 hours    Blood Culture - Blood, Arm, Left [[id number]] Collected: 06/08/21 0439    Specimen: Blood from Arm, Left Updated: 06/09/21 0501     Blood Culture No growth at 24 hours    CBC Auto Differential [[id number]]  (Abnormal) Collected: 06/09/21 0406    Specimen: Blood Updated:  06/09/21 0445     WBC 10.63 10*3/mm3      RBC 3.29 10*6/mm3      Hemoglobin 9.4 g/dL      Hematocrit 30.7 %      MCV 93.3 fL      MCH 28.6 pg      MCHC 30.6 g/dL      RDW 13.1 %      RDW-SD 44.5 fl      MPV 10.1 fL      Platelets 157 10*3/mm3      Neutrophil % 83.1 %      Lymphocyte % 5.8 %      Monocyte % 6.9 %      Eosinophil % 3.3 %      Basophil % 0.4 %      Immature Grans % 0.5 %      Neutrophils, Absolute 8.84 10*3/mm3      Lymphocytes, Absolute 0.62 10*3/mm3      Monocytes, Absolute 0.73 10*3/mm3      Eosinophils, Absolute 0.35 10*3/mm3      Basophils, Absolute 0.04 10*3/mm3      Immature Grans, Absolute 0.05 10*3/mm3      nRBC 0.0 /100 WBC         Imaging Results (Last 24 Hours)     ** No results found for the last 24 hours. **        ECG/EMG Results (last 24 hours)     ** No results found for the last 24 hours. **        Operative/Procedure Notes (last 24 hours) (Notes from 06/08/21 1531 through 06/09/21 1531)    No notes of this type exist for this encounter.         Physician Progress Notes (last 24 hours) (Notes from 06/08/21 1531 through 06/09/21 1531)    No notes of this type exist for this encounter.         Consult Notes (last 24 hours) (Notes from 06/08/21 1531 through 06/09/21 1531)    No notes of this type exist for this encounter.

## 2021-06-09 NOTE — PLAN OF CARE
Goal Outcome Evaluation:  Plan of Care Reviewed With: patient        Progress: improving     Pt spent entire day in chair, ambulated to restroom, more independent with ADL's.  CT performed today.  No complaints of pain.

## 2021-06-09 NOTE — PLAN OF CARE
Problem: Adult Inpatient Plan of Care  Goal: Plan of Care Review  Outcome: Ongoing, Progressing  Flowsheets (Taken 6/9/2021 5875)  Progress: improving  Plan of Care Reviewed With: patient  Outcome Summary: patient more oriented throughout night shift.  Patient alert in room, expressed desire to walk again and is fearful she will have to relearn from scratch   Goal Outcome Evaluation:  Plan of Care Reviewed With: patient        Progress: improving  Outcome Summary: patient more oriented throughout night shift.  Patient alert in room, expressed desire to walk again and is fearful she will have to relearn from scratch

## 2021-06-09 NOTE — THERAPY DISCHARGE NOTE
Patient Name: Cindy Mejia  : 1945    MRN: [id number]                              Today's Date: 2021       Admit Date: 2021    Visit Dx:     ICD-10-CM ICD-9-CM   1. Urinary tract infection without hematuria, site unspecified  N39.0 599.0   2. Sepsis, due to unspecified organism, unspecified whether acute organ dysfunction present (CMS/[facility])  A41.9 038.9     995.91     Patient Active Problem List   Diagnosis   • Anxiety   • Tubular adenoma   • Tremor of both hands   • B12 deficiency   • Parkinson disease (CMS/[facility])   • Action tremor   • Mixed stress and urge urinary incontinence   • Urinary tract infection without hematuria   • Sepsis, unspecified organism (CMS/[facility])     Past Medical History:   Diagnosis Date   • Anxiety 2016   • Asymptomatic varicose veins of both lower extremities 2019   • Carcinoma of colon (CMS/[facility]) 2018   • Colon polyp    • Constipation    • Menopause 3/23/2017   • Mixed stress and urge urinary incontinence    • Other hemorrhoids 2018   • Other rosacea 2018   • Parkinson's disease (CMS/[facility])    • Uterine prolapse 2016     Past Surgical History:   Procedure Laterality Date   • BLADDER SURGERY     • COLONOSCOPY N/A 2017    Procedure: COLONOSCOPY, polypectomy;  Surgeon: Rabia Mcelroy MD;  Location: [facility];  Service:    • COLONOSCOPY N/A 2017    Procedure: COLONOSCOPY, polypectomy, biopsy, sclerotherapy injection;  Surgeon: Rabia Mcelroy MD;  Location: [facility] OR;  Service:    • HYSTERECTOMY     • TUBAL ABDOMINAL LIGATION       General Information     Row Name 21 0941          Physical Therapy Time and Intention    Document Type  discharge evaluation/summary  -JW     Mode of Treatment  physical therapy  -     Row Name 21 0941          General Information    Patient Profile Reviewed  yes  -JW     Prior Level of Function  independent:;all household mobility prior to 1 month ago independent with use of rollator.  reports increased  "weakness and difficulty with mobility over the past 1 month  -     Existing Precautions/Restrictions  fall  -     Barriers to Rehab  -- Parkinson's  -     Row Name 06/09/21 0941          Living Environment    Lives With  spouse  -     Row Name 06/09/21 0941          Home Main Entrance    Number of Stairs, Main Entrance  two  -     Stair Railings, Main Entrance  -- pt reports rail is \"being installed\"  -     Row Name 06/09/21 0941          Stairs Within Home, Primary    Stairs Comment, Within Home, Primary  pt reports one flight of steps from basement, does not have to access  -     Row Name 06/09/21 0941          Cognition    Orientation Status (Cognition)  oriented x 3  -       User Key  (r) = Recorded By, (t) = Taken By, (c) = Cosigned By    Initials Name Provider Type    Cat Padgett, PT Physical Therapist        Mobility     Row Name 06/09/21 0941          Bed Mobility    Bed Mobility  other (see comments) deferred-up in chair  -     Row Name 06/09/21 0941          Sit-Stand Transfer    Sit-Stand [place] (Transfers)  supervision  -     Assistive Device (Sit-Stand Transfers)  walker, front-wheeled  -     Row Name 06/09/21 0941          Gait/Stairs (Locomotion)    [place] Level (Gait)  standby assist  -     Assistive Device (Gait)  walker, front-wheeled  -     Distance in Feet (Gait)  244  -     Bilateral Gait Deviations  forward flexed posture  -     Comment (Gait/Stairs)  pt requires increased time during direction changes, reports as baseline.  pt with no episodes of balance loss.  noted occasional variations in gait speed during mobility however able to correct with cues.  -       User Key  (r) = Recorded By, (t) = Taken By, (c) = Cosigned By    Initials Name Provider Type    Cat Padgett, PT Physical Therapist        Obj/Interventions     Row Name 06/09/21 0941          Range of Motion Comprehensive    Comment, General Range of Motion  LE ROM WFL " "bilaterally  -[facility] Name 06/09/21 0941          Strength Comprehensive (MMT)    Comment, General Manual Muscle Testing (MMT) Assessment  LE strength 4/5 bilaterally  -[facility] Name 06/09/21 0941          Sensory Assessment (Somatosensory)    Sensory Assessment (Somatosensory)  sensation intact  -       User Key  (r) = Recorded By, (t) = Taken By, (c) = Cosigned By    Initials Name Provider Type    Cat Padgett, PT Physical Therapist        Goals/Plan    No documentation.       Clinical Impression     [facility] Name 06/09/21 0941          Pain    Additional Documentation  -- no c/o pain  -JW     Row Name 06/09/21 0941          Plan of Care Review    Plan of Care Reviewed With  patient  -     Outcome Summary  Physical therapy evaluation complete.  patient performs sit to/from stand with SBA and gait with rolling walker x244 feet with SBA.  Patient requires increased time for direction changes, however no episodes of balance loss noted throughout gait activities.  Discussed recommendation for use of rolling walker instead of rollator upon return home, pt in agreement.  Recommend home health PT services at discharge.  NO further PT needs in acute care setting, recommend continued ambulation with nursing staff as tolerated.  -JW     Row Name 06/09/21 0941          Therapy Assessment/Plan (PT)    Patient/Family Therapy Goals Statement (PT)  \"go home\"  -     Criteria for Skilled Interventions Met (PT)  no problems identified which require skilled intervention  -[facility] Name 06/09/21 0941          Positioning and Restraints    Pre-Treatment Position  sitting in chair/recliner  -     Post Treatment Position  chair  -     In Chair  reclined;call light within reach;encouraged to call for assist  -       User Key  (r) = Recorded By, (t) = Taken By, (c) = Cosigned By    Initials Name Provider Type    Cat Padgett, PT Physical Therapist        Outcome Measures     [facility] Name 06/09/21 0941          How " much help from another person do you currently need...    Turning from your back to your side while in flat bed without using bedrails?  3  -JW     Moving from lying on back to sitting on the side of a flat bed without bedrails?  3  -JW     Moving to and from a bed to a chair (including a wheelchair)?  3  -JW     Standing up from a chair using your arms (e.g., wheelchair, bedside chair)?  3  -JW     Climbing 3-5 steps with a railing?  3  -JW     To walk in hospital room?  3  -JW     AM-PAC 6 Clicks Score (PT)  18  -     Row Name 06/09/21 0941          Functional Assessment    Outcome Measure Options  AM-PAC 6 Clicks Basic Mobility (PT)  -       User Key  (r) = Recorded By, (t) = Taken By, (c) = Cosigned By    Initials Name Provider Type    Cat Padgett, PT Physical Therapist        Physical Therapy Education                 Title: PT OT SLP Therapies (Resolved)     Topic: Physical Therapy (Resolved)     Point: Mobility training (Resolved)     Learning Progress Summary           Patient Acceptance, E,TB, VU by  at 6/9/2021 1041                               User Key     Initials Effective Dates Name Provider Type Discipline     04/03/18 -  Cat Mcgee, PT Physical Therapist PT              PT Recommendation and Plan     Plan of Care Reviewed With: patient  Outcome Summary: Physical therapy evaluation complete.  patient performs sit to/from stand with SBA and gait with rolling walker x244 feet with SBA.  Patient requires increased time for direction changes, however no episodes of balance loss noted throughout gait activities.  Discussed recommendation for use of rolling walker instead of rollator upon return home, pt in agreement.  Recommend home health PT services at discharge.  NO further PT needs in acute care setting, recommend continued ambulation with nursing staff as tolerated.     Time Calculation:   PT Charges     Row Name 06/09/21 0930             Time Calculation    Start Time  0941  -       Stop Time  1002  -DUNCAN      Time Calculation (min)  21 min  -DUNCAN      PT Received On  06/09/21  -DUNCAN        User Key  (r) = Recorded By, (t) = Taken By, (c) = Cosigned By    Initials Name Provider Type    Cat Padgett, PT Physical Therapist        Therapy Charges for Today     Code Description Service Date Service Provider Modifiers Qty    [id number] HC PT EVAL LOW COMPLEXITY 1 6/9/2021 Cat Mcgee, DESHAUN GP 1          PT G-Codes  Outcome Measure Options: AM-PAC 6 Clicks Basic Mobility (PT)  AM-PAC 6 Clicks Score (PT): 18    PT Discharge Summary  Anticipated Discharge Disposition (PT): home with home health, home with assist    Cat Mcgee, DESHAUN  6/9/2021

## 2021-06-09 NOTE — CASE MANAGEMENT/SOCIAL WORK
Continued Stay Note  MIRNA Corey     Patient Name: Cindy Mejia  MRN: [id number]  Today's Date: 6/9/2021    Admit Date: 6/8/2021    Discharge Plan     Row Name 06/09/21 1306       Plan    Plan  Discharge home with HH vs. STR    Plan Comments  I spoke with the patients  via the phone and he advised that the patient may want to go home with home health and their preference is [facility].  I also spoke with the patient and she advised that she is ready to go home.  The patients  provided me with updated insurance cards and I faxed copies to Registration.  I called Mata from [facility] and made the referral for home nursing, PT and OT.  She accepted the patient into their home health services and asked if we would contact her when the patient discharge.  CM will continue to follow for needs.        Discharge Codes    No documentation.             Eva Banda RN

## 2021-06-09 NOTE — PLAN OF CARE
Goal Outcome Evaluation:  Plan of Care Reviewed With: patient           Outcome Summary: Physical therapy evaluation complete.  patient performs sit to/from stand with SBA and gait with rolling walker x244 feet with SBA.  Patient requires increased time for direction changes, however no episodes of balance loss noted throughout gait activities.  Discussed recommendation for use of rolling walker instead of rollator upon return home, pt in agreement.  Recommend home health PT services at discharge.  NO further PT needs in acute care setting, recommend continued ambulation with nursing staff as tolerated.

## 2021-06-09 NOTE — PROGRESS NOTES
"Hospital Medicine Team    LOS 0 days      Patient Care Team:  Shannon Nguyen APRN as PCP - General (Family Medicine)  Ant Baxter MD as Consulting Physician (Neurology)  Rabia Mcelroy MD as Consulting Physician (Gastroenterology)          Chief Complaint: Weakness confusion    Subjective      Much improved today sitting in chair was able to work and walk well per therapy.  Mental status much improved  Review of Systems   Constitutional: Negative for fatigue and fever.       Objective    Vital Signs  Temp:  [97.3 °F (36.3 °C)-97.9 °F (36.6 °C)] 97.7 °F (36.5 °C)  Heart Rate:  [69-79] 79  Resp:  [16-17] 16  BP: (105-169)/(53-75) 155/74  Oxygen Therapy  SpO2: 95 %  Pulse Oximetry Type: Continuous  Device (Oxygen Therapy): room air  Flow (L/min): 2  Flowsheet Rows      First Filed Value   Admission Height  154.9 cm (61\") Documented at 06/08/2021 0431   Admission Weight  52.2 kg (115 lb) Documented at 06/08/2021 0431              Physical Exam:  Physical Exam  Constitutional:       General: She is not in acute distress.  Cardiovascular:      Rate and Rhythm: Normal rate.   Abdominal:      General: There is no distension.      Palpations: Abdomen is soft.      Tenderness: There is no abdominal tenderness.   Musculoskeletal:      Right lower leg: No edema.      Left lower leg: No edema.   Skin:     General: Skin is warm.   Neurological:      Mental Status: She is alert.      Comments: Alert and oriented x3   Psychiatric:         Mood and Affect: Mood normal.       Results Review:    I reviewed the patient's new clinical results.    Results from last 7 days   Lab Units 06/09/21  0406 06/08/21  0439   WBC 10*3/mm3 10.63 13.46*   HEMOGLOBIN g/dL 9.4* 11.2*   HEMATOCRIT % 30.7* 35.3   PLATELETS 10*3/mm3 157 193     Results from last 7 days   Lab Units 06/09/21  0406 06/08/21 0439   SODIUM mmol/L 140 131*   POTASSIUM mmol/L 3.4* 3.6   CHLORIDE mmol/L 106 97*   CO2 mmol/L 25.4 24.0   BUN mg/dL 17 26* "   CREATININE mg/dL 0.39* 0.64   CALCIUM mg/dL 8.7 8.9   BILIRUBIN mg/dL  --  0.7   ALK PHOS U/L  --  64   ALT (SGPT) U/L  --  30   AST (SGOT) U/L  --  70*   GLUCOSE mg/dL 100* 261*     Results from last 7 days   Lab Units 06/09/21  0406 06/08/21  0439   MAGNESIUM mg/dL 1.8 1.6       Microbiology Results (last 10 days)     Procedure Component Value - Date/Time    S. Pneumo Ag Urine or CSF - Urine, Urine, Catheter In/Out [[id number]]  (Normal) Collected: 06/08/21 1047    Lab Status: Final result Specimen: Urine, Catheter In/Out Updated: 06/08/21 1120     Strep Pneumo Ag Negative    Legionella Antigen, Urine - Urine, Urine, Catheter In/Out [[id number]]  (Normal) Collected: 06/08/21 1047    Lab Status: Final result Specimen: Urine, Catheter In/Out Updated: 06/08/21 1119     LEGIONELLA ANTIGEN, URINE Negative    Urine Culture - Urine, Urine, Catheter [[id number]]  (Abnormal) Collected: 06/08/21 0441    Lab Status: Preliminary result Specimen: Urine, Catheter Updated: 06/09/21 1232     Urine Culture >100,000 CFU/mL Escherichia coli    COVID-19,[facility] IN-HOUSE,Nasal Swab No Transport Media 3-4 HR TAT - Swab, Nasal Cavity [[id number]]  (Normal) Collected: 06/08/21 0440    Lab Status: Final result Specimen: Swab from Nasal Cavity Updated: 06/08/21 0537     COVID19 Not Detected    Narrative:      Fact sheet for providers: https://www.fda.gov/media/624547/download     Fact sheet for patients: https://www.fda.gov/media/693580/download    Test performed by PCR.    Consider negative results in combination with clinical observations, patient history, and epidemiological information.    Blood Culture - Blood, Arm, Right [[id number]] Collected: 06/08/21 0439    Lab Status: Preliminary result Specimen: Blood from Arm, Right Updated: 06/09/21 0501     Blood Culture No growth at 24 hours    Blood Culture - Blood, Arm, Left [[id number]] Collected: 06/08/21 0439    Lab Status: Preliminary result Specimen: Blood from Arm, Left Updated:  06/09/21 0501     Blood Culture No growth at 24 hours                  XR Chest 2 View    Result Date: 6/8/2021  Diffusely increased interstitial opacity with patchy airspace disease in the lung bases. The findings may represent pulmonary edema or pneumonitis. Signer Name: Srikanth Raman MD  Signed: 6/8/2021 5:47 AM  Workstation [facility]  [facility]      ECG/EMG Results (most recent)     Procedure Component Value Units Date/Time    ECG 12 Lead [[id number]] Collected: 06/08/21 0449     Updated: 06/08/21 1906     QT Interval 322 ms     Narrative:      HEART RATE= 96  bpm  RR Interval= 624  ms  NE Interval= 158  ms  P Horizontal Axis= 16  deg  P Front Axis= 9  deg  QRSD Interval= 73  ms  QT Interval= 322  ms  QRS Axis= -3  deg  T Wave Axis=   deg  - BORDERLINE ECG -  Sinus rhythm  Consider left ventricular hypertrophy  NO PRIOR TRACING AVAILABLE FOR COMPARISON  Electronically Signed By: Benjamin Serrano ([facility]) 08-Jun-2021 19:05:41  Date and Time of Study: 2021-06-08 04:49:53            X-rays, labs reviewed personally by physician.    Medication Review:   I have reviewed the patient's current medication list    Scheduled Meds  carbidopa-levodopa, 1 tablet, Oral, Daily  cefepime, 2 g, Intravenous, Q12H  pramipexole, 0.25 mg, Oral, TID  propranolol LA, 60 mg, Oral, Daily  sodium chloride, 10 mL, Intravenous, Q12H  vitamin B-12, 1,000 mcg, Oral, Daily        Meds Infusions  Pharmacy to dose vancomycin,   sodium chloride, 100 mL/hr, Last Rate: 100 mL/hr (06/09/21 0428)        Meds PRN  •  acetaminophen **OR** acetaminophen **OR** acetaminophen  •  ondansetron **OR** ondansetron  •  Pharmacy to dose vancomycin  •  sodium chloride  •  sodium chloride            Assessment/Plan  (MDM)    Active Hospital Problems:  Active Hospital Problems    Diagnosis  POA   • **Sepsis, unspecified organism (CMS/HCC) [A41.9]  Yes   • Urinary tract infection without hematuria [N39.0]  Yes   • Parkinson  disease (CMS/HCC) [G20]  Yes   • B12 deficiency [E53.8]  Yes      Resolved Hospital Problems   No resolved problems to display.     Sepsis likely d/t acute UTI- POA and probable pneumonia   -Sepsis improved  - UCx growing GNR  - CT abd 6/9/21: no acute abdominal abnormality, LLL consolidation  - continue Cefepime and can stop Vancomycin  - de-escalate abx based on Cx  - urology had been following for recurrent UTI and had planned CT abd, did and did not show urinary issue, obstructive stone etc       Parkinson's disease  - continue propranolol, Pramipexole and sinemet         This patient has been examined wearing appropriate Personal Protective Equipment. 06/09/21      Plan for disposition:home possibly 1 day if UCx results and sensitive to PO abx    Electronically signed by Torin Harp DO, 06/09/21, 15:31 EDT.

## 2021-06-10 ENCOUNTER — READMISSION MANAGEMENT (OUTPATIENT)
Dept: CALL CENTER | Facility: HOSPITAL | Age: 76
End: 2021-06-10

## 2021-06-10 VITALS
DIASTOLIC BLOOD PRESSURE: 67 MMHG | WEIGHT: 115 LBS | SYSTOLIC BLOOD PRESSURE: 169 MMHG | TEMPERATURE: 98.6 F | OXYGEN SATURATION: 92 % | RESPIRATION RATE: 16 BRPM | HEART RATE: 78 BPM | HEIGHT: 61 IN | BODY MASS INDEX: 21.71 KG/M2

## 2021-06-10 LAB
BACTERIA SPEC AEROBE CULT: ABNORMAL
PROCALCITONIN SERPL-MCNC: 1.34 NG/ML (ref 0–0.25)

## 2021-06-10 PROCEDURE — G0378 HOSPITAL OBSERVATION PER HR: HCPCS

## 2021-06-10 PROCEDURE — 25010000002 CEFEPIME-DEXTROSE 2-5 GM-%(50ML) RECONSTITUTED SOLUTION: Performed by: INTERNAL MEDICINE

## 2021-06-10 PROCEDURE — 84145 PROCALCITONIN (PCT): CPT | Performed by: HOSPITALIST

## 2021-06-10 PROCEDURE — 96366 THER/PROPH/DIAG IV INF ADDON: CPT

## 2021-06-10 PROCEDURE — 96367 TX/PROPH/DG ADDL SEQ IV INF: CPT

## 2021-06-10 PROCEDURE — 99217 PR OBSERVATION CARE DISCHARGE MANAGEMENT: CPT | Performed by: HOSPITALIST

## 2021-06-10 RX ORDER — CEFUROXIME AXETIL 500 MG/1
500 TABLET ORAL 2 TIMES DAILY
Qty: 8 TABLET | Refills: 0 | Status: SHIPPED | OUTPATIENT
Start: 2021-06-10 | End: 2021-06-14

## 2021-06-10 RX ADMIN — PRAMIPEXOLE DIHYDROCHLORIDE 0.25 MG: 0.25 TABLET ORAL at 08:50

## 2021-06-10 RX ADMIN — PROPRANOLOL HYDROCHLORIDE 60 MG: 60 CAPSULE, EXTENDED RELEASE ORAL at 08:50

## 2021-06-10 RX ADMIN — CARBIDOPA AND LEVODOPA 1 TABLET: 25; 100 TABLET ORAL at 08:50

## 2021-06-10 RX ADMIN — Medication 1000 MCG: at 08:50

## 2021-06-10 RX ADMIN — CEFEPIME HYDROCHLORIDE 2 G: 2 INJECTION, SOLUTION INTRAVENOUS at 05:36

## 2021-06-10 RX ADMIN — SODIUM CHLORIDE, PRESERVATIVE FREE 10 ML: 5 INJECTION INTRAVENOUS at 09:07

## 2021-06-10 NOTE — DISCHARGE SUMMARY
Cindy Mejia  1945  [id number]    Hospitalists Discharge Summary    Date of Admission: 6/8/2021  Date of Discharge:  6/10/2021    Primary Discharge Diagnoses:  1.  E coli UTI  2.  LLL Pneumonia    Secondary Discharge Diagnoses:  1.  Parkinson's Disease  2.  Vitamin D Deficiency    History of Present Illness (taken from H&P):  Patient is a 76 y/o female with PMH of parkinson's disease and incontinence who presented to the ED due to worsening weakness. Patient reports she has been being seen for recurrent UTI and incontinence. She was treated with an unknown antibiotic about a month ago and referred to urology. She saw Dr. Purcell on Friday but he did not place her on any antibiotics at that time. Over the past couple days patient has become progressively more weak and has developed dysuria, increased urinary frequency. Patients symptoms were severe and was unable to get off of the toilet overnight. She denies any hematuria but does report subjective fever and chills and was found to have a temp of 100.7F in the ED.     Hospital Course:  Ms. Mejia was admitted to the Med/Surg unit.  She was covered w/ broad spectrum abx, but there was no evidence of sepsis.  Antibiotic therapy was de-escalated once culture data became available.  CT of the Abd/Pelvis was performed to try and elucidate abnormality causing frequent UTI.  None was found.  I discussed case w/ Dr. Purcell, and he will have the office call for an appointment for cystoscopy.    PCP  Patient Care Team:  Shannon Nguyen APRN as PCP - General (Family Medicine)  Ant Baxter MD as Consulting Physician (Neurology)  Rabia Mcelroy MD as Consulting Physician (Gastroenterology)    Consults:   Consults     No orders found from 5/10/2021 to 6/9/2021.          Operations and Procedures Performed:       CT Abdomen Pelvis Without Contrast    Result Date: 6/9/2021  Narrative: CT Abdomen Pelvis WO INDICATION: UTI, sepsis, burning with urination  TECHNIQUE: CT of the abdomen and pelvis without IV contrast. Coronal and sagittal reconstructions were obtained.  Radiation dose reduction techniques included automated exposure control or exposure modulation based on body size. Count of known CT and cardiac nuc med studies performed in previous 12 months: 0. COMPARISON: None available. FINDINGS: Abdomen: Area of gross consolidation is seen at the left lung base. There are gallstones. 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 There is a large left renal cyst. There is a prominent right adrenal adenoma. There are nonobstructing left renal calculi. There is a small fat-containing umbilical hernia. Pelvis: The patient's appendix is not definitively identified, but there are no secondary inflammatory signs. Bowel appears unremarkable. No acute osseous abnormality.     Impression: 1. There is pneumonia in the left lower lung. 2. No acute intra-abdominal abnormality is detected. Signer Name: Erin Estrada MD  Signed: 6/9/2021 4:22 PM  Workstation Name: [id number]  [facility]    XR Chest 2 View    Result Date: 6/8/2021  Narrative: CHEST X-RAY, 6/8/2021  HISTORY:  75-year-old female in the ED with confusion/mental status change. Elevated white blood cell count. COVID-19 testing is negative. TECHNIQUE: AP and lateral upright chest series.  FINDINGS: Diffusely increased interstitial markings with mild patchy airspace disease in the lung bases. This may represent pulmonary edema or diffuse pneumonitis. There is no dense airspace consolidation or visible pleural effusion. Heart size is normal.     Impression: Diffusely increased interstitial opacity with patchy airspace disease in the lung bases. The findings may represent pulmonary edema or pneumonitis. Signer Name: Srikanth Raman MD  Signed: 6/8/2021 5:47 AM  Workstation Name: [id number]  [facility]      Allergies:  is allergic to  penicillins.    Yong  not reviewed    Discharge Medications:     Discharge Medications      New Medications      Instructions Start Date   cefuroxime 500 MG tablet  Commonly known as: CEFTIN   500 mg, Oral, 2 Times Daily         Continue These Medications      Instructions Start Date   carbidopa-levodopa  MG per tablet  Commonly known as: SINEMET   0.5 tablets, Oral, Daily      escitalopram 10 MG tablet  Commonly known as: LEXAPRO   10 mg, Oral, Daily      pramipexole 0.25 MG tablet  Commonly known as: MIRAPEX   0.25 mg, Oral, 3 Times Daily      propranolol LA 60 MG 24 hr capsule  Commonly known as: INDERAL LA   60 mg, Oral, Daily      vitamin B-12 100 MCG tablet  Commonly known as: CYANOCOBALAMIN   50 mcg, Oral, Daily      vitamin D 1.25 MG (95814 UT) capsule capsule  Commonly known as: ERGOCALCIFEROL   50,000 Units, Oral, Weekly             Last Lab Results:   Lab Results (most recent)     Procedure Component Value Units Date/Time    Urine Culture - Urine, Urine, Catheter [[id number]]  (Abnormal)  (Susceptibility) Collected: 06/08/21 0441    Specimen: Urine, Catheter Updated: 06/10/21 1116     Urine Culture >100,000 CFU/mL Escherichia coli    Susceptibility      Escherichia coli      ZENA      Ampicillin Susceptible      Ampicillin + Sulbactam Susceptible      Cefazolin Susceptible      Cefepime Susceptible      Ceftazidime Susceptible      Ceftriaxone Susceptible      Gentamicin Susceptible      Levofloxacin Susceptible      Nitrofurantoin Susceptible      Piperacillin + Tazobactam Susceptible      Tetracycline Susceptible      Trimethoprim + Sulfamethoxazole Susceptible               Linear View                   Procalcitonin [[id number]]  (Abnormal) Collected: 06/10/21 0913    Specimen: Blood Updated: 06/10/21 0951     Procalcitonin 1.34 ng/mL     Narrative:      Results may be falsely decreased if patient taking Biotin.     Blood Culture - Blood, Arm, Right [[id number]] Collected: 06/08/21 0431     Specimen: Blood from Arm, Right Updated: 06/10/21 0501     Blood Culture No growth at 2 days    Blood Culture - Blood, Arm, Left [[id number]] Collected: 06/08/21 0439    Specimen: Blood from Arm, Left Updated: 06/10/21 0501     Blood Culture No growth at 2 days    Vancomycin, Random [[id number]]  (Normal) Collected: 06/09/21 1910    Specimen: Blood Updated: 06/09/21 1939     Vancomycin Random 12.80 mcg/mL     Narrative:      Therapeutic Ranges for Vancomycin    Vancomycin Random   5.0-40.0 mcg/mL  Vancomycin Trough   5.0-20.0 mcg/mL  Vancomycin Peak     20.0-40.0 mcg/mL    Basic Metabolic Panel [[id number]]  (Abnormal) Collected: 06/09/21 0406    Specimen: Blood Updated: 06/09/21 0552     Glucose 100 mg/dL      BUN 17 mg/dL      Creatinine 0.39 mg/dL      Sodium 140 mmol/L      Potassium 3.4 mmol/L      Chloride 106 mmol/L      CO2 25.4 mmol/L      Calcium 8.7 mg/dL      eGFR Non African Amer >150 mL/min/1.73      BUN/Creatinine Ratio 43.6     Anion Gap 8.6 mmol/L     Narrative:      GFR Normal >60  Chronic Kidney Disease <60  Kidney Failure <15      Magnesium [[id number]]  (Normal) Collected: 06/09/21 0406    Specimen: Blood Updated: 06/09/21 0552     Magnesium 1.8 mg/dL     Procalcitonin [[id number]]  (Abnormal) Collected: 06/09/21 0406    Specimen: Blood Updated: 06/09/21 0523     Procalcitonin 3.16 ng/mL     Narrative:      Results may be falsely decreased if patient taking Biotin.     CBC Auto Differential [[id number]]  (Abnormal) Collected: 06/09/21 0406    Specimen: Blood Updated: 06/09/21 0445     WBC 10.63 10*3/mm3      RBC 3.29 10*6/mm3      Hemoglobin 9.4 g/dL      Hematocrit 30.7 %      MCV 93.3 fL      MCH 28.6 pg      MCHC 30.6 g/dL      RDW 13.1 %      RDW-SD 44.5 fl      MPV 10.1 fL      Platelets 157 10*3/mm3      Neutrophil % 83.1 %      Lymphocyte % 5.8 %      Monocyte % 6.9 %      Eosinophil % 3.3 %      Basophil % 0.4 %      Immature Grans % 0.5 %      Neutrophils, Absolute 8.84 10*3/mm3       Lymphocytes, Absolute 0.62 10*3/mm3      Monocytes, Absolute 0.73 10*3/mm3      Eosinophils, Absolute 0.35 10*3/mm3      Basophils, Absolute 0.04 10*3/mm3      Immature Grans, Absolute 0.05 10*3/mm3      nRBC 0.0 /100 WBC     S. Pneumo Ag Urine or CSF - Urine, Urine, Catheter In/Out [[id number]]  (Normal) Collected: 06/08/21 1047    Specimen: Urine, Catheter In/Out Updated: 06/08/21 1120     Strep Pneumo Ag Negative    Legionella Antigen, Urine - Urine, Urine, Catheter In/Out [[id number]]  (Normal) Collected: 06/08/21 1047    Specimen: Urine, Catheter In/Out Updated: 06/08/21 1119     LEGIONELLA ANTIGEN, URINE Negative    Comprehensive Metabolic Panel [[id number]]  (Abnormal) Collected: 06/08/21 0439    Specimen: Blood Updated: 06/08/21 0546     Glucose 261 mg/dL      BUN 26 mg/dL      Creatinine 0.64 mg/dL      Sodium 131 mmol/L      Potassium 3.6 mmol/L      Chloride 97 mmol/L      CO2 24.0 mmol/L      Calcium 8.9 mg/dL      Total Protein 6.2 g/dL      Albumin 3.40 g/dL      ALT (SGPT) 30 U/L      AST (SGOT) 70 U/L      Alkaline Phosphatase 64 U/L      Total Bilirubin 0.7 mg/dL      eGFR Non African Amer 91 mL/min/1.73      Globulin 2.8 gm/dL      A/G Ratio 1.2 g/dL      BUN/Creatinine Ratio 40.6     Anion Gap 10.0 mmol/L     Narrative:      GFR Normal >60  Chronic Kidney Disease <60  Kidney Failure <15      Ethanol [[id number]] Collected: 06/08/21 0439    Specimen: Blood Updated: 06/08/21 0542     Ethanol <10 mg/dL      Ethanol % <0.010 %     Magnesium [[id number]]  (Normal) Collected: 06/08/21 0439    Specimen: Blood Updated: 06/08/21 0542     Magnesium 1.6 mg/dL     Troponin [[id number]]  (Normal) Collected: 06/08/21 0439    Specimen: Blood Updated: 06/08/21 0538     Troponin T <0.010 ng/mL     Narrative:      Troponin T Reference Range:  <= 0.03 ng/mL-   Negative for AMI  >0.03 ng/mL-     Abnormal for myocardial necrosis.  Clinicians would have to utilize clinical acumen, EKG, Troponin and serial changes to  determine if it is an Acute Myocardial Infarction or myocardial injury due to an underlying chronic condition.       Results may be falsely decreased if patient taking Biotin.      COVID-19,[employer] IN-HOUSE,Nasal Swab No Transport Media 3-4 HR TAT - Swab, Nasal Cavity [[id number]]  (Normal) Collected: 06/08/21 0440    Specimen: Swab from Nasal Cavity Updated: 06/08/21 0537     COVID19 Not Detected    Narrative:      Fact sheet for providers: https://www.fda.gov/media/139163/download     Fact sheet for patients: https://www.fda.gov/media/766974/download    Test performed by PCR.    Consider negative results in combination with clinical observations, patient history, and epidemiological information.    Urinalysis, Microscopic Only - Urine, Catheter [[id number]]  (Abnormal) Collected: 06/08/21 0441    Specimen: Urine, Catheter Updated: 06/08/21 0528     RBC, UA None Seen /HPF      WBC, UA 21-30 /HPF      Bacteria, UA 4+ /HPF      Squamous Epithelial Cells, UA 0-2 /HPF      Hyaline Casts, UA None Seen /LPF      Amorphous Crystals, UA Small/1+ /HPF      Methodology Manual Light Microscopy    TSH [[id number]]  (Normal) Collected: 06/08/21 0439    Specimen: Blood Updated: 06/08/21 0525     TSH 0.625 uIU/mL     Urine Drug Screen - Urine, Clean Catch [[id number]]  (Normal) Collected: 06/08/21 0440    Specimen: Urine, Clean Catch Updated: 06/08/21 0524     THC, Screen, Urine Negative     Phencyclidine (PCP), Urine Negative     Cocaine Screen, Urine Negative     Methamphetamine, Ur Negative     Opiate Screen Negative     Amphetamine Screen, Urine Negative     Benzodiazepine Screen, Urine Negative     Tricyclic Antidepressants Screen Negative     Methadone Screen, Urine Negative     Barbiturates Screen, Urine Negative     Oxycodone Screen, Urine Negative     Propoxyphene Screen Negative     Buprenorphine, Screen, Urine Negative    Narrative:      Urine drug screen results are to be used for medical purposes only.  They are not to  be used for legal purposes such as employment testing.  Negative results do not necessarily mean the complete absence of a subtance, but rather that the result is less than the cutoff for that substance.  Positive results are unconfirmed and considered Preliminary Positive.  [facility] does not automatically confirm Postitive Unconfirmed results.  The physician may request (order) an Unconfirmed Positive result to be sent out for confirmation.      Negative Thresholds for Drugs Screened:    THC screen, urine                          50 ng/ml  Phenycyclidine (PCP), urine                25 ng/ml  Cocaine screen, urine                     150 ng/ml  Methamphetamine, urine                    500 ng/ml  Opiate screen, urine                      100 ng/ml  Amphetamine screen, urine                 500 ng/ml  Benzodiazepine screen, urine              150 ng/ml  Tricyclic Antidepressants screen, urine   300 ng/ml  Methadone screen, urine                   200 ng/ml  Barbiturates screen, urine                200 ng/ml  Oxycodone screen, urine                   100 ng/ml  Propoxyphene screen, urine                300 ng/ml  Buprenorphine screen, urine                10 ng/ml    Lactic Acid, Plasma [[id number]]  (Normal) Collected: 06/08/21 0439    Specimen: Blood Updated: 06/08/21 0517     Lactate 1.5 mmol/L     Urinalysis With Culture If Indicated - Urine, Catheter [[id number]]  (Abnormal) Collected: 06/08/21 0441    Specimen: Urine, Catheter Updated: 06/08/21 0516     Color, UA Yellow     Appearance, UA Cloudy     pH, UA 5.5     Specific [place], UA 1.030     Comment: Result obtained by Refractometer        Glucose, UA Negative     Ketones, UA 40 mg/dL (2+)     Bilirubin, UA Negative     Blood, UA Moderate (2+)     Protein, UA >=300 mg/dL (3+)     Leuk Esterase, UA Small (1+)     Nitrite, UA Positive     Urobilinogen, UA 1.0 E.U./dL    CBC & Differential [[id number]]  (Abnormal) Collected: 06/08/21 0439     Specimen: Blood Updated: 06/08/21 0456    Narrative:      The following orders were created for panel order CBC & Differential.  Procedure                               Abnormality         Status                     ---------                               -----------         ------                     CBC Auto Differential[[id number]]        Abnormal            Final result                 Please view results for these tests on the individual orders.    CBC Auto Differential [[id number]]  (Abnormal) Collected: 06/08/21 0439    Specimen: Blood Updated: 06/08/21 0456     WBC 13.46 10*3/mm3      RBC 3.90 10*6/mm3      Hemoglobin 11.2 g/dL      Hematocrit 35.3 %      MCV 90.5 fL      MCH 28.7 pg      MCHC 31.7 g/dL      RDW 12.9 %      RDW-SD 42.7 fl      MPV 9.7 fL      Platelets 193 10*3/mm3      Neutrophil % 91.7 %      Lymphocyte % 2.1 %      Monocyte % 5.3 %      Eosinophil % 0.0 %      Basophil % 0.2 %      Immature Grans % 0.7 %      Neutrophils, Absolute 12.35 10*3/mm3      Lymphocytes, Absolute 0.28 10*3/mm3      Monocytes, Absolute 0.71 10*3/mm3      Eosinophils, Absolute 0.00 10*3/mm3      Basophils, Absolute 0.03 10*3/mm3      Immature Grans, Absolute 0.09 10*3/mm3      nRBC 0.0 /100 WBC         Imaging Results (Most Recent)     Procedure Component Value Units Date/Time    CT Abdomen Pelvis Without Contrast [[id number]] Collected: 06/09/21 1622     Updated: 06/09/21 1624    Narrative:      CT Abdomen Pelvis WO    INDICATION:   UTI, sepsis, burning with urination    TECHNIQUE:   CT of the abdomen and pelvis without IV contrast. Coronal and sagittal reconstructions were obtained.  Radiation dose reduction techniques included automated exposure control or exposure modulation based on body size. Count of known CT and cardiac nuc  med studies performed in previous 12 months: 0.     COMPARISON:   None available.    FINDINGS:  Abdomen: Area of gross consolidation is seen at the left lung base. There are gallstones.  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 There is a large left renal cyst. There is a prominent right adrenal adenoma. There are  nonobstructing left renal calculi. There is a small fat-containing umbilical hernia.    Pelvis: The patient's appendix is not definitively identified, but there are no secondary inflammatory signs. Bowel appears unremarkable. No acute osseous abnormality.      Impression:      1. There is pneumonia in the left lower lung.  2. No acute intra-abdominal abnormality is detected.      Signer Name: Erin Estrada MD   Signed: 6/9/2021 4:22 PM   Workstation Name: [id number]    [facility]    XR Chest 2 View [[id number]] Collected: 06/08/21 0547     Updated: 06/08/21 0549    Narrative:      CHEST X-RAY, 6/8/2021      HISTORY:    75-year-old female in the ED with confusion/mental status change. Elevated white blood cell count. COVID-19 testing is negative.    TECHNIQUE:  AP and lateral upright chest series.      FINDINGS:  Diffusely increased interstitial markings with mild patchy airspace disease in the lung bases. This may represent pulmonary edema or diffuse pneumonitis. There is no dense airspace consolidation or visible pleural effusion. Heart size is normal.      Impression:      Diffusely increased interstitial opacity with patchy airspace disease in the lung bases. The findings may represent pulmonary edema or pneumonitis.    Signer Name: Srikanth Raman MD   Signed: 6/8/2021 5:47 AM   Workstation Name: [id number]    [facility]          PROCEDURES      Condition on Discharge:  Stable    Physical Exam at Discharge  Vital Signs  Temp:  [98 °F (36.7 °C)-98.9 °F (37.2 °C)] 98.6 °F (37 °C)  Heart Rate:  [77-79] 78  Resp:  [16] 16  BP: (143-169)/(61-67) 169/67    Physical Exam:  Physical Exam   Constitutional: Patient appears in no acute distress   Cardiovascular: Regular rate, regular rhythm, S1 normal and S2 normal.   Exam reveals no gallop and no friction rub.  No murmur heard.  Pulmonary/Chest: Lungs are diminished to auscultation bilaterally. No respiratory distress. No wheezes. No rhonchi. No rales.   Abdominal: Soft. Bowel sounds are normal. There is no tenderness.   Musculoskeletal: Normal Muscle tone  Extremities: No edema. No asymmetry.  Neurological: Cranial nerves II-XII are grossly intact with no focal deficits.    Discharge Disposition:  Home    Visiting Nurse:    Yes     Home PT/OT:  Yes     Home Safety Evaluation:  Yes     DME  None    Discharge Diet:      Dietary Orders (From admission, onward)     Start     Ordered    06/08/21 1800  Dietary Nutrition Supplement: Boost Plus (Ensure Enlive, Ensure Plus); chocolate  Daily With Dinner     Question Answer Comment   Select Supplement: Boost Plus (Ensure Enlive, Ensure Plus)    Flavor: chocolate        06/08/21 1347    06/08/21 0649  Diet Regular  Diet Effective Now     Question:  Diet Texture / Consistency  Answer:  Regular    06/08/21 0648                Activity at Discharge:  As tolerated    Follow-up Appointments  Future Appointments   Date Time Provider Department Center   6/11/2021 10:00 AM [facility]AG CT 1 BH [facility]     Additional Instructions for the Follow-ups that You Need to Schedule     Discharge Follow-up with PCP   As directed       Currently Documented PCP:    Shannon Nguyen APRN    PCP Phone Number:    [phone]     Follow Up Details: 1-2 weeks         Discharge Follow-up with Specified Provider: Dr. Purcell's office will call with an appointment for outpatient cystoscopy.   As directed      To: Dr. Purcell's office will call with an appointment for outpatient cystoscopy.               Test Results Pending at Discharge  Pending Labs     Order Current Status    Blood Culture - Blood, Arm, Left Preliminary result    Blood Culture - Blood, Arm, Right Preliminary result           Hollis Beauchamp MD  06/10/21  13:24 EDT

## 2021-06-10 NOTE — OUTREACH NOTE
Prep Survey      Responses   Confucianism [facility] patient discharged from?  [place]   Is LACE score < 7 ?  Yes   Emergency Room discharge w/ pulse ox?  No   Eligibility  [facility]   Date of Admission  06/08/21   Date of Discharge  06/10/21   Discharge Disposition  Home or Self Care   Discharge diagnosis  Sepsis likely d/t acute UTI   Does the patient have one of the following disease processes/diagnoses(primary or secondary)?  Sepsis   Does the patient have Home health ordered?  Yes   What is the Home health agency?   [facility]    Is there a DME ordered?  No   Prep survey completed?  Yes          Keeley Madsen RN

## 2021-06-10 NOTE — DISCHARGE INSTR - APPOINTMENTS
Dr. Miguel Angel Purcell's office will call patient with an appointment for outpatient cystoscopy.    Follow up with EMMA Lorenz in 1-2 weeks. [phone]

## 2021-06-10 NOTE — CASE MANAGEMENT/SOCIAL WORK
Continued Stay Note  MIRNA Corey     Patient Name: Cindy Mejia  MRN: [id number]  Today's Date: 6/10/2021    Admit Date: 6/8/2021    Discharge Plan     Row Name 06/10/21 1357       Plan    Plan  plan home with [facility]    Plan Comments  Follow up visit, patient is sitting up in chair with lunch tray. Reviewed plan home with [facility] to follow. She is agreeable to plan. LVM for Mata/Tami  to inform pateint willdc home today. Will continue to follow.        Discharge Codes    No documentation.       Expected Discharge Date and Time     Expected Discharge Date Expected Discharge Time    Jeff 10, 2021             Bobby Collier RN

## 2021-06-10 NOTE — CASE MANAGEMENT/SOCIAL WORK
Case Management Discharge Note      Final Note: dc home w Tami          Selected Continued Care - Discharged on 6/10/2021 Admission date: 6/8/2021 - Discharge disposition: Home-Health Care Svc    Destination    No services have been selected for the patient.              Durable Medical Equipment    No services have been selected for the patient.              Dialysis/Infusion    No services have been selected for the patient.              Home Medical Care Coordination complete    Service Provider Selected Services Address Phone Fax Patient Preferred    [facility]  Home Health Services centralized phone line, [phone] --          Therapy    No services have been selected for the patient.              Community Resources    No services have been selected for the patient.              Community & DME    No services have been selected for the patient.                       Final Discharge Disposition Code: 06 - home with home health care

## 2021-06-10 NOTE — PLAN OF CARE
Goal Outcome Evaluation:  Plan of Care Reviewed With: patient           Outcome Summary: patient resting at this time, antibiotics given , patient up with assist to bathroom, vital signs stable, cont to monitor

## 2021-06-11 ENCOUNTER — TRANSITIONAL CARE MANAGEMENT TELEPHONE ENCOUNTER (OUTPATIENT)
Dept: CALL CENTER | Facility: HOSPITAL | Age: 76
End: 2021-06-11

## 2021-06-11 NOTE — OUTREACH NOTE
Call Center TCM Note      Responses   [facility] patient discharged from?  [place]   Does the patient have one of the following disease processes/diagnoses(primary or secondary)?  Sepsis   TCM attempt successful?  Yes   Call start time  1157   Call end time  1209   Discharge diagnosis  Sepsis likely d/t acute UTI   Person spoke with today (if not patient) and relationship  Isra-   Meds reviewed with patient/caregiver?  Yes   Is the patient having any side effects they believe may be caused by any medication additions or changes?  No   Does the patient have all medications related to this admission filled (includes all antibiotics, inhalers, nebulizers,steroids,etc.)  Yes   Prescription comments  Urology has called in a different ABX   Is the patient taking all medications as directed (includes completed medication regime)?  Yes   Comments regarding appointments  Appt with Dr. Purcell, urology, is on 6/24/21   Does the patient have a primary care provider?   Yes   Does the patient have an appointment with their PCP within 7 days of discharge?  No   What is preventing the patient from scheduling follow up appointments within 7 days of discharge?  -- [There was no hospital d/c f/u appt available with PCP]   Nursing Interventions  Advised patient to make appointment [Routed appt request to PCP office]   Has the patient kept scheduled appointments due by today?  N/A   What is the Home health agency?   [facility]   Psychosocial issues?  No   Did the patient receive a copy of their discharge instructions?  Yes   Nursing interventions  Reviewed instructions with patient   What is the patient's perception of their health status since discharge?  Improving   Nursing interventions  Nurse provided patient education   Is the patient/caregiver able to teach back Sepsis?  S - Shivering,fever or very cold, S - Sleepy, difficult to arouse,confused, S - Short of breath   Nursing interventions  Nurse provided patient  education   Is patient/caregiver able to teach back steps to recovery at home?  Set small, achievable goals for return to baseline health, Rest and regain strength, Eat a balanced diet   Is the patient/caregiver able to teach back signs and symptoms of worsening condition:  Fever, Hyperthermia, Shortness of breath/rapid respiratory rate, Altered mental status(confusion/coma)   If the patient is a current smoker, are they able to teach back resources for cessation?  Not a smoker   Is the patient/caregiver able to teach back the hierarchy of who to call/visit for symptoms/problems? PCP, Specialist, Home health nurse, Urgent Care, ED, 911  Yes   TCM call completed?  Yes          Mariella Null RN    6/11/2021, 12:09 EDT

## 2021-06-13 LAB
BACTERIA SPEC AEROBE CULT: NORMAL
BACTERIA SPEC AEROBE CULT: NORMAL

## 2021-06-17 ENCOUNTER — OFFICE VISIT (OUTPATIENT)
Dept: INTERNAL MEDICINE | Facility: CLINIC | Age: 76
End: 2021-06-17

## 2021-06-17 VITALS
BODY MASS INDEX: 20.69 KG/M2 | OXYGEN SATURATION: 97 % | HEIGHT: 61 IN | SYSTOLIC BLOOD PRESSURE: 124 MMHG | TEMPERATURE: 97.6 F | RESPIRATION RATE: 19 BRPM | HEART RATE: 70 BPM | DIASTOLIC BLOOD PRESSURE: 68 MMHG | WEIGHT: 109.6 LBS

## 2021-06-17 DIAGNOSIS — Z09 ENCOUNTER FOR EXAMINATION FOLLOWING TREATMENT AT HOSPITAL: Primary | ICD-10-CM

## 2021-06-17 DIAGNOSIS — N30.00 ACUTE CYSTITIS WITHOUT HEMATURIA: ICD-10-CM

## 2021-06-17 DIAGNOSIS — T36.95XA ANTIBIOTIC-ASSOCIATED DIARRHEA: ICD-10-CM

## 2021-06-17 DIAGNOSIS — R73.09 ELEVATED GLUCOSE: ICD-10-CM

## 2021-06-17 DIAGNOSIS — J18.9 PNEUMONIA OF LEFT LOWER LOBE DUE TO INFECTIOUS ORGANISM: ICD-10-CM

## 2021-06-17 DIAGNOSIS — K52.1 ANTIBIOTIC-ASSOCIATED DIARRHEA: ICD-10-CM

## 2021-06-17 PROCEDURE — 1111F DSCHRG MED/CURRENT MED MERGE: CPT | Performed by: NURSE PRACTITIONER

## 2021-06-17 PROCEDURE — 99495 TRANSJ CARE MGMT MOD F2F 14D: CPT | Performed by: NURSE PRACTITIONER

## 2021-06-17 RX ORDER — NITROFURANTOIN 25; 75 MG/1; MG/1
50 CAPSULE ORAL DAILY
COMMUNITY
Start: 2021-06-11 | End: 2022-01-01

## 2021-06-17 RX ORDER — MIRABEGRON 50 MG/1
TABLET, FILM COATED, EXTENDED RELEASE ORAL
COMMUNITY
Start: 2021-04-21 | End: 2022-01-01

## 2021-06-17 NOTE — PROGRESS NOTES
"Sahil Mejia is a 75 y.o. female presenting today for follow up of   Chief Complaint   Patient presents with   • Hospital Follow Up Visit      Was seen at ED for UTI       History of Present Illness     Patient Active Problem List   Diagnosis   • Anxiety   • Tubular adenoma   • Tremor of both hands   • B12 deficiency   • Parkinson disease (CMS/HCC)   • Action tremor   • Mixed stress and urge urinary incontinence   • Urinary tract infection without hematuria   • Moderate malnutrition (CMS/HCC)       Current Outpatient Medications on File Prior to Visit   Medication Sig   • carbidopa-levodopa (SINEMET)  MG per tablet Take 0.5 tablets by mouth Daily.   • nitrofurantoin, macrocrystal-monohydrate, (MACROBID) 100 MG capsule    • pramipexole (MIRAPEX) 0.25 MG tablet Take 0.25 mg by mouth 3 (Three) Times a Day.   • propranolol LA (INDERAL LA) 60 MG 24 hr capsule Take 60 mg by mouth Daily.   • vitamin B-12 (CYANOCOBALAMIN) 100 MCG tablet Take 50 mcg by mouth Daily.   • vitamin D (ERGOCALCIFEROL) 1.25 MG (70566 UT) capsule capsule Take 1 capsule by mouth 1 (One) Time Per Week.   • conjugated estrogens (PREMARIN) 0.625 MG/GM vaginal cream USE A PEA-SIZED AMOUNT ON FINGERTIP AND PLACE IN THE VAGINAL OPENING 3 X PER WEEK.   • escitalopram (LEXAPRO) 10 MG tablet Take 1 tablet by mouth Daily.   • Mirabegron ER (MYRBETRIQ) 25 MG tablet sustained-release 24 hour 24 hr tablet Take 25 mg by mouth Daily.   • Myrbetriq 50 MG tablet sustained-release 24 hour 24 hr tablet      No current facility-administered medications on file prior to visit.        Date of Admission: 06/08/2021  Date of Discharge: 06/10/2021.    Recurrent UTI and LLL pneumonia. Hospital notes, labs, and imaging reviewed.    Since d/c:  She is scheduled for cystoscopy w/ Dr. Purcell one week from today.    She reports she is feeling \"a lot better.\"  Denies fever/chills, SOB, CP, abd pain, pain/swelling in her LEs. No dysuria or frequency.    The " "following portions of the patient's history were reviewed and updated as appropriate: allergies, current medications, past family history, past medical history, past social history, past surgical history and problem list.    Review of Systems   Constitutional: Negative for chills and fever.   Respiratory: Negative for cough and shortness of breath.    Cardiovascular: Negative for chest pain, palpitations and leg swelling.   Gastrointestinal: Positive for diarrhea. Negative for abdominal pain, constipation, nausea and vomiting.   Genitourinary: Negative for dysuria and frequency.   Neurological: Positive for tremors. Negative for dizziness and headache.       Objective   Vitals:    06/17/21 1101   BP: 124/68   Pulse: 70   Resp: 19   Temp: 97.6 °F (36.4 °C)   TempSrc: Temporal   SpO2: 97%   Weight: 49.7 kg (109 lb 9.6 oz)   Height: 154.9 cm (61\")       BP Readings from Last 3 Encounters:   06/17/21 124/68   06/10/21 169/67   12/19/20 (!) 183/90        Wt Readings from Last 3 Encounters:   06/17/21 49.7 kg (109 lb 9.6 oz)   06/08/21 52.2 kg (115 lb)   06/18/20 55 kg (121 lb 3.2 oz)        Body mass index is 20.71 kg/m².  Nursing notes and vitals reviewed.    Physical Exam  Constitutional:       General: She is not in acute distress.     Appearance: Normal appearance. She is well-developed.   HENT:      Head: Normocephalic.      Right Ear: Hearing, tympanic membrane, ear canal and external ear normal.      Left Ear: Hearing, tympanic membrane, ear canal and external ear normal.      Nose: Nose normal. No mucosal edema or rhinorrhea.      Mouth/Throat:      Mouth: Mucous membranes are moist.      Pharynx: Oropharynx is clear. Uvula midline.   Eyes:      General: Lids are normal.      Extraocular Movements: Extraocular movements intact.      Conjunctiva/sclera: Conjunctivae normal.      Pupils: Pupils are equal, round, and reactive to light.   Neck:      Thyroid: No thyroid mass or thyromegaly.   Cardiovascular:      Rate " and Rhythm: Regular rhythm.      Pulses: Normal pulses.      Heart sounds: S1 normal and S2 normal. No murmur heard.   No friction rub. No gallop.    Pulmonary:      Effort: Pulmonary effort is normal.      Breath sounds: Normal breath sounds. No wheezing, rhonchi or rales.   Abdominal:      General: Bowel sounds are normal.      Palpations: Abdomen is soft.      Tenderness: There is no abdominal tenderness. There is no guarding.      Hernia: No hernia is present.   Musculoskeletal:         General: No deformity. Normal range of motion.      Cervical back: Normal range of motion and neck supple.      Right lower leg: No edema.      Left lower leg: No edema.   Lymphadenopathy:      Cervical: No cervical adenopathy.   Skin:     General: Skin is warm and dry.      Findings: No lesion or rash.   Neurological:      General: No focal deficit present.      Mental Status: She is alert and oriented to person, place, and time.      Cranial Nerves: No cranial nerve deficit.      Sensory: No sensory deficit.      Motor: Motor function is intact.      Coordination: Coordination is intact.      Gait: Gait normal.      Deep Tendon Reflexes: Reflexes are normal and symmetric.   Psychiatric:         Attention and Perception: She is attentive.         Mood and Affect: Mood and affect normal.         Speech: Speech normal.         Behavior: Behavior normal.         Thought Content: Thought content normal.           Assessment/Plan   Diagnoses and all orders for this visit:    1. Encounter for examination following treatment at hospital (Primary)    2. Pneumonia of left lower lobe due to infectious organism  Comments:  - repeat CXR in one week  Orders:  -     CBC & Differential  -     Comprehensive Metabolic Panel  -     XR Chest PA & Lateral; Future    3. Acute cystitis without hematuria  Comments:  - complete abx   - f/u w/ Dr. Purcell  - check urine today  Orders:  -     CBC & Differential  -     Comprehensive Metabolic Panel  -      Urinalysis With Microscopic - Urine, Clean Catch  -     Urine Culture - , Urine, Clean Catch    4. Elevated glucose  -     Hemoglobin A1c    5. Antibiotic-associated diarrhea  Comments:  - advised to start an OTC probiotic        Except as noted above, pt will continue current medications as noted in the medication list. I will continue to authorize refills as needed.      The plan of care was discussed. All questions were answered. Patient verbalized understanding.

## 2021-06-18 LAB
ALBUMIN SERPL-MCNC: 4.6 G/DL (ref 3.5–5.2)
ALBUMIN/GLOB SERPL: 2.2 G/DL
ALP SERPL-CCNC: 63 U/L (ref 39–117)
ALT SERPL-CCNC: <5 U/L (ref 1–33)
AST SERPL-CCNC: 13 U/L (ref 1–32)
BASOPHILS # BLD AUTO: 0.06 10*3/MM3 (ref 0–0.2)
BASOPHILS NFR BLD AUTO: 0.8 % (ref 0–1.5)
BILIRUB SERPL-MCNC: 0.4 MG/DL (ref 0–1.2)
BUN SERPL-MCNC: 8 MG/DL (ref 8–23)
BUN/CREAT SERPL: 16 (ref 7–25)
CALCIUM SERPL-MCNC: 9.9 MG/DL (ref 8.6–10.5)
CHLORIDE SERPL-SCNC: 99 MMOL/L (ref 98–107)
CO2 SERPL-SCNC: 32.7 MMOL/L (ref 22–29)
CREAT SERPL-MCNC: 0.5 MG/DL (ref 0.57–1)
EOSINOPHIL # BLD AUTO: 0.36 10*3/MM3 (ref 0–0.4)
EOSINOPHIL NFR BLD AUTO: 4.9 % (ref 0.3–6.2)
ERYTHROCYTE [DISTWIDTH] IN BLOOD BY AUTOMATED COUNT: 12.6 % (ref 12.3–15.4)
GLOBULIN SER CALC-MCNC: 2.1 GM/DL
GLUCOSE SERPL-MCNC: 98 MG/DL (ref 65–99)
HBA1C MFR BLD: 5.5 % (ref 4.8–5.6)
HCT VFR BLD AUTO: 36 % (ref 34–46.6)
HGB BLD-MCNC: 11.7 G/DL (ref 12–15.9)
IMM GRANULOCYTES # BLD AUTO: 0.04 10*3/MM3 (ref 0–0.05)
IMM GRANULOCYTES NFR BLD AUTO: 0.5 % (ref 0–0.5)
LYMPHOCYTES # BLD AUTO: 0.67 10*3/MM3 (ref 0.7–3.1)
LYMPHOCYTES NFR BLD AUTO: 9.2 % (ref 19.6–45.3)
MCH RBC QN AUTO: 29.1 PG (ref 26.6–33)
MCHC RBC AUTO-ENTMCNC: 32.5 G/DL (ref 31.5–35.7)
MCV RBC AUTO: 89.6 FL (ref 79–97)
MONOCYTES # BLD AUTO: 0.63 10*3/MM3 (ref 0.1–0.9)
MONOCYTES NFR BLD AUTO: 8.6 % (ref 5–12)
NEUTROPHILS # BLD AUTO: 5.55 10*3/MM3 (ref 1.7–7)
NEUTROPHILS NFR BLD AUTO: 76 % (ref 42.7–76)
NRBC BLD AUTO-RTO: 0 /100 WBC (ref 0–0.2)
PLATELET # BLD AUTO: 352 10*3/MM3 (ref 140–450)
POTASSIUM SERPL-SCNC: 4.3 MMOL/L (ref 3.5–5.2)
PROT SERPL-MCNC: 6.7 G/DL (ref 6–8.5)
RBC # BLD AUTO: 4.02 10*6/MM3 (ref 3.77–5.28)
SODIUM SERPL-SCNC: 143 MMOL/L (ref 136–145)
UNABLE TO VOID: NORMAL
WBC # BLD AUTO: 7.31 10*3/MM3 (ref 3.4–10.8)

## 2021-06-24 ENCOUNTER — HOSPITAL ENCOUNTER (OUTPATIENT)
Dept: GENERAL RADIOLOGY | Facility: HOSPITAL | Age: 76
Discharge: HOME | End: 2021-06-24
Admitting: NURSE PRACTITIONER

## 2021-06-24 DIAGNOSIS — J18.9 PNEUMONIA OF LEFT LOWER LOBE DUE TO INFECTIOUS ORGANISM: ICD-10-CM

## 2021-06-24 LAB
APPEARANCE UR: ABNORMAL
BACTERIA #/AREA URNS HPF: ABNORMAL /HPF
BACTERIA UR CULT: ABNORMAL
BACTERIA UR CULT: ABNORMAL
BILIRUB UR QL STRIP: NEGATIVE
COLOR UR: YELLOW
EPI CELLS #/AREA URNS HPF: ABNORMAL /HPF
GLUCOSE UR QL: NEGATIVE
HGB UR QL STRIP: NEGATIVE
KETONES UR QL STRIP: NEGATIVE
LEUKOCYTE ESTERASE UR QL STRIP: ABNORMAL
NITRITE UR QL STRIP: NEGATIVE
PH UR STRIP: 7 [PH] (ref 5–8)
PROT UR QL STRIP: NEGATIVE
RBC #/AREA URNS HPF: ABNORMAL /HPF
SP GR UR: 1.01 (ref 1–1.03)
UROBILINOGEN UR STRIP-MCNC: ABNORMAL MG/DL
WBC #/AREA URNS HPF: ABNORMAL /HPF
YEAST #/AREA URNS HPF: ABNORMAL /HPF

## 2021-06-24 PROCEDURE — 71046 X-RAY EXAM CHEST 2 VIEWS: CPT

## 2022-01-01 ENCOUNTER — APPOINTMENT (OUTPATIENT)
Dept: GENERAL RADIOLOGY | Facility: HOSPITAL | Age: 77
End: 2022-01-01

## 2022-01-01 ENCOUNTER — OFFICE VISIT (OUTPATIENT)
Dept: CARDIOLOGY | Facility: CLINIC | Age: 77
End: 2022-01-01

## 2022-01-01 ENCOUNTER — HOSPITAL ENCOUNTER (OUTPATIENT)
Facility: HOSPITAL | Age: 77
Setting detail: OBSERVATION
Discharge: HOME | End: 2022-10-02
Attending: EMERGENCY MEDICINE | Admitting: STUDENT IN AN ORGANIZED HEALTH CARE EDUCATION/TRAINING PROGRAM

## 2022-01-01 ENCOUNTER — TELEPHONE (OUTPATIENT)
Dept: INTERNAL MEDICINE | Facility: CLINIC | Age: 77
End: 2022-01-01

## 2022-01-01 ENCOUNTER — HOSPITAL ENCOUNTER (OUTPATIENT)
Facility: HOSPITAL | Age: 77
Setting detail: OBSERVATION
Discharge: HOME HEALTH | End: 2022-06-21
Attending: EMERGENCY MEDICINE | Admitting: HOSPITALIST

## 2022-01-01 ENCOUNTER — OFFICE VISIT (OUTPATIENT)
Dept: INTERNAL MEDICINE | Facility: CLINIC | Age: 77
End: 2022-01-01

## 2022-01-01 ENCOUNTER — HOSPITAL ENCOUNTER (OUTPATIENT)
Dept: MAMMOGRAPHY | Facility: HOSPITAL | Age: 77
Discharge: HOME | End: 2022-03-30

## 2022-01-01 ENCOUNTER — TRANSCRIBE ORDERS (OUTPATIENT)
Dept: ADMINISTRATIVE | Facility: HOSPITAL | Age: 77
End: 2022-01-01

## 2022-01-01 ENCOUNTER — TRANSITIONAL CARE MANAGEMENT TELEPHONE ENCOUNTER (OUTPATIENT)
Dept: CALL CENTER | Facility: HOSPITAL | Age: 77
End: 2022-01-01

## 2022-01-01 ENCOUNTER — OUTSIDE FACILITY SERVICE (OUTPATIENT)
Dept: INTERNAL MEDICINE | Facility: CLINIC | Age: 77
End: 2022-01-01
Payer: MEDICARE

## 2022-01-01 ENCOUNTER — READMISSION MANAGEMENT (OUTPATIENT)
Dept: CALL CENTER | Facility: HOSPITAL | Age: 77
End: 2022-01-01

## 2022-01-01 ENCOUNTER — APPOINTMENT (OUTPATIENT)
Dept: MRI IMAGING | Facility: HOSPITAL | Age: 77
End: 2022-01-01

## 2022-01-01 ENCOUNTER — APPOINTMENT (OUTPATIENT)
Dept: CT IMAGING | Facility: HOSPITAL | Age: 77
End: 2022-01-01

## 2022-01-01 ENCOUNTER — TELEPHONE (OUTPATIENT)
Dept: CARDIOLOGY | Facility: CLINIC | Age: 77
End: 2022-01-01

## 2022-01-01 ENCOUNTER — HOSPITAL ENCOUNTER (OUTPATIENT)
Dept: CARDIOLOGY | Facility: HOSPITAL | Age: 77
Discharge: HOME | End: 2022-04-04

## 2022-01-01 ENCOUNTER — HOSPITAL ENCOUNTER (OUTPATIENT)
Dept: INFUSION THERAPY | Facility: HOSPITAL | Age: 77
Setting detail: INFUSION SERIES
Discharge: HOME | End: 2022-11-07

## 2022-01-01 ENCOUNTER — HOSPITAL ENCOUNTER (OUTPATIENT)
Dept: INFUSION THERAPY | Facility: HOSPITAL | Age: 77
Discharge: HOME | End: 2022-05-02
Admitting: NURSE PRACTITIONER

## 2022-01-01 ENCOUNTER — HOSPITAL ENCOUNTER (OUTPATIENT)
Dept: ULTRASOUND IMAGING | Facility: HOSPITAL | Age: 77
Discharge: HOME | End: 2022-08-12
Admitting: NURSE PRACTITIONER

## 2022-01-01 ENCOUNTER — APPOINTMENT (OUTPATIENT)
Dept: BONE DENSITY | Facility: HOSPITAL | Age: 77
End: 2022-01-01

## 2022-01-01 VITALS
WEIGHT: 95.8 LBS | DIASTOLIC BLOOD PRESSURE: 76 MMHG | SYSTOLIC BLOOD PRESSURE: 118 MMHG | TEMPERATURE: 98.2 F | BODY MASS INDEX: 18.81 KG/M2 | HEART RATE: 86 BPM | OXYGEN SATURATION: 94 % | RESPIRATION RATE: 16 BRPM | HEIGHT: 60 IN

## 2022-01-01 VITALS
WEIGHT: 98 LBS | BODY MASS INDEX: 19.24 KG/M2 | OXYGEN SATURATION: 97 % | TEMPERATURE: 96.8 F | SYSTOLIC BLOOD PRESSURE: 106 MMHG | DIASTOLIC BLOOD PRESSURE: 54 MMHG | HEART RATE: 51 BPM | HEIGHT: 60 IN

## 2022-01-01 VITALS
OXYGEN SATURATION: 96 % | HEIGHT: 60 IN | WEIGHT: 97 LBS | BODY MASS INDEX: 19.04 KG/M2 | TEMPERATURE: 97.9 F | DIASTOLIC BLOOD PRESSURE: 72 MMHG | RESPIRATION RATE: 18 BRPM | HEART RATE: 95 BPM | SYSTOLIC BLOOD PRESSURE: 122 MMHG

## 2022-01-01 VITALS
TEMPERATURE: 97.1 F | OXYGEN SATURATION: 97 % | WEIGHT: 97 LBS | SYSTOLIC BLOOD PRESSURE: 128 MMHG | HEART RATE: 97 BPM | DIASTOLIC BLOOD PRESSURE: 60 MMHG | BODY MASS INDEX: 19.04 KG/M2 | HEIGHT: 60 IN

## 2022-01-01 VITALS
RESPIRATION RATE: 16 BRPM | HEIGHT: 60 IN | WEIGHT: 101 LBS | BODY MASS INDEX: 19.83 KG/M2 | OXYGEN SATURATION: 97 % | TEMPERATURE: 97.6 F | SYSTOLIC BLOOD PRESSURE: 143 MMHG | HEART RATE: 68 BPM | DIASTOLIC BLOOD PRESSURE: 64 MMHG

## 2022-01-01 VITALS
TEMPERATURE: 97.6 F | HEIGHT: 60 IN | DIASTOLIC BLOOD PRESSURE: 73 MMHG | HEART RATE: 76 BPM | RESPIRATION RATE: 16 BRPM | SYSTOLIC BLOOD PRESSURE: 148 MMHG | WEIGHT: 98.9 LBS | BODY MASS INDEX: 19.42 KG/M2 | OXYGEN SATURATION: 91 %

## 2022-01-01 VITALS
SYSTOLIC BLOOD PRESSURE: 116 MMHG | OXYGEN SATURATION: 98 % | BODY MASS INDEX: 18.61 KG/M2 | HEIGHT: 61 IN | DIASTOLIC BLOOD PRESSURE: 62 MMHG | WEIGHT: 98.6 LBS | HEART RATE: 74 BPM

## 2022-01-01 VITALS
HEIGHT: 61 IN | BODY MASS INDEX: 19.07 KG/M2 | HEART RATE: 74 BPM | SYSTOLIC BLOOD PRESSURE: 122 MMHG | WEIGHT: 101 LBS | DIASTOLIC BLOOD PRESSURE: 76 MMHG

## 2022-01-01 VITALS — OXYGEN SATURATION: 95 % | BODY MASS INDEX: 18.96 KG/M2 | HEART RATE: 65 BPM | HEIGHT: 61 IN | WEIGHT: 100.4 LBS

## 2022-01-01 VITALS
WEIGHT: 98 LBS | OXYGEN SATURATION: 97 % | SYSTOLIC BLOOD PRESSURE: 146 MMHG | DIASTOLIC BLOOD PRESSURE: 87 MMHG | RESPIRATION RATE: 16 BRPM | HEART RATE: 81 BPM | TEMPERATURE: 97.1 F | HEIGHT: 60 IN | BODY MASS INDEX: 19.24 KG/M2

## 2022-01-01 VITALS
BODY MASS INDEX: 18.22 KG/M2 | TEMPERATURE: 97.1 F | HEART RATE: 116 BPM | HEIGHT: 60 IN | DIASTOLIC BLOOD PRESSURE: 40 MMHG | WEIGHT: 92.8 LBS | OXYGEN SATURATION: 97 % | SYSTOLIC BLOOD PRESSURE: 90 MMHG | RESPIRATION RATE: 16 BRPM

## 2022-01-01 VITALS
DIASTOLIC BLOOD PRESSURE: 75 MMHG | BODY MASS INDEX: 18.5 KG/M2 | HEIGHT: 61 IN | WEIGHT: 98 LBS | HEART RATE: 71 BPM | SYSTOLIC BLOOD PRESSURE: 142 MMHG

## 2022-01-01 DIAGNOSIS — R53.1 WEAKNESS: ICD-10-CM

## 2022-01-01 DIAGNOSIS — I95.9 HYPOTENSION, UNSPECIFIED HYPOTENSION TYPE: ICD-10-CM

## 2022-01-01 DIAGNOSIS — R53.1 GENERALIZED WEAKNESS: ICD-10-CM

## 2022-01-01 DIAGNOSIS — B34.2 CORONAVIRUS INFECTION: ICD-10-CM

## 2022-01-01 DIAGNOSIS — G20 PARKINSON DISEASE: ICD-10-CM

## 2022-01-01 DIAGNOSIS — Z09 ENCOUNTER FOR EXAMINATION FOLLOWING TREATMENT AT HOSPITAL: Primary | ICD-10-CM

## 2022-01-01 DIAGNOSIS — Z78.0 POST-MENOPAUSE: ICD-10-CM

## 2022-01-01 DIAGNOSIS — N30.00 ACUTE CYSTITIS WITHOUT HEMATURIA: Primary | ICD-10-CM

## 2022-01-01 DIAGNOSIS — Z91.89 AT HIGH RISK FOR FRACTURE: ICD-10-CM

## 2022-01-01 DIAGNOSIS — D64.9 NORMOCYTIC NORMOCHROMIC ANEMIA: ICD-10-CM

## 2022-01-01 DIAGNOSIS — R53.1 WEAKNESS: Primary | ICD-10-CM

## 2022-01-01 DIAGNOSIS — E44.0 MODERATE PROTEIN-CALORIE MALNUTRITION: ICD-10-CM

## 2022-01-01 DIAGNOSIS — E44.0 MODERATE MALNUTRITION: ICD-10-CM

## 2022-01-01 DIAGNOSIS — N39.0 ACUTE UTI: Primary | ICD-10-CM

## 2022-01-01 DIAGNOSIS — R55 SYNCOPE, UNSPECIFIED SYNCOPE TYPE: Primary | ICD-10-CM

## 2022-01-01 DIAGNOSIS — Z00.00 ENCOUNTER FOR MEDICARE ANNUAL WELLNESS EXAM: Primary | ICD-10-CM

## 2022-01-01 DIAGNOSIS — M81.0 AGE-RELATED OSTEOPOROSIS WITHOUT CURRENT PATHOLOGICAL FRACTURE: ICD-10-CM

## 2022-01-01 DIAGNOSIS — R26.89 BALANCE PROBLEMS: ICD-10-CM

## 2022-01-01 DIAGNOSIS — N20.0 CALCULUS OF KIDNEY: ICD-10-CM

## 2022-01-01 DIAGNOSIS — D64.9 ANEMIA, UNSPECIFIED TYPE: ICD-10-CM

## 2022-01-01 DIAGNOSIS — M85.89 OSTEOPENIA OF MULTIPLE SITES: ICD-10-CM

## 2022-01-01 DIAGNOSIS — N39.0 URINARY TRACT INFECTION IN FEMALE: Primary | ICD-10-CM

## 2022-01-01 DIAGNOSIS — G24.9 DYSKINESIA: ICD-10-CM

## 2022-01-01 DIAGNOSIS — Z12.31 ENCOUNTER FOR SCREENING MAMMOGRAM FOR MALIGNANT NEOPLASM OF BREAST: ICD-10-CM

## 2022-01-01 DIAGNOSIS — M81.0 AGE-RELATED OSTEOPOROSIS WITHOUT CURRENT PATHOLOGICAL FRACTURE: Primary | ICD-10-CM

## 2022-01-01 DIAGNOSIS — R29.6 FREQUENT FALLS: ICD-10-CM

## 2022-01-01 DIAGNOSIS — R55 SYNCOPE, UNSPECIFIED SYNCOPE TYPE: ICD-10-CM

## 2022-01-01 DIAGNOSIS — G25.2 ACTION TREMOR: ICD-10-CM

## 2022-01-01 DIAGNOSIS — R63.6 UNDERWEIGHT DUE TO INADEQUATE CALORIC INTAKE: Primary | ICD-10-CM

## 2022-01-01 DIAGNOSIS — I95.9 SYMPTOMATIC HYPOTENSION: ICD-10-CM

## 2022-01-01 DIAGNOSIS — N20.0 CALCULUS OF KIDNEY: Primary | ICD-10-CM

## 2022-01-01 DIAGNOSIS — G20 PARKINSON DISEASE: Primary | ICD-10-CM

## 2022-01-01 DIAGNOSIS — N39.0 RECURRENT UTI: ICD-10-CM

## 2022-01-01 DIAGNOSIS — D36.9 TUBULAR ADENOMA: ICD-10-CM

## 2022-01-01 LAB
ALBUMIN SERPL-MCNC: 4.1 G/DL (ref 3.5–5.2)
ALBUMIN SERPL-MCNC: 4.3 G/DL (ref 3.5–5.2)
ALBUMIN SERPL-MCNC: 4.3 G/DL (ref 3.5–5.2)
ALBUMIN SERPL-MCNC: 4.3 G/DL (ref 3.7–4.7)
ALBUMIN SERPL-MCNC: 4.5 G/DL (ref 3.5–5.2)
ALBUMIN/GLOB SERPL: 1.3 G/DL
ALBUMIN/GLOB SERPL: 1.4 G/DL
ALBUMIN/GLOB SERPL: 1.4 G/DL
ALBUMIN/GLOB SERPL: 1.5 {RATIO} (ref 1.2–2.2)
ALBUMIN/GLOB SERPL: 1.6 G/DL
ALP SERPL-CCNC: 51 U/L (ref 39–117)
ALP SERPL-CCNC: 63 U/L (ref 39–117)
ALP SERPL-CCNC: 71 U/L (ref 39–117)
ALP SERPL-CCNC: 79 U/L (ref 39–117)
ALP SERPL-CCNC: 93 IU/L (ref 44–121)
ALT SERPL W P-5'-P-CCNC: 7 U/L (ref 1–33)
ALT SERPL W P-5'-P-CCNC: <5 U/L (ref 1–33)
ALT SERPL-CCNC: 8 IU/L (ref 0–32)
ANION GAP SERPL CALCULATED.3IONS-SCNC: 10.6 MMOL/L (ref 5–15)
ANION GAP SERPL CALCULATED.3IONS-SCNC: 11.9 MMOL/L (ref 5–15)
ANION GAP SERPL CALCULATED.3IONS-SCNC: 12.2 MMOL/L (ref 5–15)
ANION GAP SERPL CALCULATED.3IONS-SCNC: 12.3 MMOL/L (ref 5–15)
ANION GAP SERPL CALCULATED.3IONS-SCNC: 7.2 MMOL/L (ref 5–15)
ANION GAP SERPL CALCULATED.3IONS-SCNC: 7.5 MMOL/L (ref 5–15)
AORTIC DIMENSIONLESS INDEX: 0.7 (DI)
APPEARANCE UR: ABNORMAL
APTT PPP: 34.6 SECONDS (ref 24.3–38.1)
AST SERPL-CCNC: 12 IU/L (ref 0–40)
AST SERPL-CCNC: 15 U/L (ref 1–32)
AST SERPL-CCNC: 16 U/L (ref 1–32)
AST SERPL-CCNC: 17 U/L (ref 1–32)
AST SERPL-CCNC: 17 U/L (ref 1–32)
B PARAPERT DNA SPEC QL NAA+PROBE: NOT DETECTED
B PERT DNA SPEC QL NAA+PROBE: NOT DETECTED
BACTERIA #/AREA URNS HPF: ABNORMAL /[HPF]
BACTERIA SPEC AEROBE CULT: NO GROWTH
BACTERIA SPEC AEROBE CULT: NORMAL
BACTERIA UR CULT: ABNORMAL
BACTERIA UR CULT: ABNORMAL
BACTERIA UR QL AUTO: ABNORMAL /HPF
BACTERIA UR QL AUTO: ABNORMAL /HPF
BASOPHILS # BLD AUTO: 0.03 10*3/MM3 (ref 0–0.2)
BASOPHILS # BLD AUTO: 0.03 10*3/MM3 (ref 0–0.2)
BASOPHILS # BLD AUTO: 0.04 10*3/MM3 (ref 0–0.2)
BASOPHILS # BLD AUTO: 0.04 10*3/MM3 (ref 0–0.2)
BASOPHILS # BLD AUTO: 0.1 X10E3/UL (ref 0–0.2)
BASOPHILS NFR BLD AUTO: 0.2 % (ref 0–1.5)
BASOPHILS NFR BLD AUTO: 0.3 % (ref 0–1.5)
BASOPHILS NFR BLD AUTO: 0.3 % (ref 0–1.5)
BASOPHILS NFR BLD AUTO: 0.6 % (ref 0–1.5)
BASOPHILS NFR BLD AUTO: 1 %
BH CV ECHO MEAS - AO MAX PG: 6.1 MMHG
BH CV ECHO MEAS - AO MEAN PG: 2.9 MMHG
BH CV ECHO MEAS - AO V2 MAX: 123.1 CM/SEC
BH CV ECHO MEAS - AO V2 VTI: 27 CM
BH CV ECHO MEAS - AVA(I,D): 1.57 CM2
BH CV ECHO MEAS - EDV(CUBED): 35.2 ML
BH CV ECHO MEAS - EDV(MOD-SP2): 71.6 ML
BH CV ECHO MEAS - EDV(MOD-SP4): 64.5 ML
BH CV ECHO MEAS - EF(MOD-BP): 63.6 %
BH CV ECHO MEAS - EF(MOD-SP2): 65.4 %
BH CV ECHO MEAS - EF(MOD-SP4): 61.1 %
BH CV ECHO MEAS - ESV(CUBED): 11.4 ML
BH CV ECHO MEAS - ESV(MOD-SP2): 24.8 ML
BH CV ECHO MEAS - ESV(MOD-SP4): 25.1 ML
BH CV ECHO MEAS - FS: 31.3 %
BH CV ECHO MEAS - IVS/LVPW: 1.12 CM
BH CV ECHO MEAS - IVSD: 1.28 CM
BH CV ECHO MEAS - LAT PEAK E' VEL: 7.2 CM/SEC
BH CV ECHO MEAS - LV MASS(C)D: 124.6 GRAMS
BH CV ECHO MEAS - LV MAX PG: 3 MMHG
BH CV ECHO MEAS - LV MEAN PG: 1.36 MMHG
BH CV ECHO MEAS - LV V1 MAX: 86.2 CM/SEC
BH CV ECHO MEAS - LV V1 VTI: 19.6 CM
BH CV ECHO MEAS - LVIDD: 3.3 CM
BH CV ECHO MEAS - LVIDS: 2.25 CM
BH CV ECHO MEAS - LVOT AREA: 2.17 CM2
BH CV ECHO MEAS - LVOT DIAM: 1.66 CM
BH CV ECHO MEAS - LVPWD: 1.14 CM
BH CV ECHO MEAS - MED PEAK E' VEL: 5.7 CM/SEC
BH CV ECHO MEAS - MV A DUR: 0.11 SEC
BH CV ECHO MEAS - MV A MAX VEL: 109.6 CM/SEC
BH CV ECHO MEAS - MV DEC SLOPE: 342.1 CM/SEC2
BH CV ECHO MEAS - MV DEC TIME: 0.17 MSEC
BH CV ECHO MEAS - MV E MAX VEL: 90 CM/SEC
BH CV ECHO MEAS - MV E/A: 0.82
BH CV ECHO MEAS - MV MEAN PG: 2.06 MMHG
BH CV ECHO MEAS - MV V2 VTI: 27 CM
BH CV ECHO MEAS - MVA(VTI): 1.57 CM2
BH CV ECHO MEAS - PULM A REVS DUR: 0.11 SEC
BH CV ECHO MEAS - PULM A REVS VEL: 38.9 CM/SEC
BH CV ECHO MEAS - PULM DIAS VEL: 37.1 CM/SEC
BH CV ECHO MEAS - PULM SYS VEL: 58 CM/SEC
BH CV ECHO MEAS - RAP SYSTOLE: 3 MMHG
BH CV ECHO MEAS - RVSP: 16.8 MMHG
BH CV ECHO MEAS - SV(LVOT): 42.5 ML
BH CV ECHO MEAS - SV(MOD-SP2): 46.8 ML
BH CV ECHO MEAS - SV(MOD-SP4): 39.4 ML
BH CV ECHO MEAS - TAPSE (>1.6): 2.25 CM
BH CV ECHO MEAS - TR MAX PG: 13.8 MMHG
BH CV ECHO MEAS - TR MAX VEL: 185.8 CM/SEC
BH CV ECHO MEASUREMENTS AVERAGE E/E' RATIO: 13.95
BH CV XLRA - RV BASE: 3.7 CM
BH CV XLRA - RV LENGTH: 7.3 CM
BH CV XLRA - RV MID: 2.31 CM
BH CV XLRA - TDI S': 9.5 CM/SEC
BILIRUB SERPL-MCNC: 0.2 MG/DL (ref 0–1.2)
BILIRUB SERPL-MCNC: 0.3 MG/DL (ref 0–1.2)
BILIRUB SERPL-MCNC: 0.3 MG/DL (ref 0–1.2)
BILIRUB SERPL-MCNC: 0.4 MG/DL (ref 0–1.2)
BILIRUB SERPL-MCNC: 0.5 MG/DL (ref 0–1.2)
BILIRUB UR QL STRIP: NEGATIVE
BUN SERPL-MCNC: 15 MG/DL (ref 8–23)
BUN SERPL-MCNC: 15 MG/DL (ref 8–23)
BUN SERPL-MCNC: 17 MG/DL (ref 8–23)
BUN SERPL-MCNC: 17 MG/DL (ref 8–27)
BUN SERPL-MCNC: 20 MG/DL (ref 8–23)
BUN SERPL-MCNC: 21 MG/DL (ref 8–23)
BUN SERPL-MCNC: 23 MG/DL (ref 8–23)
BUN/CREAT SERPL: 18.5 (ref 7–25)
BUN/CREAT SERPL: 20.8 (ref 7–25)
BUN/CREAT SERPL: 24 (ref 12–28)
BUN/CREAT SERPL: 28.4 (ref 7–25)
BUN/CREAT SERPL: 28.8 (ref 7–25)
BUN/CREAT SERPL: 30.3 (ref 7–25)
BUN/CREAT SERPL: 35.9 (ref 7–25)
C PNEUM DNA NPH QL NAA+NON-PROBE: NOT DETECTED
CALCIUM SERPL-MCNC: 9.7 MG/DL (ref 8.7–10.3)
CALCIUM SPEC-SCNC: 10 MG/DL (ref 8.6–10.5)
CALCIUM SPEC-SCNC: 8.4 MG/DL (ref 8.6–10.5)
CALCIUM SPEC-SCNC: 8.5 MG/DL (ref 8.6–10.5)
CALCIUM SPEC-SCNC: 8.8 MG/DL (ref 8.6–10.5)
CALCIUM SPEC-SCNC: 9.7 MG/DL (ref 8.6–10.5)
CALCIUM SPEC-SCNC: 9.9 MG/DL (ref 8.6–10.5)
CASTS URNS QL MICRO: ABNORMAL /LPF
CHLORIDE SERPL-SCNC: 100 MMOL/L (ref 98–107)
CHLORIDE SERPL-SCNC: 101 MMOL/L (ref 98–107)
CHLORIDE SERPL-SCNC: 101 MMOL/L (ref 98–107)
CHLORIDE SERPL-SCNC: 102 MMOL/L (ref 98–107)
CHLORIDE SERPL-SCNC: 103 MMOL/L (ref 96–106)
CHLORIDE SERPL-SCNC: 103 MMOL/L (ref 98–107)
CHLORIDE SERPL-SCNC: 105 MMOL/L (ref 98–107)
CHOLEST SERPL-MCNC: 184 MG/DL (ref 100–199)
CHOLEST/HDLC SERPL: 2.8 RATIO (ref 0–4.4)
CLARITY UR: ABNORMAL
CLARITY UR: ABNORMAL
CO2 SERPL-SCNC: 19.8 MMOL/L (ref 22–29)
CO2 SERPL-SCNC: 23.1 MMOL/L (ref 22–29)
CO2 SERPL-SCNC: 24 MMOL/L (ref 20–29)
CO2 SERPL-SCNC: 24.4 MMOL/L (ref 22–29)
CO2 SERPL-SCNC: 26.7 MMOL/L (ref 22–29)
CO2 SERPL-SCNC: 30.5 MMOL/L (ref 22–29)
CO2 SERPL-SCNC: 30.8 MMOL/L (ref 22–29)
COLOR UR: YELLOW
CREAT SERPL-MCNC: 0.52 MG/DL (ref 0.57–1)
CREAT SERPL-MCNC: 0.64 MG/DL (ref 0.57–1)
CREAT SERPL-MCNC: 0.66 MG/DL (ref 0.57–1)
CREAT SERPL-MCNC: 0.71 MG/DL (ref 0.57–1)
CREAT SERPL-MCNC: 0.72 MG/DL (ref 0.57–1)
CREAT SERPL-MCNC: 0.74 MG/DL (ref 0.57–1)
CREAT SERPL-MCNC: 0.92 MG/DL (ref 0.57–1)
D-LACTATE SERPL-SCNC: 0.9 MMOL/L (ref 0.5–2)
D-LACTATE SERPL-SCNC: 1.1 MMOL/L (ref 0.5–2)
DEPRECATED RDW RBC AUTO: 45.1 FL (ref 37–54)
DEPRECATED RDW RBC AUTO: 45.5 FL (ref 37–54)
DEPRECATED RDW RBC AUTO: 46.2 FL (ref 37–54)
DEPRECATED RDW RBC AUTO: 47.1 FL (ref 37–54)
EGFRCR SERPLBLD CKD-EPI 2021: 65.1 ML/MIN/1.73
EGFRCR SERPLBLD CKD-EPI 2021: 84.5 ML/MIN/1.73
EGFRCR SERPLBLD CKD-EPI 2021: 87.3 ML/MIN/1.73
EGFRCR SERPLBLD CKD-EPI 2021: 89 ML/MIN/1.73
EGFRCR SERPLBLD CKD-EPI 2021: 91.6 ML/MIN/1.73
EGFRCR SERPLBLD CKD-EPI 2021: 92.3 ML/MIN/1.73
EGFRCR SERPLBLD CKD-EPI 2021: 97 ML/MIN/1.73
EOSINOPHIL # BLD AUTO: 0.03 10*3/MM3 (ref 0–0.4)
EOSINOPHIL # BLD AUTO: 0.05 10*3/MM3 (ref 0–0.4)
EOSINOPHIL # BLD AUTO: 0.06 10*3/MM3 (ref 0–0.4)
EOSINOPHIL # BLD AUTO: 0.15 10*3/MM3 (ref 0–0.4)
EOSINOPHIL # BLD AUTO: 0.4 X10E3/UL (ref 0–0.4)
EOSINOPHIL NFR BLD AUTO: 0.2 % (ref 0.3–6.2)
EOSINOPHIL NFR BLD AUTO: 0.4 % (ref 0.3–6.2)
EOSINOPHIL NFR BLD AUTO: 0.8 % (ref 0.3–6.2)
EOSINOPHIL NFR BLD AUTO: 1.4 % (ref 0.3–6.2)
EOSINOPHIL NFR BLD AUTO: 5 %
EPI CELLS #/AREA URNS HPF: >10 /HPF (ref 0–10)
ERYTHROCYTE [DISTWIDTH] IN BLOOD BY AUTOMATED COUNT: 12.5 % (ref 11.7–15.4)
ERYTHROCYTE [DISTWIDTH] IN BLOOD BY AUTOMATED COUNT: 12.6 % (ref 11.7–15.4)
ERYTHROCYTE [DISTWIDTH] IN BLOOD BY AUTOMATED COUNT: 13.2 % (ref 12.3–15.4)
ERYTHROCYTE [DISTWIDTH] IN BLOOD BY AUTOMATED COUNT: 13.3 % (ref 12.3–15.4)
ERYTHROCYTE [DISTWIDTH] IN BLOOD BY AUTOMATED COUNT: 13.6 % (ref 12.3–15.4)
ERYTHROCYTE [DISTWIDTH] IN BLOOD BY AUTOMATED COUNT: 13.6 % (ref 12.3–15.4)
FERRITIN SERPL-MCNC: 78 NG/ML (ref 15–150)
FLUAV RNA RESP QL NAA+PROBE: NOT DETECTED
FLUAV RNA RESP QL NAA+PROBE: NOT DETECTED
FLUAV SUBTYP SPEC NAA+PROBE: NOT DETECTED
FLUBV RNA ISLT QL NAA+PROBE: NOT DETECTED
FLUBV RNA RESP QL NAA+PROBE: NOT DETECTED
FLUBV RNA RESP QL NAA+PROBE: NOT DETECTED
FOLATE SERPL-MCNC: 4.8 NG/ML
GLOBULIN SER CALC-MCNC: 2.8 G/DL (ref 1.5–4.5)
GLOBULIN UR ELPH-MCNC: 2.8 GM/DL
GLOBULIN UR ELPH-MCNC: 2.9 GM/DL
GLOBULIN UR ELPH-MCNC: 3 GM/DL
GLOBULIN UR ELPH-MCNC: 3.4 GM/DL
GLUCOSE BLDC GLUCOMTR-MCNC: 123 MG/DL (ref 70–130)
GLUCOSE SERPL-MCNC: 112 MG/DL (ref 65–99)
GLUCOSE SERPL-MCNC: 116 MG/DL (ref 65–99)
GLUCOSE SERPL-MCNC: 122 MG/DL (ref 65–99)
GLUCOSE SERPL-MCNC: 86 MG/DL (ref 65–99)
GLUCOSE SERPL-MCNC: 86 MG/DL (ref 65–99)
GLUCOSE SERPL-MCNC: 92 MG/DL (ref 65–99)
GLUCOSE SERPL-MCNC: 99 MG/DL (ref 65–99)
GLUCOSE UR QL STRIP: NEGATIVE
GLUCOSE UR STRIP-MCNC: NEGATIVE MG/DL
GLUCOSE UR STRIP-MCNC: NEGATIVE MG/DL
HADV DNA SPEC NAA+PROBE: NOT DETECTED
HCOV 229E RNA SPEC QL NAA+PROBE: NOT DETECTED
HCOV HKU1 RNA SPEC QL NAA+PROBE: NOT DETECTED
HCOV NL63 RNA SPEC QL NAA+PROBE: DETECTED
HCOV OC43 RNA SPEC QL NAA+PROBE: NOT DETECTED
HCT VFR BLD AUTO: 31 % (ref 34–46.6)
HCT VFR BLD AUTO: 32.4 % (ref 34–46.6)
HCT VFR BLD AUTO: 32.6 % (ref 34–46.6)
HCT VFR BLD AUTO: 33.7 % (ref 34–46.6)
HCT VFR BLD AUTO: 35.2 % (ref 34–46.6)
HCT VFR BLD AUTO: 35.4 % (ref 34–46.6)
HDLC SERPL-MCNC: 65 MG/DL
HGB BLD-MCNC: 10 G/DL (ref 12–15.9)
HGB BLD-MCNC: 10.3 G/DL (ref 12–15.9)
HGB BLD-MCNC: 10.4 G/DL (ref 12–15.9)
HGB BLD-MCNC: 11 G/DL (ref 11.1–15.9)
HGB BLD-MCNC: 11.1 G/DL (ref 12–15.9)
HGB BLD-MCNC: 11.5 G/DL (ref 11.1–15.9)
HGB UR QL STRIP.AUTO: ABNORMAL
HGB UR QL STRIP.AUTO: NEGATIVE
HGB UR QL STRIP: ABNORMAL
HMPV RNA NPH QL NAA+NON-PROBE: NOT DETECTED
HOLD SPECIMEN: NORMAL
HOLD SPECIMEN: NORMAL
HPIV1 RNA ISLT QL NAA+PROBE: NOT DETECTED
HPIV2 RNA SPEC QL NAA+PROBE: NOT DETECTED
HPIV3 RNA NPH QL NAA+PROBE: NOT DETECTED
HPIV4 P GENE NPH QL NAA+PROBE: NOT DETECTED
HYALINE CASTS UR QL AUTO: ABNORMAL /LPF
HYALINE CASTS UR QL AUTO: ABNORMAL /LPF
IMM GRANULOCYTES # BLD AUTO: 0 X10E3/UL (ref 0–0.1)
IMM GRANULOCYTES # BLD AUTO: 0.02 10*3/MM3 (ref 0–0.05)
IMM GRANULOCYTES # BLD AUTO: 0.03 10*3/MM3 (ref 0–0.05)
IMM GRANULOCYTES # BLD AUTO: 0.03 10*3/MM3 (ref 0–0.05)
IMM GRANULOCYTES # BLD AUTO: 0.05 10*3/MM3 (ref 0–0.05)
IMM GRANULOCYTES NFR BLD AUTO: 0 %
IMM GRANULOCYTES NFR BLD AUTO: 0.3 % (ref 0–0.5)
IMM GRANULOCYTES NFR BLD AUTO: 0.4 % (ref 0–0.5)
INR PPP: 1.1 (ref 0.9–1.1)
IRON SATN MFR SERPL: 23 % (ref 15–55)
IRON SERPL-MCNC: 74 UG/DL (ref 27–139)
KETONES UR QL STRIP: ABNORMAL
KETONES UR QL STRIP: ABNORMAL
KETONES UR QL STRIP: NEGATIVE
LDLC SERPL CALC-MCNC: 97 MG/DL (ref 0–99)
LEFT ATRIUM VOLUME INDEX: 30.9 ML/M2
LEUKOCYTE ESTERASE UR QL STRIP.AUTO: ABNORMAL
LEUKOCYTE ESTERASE UR QL STRIP.AUTO: ABNORMAL
LEUKOCYTE ESTERASE UR QL STRIP: ABNORMAL
LIPASE SERPL-CCNC: 15 U/L (ref 13–60)
LYMPHOCYTES # BLD AUTO: 0.48 10*3/MM3 (ref 0.7–3.1)
LYMPHOCYTES # BLD AUTO: 0.49 10*3/MM3 (ref 0.7–3.1)
LYMPHOCYTES # BLD AUTO: 0.57 10*3/MM3 (ref 0.7–3.1)
LYMPHOCYTES # BLD AUTO: 0.6 X10E3/UL (ref 0.7–3.1)
LYMPHOCYTES # BLD AUTO: 0.94 10*3/MM3 (ref 0.7–3.1)
LYMPHOCYTES NFR BLD AUTO: 4 % (ref 19.6–45.3)
LYMPHOCYTES NFR BLD AUTO: 4.3 % (ref 19.6–45.3)
LYMPHOCYTES NFR BLD AUTO: 8 %
LYMPHOCYTES NFR BLD AUTO: 8 % (ref 19.6–45.3)
LYMPHOCYTES NFR BLD AUTO: 8.8 % (ref 19.6–45.3)
Lab: NORMAL
M PNEUMO IGG SER IA-ACNC: NOT DETECTED
MAGNESIUM SERPL-MCNC: 2 MG/DL (ref 1.6–2.4)
MAGNESIUM SERPL-MCNC: 2.1 MG/DL (ref 1.6–2.4)
MAGNESIUM SERPL-MCNC: 2.2 MG/DL (ref 1.6–2.4)
MAGNESIUM SERPL-MCNC: 2.3 MG/DL (ref 1.6–2.4)
MAXIMAL PREDICTED HEART RATE: 145 BPM
MAXIMAL PREDICTED HEART RATE: 145 BPM
MCH RBC QN AUTO: 29.5 PG (ref 26.6–33)
MCH RBC QN AUTO: 29.6 PG (ref 26.6–33)
MCH RBC QN AUTO: 29.6 PG (ref 26.6–33)
MCH RBC QN AUTO: 29.7 PG (ref 26.6–33)
MCH RBC QN AUTO: 29.9 PG (ref 26.6–33)
MCH RBC QN AUTO: 30.9 PG (ref 26.6–33)
MCHC RBC AUTO-ENTMCNC: 31.5 G/DL (ref 31.5–35.7)
MCHC RBC AUTO-ENTMCNC: 31.8 G/DL (ref 31.5–35.7)
MCHC RBC AUTO-ENTMCNC: 31.9 G/DL (ref 31.5–35.7)
MCHC RBC AUTO-ENTMCNC: 32.3 G/DL (ref 31.5–35.7)
MCHC RBC AUTO-ENTMCNC: 32.5 G/DL (ref 31.5–35.7)
MCHC RBC AUTO-ENTMCNC: 32.6 G/DL (ref 31.5–35.7)
MCV RBC AUTO: 91 FL (ref 79–97)
MCV RBC AUTO: 91 FL (ref 79–97)
MCV RBC AUTO: 92.8 FL (ref 79–97)
MCV RBC AUTO: 93.1 FL (ref 79–97)
MCV RBC AUTO: 94.9 FL (ref 79–97)
MCV RBC AUTO: 95.7 FL (ref 79–97)
MICRO URNS: ABNORMAL
MONOCYTES # BLD AUTO: 0.6 X10E3/UL (ref 0.1–0.9)
MONOCYTES # BLD AUTO: 0.69 10*3/MM3 (ref 0.1–0.9)
MONOCYTES # BLD AUTO: 0.81 10*3/MM3 (ref 0.1–0.9)
MONOCYTES # BLD AUTO: 0.82 10*3/MM3 (ref 0.1–0.9)
MONOCYTES # BLD AUTO: 0.93 10*3/MM3 (ref 0.1–0.9)
MONOCYTES NFR BLD AUTO: 7.2 % (ref 5–12)
MONOCYTES NFR BLD AUTO: 7.6 % (ref 5–12)
MONOCYTES NFR BLD AUTO: 7.7 % (ref 5–12)
MONOCYTES NFR BLD AUTO: 8 %
MONOCYTES NFR BLD AUTO: 9.7 % (ref 5–12)
NEUTROPHILS # BLD AUTO: 5.7 X10E3/UL (ref 1.4–7)
NEUTROPHILS NFR BLD AUTO: 10 10*3/MM3 (ref 1.7–7)
NEUTROPHILS NFR BLD AUTO: 10.62 10*3/MM3 (ref 1.7–7)
NEUTROPHILS NFR BLD AUTO: 5.74 10*3/MM3 (ref 1.7–7)
NEUTROPHILS NFR BLD AUTO: 78 %
NEUTROPHILS NFR BLD AUTO: 8.67 10*3/MM3 (ref 1.7–7)
NEUTROPHILS NFR BLD AUTO: 80.6 % (ref 42.7–76)
NEUTROPHILS NFR BLD AUTO: 81.6 % (ref 42.7–76)
NEUTROPHILS NFR BLD AUTO: 87.5 % (ref 42.7–76)
NEUTROPHILS NFR BLD AUTO: 87.5 % (ref 42.7–76)
NITRITE UR QL STRIP: NEGATIVE
NITRITE UR QL STRIP: POSITIVE
NITRITE UR QL STRIP: POSITIVE
NRBC BLD AUTO-RTO: 0 /100 WBC (ref 0–0.2)
NT-PROBNP SERPL-MCNC: 616.7 PG/ML (ref 0–1800)
NT-PROBNP SERPL-MCNC: 838.7 PG/ML (ref 0–1800)
OTHER ANTIBIOTIC SUSC ISLT: ABNORMAL
PH UR STRIP.AUTO: 5.5 [PH] (ref 4.5–8)
PH UR STRIP.AUTO: 6.5 [PH] (ref 4.5–8)
PH UR STRIP: 6.5 [PH] (ref 5–7.5)
PHOSPHATE SERPL-MCNC: 2 MG/DL (ref 2.5–4.5)
PHOSPHATE SERPL-MCNC: 4.7 MG/DL (ref 2.5–4.5)
PHOSPHATE SERPL-MCNC: 4.9 MG/DL (ref 2.5–4.5)
PLATELET # BLD AUTO: 195 10*3/MM3 (ref 140–450)
PLATELET # BLD AUTO: 204 10*3/MM3 (ref 140–450)
PLATELET # BLD AUTO: 249 10*3/MM3 (ref 140–450)
PLATELET # BLD AUTO: 256 10*3/MM3 (ref 140–450)
PLATELET # BLD AUTO: 287 X10E3/UL (ref 150–450)
PLATELET # BLD AUTO: 289 X10E3/UL (ref 150–450)
PMV BLD AUTO: 10.4 FL (ref 6–12)
PMV BLD AUTO: 10.7 FL (ref 6–12)
PMV BLD AUTO: 9.5 FL (ref 6–12)
PMV BLD AUTO: 9.8 FL (ref 6–12)
POTASSIUM SERPL-SCNC: 3.8 MMOL/L (ref 3.5–5.2)
POTASSIUM SERPL-SCNC: 3.9 MMOL/L (ref 3.5–5.2)
POTASSIUM SERPL-SCNC: 4.1 MMOL/L (ref 3.5–5.2)
POTASSIUM SERPL-SCNC: 4.2 MMOL/L (ref 3.5–5.2)
POTASSIUM SERPL-SCNC: 4.3 MMOL/L (ref 3.5–5.2)
POTASSIUM SERPL-SCNC: 4.4 MMOL/L (ref 3.5–5.2)
POTASSIUM SERPL-SCNC: 5.5 MMOL/L (ref 3.5–5.2)
PROCALCITONIN SERPL-MCNC: 0.04 NG/ML (ref 0–0.25)
PROCALCITONIN SERPL-MCNC: 0.04 NG/ML (ref 0–0.25)
PROCALCITONIN SERPL-MCNC: 0.31 NG/ML (ref 0–0.25)
PROCALCITONIN SERPL-MCNC: 0.4 NG/ML (ref 0–0.25)
PROT SERPL-MCNC: 7 G/DL (ref 6–8.5)
PROT SERPL-MCNC: 7.1 G/DL (ref 6–8.5)
PROT SERPL-MCNC: 7.3 G/DL (ref 6–8.5)
PROT SERPL-MCNC: 7.3 G/DL (ref 6–8.5)
PROT SERPL-MCNC: 7.7 G/DL (ref 6–8.5)
PROT UR QL STRIP: ABNORMAL
PROTHROMBIN TIME: 14 SECONDS (ref 12.1–15)
QT INTERVAL: 378 MS
RBC # BLD AUTO: 3.24 10*6/MM3 (ref 3.77–5.28)
RBC # BLD AUTO: 3.49 10*6/MM3 (ref 3.77–5.28)
RBC # BLD AUTO: 3.5 10*6/MM3 (ref 3.77–5.28)
RBC # BLD AUTO: 3.71 10*6/MM3 (ref 3.77–5.28)
RBC # BLD AUTO: 3.72 X10E6/UL (ref 3.77–5.28)
RBC # BLD AUTO: 3.89 X10E6/UL (ref 3.77–5.28)
RBC # UR STRIP: ABNORMAL /HPF
RBC # UR STRIP: ABNORMAL /HPF
RBC #/AREA URNS HPF: ABNORMAL /HPF (ref 0–2)
RBC MORPH BLD: NORMAL
REF LAB TEST METHOD: ABNORMAL
REF LAB TEST METHOD: ABNORMAL
REQUEST PROBLEM: NORMAL
RHINOVIRUS RNA SPEC NAA+PROBE: NOT DETECTED
RSV RNA NPH QL NAA+NON-PROBE: NOT DETECTED
SARS-COV-2 RNA NPH QL NAA+NON-PROBE: NOT DETECTED
SARS-COV-2 RNA RESP QL NAA+PROBE: NOT DETECTED
SARS-COV-2 RNA RESP QL NAA+PROBE: NOT DETECTED
SINUS: 2.28 CM
SMALL PLATELETS BLD QL SMEAR: ADEQUATE
SODIUM SERPL-SCNC: 135 MMOL/L (ref 136–145)
SODIUM SERPL-SCNC: 136 MMOL/L (ref 136–145)
SODIUM SERPL-SCNC: 138 MMOL/L (ref 136–145)
SODIUM SERPL-SCNC: 140 MMOL/L (ref 136–145)
SODIUM SERPL-SCNC: 144 MMOL/L (ref 134–144)
SP GR UR STRIP: 1.01 (ref 1–1.03)
SP GR UR STRIP: 1.02 (ref 1–1.03)
SP GR UR STRIP: 1.02 (ref 1–1.03)
SPECIMEN STATUS: NORMAL
SQUAMOUS #/AREA URNS HPF: ABNORMAL /HPF
SQUAMOUS #/AREA URNS HPF: ABNORMAL /HPF
STRESS TARGET HR: 123 BPM
STRESS TARGET HR: 123 BPM
T4 FREE SERPL-MCNC: 1.39 NG/DL (ref 0.93–1.7)
TIBC SERPL-MCNC: 328 UG/DL (ref 250–450)
TRIGL SERPL-MCNC: 128 MG/DL (ref 0–149)
TROPONIN T SERPL-MCNC: 0.02 NG/ML (ref 0–0.03)
TSH SERPL DL<=0.005 MIU/L-ACNC: 2.85 UIU/ML (ref 0.45–4.5)
TSH SERPL DL<=0.05 MIU/L-ACNC: 2.79 UIU/ML (ref 0.27–4.2)
UIBC SERPL-MCNC: 254 UG/DL (ref 118–369)
UROBILINOGEN UR QL STRIP: ABNORMAL
UROBILINOGEN UR QL STRIP: ABNORMAL
UROBILINOGEN UR STRIP-MCNC: 0.2 MG/DL (ref 0.2–1)
VIT B12 SERPL-MCNC: >2000 PG/ML (ref 232–1245)
VLDLC SERPL CALC-MCNC: 22 MG/DL (ref 5–40)
WBC # BLD AUTO: 7.2 X10E3/UL (ref 3.4–10.8)
WBC # BLD AUTO: 8.2 X10E3/UL (ref 3.4–10.8)
WBC # UR STRIP: ABNORMAL /HPF
WBC # UR STRIP: ABNORMAL /HPF
WBC #/AREA URNS HPF: >30 /HPF (ref 0–5)
WBC CLUMPS # UR AUTO: ABNORMAL /HPF
WBC MORPH BLD: NORMAL
WBC NRBC COR # BLD: 10.63 10*3/MM3 (ref 3.4–10.8)
WBC NRBC COR # BLD: 11.43 10*3/MM3 (ref 3.4–10.8)
WBC NRBC COR # BLD: 12.14 10*3/MM3 (ref 3.4–10.8)
WBC NRBC COR # BLD: 7.12 10*3/MM3 (ref 3.4–10.8)
WHOLE BLOOD HOLD COAG: NORMAL
WHOLE BLOOD HOLD SPECIMEN: NORMAL
WRITTEN AUTHORIZATION: NORMAL

## 2022-01-01 PROCEDURE — 25010000002 MORPHINE PER 10 MG: Performed by: EMERGENCY MEDICINE

## 2022-01-01 PROCEDURE — 25010000002 ENOXAPARIN PER 10 MG: Performed by: FAMILY MEDICINE

## 2022-01-01 PROCEDURE — 94799 UNLISTED PULMONARY SVC/PX: CPT

## 2022-01-01 PROCEDURE — 99213 OFFICE O/P EST LOW 20 MIN: CPT | Performed by: NURSE PRACTITIONER

## 2022-01-01 PROCEDURE — 85025 COMPLETE CBC W/AUTO DIFF WBC: CPT | Performed by: EMERGENCY MEDICINE

## 2022-01-01 PROCEDURE — 80053 COMPREHEN METABOLIC PANEL: CPT | Performed by: EMERGENCY MEDICINE

## 2022-01-01 PROCEDURE — 93010 ELECTROCARDIOGRAM REPORT: CPT | Performed by: INTERNAL MEDICINE

## 2022-01-01 PROCEDURE — 84443 ASSAY THYROID STIM HORMONE: CPT

## 2022-01-01 PROCEDURE — 77063 BREAST TOMOSYNTHESIS BI: CPT

## 2022-01-01 PROCEDURE — 1170F FXNL STATUS ASSESSED: CPT | Performed by: NURSE PRACTITIONER

## 2022-01-01 PROCEDURE — 77067 SCR MAMMO BI INCL CAD: CPT

## 2022-01-01 PROCEDURE — 96372 THER/PROPH/DIAG INJ SC/IM: CPT

## 2022-01-01 PROCEDURE — 93227 XTRNL ECG REC<48 HR R&I: CPT | Performed by: INTERNAL MEDICINE

## 2022-01-01 PROCEDURE — G0439 PPPS, SUBSEQ VISIT: HCPCS | Performed by: NURSE PRACTITIONER

## 2022-01-01 PROCEDURE — P9612 CATHETERIZE FOR URINE SPEC: HCPCS

## 2022-01-01 PROCEDURE — 25010000002 DENOSUMAB 60 MG/ML SOLUTION PREFILLED SYRINGE: Performed by: NURSE PRACTITIONER

## 2022-01-01 PROCEDURE — 97116 GAIT TRAINING THERAPY: CPT

## 2022-01-01 PROCEDURE — 97165 OT EVAL LOW COMPLEX 30 MIN: CPT

## 2022-01-01 PROCEDURE — C9803 HOPD COVID-19 SPEC COLLECT: HCPCS

## 2022-01-01 PROCEDURE — 1160F RVW MEDS BY RX/DR IN RCRD: CPT | Performed by: NURSE PRACTITIONER

## 2022-01-01 PROCEDURE — 93005 ELECTROCARDIOGRAM TRACING: CPT | Performed by: EMERGENCY MEDICINE

## 2022-01-01 PROCEDURE — 93225 XTRNL ECG REC<48 HRS REC: CPT

## 2022-01-01 PROCEDURE — 97161 PT EVAL LOW COMPLEX 20 MIN: CPT

## 2022-01-01 PROCEDURE — 25010000002 CEFTRIAXONE SODIUM-DEXTROSE 1-3.74 GM-%(50ML) RECONSTITUTED SOLUTION: Performed by: EMERGENCY MEDICINE

## 2022-01-01 PROCEDURE — 99204 OFFICE O/P NEW MOD 45 MIN: CPT | Performed by: INTERNAL MEDICINE

## 2022-01-01 PROCEDURE — 87086 URINE CULTURE/COLONY COUNT: CPT | Performed by: FAMILY MEDICINE

## 2022-01-01 PROCEDURE — 84484 ASSAY OF TROPONIN QUANT: CPT | Performed by: EMERGENCY MEDICINE

## 2022-01-01 PROCEDURE — 25010000002 LEVOFLOXACIN PER 250 MG: Performed by: STUDENT IN AN ORGANIZED HEALTH CARE EDUCATION/TRAINING PROGRAM

## 2022-01-01 PROCEDURE — 96365 THER/PROPH/DIAG IV INF INIT: CPT

## 2022-01-01 PROCEDURE — 87040 BLOOD CULTURE FOR BACTERIA: CPT

## 2022-01-01 PROCEDURE — 36415 COLL VENOUS BLD VENIPUNCTURE: CPT

## 2022-01-01 PROCEDURE — 83880 ASSAY OF NATRIURETIC PEPTIDE: CPT | Performed by: EMERGENCY MEDICINE

## 2022-01-01 PROCEDURE — 83735 ASSAY OF MAGNESIUM: CPT | Performed by: EMERGENCY MEDICINE

## 2022-01-01 PROCEDURE — 81001 URINALYSIS AUTO W/SCOPE: CPT | Performed by: EMERGENCY MEDICINE

## 2022-01-01 PROCEDURE — 1111F DSCHRG MED/CURRENT MED MERGE: CPT | Performed by: NURSE PRACTITIONER

## 2022-01-01 PROCEDURE — 87040 BLOOD CULTURE FOR BACTERIA: CPT | Performed by: EMERGENCY MEDICINE

## 2022-01-01 PROCEDURE — 82962 GLUCOSE BLOOD TEST: CPT

## 2022-01-01 PROCEDURE — 83735 ASSAY OF MAGNESIUM: CPT | Performed by: NURSE PRACTITIONER

## 2022-01-01 PROCEDURE — G0378 HOSPITAL OBSERVATION PER HR: HCPCS

## 2022-01-01 PROCEDURE — 76775 US EXAM ABDO BACK WALL LIM: CPT

## 2022-01-01 PROCEDURE — 80048 BASIC METABOLIC PNL TOTAL CA: CPT | Performed by: STUDENT IN AN ORGANIZED HEALTH CARE EDUCATION/TRAINING PROGRAM

## 2022-01-01 PROCEDURE — 99217 PR OBSERVATION CARE DISCHARGE MANAGEMENT: CPT | Performed by: STUDENT IN AN ORGANIZED HEALTH CARE EDUCATION/TRAINING PROGRAM

## 2022-01-01 PROCEDURE — G0180 MD CERTIFICATION HHA PATIENT: HCPCS | Performed by: NURSE PRACTITIONER

## 2022-01-01 PROCEDURE — 83605 ASSAY OF LACTIC ACID: CPT

## 2022-01-01 PROCEDURE — 87086 URINE CULTURE/COLONY COUNT: CPT | Performed by: EMERGENCY MEDICINE

## 2022-01-01 PROCEDURE — 84100 ASSAY OF PHOSPHORUS: CPT | Performed by: NURSE PRACTITIONER

## 2022-01-01 PROCEDURE — 85007 BL SMEAR W/DIFF WBC COUNT: CPT | Performed by: EMERGENCY MEDICINE

## 2022-01-01 PROCEDURE — 93000 ELECTROCARDIOGRAM COMPLETE: CPT | Performed by: INTERNAL MEDICINE

## 2022-01-01 PROCEDURE — 84145 PROCALCITONIN (PCT): CPT | Performed by: STUDENT IN AN ORGANIZED HEALTH CARE EDUCATION/TRAINING PROGRAM

## 2022-01-01 PROCEDURE — 84145 PROCALCITONIN (PCT): CPT | Performed by: EMERGENCY MEDICINE

## 2022-01-01 PROCEDURE — 1126F AMNT PAIN NOTED NONE PRSNT: CPT | Performed by: NURSE PRACTITIONER

## 2022-01-01 PROCEDURE — 83605 ASSAY OF LACTIC ACID: CPT | Performed by: EMERGENCY MEDICINE

## 2022-01-01 PROCEDURE — 25010000002 CEFTRIAXONE SODIUM-DEXTROSE 1-3.74 GM-%(50ML) RECONSTITUTED SOLUTION: Performed by: STUDENT IN AN ORGANIZED HEALTH CARE EDUCATION/TRAINING PROGRAM

## 2022-01-01 PROCEDURE — 99282 EMERGENCY DEPT VISIT SF MDM: CPT | Performed by: EMERGENCY MEDICINE

## 2022-01-01 PROCEDURE — 77080 DXA BONE DENSITY AXIAL: CPT

## 2022-01-01 PROCEDURE — 96367 TX/PROPH/DG ADDL SEQ IV INF: CPT

## 2022-01-01 PROCEDURE — 93226 XTRNL ECG REC<48 HR SCAN A/R: CPT

## 2022-01-01 PROCEDURE — 99284 EMERGENCY DEPT VISIT MOD MDM: CPT

## 2022-01-01 PROCEDURE — 96361 HYDRATE IV INFUSION ADD-ON: CPT

## 2022-01-01 PROCEDURE — 99495 TRANSJ CARE MGMT MOD F2F 14D: CPT | Performed by: NURSE PRACTITIONER

## 2022-01-01 PROCEDURE — 87636 SARSCOV2 & INF A&B AMP PRB: CPT | Performed by: EMERGENCY MEDICINE

## 2022-01-01 PROCEDURE — 25010000002 ENOXAPARIN PER 10 MG: Performed by: STUDENT IN AN ORGANIZED HEALTH CARE EDUCATION/TRAINING PROGRAM

## 2022-01-01 PROCEDURE — 97535 SELF CARE MNGMENT TRAINING: CPT

## 2022-01-01 PROCEDURE — 83690 ASSAY OF LIPASE: CPT | Performed by: EMERGENCY MEDICINE

## 2022-01-01 PROCEDURE — 87636 SARSCOV2 & INF A&B AMP PRB: CPT

## 2022-01-01 PROCEDURE — 85025 COMPLETE CBC W/AUTO DIFF WBC: CPT | Performed by: STUDENT IN AN ORGANIZED HEALTH CARE EDUCATION/TRAINING PROGRAM

## 2022-01-01 PROCEDURE — 84100 ASSAY OF PHOSPHORUS: CPT | Performed by: STUDENT IN AN ORGANIZED HEALTH CARE EDUCATION/TRAINING PROGRAM

## 2022-01-01 PROCEDURE — 84145 PROCALCITONIN (PCT): CPT

## 2022-01-01 PROCEDURE — 99217 PR OBSERVATION CARE DISCHARGE MANAGEMENT: CPT | Performed by: HOSPITALIST

## 2022-01-01 PROCEDURE — 93306 TTE W/DOPPLER COMPLETE: CPT

## 2022-01-01 PROCEDURE — 83880 ASSAY OF NATRIURETIC PEPTIDE: CPT

## 2022-01-01 PROCEDURE — 85610 PROTHROMBIN TIME: CPT | Performed by: EMERGENCY MEDICINE

## 2022-01-01 PROCEDURE — 0202U NFCT DS 22 TRGT SARS-COV-2: CPT | Performed by: EMERGENCY MEDICINE

## 2022-01-01 PROCEDURE — 83735 ASSAY OF MAGNESIUM: CPT | Performed by: STUDENT IN AN ORGANIZED HEALTH CARE EDUCATION/TRAINING PROGRAM

## 2022-01-01 PROCEDURE — 80053 COMPREHEN METABOLIC PANEL: CPT | Performed by: NURSE PRACTITIONER

## 2022-01-01 PROCEDURE — 99285 EMERGENCY DEPT VISIT HI MDM: CPT

## 2022-01-01 PROCEDURE — 99214 OFFICE O/P EST MOD 30 MIN: CPT | Performed by: NURSE PRACTITIONER

## 2022-01-01 PROCEDURE — 99219 PR INITIAL OBSERVATION CARE/DAY 50 MINUTES: CPT | Performed by: STUDENT IN AN ORGANIZED HEALTH CARE EDUCATION/TRAINING PROGRAM

## 2022-01-01 PROCEDURE — 99225 PR SBSQ OBSERVATION CARE/DAY 25 MINUTES: CPT | Performed by: FAMILY MEDICINE

## 2022-01-01 PROCEDURE — 70450 CT HEAD/BRAIN W/O DYE: CPT

## 2022-01-01 PROCEDURE — 25010000002 CEFTRIAXONE SODIUM-DEXTROSE 2-2.22 GM-%(50ML) RECONSTITUTED SOLUTION

## 2022-01-01 PROCEDURE — 96375 TX/PRO/DX INJ NEW DRUG ADDON: CPT

## 2022-01-01 PROCEDURE — 99219 PR INITIAL OBSERVATION CARE/DAY 50 MINUTES: CPT | Performed by: FAMILY MEDICINE

## 2022-01-01 PROCEDURE — 96366 THER/PROPH/DIAG IV INF ADDON: CPT

## 2022-01-01 PROCEDURE — 85730 THROMBOPLASTIN TIME PARTIAL: CPT | Performed by: EMERGENCY MEDICINE

## 2022-01-01 PROCEDURE — 84439 ASSAY OF FREE THYROXINE: CPT

## 2022-01-01 PROCEDURE — 93306 TTE W/DOPPLER COMPLETE: CPT | Performed by: INTERNAL MEDICINE

## 2022-01-01 PROCEDURE — 99215 OFFICE O/P EST HI 40 MIN: CPT | Performed by: NURSE PRACTITIONER

## 2022-01-01 PROCEDURE — 71045 X-RAY EXAM CHEST 1 VIEW: CPT

## 2022-01-01 PROCEDURE — 70551 MRI BRAIN STEM W/O DYE: CPT

## 2022-01-01 PROCEDURE — 99291 CRITICAL CARE FIRST HOUR: CPT | Performed by: STUDENT IN AN ORGANIZED HEALTH CARE EDUCATION/TRAINING PROGRAM

## 2022-01-01 RX ORDER — NITROFURANTOIN 25; 75 MG/1; MG/1
CAPSULE ORAL
COMMUNITY
Start: 2022-01-01 | End: 2022-01-01 | Stop reason: SDUPTHER

## 2022-01-01 RX ORDER — PROPRANOLOL HCL 60 MG
60 CAPSULE, EXTENDED RELEASE 24HR ORAL DAILY
Status: DISCONTINUED | OUTPATIENT
Start: 2022-01-01 | End: 2022-01-01 | Stop reason: HOSPADM

## 2022-01-01 RX ORDER — MIDODRINE HYDROCHLORIDE 2.5 MG/1
TABLET ORAL
COMMUNITY
Start: 2022-01-01 | End: 2022-01-01 | Stop reason: ALTCHOICE

## 2022-01-01 RX ORDER — MIDODRINE HYDROCHLORIDE 5 MG/1
TABLET ORAL
COMMUNITY
Start: 2022-01-01 | End: 2022-01-01

## 2022-01-01 RX ORDER — MIDODRINE HYDROCHLORIDE 2.5 MG/1
2.5 TABLET ORAL
Qty: 30 TABLET | Refills: 0 | Status: SHIPPED | OUTPATIENT
Start: 2022-01-01 | End: 2022-01-01

## 2022-01-01 RX ORDER — SODIUM CHLORIDE 0.9 % (FLUSH) 0.9 %
10 SYRINGE (ML) INJECTION EVERY 12 HOURS SCHEDULED
Status: DISCONTINUED | OUTPATIENT
Start: 2022-01-01 | End: 2022-01-01 | Stop reason: HOSPADM

## 2022-01-01 RX ORDER — ACETAMINOPHEN 325 MG/1
650 TABLET ORAL EVERY 4 HOURS PRN
Status: DISCONTINUED | OUTPATIENT
Start: 2022-01-01 | End: 2022-01-01 | Stop reason: HOSPADM

## 2022-01-01 RX ORDER — ENOXAPARIN SODIUM 100 MG/ML
30 INJECTION SUBCUTANEOUS DAILY
Status: DISCONTINUED | OUTPATIENT
Start: 2022-01-01 | End: 2022-01-01

## 2022-01-01 RX ORDER — CEFUROXIME AXETIL 500 MG/1
500 TABLET ORAL 2 TIMES DAILY
Qty: 14 TABLET | Refills: 0 | Status: SHIPPED | OUTPATIENT
Start: 2022-01-01 | End: 2022-01-01 | Stop reason: HOSPADM

## 2022-01-01 RX ORDER — CEFTRIAXONE 2 G/50ML
2 INJECTION, SOLUTION INTRAVENOUS ONCE
Status: COMPLETED | OUTPATIENT
Start: 2022-01-01 | End: 2022-01-01

## 2022-01-01 RX ORDER — NITROGLYCERIN 0.4 MG/1
0.4 TABLET SUBLINGUAL
Status: DISCONTINUED | OUTPATIENT
Start: 2022-01-01 | End: 2022-01-01 | Stop reason: HOSPADM

## 2022-01-01 RX ORDER — CEFTRIAXONE 1 G/50ML
1 INJECTION, SOLUTION INTRAVENOUS EVERY 24 HOURS
Status: DISCONTINUED | OUTPATIENT
Start: 2022-01-01 | End: 2022-01-01 | Stop reason: HOSPADM

## 2022-01-01 RX ORDER — NITROFURANTOIN 25; 75 MG/1; MG/1
100 CAPSULE ORAL 2 TIMES DAILY
Qty: 14 CAPSULE | Refills: 0 | Status: SHIPPED | OUTPATIENT
Start: 2022-01-01 | End: 2022-01-01

## 2022-01-01 RX ORDER — CEFTRIAXONE 1 G/50ML
1 INJECTION, SOLUTION INTRAVENOUS ONCE
Status: COMPLETED | OUTPATIENT
Start: 2022-01-01 | End: 2022-01-01

## 2022-01-01 RX ORDER — PRAMIPEXOLE DIHYDROCHLORIDE 0.25 MG/1
0.25 TABLET ORAL DAILY
Qty: 30 TABLET | Refills: 0
Start: 2022-01-01 | End: 2022-01-01 | Stop reason: SDUPTHER

## 2022-01-01 RX ORDER — ENOXAPARIN SODIUM 100 MG/ML
40 INJECTION SUBCUTANEOUS EVERY 24 HOURS
Status: DISCONTINUED | OUTPATIENT
Start: 2022-01-01 | End: 2022-01-01 | Stop reason: HOSPADM

## 2022-01-01 RX ORDER — CHOLECALCIFEROL (VITAMIN D3) 125 MCG
5 CAPSULE ORAL NIGHTLY PRN
Status: DISCONTINUED | OUTPATIENT
Start: 2022-01-01 | End: 2022-01-01 | Stop reason: HOSPADM

## 2022-01-01 RX ORDER — UBIDECARENONE 75 MG
50 CAPSULE ORAL DAILY
Status: DISCONTINUED | OUTPATIENT
Start: 2022-01-01 | End: 2022-01-01 | Stop reason: HOSPADM

## 2022-01-01 RX ORDER — ENOXAPARIN SODIUM 100 MG/ML
30 INJECTION SUBCUTANEOUS NIGHTLY
Status: DISCONTINUED | OUTPATIENT
Start: 2022-01-01 | End: 2022-01-01 | Stop reason: HOSPADM

## 2022-01-01 RX ORDER — LEVOFLOXACIN 5 MG/ML
750 INJECTION, SOLUTION INTRAVENOUS
Status: DISCONTINUED | OUTPATIENT
Start: 2022-01-01 | End: 2022-01-01 | Stop reason: HOSPADM

## 2022-01-01 RX ORDER — CEFTRIAXONE 2 G/50ML
2 INJECTION, SOLUTION INTRAVENOUS EVERY 24 HOURS
Status: DISCONTINUED | OUTPATIENT
Start: 2022-01-01 | End: 2022-01-01

## 2022-01-01 RX ORDER — SODIUM CHLORIDE 0.9 % (FLUSH) 0.9 %
10 SYRINGE (ML) INJECTION AS NEEDED
Status: DISCONTINUED | OUTPATIENT
Start: 2022-01-01 | End: 2022-01-01 | Stop reason: HOSPADM

## 2022-01-01 RX ORDER — PRAMIPEXOLE DIHYDROCHLORIDE 0.25 MG/1
0.25 TABLET ORAL DAILY
Status: DISCONTINUED | OUTPATIENT
Start: 2022-01-01 | End: 2022-01-01 | Stop reason: HOSPADM

## 2022-01-01 RX ORDER — ACETAMINOPHEN 325 MG/1
650 TABLET ORAL EVERY 6 HOURS PRN
Status: DISCONTINUED | OUTPATIENT
Start: 2022-01-01 | End: 2022-01-01 | Stop reason: HOSPADM

## 2022-01-01 RX ORDER — PRAMIPEXOLE DIHYDROCHLORIDE 0.25 MG/1
0.25 TABLET ORAL NIGHTLY
COMMUNITY

## 2022-01-01 RX ORDER — ACETAMINOPHEN 325 MG/1
650 TABLET ORAL EVERY 6 HOURS PRN
Qty: 30 TABLET | Refills: 0
Start: 2022-01-01 | End: 2022-01-01

## 2022-01-01 RX ORDER — SODIUM CHLORIDE 9 MG/ML
40 INJECTION, SOLUTION INTRAVENOUS AS NEEDED
Status: DISCONTINUED | OUTPATIENT
Start: 2022-01-01 | End: 2022-01-01 | Stop reason: HOSPADM

## 2022-01-01 RX ORDER — ACETAMINOPHEN 325 MG/1
TABLET ORAL
Status: DISPENSED
Start: 2022-01-01 | End: 2022-01-01

## 2022-01-01 RX ORDER — DEXTROSE, SODIUM CHLORIDE, AND POTASSIUM CHLORIDE 5; .45; .15 G/100ML; G/100ML; G/100ML
100 INJECTION INTRAVENOUS CONTINUOUS
Status: DISCONTINUED | OUTPATIENT
Start: 2022-01-01 | End: 2022-01-01

## 2022-01-01 RX ORDER — PRAMIPEXOLE DIHYDROCHLORIDE 0.25 MG/1
0.25 TABLET ORAL DAILY
Status: ON HOLD | COMMUNITY
End: 2022-01-01

## 2022-01-01 RX ORDER — ONDANSETRON 4 MG/1
4 TABLET, FILM COATED ORAL EVERY 6 HOURS PRN
Status: DISCONTINUED | OUTPATIENT
Start: 2022-01-01 | End: 2022-01-01 | Stop reason: HOSPADM

## 2022-01-01 RX ORDER — CEFDINIR 300 MG/1
300 CAPSULE ORAL 2 TIMES DAILY
Qty: 10 CAPSULE | Refills: 0 | Status: SHIPPED | OUTPATIENT
Start: 2022-01-01 | End: 2022-01-01

## 2022-01-01 RX ORDER — ONDANSETRON 2 MG/ML
4 INJECTION INTRAMUSCULAR; INTRAVENOUS EVERY 6 HOURS PRN
Status: DISCONTINUED | OUTPATIENT
Start: 2022-01-01 | End: 2022-01-01 | Stop reason: HOSPADM

## 2022-01-01 RX ORDER — LEVOFLOXACIN 5 MG/ML
750 INJECTION, SOLUTION INTRAVENOUS
Status: DISCONTINUED | OUTPATIENT
Start: 2022-01-01 | End: 2022-01-01 | Stop reason: DRUGHIGH

## 2022-01-01 RX ORDER — ESTRADIOL 0.1 MG/G
CREAM VAGINAL
Status: ON HOLD | COMMUNITY
Start: 2022-01-01 | End: 2022-01-01

## 2022-01-01 RX ORDER — LEVOFLOXACIN 750 MG/1
750 TABLET ORAL DAILY
Qty: 3 TABLET | Refills: 0 | Status: SHIPPED | OUTPATIENT
Start: 2022-01-01 | End: 2022-01-01

## 2022-01-01 RX ADMIN — MORPHINE SULFATE 2 MG: 4 INJECTION INTRAVENOUS at 18:06

## 2022-01-01 RX ADMIN — PRAMIPEXOLE DIHYDROCHLORIDE 0.25 MG: 0.25 TABLET ORAL at 09:33

## 2022-01-01 RX ADMIN — CARBIDOPA AND LEVODOPA 1 TABLET: 25; 100 TABLET ORAL at 23:28

## 2022-01-01 RX ADMIN — Medication 10 ML: at 08:48

## 2022-01-01 RX ADMIN — CARBIDOPA AND LEVODOPA 2 TABLET: 25; 100 TABLET ORAL at 09:32

## 2022-01-01 RX ADMIN — VITAM B12 50 MCG: 100 TAB at 09:33

## 2022-01-01 RX ADMIN — CARBIDOPA AND LEVODOPA 1 TABLET: 25; 100 TABLET ORAL at 09:23

## 2022-01-01 RX ADMIN — DEXTROSE MONOHYDRATE, SODIUM CHLORIDE, AND POTASSIUM CHLORIDE 100 ML/HR: 50; 4.5; 1.49 INJECTION, SOLUTION INTRAVENOUS at 23:32

## 2022-01-01 RX ADMIN — CEFTRIAXONE 1 G: 1 INJECTION, SOLUTION INTRAVENOUS at 18:07

## 2022-01-01 RX ADMIN — CARBIDOPA AND LEVODOPA 1 TABLET: 25; 100 TABLET ORAL at 21:11

## 2022-01-01 RX ADMIN — SODIUM CHLORIDE 500 ML: 9 INJECTION, SOLUTION INTRAVENOUS at 16:13

## 2022-01-01 RX ADMIN — Medication 10 ML: at 23:40

## 2022-01-01 RX ADMIN — ACETAMINOPHEN 325 MG: 325 TABLET ORAL at 03:38

## 2022-01-01 RX ADMIN — DEXTROSE MONOHYDRATE, SODIUM CHLORIDE, AND POTASSIUM CHLORIDE 100 ML/HR: 50; 4.5; 1.49 INJECTION, SOLUTION INTRAVENOUS at 09:23

## 2022-01-01 RX ADMIN — CEFTRIAXONE 1 G: 1 INJECTION, SOLUTION INTRAVENOUS at 09:35

## 2022-01-01 RX ADMIN — ACETAMINOPHEN 650 MG: 325 TABLET ORAL at 21:11

## 2022-01-01 RX ADMIN — Medication 10 ML: at 09:24

## 2022-01-01 RX ADMIN — SODIUM CHLORIDE 500 ML: 9 INJECTION, SOLUTION INTRAVENOUS at 13:26

## 2022-01-01 RX ADMIN — LEVOFLOXACIN 750 MG: 750 INJECTION, SOLUTION INTRAVENOUS at 13:37

## 2022-01-01 RX ADMIN — Medication 10 ML: at 09:35

## 2022-01-01 RX ADMIN — Medication 10 ML: at 21:10

## 2022-01-01 RX ADMIN — ENOXAPARIN SODIUM 40 MG: 40 INJECTION SUBCUTANEOUS at 23:39

## 2022-01-01 RX ADMIN — DENOSUMAB 60 MG: 60 INJECTION SUBCUTANEOUS at 12:19

## 2022-01-01 RX ADMIN — CEFTRIAXONE 2 G: 2 INJECTION, SOLUTION INTRAVENOUS at 17:27

## 2022-01-01 RX ADMIN — PROPRANOLOL HYDROCHLORIDE 60 MG: 60 CAPSULE, EXTENDED RELEASE ORAL at 09:33

## 2022-01-01 RX ADMIN — PRAMIPEXOLE DIHYDROCHLORIDE 0.25 MG: 0.25 TABLET ORAL at 09:23

## 2022-01-01 RX ADMIN — DENOSUMAB 60 MG: 60 INJECTION SUBCUTANEOUS at 12:50

## 2022-01-01 RX ADMIN — ENOXAPARIN SODIUM 30 MG: 30 INJECTION SUBCUTANEOUS at 23:33

## 2022-01-01 RX ADMIN — CARBIDOPA AND LEVODOPA 1 TABLET: 25; 100 TABLET ORAL at 08:46

## 2022-01-01 RX ADMIN — SODIUM CHLORIDE, POTASSIUM CHLORIDE, SODIUM LACTATE AND CALCIUM CHLORIDE 1000 ML: 600; 310; 30; 20 INJECTION, SOLUTION INTRAVENOUS at 12:47

## 2022-01-01 RX ADMIN — PRAMIPEXOLE DIHYDROCHLORIDE 0.25 MG: 0.25 TABLET ORAL at 08:46

## 2022-01-01 RX ADMIN — Medication 10 ML: at 23:15

## 2022-01-01 RX ADMIN — ENOXAPARIN SODIUM 30 MG: 30 INJECTION SUBCUTANEOUS at 21:10

## 2022-03-21 PROBLEM — R26.89 BALANCE PROBLEMS: Status: ACTIVE | Noted: 2022-01-21

## 2022-03-21 PROBLEM — G24.9 DYSKINESIA: Status: ACTIVE | Noted: 2021-07-20

## 2022-03-21 PROBLEM — M85.89 OSTEOPENIA OF MULTIPLE SITES: Status: ACTIVE | Noted: 2022-01-01

## 2022-03-21 NOTE — PROGRESS NOTES
The ABCs of the Annual Wellness Visit  Subsequent Medicare Wellness Visit    Chief Complaint   Patient presents with   • Annual Exam     AWV       Subjective   History of Present Illness:  Cindy Mejia is a 76 y.o. female who presents for a Subsequent Medicare Wellness Visit as well as follow up of her chronic health conditions.        Patient Active Problem List   Diagnosis   • Anxiety   • Tubular adenoma   • Tremor of both hands   • B12 deficiency   • Parkinson disease (HCC)   • Action tremor   • Mixed stress and urge urinary incontinence   • Urinary tract infection without hematuria   • Moderate malnutrition (HCC)   • Dyskinesia   • Balance problems   • Osteopenia of multiple sites       Outpatient Medications Marked as Taking for the 3/21/22 encounter (Office Visit) with Shannon Nguyen APRN   Medication Sig Dispense Refill   • carbidopa-levodopa (SINEMET)  MG per tablet Take 2 tablets by mouth Daily.     • Meth-Hyo-M Bl-Na Phos-Ph Sal (UTICAP PO) Take  by mouth.     • nitrofurantoin, macrocrystal-monohydrate, (MACROBID) 100 MG capsule Take 50 mg by mouth Daily.     • Phenazopyridine HCl (VH ESSENTIALS UTI RELIEF PO) Take  by mouth.     • pramipexole (MIRAPEX) 0.25 MG tablet Take 0.25 mg by mouth Daily.     • propranolol LA (INDERAL LA) 60 MG 24 hr capsule Take 60 mg by mouth Daily.     • vitamin B-12 (CYANOCOBALAMIN) 100 MCG tablet Take 50 mcg by mouth Daily.         Chronic Health Conditions Addressed in this Visit:  Parkinson - f/b Dr. Baxter, on Sinemet and margarita this fine; she does feel that her tremors and difficulty w/ voluntary movements seem to be worsening and she is having more trouble w/ ADLs; she reports Propranolol was prescribed for tremors and she has never been diagnosed w/ HTN. She has [facility] through [facility] w/ [place]. She was noted to be having some low BP readings during therapy. She was prescribed Midrodrine. Stopped Midodrine after 3 days d/t very high BP  readings.    Frequent UTIs - She is under the care of Urology (Bin[facility]) and taking a daily low-dose abx.    She notes intermittent dizziness, syncopal episodes and falls.    HEALTH RISK ASSESSMENT    Recent Hospitalizations:  None recently    Current Medical Providers:  Patient Care Team:  Shannon Nguyen APRN as PCP - General (Family Medicine)  Ant Baxter MD as Consulting Physician (Neurology)  Rabia Mcelroy MD as Consulting Physician (Gastroenterology)    Smoking Status:  Social History     Tobacco Use   Smoking Status Never Smoker   Smokeless Tobacco Never Used       Alcohol Consumption:  Social History     Substance and Sexual Activity   Alcohol Use No       Depression Screen:   PHQ-2/PHQ-9 Depression Screening 3/21/2022   Retired Total Score -   Little Interest or Pleasure in Doing Things 0-->not at all   Feeling Down, Depressed or Hopeless 0-->not at all   PHQ-9: Brief Depression Severity Measure Score 0       Fall Risk Screen:  CATY Fall Risk Assessment was completed, and patient is at HIGH risk for falls. Assessment completed on:3/21/2022    Health Habits and Functional and Cognitive Screening:  Functional & Cognitive Status 3/21/2022   Do you have difficulty preparing food and eating? Yes   Do you have difficulty bathing yourself, getting dressed or grooming yourself? Yes   Do you have difficulty using the toilet? No   Do you have difficulty moving around from place to place? Yes   Do you have trouble with steps or getting out of a bed or a chair? Yes   Current Diet Unhealthy Diet   Dental Exam Up to date   Eye Exam Up to date   Exercise (times per week) 4 times per week   Current Exercises Include Other   Current Exercise Activities Include -   Do you need help using the phone?  No   Are you deaf or do you have serious difficulty hearing?  No   Do you need help with transportation? Yes   Do you need help shopping? Yes   Do you need help preparing meals?  Yes   Do you need help with  housework?  Yes   Do you need help with laundry? Yes   Do you need help taking your medications? No   Do you need help managing money? No   Do you ever drive or ride in a car without wearing a seat belt? No   Have you felt unusual stress, anger or loneliness in the last month? No   Who do you live with? Spouse   If you need help, do you have trouble finding someone available to you? No   Have you been bothered in the last four weeks by sexual problems? No   Do you have difficulty concentrating, remembering or making decisions? No         Does the patient have evidence of cognitive impairment? No    Asprin use counseling:Does not need ASA (and currently is not on it)    Age-appropriate Screening Schedule:  Refer to the list below for future screening recommendations based on patient's age, sex and/or medical conditions. Orders for these recommended tests are listed in the plan section. The patient has been provided with a written plan.    Health Maintenance   Topic Date Due   • ZOSTER VACCINE (1 of 2) Never done   • TDAP/TD VACCINES (2 - Tdap) 10/06/2008   • DXA SCAN  04/05/2021   • MAMMOGRAM  07/10/2022   • INFLUENZA VACCINE  Completed          The following portions of the patient's history were reviewed and updated as appropriate: allergies, current medications, past family history, past medical history, past social history, past surgical history, and problem list.      Compared to one year ago, the patient feels her physical health is worse.  Compared to one year ago, the patient feels her mental health is the same.    Reviewed chart for potential of high risk medication in the elderly: Yes  Reviewed chart for potential of harmful drug interactions in the elderly:Yes      Review of Systems   Respiratory: Negative for shortness of breath.    Cardiovascular: Negative for chest pain and palpitations.   Neurological: Positive for dizziness, tremors, syncope and weakness.         Objective         Vitals:    03/21/22  "0830   BP: 116/62   Pulse: 74   SpO2: 98%   Weight: 44.7 kg (98 lb 9.6 oz)   Height: 154.9 cm (61\")       Body mass index is 18.63 kg/m².  Discussed the patient's BMI with her. The BMI is below average; BMI management plan is completed.  Patient's Body mass index is 18.63 kg/m². indicating that she is underweight (BMI < 18.5). Recommendations include: supplement w/ Boost or other protein meal-replacment beverage.        Physical Exam  Constitutional:       General: She is not in acute distress.     Appearance: She is well-developed.   HENT:      Right Ear: Hearing, tympanic membrane, ear canal and external ear normal.      Left Ear: Hearing, tympanic membrane, ear canal and external ear normal.      Nose: Nose normal.      Mouth/Throat:      Mouth: Mucous membranes are moist.      Pharynx: Oropharynx is clear. Uvula midline.   Neck:      Thyroid: No thyroid mass or thyromegaly.   Cardiovascular:      Rate and Rhythm: Regular rhythm.      Pulses: Normal pulses.      Heart sounds: S1 normal and S2 normal. No murmur heard.    No friction rub. No gallop.   Pulmonary:      Effort: Pulmonary effort is normal.      Breath sounds: Normal breath sounds. No wheezing, rhonchi or rales.   Musculoskeletal:      Cervical back: Neck supple.   Lymphadenopathy:      Cervical: No cervical adenopathy.   Neurological:      Mental Status: She is alert and oriented to person, place, and time.      Cranial Nerves: No cranial nerve deficit.      Sensory: No sensory deficit.      Motor: Weakness and tremor present.      Gait: Gait abnormal.   Psychiatric:         Attention and Perception: She is attentive.         Speech: Speech normal.         Behavior: Behavior normal.         No results found for this or any previous visit (from the past 336 hour(s)).    Each of these lab results were discussed individually in detail with the patient.      Assessment/Plan     Diagnoses and all orders for this visit:    1. Encounter for Medicare annual " wellness exam (Primary)  -     CBC (No Diff)  -     Comprehensive Metabolic Panel  -     Lipid Panel With / Chol / HDL Ratio  -     TSH    2. Encounter for screening mammogram for malignant neoplasm of breast  -     Mammo Screening Digital Tomosynthesis Bilateral With CAD    3. Post-menopause  -     DEXA Bone Density Axial    4. Osteopenia of multiple sites  -     DEXA Bone Density Axial    5. Balance problems    6. Dyskinesia    7. Parkinson disease (HCC)    8. Moderate malnutrition (HCC)  -     CBC (No Diff)  -     Comprehensive Metabolic Panel  -     Lipid Panel With / Chol / HDL Ratio  -     TSH    9. Syncope, unspecified syncope type  -     Adult Transthoracic Echo Complete W/ Cont if Necessary Per Protocol  -     Holter monitor - 48 hour  -     Ambulatory Referral to Cardiology  -     CBC (No Diff)  -     Comprehensive Metabolic Panel  -     TSH    10. Hypotension, unspecified hypotension type  Comments:  - monitor BP, keep log, bring to Neuro f/u and Cards consult  Orders:  -     Ambulatory Referral to Cardiology  -     CBC (No Diff)  -     Comprehensive Metabolic Panel  -     TSH        Medicare Risks and Personalized Health Plan    Advanced Care Planning:  ACP discussion was declined by the patient. Patient does not have an advance directive, declines further assistance.    CMS Preventative Services Quick Reference  Breast Cancer/Mammogram Screening  Fall Risk  Osteoporosis Risk        The above risks/problems have been discussed with the patient.  Pertinent information has been shared with the patient in the After Visit Summary.  Follow up plans and orders are seen below in the Assessment/Plan Section.      Follow Up:  Return for fasting labs ASAP., sooner if needed.    An After Visit Summary and PPPS were given to the patient.

## 2022-04-20 NOTE — PROGRESS NOTES
Subjective:     Encounter Date:04/27/22      Patient ID: Cindy Mejia is a 76 y.o. female.    Chief Complaint:  History of Present Illness    Dear Shannon,    I had the pleasure of seeing this patient in the office today for initial evaluation and consultation.  I appreciate that you sent her in to see us.  They come in today to be seen for evaluation of spells of dizziness when standing.    Patient has a history of Parkinson's.  Lately her physical therapist has noted that when she stands up sometimes she gets unsteady and feels weak.  They checked her blood pressure and they have gotten low numbers.    She saw neurology and they started on midodrine.  She was on midodrine 5 mg 3 times daily.  However says she felt worse on that and her blood pressure went really high so it was stopped.    She does not really drink much water.  They do not add a whole lot of salt to their food.    She has not passed out anytime that they know of.  No spells of chest pain chest discomfort.  No shortness of breath.  No palpitations or tachycardia.    This patient has no known cardiac history.  This patient has no history of coronary artery disease, congestive heart failure, rheumatic fever, rheumatic heart disease, congenital heart disease or heart murmur.  This patient has never required invasive cardiovascular evaluation.    Here for an echocardiogram and a Holter monitor, both were largely unremarkable.  She did have a little bit of mitral regurgitation and some diastolic dysfunction, but she has never had any heart failure symptoms.    The following portions of the patient's history were reviewed and updated as appropriate: allergies, current medications, past family history, past medical history, past social history, past surgical history and problem list.      ECG 12 Lead    Date/Time: 4/27/2022 4:24 PM  Performed by: Akbar Paredes III, MD  Authorized by: Akbar Paredes III, MD   Comparison: compared with previous ECG  "  Similar to previous ECG  Rhythm: sinus rhythm  Rate: normal  Conduction: conduction normal  ST Segments: ST segments normal  T Waves: T waves normal  QRS axis: normal  Other: no other findings    Clinical impression: normal ECG               Objective:     Vitals:    04/20/22 1336 04/20/22 1355 04/20/22 1356   BP: 128/80 126/80 122/76   Patient Position: Lying Sitting Standing   Pulse: 76 73 74   Weight: 45.8 kg (101 lb)     Height: 154.9 cm (61\")       Body mass index is 19.08 kg/m².      Vitals reviewed.   Constitutional:       General: Not in acute distress.     Appearance: Well-developed. Not diaphoretic.   Eyes:      General:         Right eye: No discharge.         Left eye: No discharge.      Conjunctiva/sclera: Conjunctivae normal.      Pupils: Pupils are equal, round, and reactive to light.   HENT:      Head: Normocephalic and atraumatic.      Nose: Nose normal.   Neck:      Thyroid: No thyromegaly.      Trachea: No tracheal deviation.      Lymphadenopathy: No cervical adenopathy.   Pulmonary:      Effort: Pulmonary effort is normal. No respiratory distress.      Breath sounds: Normal breath sounds. No stridor.   Chest:      Chest wall: Not tender to palpatation.   Cardiovascular:      Normal rate. Regular rhythm.      Murmurs: There is no murmur.      . No S3 gallop. No click. No rub.   Pulses:     Intact distal pulses.   Edema:     Peripheral edema absent.   Abdominal:      General: Bowel sounds are normal. There is no distension.      Palpations: Abdomen is soft. There is no abdominal mass.      Tenderness: There is no abdominal tenderness. There is no guarding or rebound.   Musculoskeletal: Normal range of motion.         General: No tenderness or deformity.      Cervical back: Normal range of motion and neck supple. Skin:     General: Skin is warm and dry.      Findings: No erythema or rash.   Neurological:      Mental Status: Alert and oriented to person, place, and time.      Deep Tendon Reflexes: " Reflexes are normal and symmetric.   Psychiatric:         Thought Content: Thought content normal.         Data and records reviewed:     Lab Results   Component Value Date    GLUCOSE 86 03/22/2022    BUN 17 03/22/2022    CREATININE 0.71 03/22/2022    EGFRIFNONA 121 06/17/2021    EGFRIFAFRI 146 06/17/2021    BCR 24 03/22/2022    K 5.5 (H) 03/22/2022    CO2 24 03/22/2022    CALCIUM 9.7 03/22/2022    PROTENTOTREF 7.1 03/22/2022    ALBUMIN 4.3 03/22/2022    LABIL2 1.5 03/22/2022    AST 12 03/22/2022    ALT 8 03/22/2022     No results found for: CHOL  Lab Results   Component Value Date    TRIG 128 03/22/2022    TRIG 116 06/16/2020    TRIG 116 09/24/2019     Lab Results   Component Value Date    HDL 65 03/22/2022    HDL 51 06/16/2020    HDL 51 09/24/2019     Lab Results   Component Value Date    LDL 97 03/22/2022    LDL 99 06/16/2020     (H) 09/24/2019     Lab Results   Component Value Date    VLDL 22 03/22/2022    VLDL 23.2 06/16/2020    VLDL 23.2 09/24/2019     Lab Results   Component Value Date    LDLHDL 2.39 09/24/2019     CBC    CBC 6/17/21 3/22/22 4/1/22   WBC 7.31 8.2 7.2   RBC 4.02 3.72 (A) 3.89   Hemoglobin 11.7 (A) 11.0 (A) 11.5   Hematocrit 36.0 33.7 (A) 35.4   MCV 89.6 91 91   MCH 29.1 29.6 29.6   MCHC 32.5 32.6 32.5   RDW 12.6 12.6 12.5   Platelets 352 289 287   (A) Abnormal value            DEXA Bone Density Axial    Result Date: 3/30/2022  1. Osteoporosis. 2. Lumbar T score measures -2.7.   The patient is at increased risk for future major osteoporotic fracture based on demonstrated osteoporosis. Quantitative FRAX fracture risk assessment cannot be accurately calculated in patients who have been diagnosed with osteoporosis.  This report was finalized on 3/30/2022 4:46 PM by Dr. Srikanth Raman MD.      Mammo Screening Digital Tomosynthesis Bilateral With CAD    Result Date: 3/30/2022  Negative screening mammogram.  BI-RADS CATEGORY 1: Negative   Women over the age of 40 undergoing screening  mammography are entered into a reminder system with target due date for the next mammogram.  This report was finalized on 3/30/2022 5:19 PM by Dr. Srikanth Raman MD.      Results for orders placed in visit on 03/21/22    Adult Transthoracic Echo Complete W/ Cont if Necessary Per Protocol    Interpretation Summary  · Calculated left ventricular EF = 63.6% Estimated left ventricular EF was in agreement with the calculated left ventricular EF. Left ventricular systolic function is normal.  · Left ventricular diastolic function is consistent with (grade II w/high LAP) pseudonormalization.  · Mild mitral valve regurgitation is present.  · The left atrial cavity is mildly dilated.  · No evidence of pulmonary hypertension is present.          Assessment:          Diagnosis Plan   1. Parkinson disease (HCC)  ECG 12 Lead   2. Balance problems  ECG 12 Lead   3. Action tremor  ECG 12 Lead          Plan:       1.  Parkinson's disease with dizziness, concerns by neurology of autonomic dysfunction leading to intermittent hypotension.  We did do orthostatics and she was not orthostatic today.  However clearly it sounds like she is at times.  She would like to avoid medical therapy and she did not feel good at all on the midodrine.  I will have her deliberately increase her fluid intake-sounds like there is clear room for improvement on that.  I have also asked him to deliberately start adding more salt to her diet.  At all they could also use Gatorade which would have electrolytes as well as fluid in some calories which could be beneficial for her to.  I have asked him to call me in 2 weeks and reported in liminal how she is doing.  If she continues to have orthostatic symptoms then at that point we could add fludrocortisone 0.1 mg daily.  2. Intention tremor, on propranolol, doing a good job so I did was not can to change that today.  Thank you very much for allowing us to participate in the care of this pleasant patient.   Please don't hesitate to call if I can be of assistance in any way.      Current Outpatient Medications:   •  carbidopa-levodopa (SINEMET)  MG per tablet, Take 2 tablets by mouth Daily., Disp: , Rfl:   •  nitrofurantoin, macrocrystal-monohydrate, (MACROBID) 100 MG capsule, Take 50 mg by mouth Daily., Disp: , Rfl:   •  pramipexole (MIRAPEX) 0.25 MG tablet, Take 0.25 mg by mouth Daily., Disp: , Rfl:   •  propranolol LA (INDERAL LA) 60 MG 24 hr capsule, Take 60 mg by mouth Daily., Disp: , Rfl:   •  vitamin B-12 (CYANOCOBALAMIN) 100 MCG tablet, Take 50 mcg by mouth Daily., Disp: , Rfl:   •  Phenazopyridine HCl (VH ESSENTIALS UTI RELIEF PO), Take  by mouth., Disp: , Rfl:          No follow-ups on file.

## 2022-04-21 PROBLEM — M81.0 AGE-RELATED OSTEOPOROSIS WITHOUT CURRENT PATHOLOGICAL FRACTURE: Chronic | Status: ACTIVE | Noted: 2022-01-01

## 2022-04-21 NOTE — PROGRESS NOTES
"Subjective   Cindy Mejia is a 76 y.o. female presenting today for follow up of   Chief Complaint   Patient presents with   • Osteoporosis     Discuss Prolia       History of Present Illness     Patient Active Problem List   Diagnosis   • Anxiety   • Tubular adenoma   • Tremor of both hands   • B12 deficiency   • Parkinson disease (HCC)   • Action tremor   • Mixed stress and urge urinary incontinence   • Urinary tract infection without hematuria   • Moderate malnutrition (HCC)   • Dyskinesia   • Balance problems   • Osteopenia of multiple sites   • Age-related osteoporosis without current pathological fracture       Outpatient Medications Marked as Taking for the 4/21/22 encounter (Office Visit) with Shannon Nguyen APRN   Medication Sig Dispense Refill   • carbidopa-levodopa (SINEMET)  MG per tablet Take 2 tablets by mouth Daily.     • nitrofurantoin, macrocrystal-monohydrate, (MACROBID) 100 MG capsule Take 50 mg by mouth Daily.     • Phenazopyridine HCl (VH ESSENTIALS UTI RELIEF PO) Take  by mouth.     • pramipexole (MIRAPEX) 0.25 MG tablet Take 0.25 mg by mouth Daily.     • propranolol LA (INDERAL LA) 60 MG 24 hr capsule Take 60 mg by mouth Daily.     • vitamin B-12 (CYANOCOBALAMIN) 100 MCG tablet Take 50 mcg by mouth Daily.         She notes intermittent dizziness, syncopal episodes and falls.  Office Visit with Akbar Paredes III, MD (04/20/2022)    She presents today to discuss recently identified osteoporosis    We had previously noted anemia with her last labs. She eats very little. Since her last visit she has started drinking Boost.      The following portions of the patient's history were reviewed and updated as appropriate: allergies, current medications, past family history, past medical history, past social history, past surgical history and problem list.        Objective   Vitals:    04/21/22 0942   Pulse: 65   SpO2: 95%   Weight: 45.5 kg (100 lb 6.4 oz)   Height: 154.9 cm (61\")       BP " Readings from Last 3 Encounters:   04/20/22 122/76   04/04/22 142/75   03/21/22 116/62        Wt Readings from Last 3 Encounters:   04/21/22 45.5 kg (100 lb 6.4 oz)   04/20/22 45.8 kg (101 lb)   04/04/22 44.5 kg (98 lb)        Body mass index is 18.97 kg/m².  Nursing notes and vitals reviewed.    Physical Exam  Constitutional:       General: She is not in acute distress.     Appearance: She is well-developed and underweight.      Comments: Frail   Cardiovascular:      Rate and Rhythm: Regular rhythm.      Heart sounds: S1 normal and S2 normal.   Pulmonary:      Effort: Pulmonary effort is normal.      Breath sounds: Normal breath sounds.   Musculoskeletal:      Comments: Ambulates w/ walker   Neurological:      Mental Status: She is alert and oriented to person, place, and time.   Psychiatric:         Attention and Perception: She is attentive.         Behavior: Behavior normal.         Thought Content: Thought content normal.         Recent Results (from the past 672 hour(s))   CBC & Differential    Collection Time: 04/01/22  8:33 AM    Specimen: Blood   Result Value Ref Range    WBC 7.2 3.4 - 10.8 x10E3/uL    RBC 3.89 3.77 - 5.28 x10E6/uL    Hemoglobin 11.5 11.1 - 15.9 g/dL    Hematocrit 35.4 34.0 - 46.6 %    MCV 91 79 - 97 fL    MCH 29.6 26.6 - 33.0 pg    MCHC 32.5 31.5 - 35.7 g/dL    RDW 12.5 11.7 - 15.4 %    Platelets 287 150 - 450 x10E3/uL    Neutrophil Rel % 78 Not Estab. %    Lymphocyte Rel % 8 Not Estab. %    Monocyte Rel % 8 Not Estab. %    Eosinophil Rel % 5 Not Estab. %    Basophil Rel % 1 Not Estab. %    Neutrophils Absolute 5.7 1.4 - 7.0 x10E3/uL    Lymphocytes Absolute 0.6 (L) 0.7 - 3.1 x10E3/uL    Monocytes Absolute 0.6 0.1 - 0.9 x10E3/uL    Eosinophils Absolute 0.4 0.0 - 0.4 x10E3/uL    Basophils Absolute 0.1 0.0 - 0.2 x10E3/uL    Immature Granulocyte Rel % 0 Not Estab. %    Immature Grans Absolute 0.0 0.0 - 0.1 x10E3/uL   Ferritin    Collection Time: 04/01/22  8:33 AM    Specimen: Blood   Result  Value Ref Range    Ferritin 78 15 - 150 ng/mL   Iron Profile    Collection Time: 04/01/22  8:33 AM    Specimen: Blood   Result Value Ref Range    TIBC 328 250 - 450 ug/dL    UIBC 254 118 - 369 ug/dL    Iron 74 27 - 139 ug/dL    Iron Saturation 23 15 - 55 %   Folate    Collection Time: 04/01/22  8:33 AM    Specimen: Blood   Result Value Ref Range    Folate 4.8 >3.0 ng/mL   Vitamin B12    Collection Time: 04/01/22  8:33 AM    Specimen: Blood   Result Value Ref Range    Vitamin B-12 >2000 (H) 232 - 1245 pg/mL   Adult Transthoracic Echo Complete W/ Cont if Necessary Per Protocol    Collection Time: 04/04/22 10:11 AM   Result Value Ref Range    Target HR (85%) 123 bpm    Max. Pred. HR (100%) 145 bpm    RV S' 9.5 cm/sec    RV Base 3.7 cm    RV Length 7.3 cm    RV Mid 2.31 cm    Sinus 2.28 cm    Dimensionless Index 0.70 (DI)    LA Volume Index 30.9 mL/m2    Avg E/e' ratio 13.95     Ao pk mina 123.1 cm/sec    Ao V2 VTI 27.0 cm    ALENA(I,D) 1.57 cm2    EDV(cubed) 35.2 ml    EDV(MOD-sp2) 71.6 ml    EDV(MOD-sp4) 64.5 ml    EF(MOD-bp) 63.6 %    EF(MOD-sp2) 65.4 %    EF(MOD-sp4) 61.1 %    ESV(cubed) 11.4 ml    ESV(MOD-sp2) 24.8 ml    ESV(MOD-sp4) 25.1 ml    IVS/LVPW 1.12 cm    Lat Peak E' Mina 7.2 cm/sec    LV mass(C)d 124.6 grams    LV V1 max PG 3.0 mmHg    LV V1 mean PG 1.36 mmHg    LV V1 max 86.2 cm/sec    LVPWd 1.14 cm    Med Peak E' Mina 5.7 cm/sec    MV dec slope 342.1 cm/sec2    MV dec time 0.17 msec    MV V2 VTI 27.0 cm    MVA(VTI) 1.57 cm2    Pulm A Revs Mina 38.9 cm/sec    RAP systole 3.0 mmHg    RVSP(TR) 16.8 mmHg    SV(LVOT) 42.5 ml    SV(MOD-sp2) 46.8 ml    SV(MOD-sp4) 39.4 ml    TR max PG 13.8 mmHg    Ao max PG 6.1 mmHg    Ao mean PG 2.9 mmHg    FS 31.3 %    IVSd 1.28 cm    LV V1 VTI 19.6 cm    LVIDd 3.3 cm    LVIDs 2.25 cm    LVOT area 2.17 cm2    LVOT diam 1.66 cm    MV E/A 0.82     MV mean PG 2.06 mmHg    MV A dur 0.11 sec    MV A max mina 109.6 cm/sec    MV E max mina 90.0 cm/sec    Pulm A Revs Dur 0.11 sec    Pulm  Scott Mina 37.1 cm/sec    Pulm Sys Mina 58.0 cm/sec    TR max mina 185.8 cm/sec    TAPSE (>1.6) 2.25 cm   Holter monitor - 48 hour    Collection Time: 04/04/22 10:37 AM   Result Value Ref Range    Target HR (85%) 123 bpm    Max. Pred. HR (100%) 145 bpm     DEXA Bone Density Axial    Result Date: 3/30/2022  1. Osteoporosis. 2. Lumbar T score measures -2.7.   The patient is at increased risk for future major osteoporotic fracture based on demonstrated osteoporosis. Quantitative FRAX fracture risk assessment cannot be accurately calculated in patients who have been diagnosed with osteoporosis.  This report was finalized on 3/30/2022 4:46 PM by Dr. Srikanth Raman MD.      Mammo Screening Digital Tomosynthesis Bilateral With CAD    Result Date: 3/30/2022  Negative screening mammogram.  BI-RADS CATEGORY 1: Negative   Women over the age of 40 undergoing screening mammography are entered into a reminder system with target due date for the next mammogram.  This report was finalized on 3/30/2022 5:19 PM by Dr. Srikanth Raman MD.        Assessment/Plan   Diagnoses and all orders for this visit:    1. Syncope, unspecified syncope type (Primary)  Comments:  - Cardiology note reviewed  - counseled re increasing water and salt intake    2. Age-related osteoporosis without current pathological fracture  -     Ambulatory Referral to [facility] For Infusion Treatment    3. At high risk for fracture  -     Ambulatory Referral to [facility] For Infusion Treatment    4. Frequent falls  -     Ambulatory Referral to [facility] For Infusion Treatment    5. Dyskinesia  -     Ambulatory Referral to [facility] For Infusion Treatment    6. Balance problems  -     Ambulatory Referral to [facility] For Infusion Treatment    7. Parkinson disease (HCC)  -     Ambulatory Referral to [facility] For Infusion Treatment    8. Moderate protein-calorie malnutrition (HCC)  Comments:  - continue QD Boost  Orders:  -     CBC & Differential; Future  -     Comprehensive Metabolic Panel; Future  -      Ferritin; Future  -     Folate; Future  -     Iron Profile; Future  -     Vitamin B12; Future    9. Normocytic normochromic anemia  Comments:  - CBC, iron studies, folate and B12 WNL  - discuss CLS, deferred at this time d/t co-morbid conditions  - cont nutritional supp  - will monitor  Orders:  -     CBC & Differential; Future  -     Comprehensive Metabolic Panel; Future  -     Ferritin; Future  -     Folate; Future  -     Iron Profile; Future  -     Vitamin B12; Future              Medications, including side effects, were discussed with the patient. Patient verbalized understanding.  The plan of care was discussed. All questions were answered. Patient verbalized understanding.      Return in about 3 months (around 7/21/2022) for non-fasting labs one week prior to.        I spent 41 minutes caring for Cindy on this date of service. This time includes time spent by me in the following activities:preparing for the visit, reviewing tests, obtaining and/or reviewing a separately obtained history, performing a medically appropriate examination and/or evaluation , counseling and educating the patient/family/caregiver, ordering medications, tests, or procedures, referring and communicating with other health care professionals , documenting information in the medical record, independently interpreting results and communicating that information with the patient/family/caregiver and care coordination

## 2022-05-02 NOTE — PATIENT INSTRUCTIONS
"  Call [facility] at [phone] if you have any problems or concerns.    We know you have a Choice in healthcare and appreciate you using [facility].  Our purpose is to provide you \"Excellent Care\".  We hope that you will always choose us in the future and continue to recommend us to your family and friends.               "

## 2022-05-02 NOTE — NURSING NOTE
Patient arrived to [facility] at 1115 ambulatory.  Medication administered as ordered. AVS discussed and copy given.  Patient dischargted in stable condition at 1300.

## 2022-05-17 NOTE — TELEPHONE ENCOUNTER
If she is feeling better than that is good.  I would make a 6-month follow-up appointment with Dr. Paredes.  Thank you

## 2022-05-17 NOTE — TELEPHONE ENCOUNTER
Pt was seen on 4/20/22. This was the plan:        Pt  called back. He just states that she is doing fine and better. There is no BP to report.    PT#: [phone]

## 2022-05-17 NOTE — TELEPHONE ENCOUNTER
----- Message from Amanda Olivares sent at 5/16/2022  1:12 PM EDT -----  Regarding: Cindy Mejia feeling fine`  Patients  stopped by to let Dr. Paredes know that Cindy is doing well. Said Dr. Paredes wanted an update in a few weeks and he was in the area and just wanted to stop by to let you know. Patricia

## 2022-05-25 NOTE — TELEPHONE ENCOUNTER
Caller: Jd Mejia     Relationship: [employer]    Best call back number: [phone]     What is your medical concern?     PATIENT HAS NOT BEEN SLEEPING WELL AND WANTS TO KNOW IF ASAF CHRISTIANSON CAN SUGGEST SOMETHING OR PRESCRIBE SOMETHING THAT CAN HELP HER SLEEPL    How long has this issue been going on? FOR A WHILE LAST 2 NIGHTS HAVE BEEN THE WORST.    SALLY LOWE [address] [place] [address], [place] - [address] [facility] [address] - [phone]  - [phone]      PLEASE CALL AND ADVISE PATIENT.

## 2022-05-26 NOTE — TELEPHONE ENCOUNTER
Called and spoke with patient and informed her of what Shannon had said she stated her understanding

## 2022-06-20 PROBLEM — N39.0 URINARY TRACT INFECTION IN FEMALE: Status: ACTIVE | Noted: 2022-01-01

## 2022-06-20 NOTE — ED PROVIDER NOTES
EMERGENCY DEPARTMENT ENCOUNTER      Room Number: 06/06      HPI:    Chief complaint: Generalized weakness and malaise.  Hypotension and abnormal lung sounds at urgent care    Location: As noted    Quality/Severity: Moderate    Timing/Duration: Symptoms started 4-5 days ago    Modifying Factors:  with upper respiratory symptoms that started 2 days prior to the patient's onset of symptoms.    Associated Symptoms: Loss of appetite, cough, sore throat, and nasal congestion    Narrative: Pt is a 76 y.o. female who presents complaining of generalized weakness and malaise as noted above.  Patient initially presented to urgent care and referred here due to hypotension and abnormal lung sounds.  The patient states that her symptoms started some 4-5 days ago and much worse since yesterday.  Patient relates that she is now too weak to ambulate.  Both the patient and her  received the Pfizer vaccine and they have each been boosted x1.  The  had similar upper respiratory symptoms and states that he is now fully recovered.      PMD: Shannon Nguyen, EMMA    REVIEW OF SYSTEMS  Review of Systems   Constitutional: Positive for activity change, appetite change and fatigue. Negative for fever.   HENT: Positive for congestion (Nasal) and sore throat (Mild).    Respiratory: Positive for cough (Mostly dry with occasional rattle). Negative for shortness of breath and wheezing.    Cardiovascular: Negative for chest pain, palpitations and leg swelling.   Gastrointestinal: Negative for abdominal pain, diarrhea, nausea and vomiting.   Endocrine: Negative for polydipsia.   Genitourinary: Negative for difficulty urinating, dysuria, flank pain, frequency and urgency.        Patient is on chronic suppressive therapy (Macrobid) for urinary tract infections.   Musculoskeletal: Positive for gait problem (Due to pre-existing Parkinson's and current weakness). Negative for back pain.   Skin: Negative for rash.    Neurological: Positive for tremors (Known Parkinson's) and weakness (Generalized). Negative for dizziness and headaches.   Psychiatric/Behavioral: Negative for confusion.   All other systems reviewed and are negative.      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   • Mixed stress and urge urinary incontinence    • Urinary tract infection without hematuria 06/08/2021   • Moderate malnutrition (HCC) 06/09/2021   • Dyskinesia 07/20/2021   • Balance problems 01/21/2022   • Osteopenia of multiple sites 03/21/2022   • Age-related osteoporosis without current pathological fracture 04/21/2022     Resolved Ambulatory Problems     Diagnosis Date Noted   • Needs flu shot 09/22/2016   • Uterine prolapse 06/21/2016   • Colon polyps 06/01/2017   • Medicare annual wellness visit, subsequent 01/25/2018   • Asymptomatic varicose veins of both lower extremities 09/24/2019     Past Medical History:   Diagnosis Date   • Carcinoma of colon (HCC) 1/25/2018   • Colon polyp    • Constipation    • Menopause 3/23/2017   • Other hemorrhoids 1/25/2018   • Other rosacea 1/25/2018   • Parkinson's disease (HCC)        PAST SURGICAL HISTORY  Past Surgical History:   Procedure Laterality Date   • BLADDER SURGERY     • COLONOSCOPY N/A 5/22/2017    Procedure: COLONOSCOPY, polypectomy;  Surgeon: Rabia Mcelroy MD;  Location: [facility] OR;  Service:    • COLONOSCOPY N/A 8/28/2017    Procedure: COLONOSCOPY, polypectomy, biopsy, sclerotherapy injection;  Surgeon: Rabia Mcelroy MD;  Location: [facility] OR;  Service:    • HYSTERECTOMY     • TUBAL ABDOMINAL LIGATION         FAMILY HISTORY  Family History   Problem Relation Age of Onset   • Breast cancer Mother    • Pancreatic cancer Father    • Thyroid disease Sister    • Heart disease Brother  60       SOCIAL HISTORY  Social History     Socioeconomic History   • Marital status:    Tobacco Use   • Smoking status: Never Smoker   • Smokeless tobacco: Never Used   Vaping Use   • Vaping Use: Never used   Substance and Sexual Activity   • Alcohol use: No   • Drug use: No   • Sexual activity: Defer       ALLERGIES  Penicillins    PHYSICAL EXAM  ED Triage Vitals [06/20/22 1510]   Temp Heart Rate Resp BP SpO2   98.8 °F (37.1 °C) 80 14 100/52 91 %      Temp src Heart Rate Source Patient Position BP Location FiO2 (%)   Oral Monitor Sitting Left arm --       Physical Exam  Vitals and nursing note reviewed.   Constitutional:       Comments: The patient is a very thin, 76-year-old, female in no acute distress.   HENT:      Head: Normocephalic and atraumatic.   Eyes:      Extraocular Movements: Extraocular movements intact.      Conjunctiva/sclera: Conjunctivae normal.   Neck:      Thyroid: No thyromegaly.   Cardiovascular:      Rate and Rhythm: Normal rate and regular rhythm.      Heart sounds: Normal heart sounds. No murmur heard.  Pulmonary:      Comments: No respiratory distress and patient speaks in full sentences.  Auscultation of the lung snyder shows bibasilar Rales and worse on the left.  Adequate exchange bilaterally.  Abdominal:      General: Bowel sounds are normal.      Palpations: Abdomen is soft.      Tenderness: There is no abdominal tenderness.   Musculoskeletal:         General: Normal range of motion.      Cervical back: Normal range of motion and neck supple.   Lymphadenopathy:      Cervical: No cervical adenopathy.   Skin:     General: Skin is warm and dry.   Neurological:      General: No focal deficit present.      Mental Status: She is alert and oriented to person, place, and time.      Comments: Masklike facies.  Slight tremor noted.   Psychiatric:         Mood and Affect: Mood and affect normal.         Behavior: Behavior normal.         Thought Content: Thought content normal.          Judgment: Judgment normal.         LAB RESULTS  Results for orders placed or performed during the hospital encounter of 06/20/22   COVID-19 and FLU A/B PCR - Swab, Nasopharynx    Specimen: Nasopharynx; Swab   Result Value Ref Range    COVID19 Not Detected Not Detected - Ref. Range    Influenza A PCR Not Detected Not Detected    Influenza B PCR Not Detected Not Detected   Comprehensive Metabolic Panel    Specimen: Arm, Right; Blood   Result Value Ref Range    Glucose 122 (H) 65 - 99 mg/dL    BUN 21 8 - 23 mg/dL    Creatinine 0.74 0.57 - 1.00 mg/dL    Sodium 136 136 - 145 mmol/L    Potassium 4.1 3.5 - 5.2 mmol/L    Chloride 101 98 - 107 mmol/L    CO2 23.1 22.0 - 29.0 mmol/L    Calcium 8.5 (L) 8.6 - 10.5 mg/dL    Total Protein 7.0 6.0 - 8.5 g/dL    Albumin 4.10 3.50 - 5.20 g/dL    ALT (SGPT) 7 1 - 33 U/L    AST (SGOT) 16 1 - 32 U/L    Alkaline Phosphatase 63 39 - 117 U/L    Total Bilirubin 0.5 0.0 - 1.2 mg/dL    Globulin 2.9 gm/dL    A/G Ratio 1.4 g/dL    BUN/Creatinine Ratio 28.4 (H) 7.0 - 25.0    Anion Gap 11.9 5.0 - 15.0 mmol/L    eGFR 84.5 >60.0 mL/min/1.73   Protime-INR    Specimen: Arm, Right; Blood   Result Value Ref Range    Protime 14.0 12.1 - 15.0 Seconds    INR 1.10 0.90 - 1.10   aPTT    Specimen: Arm, Right; Blood   Result Value Ref Range    PTT 34.6 24.3 - 38.1 seconds   Lipase    Specimen: Arm, Right; Blood   Result Value Ref Range    Lipase 15 13 - 60 U/L   Urinalysis With Culture If Indicated - Urine, Catheter    Specimen: Urine, Catheter   Result Value Ref Range    Color, UA Yellow Yellow, Straw    Appearance, UA Cloudy (A) Clear    pH, UA 5.5 4.5 - 8.0    Specific Gravity, UA 1.020 1.003 - 1.030    Glucose, UA Negative Negative    Ketones, UA 15 mg/dL (1+) (A) Negative    Bilirubin, UA Negative Negative    Blood, UA Large (3+) (A) Negative    Protein, UA >=300 mg/dL (3+) (A) Negative    Leuk Esterase, UA Large (3+) (A) Negative    Nitrite, UA Negative Negative    Urobilinogen, UA 0.2 E.U./dL 0.2 - 1.0  E.U./dL   Lactic Acid, Plasma    Specimen: Arm, Right; Blood   Result Value Ref Range    Lactate 1.1 0.5 - 2.0 mmol/L   Procalcitonin    Specimen: Arm, Right; Blood   Result Value Ref Range    Procalcitonin 0.31 (H) 0.00 - 0.25 ng/mL   CBC Auto Differential    Specimen: Arm, Right; Blood   Result Value Ref Range    WBC 12.14 (H) 3.40 - 10.80 10*3/mm3    RBC 3.50 (L) 3.77 - 5.28 10*6/mm3    Hemoglobin 10.4 (L) 12.0 - 15.9 g/dL    Hematocrit 32.6 (L) 34.0 - 46.6 %    MCV 93.1 79.0 - 97.0 fL    MCH 29.7 26.6 - 33.0 pg    MCHC 31.9 31.5 - 35.7 g/dL    RDW 13.6 12.3 - 15.4 %    RDW-SD 46.2 37.0 - 54.0 fl    MPV 10.4 6.0 - 12.0 fL    Platelets 195 140 - 450 10*3/mm3    Neutrophil % 87.5 (H) 42.7 - 76.0 %    Lymphocyte % 4.0 (L) 19.6 - 45.3 %    Monocyte % 7.7 5.0 - 12.0 %    Eosinophil % 0.2 (L) 0.3 - 6.2 %    Basophil % 0.2 0.0 - 1.5 %    Immature Grans % 0.4 0.0 - 0.5 %    Neutrophils, Absolute 10.62 (H) 1.70 - 7.00 10*3/mm3    Lymphocytes, Absolute 0.48 (L) 0.70 - 3.10 10*3/mm3    Monocytes, Absolute 0.93 (H) 0.10 - 0.90 10*3/mm3    Eosinophils, Absolute 0.03 0.00 - 0.40 10*3/mm3    Basophils, Absolute 0.03 0.00 - 0.20 10*3/mm3    Immature Grans, Absolute 0.05 0.00 - 0.05 10*3/mm3    nRBC 0.0 0.0 - 0.2 /100 WBC   Scan Slide    Specimen: Arm, Right; Blood   Result Value Ref Range    RBC Morphology Normal Normal    WBC Morphology Normal Normal    Platelet Estimate Adequate Normal   Urinalysis, Microscopic Only - Urine, Catheter    Specimen: Urine, Catheter   Result Value Ref Range    RBC, UA Too Numerous to Count (A) None Seen /HPF    WBC, UA Too Numerous to Count (A) None Seen /HPF    Bacteria, UA 4+ (A) None Seen /HPF    Squamous Epithelial Cells, UA 3-6 (A) None Seen, 0-2 /HPF    Hyaline Casts, UA None Seen None Seen /LPF    WBC Clumps, UA Large/3+ None Seen /HPF    Methodology Manual Light Microscopy          I ordered the above labs and reviewed the results    RADIOLOGY  XR Chest 1 View    Result Date:  6/20/2022  Narrative: CR Chest 1 Vw INDICATION: Weakness and cough congestion for 5 days. COMPARISON:  Chest x-ray the 24th 2021. FINDINGS: Portable AP view(s) of the chest.  The interval development of mild cardiac silhouette enlargement with central vascular and interstitial edema. There is patchy bibasilar airspace disease and findings are most consistent with mild to moderate congestive heart failure. Clinical correlation and follow-up to resolution recommended to exclude underlying pathology or there is evidence for old granulomatous disease. There is no pneumothorax. There is no obvious pleural effusion.     Impression: Findings are most consistent with interval development of mild to moderate congestive heart failure. Clinical correlation and follow-up recommended to confirm Signer Name: Astrid Lacy MD  Signed: 6/20/2022 5:33 PM  Workstation Name: [email or web address]  [facility] ordered the above radiologic testing and reviewed the results    PROCEDURES  Procedures      PROGRESS AND CONSULTS  ED Course as of 06/20/22 1749   Mon Jun 20, 2022   1717 Leukocytosis and dirty urine noted.  Isolation discontinued [ML]   1731 Chest x-ray results still pending.  Patient agreeable to admission.  Patient with complaint of pain all over. [ML]   1747 Case and findings discussed with the on-call hospitalist, Dr. Beauchamp, who felt that the patient could benefit from an observation admission for further treatment.  Patient agreeable to care plan. [ML]      ED Course User Index  [ML] Rudi Marr MD           MEDICAL DECISION MAKING  Results were reviewed/discussed with the patient and they were also made aware of online access. Pt also made aware that some labs, such as cultures, will not be resulted during ER visit and follow up with PMD is necessary.     MDM       DIAGNOSIS  Final diagnoses:   Urinary tract infection in female   Weakness       Latest Documented Vital Signs:  As of 17:49  EDT  BP- 141/67 HR- 79 Temp- 98.7 °F (37.1 °C) (Oral) O2 sat- 94%    DISPOSITION patient admitted to [facility] on observation basis       Medication List      No changes were made to your prescriptions during this visit.                Rudi Marr MD  06/20/22 6304

## 2022-06-21 NOTE — OUTREACH NOTE
Prep Survey    Flowsheet Row Responses   [facility] patient discharged from? [place]   Is LACE score < 7 ? Yes   Emergency Room discharge w/ pulse ox? No   Eligibility [facility]   Date of Admission 06/20/22   Date of Discharge 06/21/22   Discharge Disposition Home or Self Care   Discharge diagnosis UTI,  URI   Does the patient have one of the following disease processes/diagnoses(primary or secondary)? Other   Does the patient have Home health ordered? No   Is there a DME ordered? No   Prep survey completed? Yes          SAMANTA WOLFE - Registered Nurse

## 2022-06-21 NOTE — CASE MANAGEMENT/SOCIAL WORK
Continued Stay Note  MIRNA Corey     Patient Name: Cindy Mejia  MRN: [id number]  Today's Date: 6/21/2022    Admit Date: 6/20/2022     Discharge Plan     Row Name 06/21/22 1128       Plan    Plan DC home with spouse.    Plan Comments CCP called pt in her room, introduced self and role and reviewed face sheet.   Pt had asked for pricing of pure wick.  I told her the cost for everything is well over $300/month.  She then asked me to tell her .  CCP spoke to him and he said he would check it out on [employer].  Pt lives in 2 level home with 2 steps to enter the front of the house. They have a rail to assist.  Her living area and bedroom are on the 1st floor.  Pt has a Rollator, shower handles, shower chair all he obtained through [employer].  Pt requires assist with all her ADL's/IADL's due to her Parkinsons'.  Pts daughter comes in once a week and gives her a bath. Spouse provides Meals, cleaning, etc.  Pt does not drive.Pt has bneen to [facility] before and they have used [facility] in the past.    They use [facility] in [place], [place].  Spouse says he has had no issues paying for her medications.  They have a Living Will and POA.  CCP advised them to at least let PCP have copy for his records.  DC plan is Home with family.  CCP will continue to follow. Thanks, Cheryl QUIÑONES               Discharge Codes    No documentation.                     Cheryl Aguilar

## 2022-06-21 NOTE — THERAPY EVALUATION
Patient Name: Cindy Mejia  : 1945    MRN: [id number]                              Today's Date: 2022       Admit Date: 2022    Visit Dx:     ICD-10-CM ICD-9-CM   1. Urinary tract infection in female  N39.0 599.0   2. Weakness  R53.1 780.79     Patient Active Problem List   Diagnosis   • Anxiety   • Tubular adenoma   • Tremor of both hands   • B12 deficiency   • Parkinson disease (HCC)   • Action tremor   • Mixed stress and urge urinary incontinence   • Urinary tract infection without hematuria   • Moderate malnutrition (HCC)   • Dyskinesia   • Balance problems   • Osteopenia of multiple sites   • Age-related osteoporosis without current pathological fracture   • Urinary tract infection in female     Past Medical History:   Diagnosis Date   • Age-related osteoporosis without current pathological fracture 2022   • Anxiety 2016   • Arthritis    • Asymptomatic varicose veins of both lower extremities 2019   • Carcinoma of colon (HCC) 2018   • Colon polyp    • Constipation    • Menopause 2017   • Mixed stress and urge urinary incontinence    • Other hemorrhoids 2018   • Other rosacea 2018   • Parkinson's disease (HCC)    • Uterine prolapse 2016     Past Surgical History:   Procedure Laterality Date   • BLADDER SURGERY     • COLONOSCOPY N/A 2017    Procedure: COLONOSCOPY, polypectomy;  Surgeon: Rabia Mcelroy MD;  Location: [facility];  Service:    • COLONOSCOPY N/A 2017    Procedure: COLONOSCOPY, polypectomy, biopsy, sclerotherapy injection;  Surgeon: Rabia Mcelroy MD;  Location: [facility] OR;  Service:    • HYSTERECTOMY     • TUBAL ABDOMINAL LIGATION        General Information     Row Name 22 1032          Physical Therapy Time and Intention    Document Type discharge evaluation/summary  -JW     Mode of Treatment physical therapy  -     Row Name 22 103          General Information    Patient Profile Reviewed yes  -JW     Prior  Level of Function --  spouse and daughter assist as needed,  pt amblates with rollator  -     Existing Precautions/Restrictions fall  -     Barriers to Rehab previous functional deficit  -     Row Name 06/21/22 1032          Living Environment    People in Home spouse  -     Row Name 06/21/22 1032          Home Main Entrance    Number of Stairs, Main Entrance two  -     Stair Railings, Main Entrance --  1 handrail  -     Row Name 06/21/22 1032          Stairs Within Home, Primary    Stairs, Within Home, Primary pt lives in multi level home, stays on first floor  -     Row Name 06/21/22 1032          Cognition    Orientation Status (Cognition) --  oriented to name and place, states year as 2021  -     Row Name 06/21/22 1032          Safety Issues, Functional Mobility    Comment, Safety Issues/Impairments (Mobility) pt with difficulty changing directions and backing up to recliner surface  -           User Key  (r) = Recorded By, (t) = Taken By, (c) = Cosigned By    Initials Name Provider Type    Cat Padgett, PT Physical Therapist               Mobility     Row Name 06/21/22 1032          Bed Mobility    Bed Mobility supine-sit  -     Supine-Sit [place] (Bed Mobility) minimum assist (75% patient effort)  -     Assistive Device (Bed Mobility) bed rails;head of bed elevated  -     Comment, (Bed Mobility) increased time to complete.  noted increased right lateral lean once sitting EOB  -     Row Name 06/21/22 1032          Sit-Stand Transfer    Sit-Stand Hamilton (Transfers) minimum assist (75% patient effort);verbal cues  -     Assistive Device (Sit-Stand Transfers) walker, 4-wheeled  -     Comment, (Sit-Stand Transfer) cues for hand placement  -     Row Name 06/21/22 1032          Gait/Stairs (Locomotion)    Hamilton Level (Gait) verbal cues;contact guard  -     Assistive Device (Gait) walker, 4-wheeled  -     Distance in Feet (Gait) 15  -     Comment,  (Gait/Stairs) pt requires verbal cues for proper gait speed and distance from device.  patient requires cues for direction changes and sequencing to back up to recliner.  -           User Key  (r) = Recorded By, (t) = Taken By, (c) = Cosigned By    Initials Name Provider Type    Cat Padgett PT Physical Therapist               Obj/Interventions     Row Name 06/21/22 1032          Range of Motion Comprehensive    Comment, General Range of Motion LE ROM WFL bilaterally  -     Row Name 06/21/22 1032          Strength Comprehensive (MMT)    Comment, General Manual Muscle Testing (MMT) Assessment LE strength 4/5 bilaterally  -     Row Name 06/21/22 1032          Balance    Comment, Balance CGA/min assist for static sitting balance.  increased right lateral lean in sitting, however right leaning improved upon standing. MD notified  -           User Key  (r) = Recorded By, (t) = Taken By, (c) = Cosigned By    Initials Name Provider Type     Cat Mcgee, PT Physical Therapist               Goals/Plan    No documentation.                Clinical Impression     Row Name 06/21/22 1032          Pain    Pretreatment Pain Rating 0/10 - no pain  -     Posttreatment Pain Rating 0/10 - no pain  -     Row Name 06/21/22 1032          Plan of Care Review    Plan of Care Reviewed With patient;spouse  -     Outcome Evaluation PT Evaluation complete.  Patient performs supine to sit with min assist.  Patient requires CGA/min assist for static sitting with noted right lateral lean.  Patient performs sit to stand with min assist from bed surface and performs gait with rollator x15 feet with CGA and verbal cues for sequencing and safety.  Patient's balance improved while standing with no lateral lean noted in standing.  Patient with orders to return home today with spouse assistance, recommend home health PT.  Discussed with MD.  -     Row Name 06/21/22 1032          Therapy Assessment/Plan (PT)    Patient/Family  Therapy Goals Statement (PT) go home  -     Criteria for Skilled Interventions Met (PT) other (see comments)  pt to return home today with spouse assistance  -     Therapy Frequency (PT) evaluation only  -     Row Name 06/21/22 1032          Positioning and Restraints    Pre-Treatment Position in bed  -JW     Post Treatment Position chair  -JW     In Chair reclined;call light within reach;encouraged to call for assist;with family/caregiver  -           User Key  (r) = Recorded By, (t) = Taken By, (c) = Cosigned By    Initials Name Provider Type    Cat Padgett, DESHAUN Physical Therapist               Outcome Measures     Row Name 06/21/22 1032          How much help from another person do you currently need...    Turning from your back to your side while in flat bed without using bedrails? 3  -JW     Moving from lying on back to sitting on the side of a flat bed without bedrails? 3  -JW     Moving to and from a bed to a chair (including a wheelchair)? 3  -JW     Standing up from a chair using your arms (e.g., wheelchair, bedside chair)? 3  -JW     Climbing 3-5 steps with a railing? 2  -JW     To walk in hospital room? 3  -JW     AM-PAC 6 Clicks Score (PT) 17  -JW     Highest level of mobility 5 --> Static standing  -     Row Name 06/21/22 1032          Functional Assessment    Outcome Measure Options AM-PAC 6 Clicks Basic Mobility (PT)  -           User Key  (r) = Recorded By, (t) = Taken By, (c) = Cosigned By    Initials Name Provider Type    Cat Padgett PT Physical Therapist                             Physical Therapy Education                 Title: PT OT SLP Therapies (Resolved)     Topic: Physical Therapy (Resolved)     Point: Mobility training (Resolved)     Learner Progress:  Not documented in this visit.                          PT Recommendation and Plan     Plan of Care Reviewed With: patient, spouse  Outcome Evaluation: PT Evaluation complete.  Patient performs supine to sit with  min assist.  Patient requires CGA/min assist for static sitting with noted right lateral lean.  Patient performs sit to stand with min assist from bed surface and performs gait with rollator x15 feet with CGA and verbal cues for sequencing and safety.  Patient's balance improved while standing with no lateral lean noted in standing.  Patient with orders to return home today with spouse assistance, recommend home health PT.  Discussed with MD.     Time Calculation:    PT Charges     Row Name 06/21/22 1259             Time Calculation    Start Time 1032  -      Stop Time 1059  -      Time Calculation (min) 27 min  -      PT Received On 06/21/22  -DUNCAN            User Key  (r) = Recorded By, (t) = Taken By, (c) = Cosigned By    Initials Name Provider Type    Cat Padgett, DESHAUN Physical Therapist              Therapy Charges for Today     Code Description Service Date Service Provider Modifiers Qty    05137395785 HC PT EVAL LOW COMPLEXITY 2 6/21/2022 Cat Mcgee, PT GP 1          PT G-Codes  Outcome Measure Options: AM-PAC 6 Clicks Basic Mobility (PT)  AM-PAC 6 Clicks Score (PT): 17    Cat Mcgee PT  6/21/2022

## 2022-06-21 NOTE — H&P
[facility] HOSPITALIST     Shannon Nguyen, EMMA    CHIEF COMPLAINT: malaise, dysuria    HISTORY OF PRESENT ILLNESS:    76 year old with PMH of Parkinson's disease and frequent UTIs on Macrobid daily presenting with 2-3 weeks of fatigue, malaise, and dysuria. Associated decrease appetite rhinorrhea, sore throat, non-productive cough, hoarseness that started today. Denies fever, chills, dyspnea, chest pain, rash. Nothing seemed to make it better or worse. Admitted in June 2021 with similar symptoms and had pan-sensitive E. Coli UTI.  No other antibiotics (other than Macrobid) since then.    Follows with Dr. Purcell, Urology  Past Medical History:   Diagnosis Date   • Age-related osteoporosis without current pathological fracture 4/21/2022   • Anxiety 9/22/2016   • Asymptomatic varicose veins of both lower extremities 9/24/2019   • Carcinoma of colon (HCC) 1/25/2018   • Colon polyp    • Constipation    • Menopause 3/23/2017   • Mixed stress and urge urinary incontinence    • Other hemorrhoids 1/25/2018   • Other rosacea 1/25/2018   • Parkinson's disease (HCC)    • Uterine prolapse 6/21/2016     Past Surgical History:   Procedure Laterality Date   • BLADDER SURGERY     • COLONOSCOPY N/A 5/22/2017    Procedure: COLONOSCOPY, polypectomy;  Surgeon: Rabia Mcelroy MD;  Location: [facility] OR;  Service:    • COLONOSCOPY N/A 8/28/2017    Procedure: COLONOSCOPY, polypectomy, biopsy, sclerotherapy injection;  Surgeon: Rabia Mcelroy MD;  Location: [facility] OR;  Service:    • HYSTERECTOMY     • TUBAL ABDOMINAL LIGATION       Family History   Problem Relation Age of Onset   • Breast cancer Mother    • Pancreatic cancer Father    • Thyroid disease Sister    • Heart disease Brother 60     Social History     Tobacco Use   • Smoking status: Never Smoker   • Smokeless tobacco: Never Used   Vaping Use   • Vaping Use: Never used   Substance Use Topics   • Alcohol use: No   • Drug use: No     Medications Prior to  "Admission   Medication Sig Dispense Refill Last Dose   • carbidopa-levodopa (SINEMET)  MG per tablet Take 2 tablets by mouth Daily.      • nitrofurantoin, macrocrystal-monohydrate, (MACROBID) 100 MG capsule Take 100 mg by mouth 2 (Two) Times a Day.      • Phenazopyridine HCl (VH ESSENTIALS UTI RELIEF PO) Take  by mouth.      • pramipexole (MIRAPEX) 0.25 MG tablet Take 0.25 mg by mouth Daily.      • propranolol LA (INDERAL LA) 60 MG 24 hr capsule Take 60 mg by mouth Daily.      • vitamin B-12 (CYANOCOBALAMIN) 100 MCG tablet Take 50 mcg by mouth Daily.        Allergies:  Penicillins    Immunization History   Administered Date(s) Administered   • COVID-19 (PFIZER) PURPLE CAP 02/23/2021, 03/16/2021, 11/21/2021   • Flu Vaccine Quad PF >36MO 11/12/2020   • Fluad Quad 65+ 11/10/2021   • Fluzone High Dose =>65 Years ([employer] ONLY) 10/05/2018   • Pneumococcal Conjugate 13-Valent (PCV13) 01/04/2019   • Pneumococcal Polysaccharide (PPSV23) 06/18/2020   • Td 10/06/1998       REVIEW OF SYSTEMS:  Please see the above history of present illness for pertinent positives and negatives.  The remainder of the patient's systems have been reviewed and are negative.     Vital Signs  Temp:  [98.6 °F (37 °C)-98.8 °F (37.1 °C)] 98.6 °F (37 °C)  Heart Rate:  [78-87] 78  Resp:  [14-16] 16  BP: ()/(52-67) 133/60  Flowsheet Rows    Flowsheet Row First Filed Value   Admission Height 152.4 cm (60\") Documented at 06/20/2022 1510   Admission Weight 45.4 kg (100 lb) Documented at 06/20/2022 1510             Physical Exam  Constitutional:       General: She is not in acute distress.     Appearance: She is not diaphoretic.   HENT:      Head: Normocephalic and atraumatic.      Mouth/Throat:      Mouth: Mucous membranes are moist.      Pharynx: Oropharynx is clear.   Eyes:      Extraocular Movements: Extraocular movements intact.      Pupils: Pupils are equal, round, and reactive to light.   Cardiovascular:      Rate and Rhythm: Normal rate " and regular rhythm.      Heart sounds: No murmur heard.    No gallop.   Pulmonary:      Effort: Pulmonary effort is normal. No respiratory distress.      Breath sounds: Rales present. No wheezing.   Abdominal:      General: Abdomen is flat. Bowel sounds are normal. There is no distension.      Palpations: Abdomen is soft.      Tenderness: There is no abdominal tenderness. There is no guarding or rebound.   Musculoskeletal:         General: No swelling or deformity. Normal range of motion.   Skin:     General: Skin is warm and dry.   Neurological:      General: No focal deficit present.      Mental Status: She is alert and oriented to person, place, and time.   Psychiatric:         Mood and Affect: Mood normal.         Thought Content: Thought content normal.         Judgment: Judgment normal.         Emotional Behavior:    Judgement and Insight: intact   Mental Status:  Alertness  AOX3   Memory:  intact   Mood and Affect:         Depression  no               Anxiety  no    Debilities:   Physical Weakness  yes   Handicaps  no   Disabilities  no   Agitation  no     Results Review:    I reviewed the patient's new clinical results.  Lab Results (most recent)     Procedure Component Value Units Date/Time    BNP [[id number]]  (Normal) Collected: 06/20/22 1610    Specimen: Blood Updated: 06/20/22 1845     proBNP 616.7 pg/mL     Narrative:      Among patients with dyspnea, NT-proBNP is highly sensitive for the detection of acute congestive heart failure. In addition NT-proBNP of <300 pg/ml effectively rules out acute congestive heart failure with 99% negative predictive value.    Results may be falsely decreased if patient taking Biotin.      Blood Culture - Blood, Arm, Right [[id number]] Collected: 06/20/22 1810    Specimen: Blood from Arm, Right Updated: 06/20/22 1817    Respiratory Panel PCR w/COVID-19(SARS-CoV-2) ALEXANDRA/HELIO/JUAN/PAD/COR/MAD/MANE In-House, NP Swab in UTM/VTM, 3-4 HR TAT - Swab, Nasopharynx [[id number]]  "Collected: 06/20/22 1811    Specimen: Swab from Nasopharynx Updated: 06/20/22 1816    Procalcitonin [[id number]]  (Abnormal) Collected: 06/20/22 1610    Specimen: Blood from Arm, Right Updated: 06/20/22 1720     Procalcitonin 0.31 ng/mL     Narrative:      As a Marker for Sepsis (Non-Neonates):    1. <0.5 ng/mL represents a low risk of severe sepsis and/or septic shock.  2. >2 ng/mL represents a high risk of severe sepsis and/or septic shock.    As a Marker for Lower Respiratory Tract Infections that require antibiotic therapy:    PCT on Admission    Antibiotic Therapy       6-12 Hrs later    >0.5                Strongly Recommended  >0.25 - <0.5        Recommended   0.1 - 0.25          Discouraged              Remeasure/reassess PCT  <0.1                Strongly Discouraged     Remeasure/reassess PCT    As 28 day mortality risk marker: \"Change in Procalcitonin Result\" (>80% or <=80%) if Day 0 (or Day 1) and Day 4 values are available. Refer to http://www.[facility] [place]pct[place]cal[place]    Change in PCT <=80%  A decrease of PCT levels below or equal to 80% defines a positive change in PCT test result representing a higher risk for 28-day all-cause mortality of patients diagnosed with severe sepsis for septic shock.    Change in PCT >80%  A decrease of PCT levels of more than 80% defines a negative change in PCT result representing a lower risk for 28-day all-cause mortality of patients diagnosed with severe sepsis or septic shock.       CBC & Differential [[id number]]  (Abnormal) Collected: 06/20/22 1610    Specimen: Blood from Arm, Right Updated: 06/20/22 1720    Narrative:      The following orders were created for panel order CBC & Differential.  Procedure                               Abnormality         Status                     ---------                               -----------         ------                     CBC Auto Differential[[id number]]        Abnormal            Final result               Scan " Slide[[id number]]                                       Final result                 Please view results for these tests on the individual orders.    Scan Slide [[id number]] Collected: 06/20/22 1610    Specimen: Blood from Arm, Right Updated: 06/20/22 1720     RBC Morphology Normal     WBC Morphology Normal     Platelet Estimate Adequate    Comprehensive Metabolic Panel [[id number]]  (Abnormal) Collected: 06/20/22 1610    Specimen: Blood from Arm, Right Updated: 06/20/22 1717     Glucose 122 mg/dL      BUN 21 mg/dL      Creatinine 0.74 mg/dL      Sodium 136 mmol/L      Potassium 4.1 mmol/L      Chloride 101 mmol/L      CO2 23.1 mmol/L      Calcium 8.5 mg/dL      Total Protein 7.0 g/dL      Albumin 4.10 g/dL      ALT (SGPT) 7 U/L      AST (SGOT) 16 U/L      Alkaline Phosphatase 63 U/L      Total Bilirubin 0.5 mg/dL      Globulin 2.9 gm/dL      A/G Ratio 1.4 g/dL      BUN/Creatinine Ratio 28.4     Anion Gap 11.9 mmol/L      eGFR 84.5 mL/min/1.73      Comment: National Kidney Foundation and American Society of Nephrology (ASN) Task Force recommended calculation based on the Chronic Kidney Disease Epidemiology Collaboration (CKD-EPI) equation refit without adjustment for race.       Narrative:      GFR Normal >60  Chronic Kidney Disease <60  Kidney Failure <15      Lipase [[id number]]  (Normal) Collected: 06/20/22 1610    Specimen: Blood from Arm, Right Updated: 06/20/22 1717     Lipase 15 U/L     Protime-INR [[id number]]  (Normal) Collected: 06/20/22 1610    Specimen: Blood from Arm, Right Updated: 06/20/22 1714     Protime 14.0 Seconds      INR 1.10    Narrative:      Therapeutic Ranges for INR: 2.0-3.0 (PT 20-30)                              2.5-3.5 (PT 25-34)    aPTT [[id number]]  (Normal) Collected: 06/20/22 1610    Specimen: Blood from Arm, Right Updated: 06/20/22 1714     PTT 34.6 seconds     Narrative:      PTT = The equivalent PTT values for the therapeutic range of heparin levels at 0.1 to 0.7 U/ml are 53 to 110  seconds.      Lactic Acid, Plasma [[id number]]  (Normal) Collected: 06/20/22 1610    Specimen: Blood from Arm, Right Updated: 06/20/22 1708     Lactate 1.1 mmol/L     COVID-19 and FLU A/B PCR - Swab, Nasopharynx [[id number]]  (Normal) Collected: 06/20/22 1612    Specimen: Swab from Nasopharynx Updated: 06/20/22 1708     COVID19 Not Detected     Influenza A PCR Not Detected     Influenza B PCR Not Detected    Narrative:      Fact sheet for providers: https://www.fda.gov/media/602729/download    Fact sheet for patients: https://www.fda.gov/media/937934/download    Test performed by PCR.    Urinalysis, Microscopic Only - Urine, Catheter [[id number]]  (Abnormal) Collected: 06/20/22 1637    Specimen: Urine, Catheter Updated: 06/20/22 1659     RBC, UA Too Numerous to Count /HPF      WBC, UA Too Numerous to Count /HPF      Bacteria, UA 4+ /HPF      Squamous Epithelial Cells, UA 3-6 /HPF      Hyaline Casts, UA None Seen /LPF      WBC Clumps, UA Large/3+ /HPF      Methodology Manual Light Microscopy    Urine Culture - Urine, Urine, Catheter [[id number]] Collected: 06/20/22 1637    Specimen: Urine, Catheter Updated: 06/20/22 1659    Urinalysis With Culture If Indicated - Urine, Catheter [[id number]]  (Abnormal) Collected: 06/20/22 1637    Specimen: Urine, Catheter Updated: 06/20/22 1657     Color, UA Yellow     Appearance, UA Cloudy     pH, UA 5.5     Specific Gravity, UA 1.020     Glucose, UA Negative     Ketones, UA 15 mg/dL (1+)     Bilirubin, UA Negative     Blood, UA Large (3+)     Protein, UA >=300 mg/dL (3+)     Leuk Esterase, UA Large (3+)     Nitrite, UA Negative     Urobilinogen, UA 0.2 E.U./dL    Narrative:      In absence of clinical symptoms, the presence of pyuria, bacteria, and/or nitrites on the urinalysis result does not correlate with infection.    CBC Auto Differential [[id number]]  (Abnormal) Collected: 06/20/22 1610    Specimen: Blood from Arm, Right Updated: 06/20/22 1652     WBC 12.14 10*3/mm3      RBC 3.50  10*6/mm3      Hemoglobin 10.4 g/dL      Hematocrit 32.6 %      MCV 93.1 fL      MCH 29.7 pg      MCHC 31.9 g/dL      RDW 13.6 %      RDW-SD 46.2 fl      MPV 10.4 fL      Platelets 195 10*3/mm3      Neutrophil % 87.5 %      Lymphocyte % 4.0 %      Monocyte % 7.7 %      Eosinophil % 0.2 %      Basophil % 0.2 %      Immature Grans % 0.4 %      Neutrophils, Absolute 10.62 10*3/mm3      Lymphocytes, Absolute 0.48 10*3/mm3      Monocytes, Absolute 0.93 10*3/mm3      Eosinophils, Absolute 0.03 10*3/mm3      Basophils, Absolute 0.03 10*3/mm3      Immature Grans, Absolute 0.05 10*3/mm3      nRBC 0.0 /100 WBC     Blood Culture - Blood, Arm, Right [[id number]] Collected: 06/20/22 1610    Specimen: Blood from Arm, Right Updated: 06/20/22 1650          Imaging Results (Most Recent)     Procedure Component Value Units Date/Time    XR Chest 1 View [[id number]] Collected: 06/20/22 1733     Updated: 06/20/22 1736    Narrative:      CR Chest 1 Vw    INDICATION:   Weakness and cough congestion for 5 days.     COMPARISON:    Chest x-ray the 24th 2021.    FINDINGS:  Portable AP view(s) of the chest.  The interval development of mild cardiac silhouette enlargement with central vascular and interstitial edema. There is patchy bibasilar airspace disease and findings are most consistent with mild to moderate congestive  heart failure. Clinical correlation and follow-up to resolution recommended to exclude underlying pathology or there is evidence for old granulomatous disease. There is no pneumothorax. There is no obvious pleural effusion.      Impression:      Findings are most consistent with interval development of mild to moderate congestive heart failure. Clinical correlation and follow-up recommended to confirm    Signer Name: Astrid Lacy MD   Signed: 6/20/2022 5:33 PM   Workstation Name: [email or web address]    [facility]        reviewed    ECG/EMG Results (most recent)     None        reviewed    Assessment & Plan  "    UTI  -UA with WBCs to numerous to count, Large LE  -procal 0.31, WBC 12  -previously grew pan-sensitive E. Coli, Urine culture pending  -continue Ceftriaxone  -stop daily Macrobid, patient follow up with Dr. Purcell in the next 2 weeks (already has appointment)    URI  -COVID negative  -RVP pending  -CXR with no infiltrates, but possible interval development of mild to moderate CHF  -patient with out any swelling, orthopnea, dyspena  -recent ECHO from 03/2022 with EF 64% and normal LV function, Grade II diastolic dysfunction    Parkinson's disease  -continue home medicaitons    I discussed the patient's findings and my recommendations with patient and .     Valerio Martínez MD  06/20/22  21:34 EDT        As of April 2021, as required by the Federal 21st Century Cures Act, medical records (including provider notes and laboratory/imaging results) are to be made available to patients and/or their designees as soon as the documents are signed/resulted. While the intention is to ensure transparency and to engage patients in their healthcare, this immediate access may create unintended consequences because this document uses language intended for communication between medical providers for interpretation with the entirety of the patient's clinical picture in mind. It is recommended that patients and/or their designees review all available information with their primary or specialist providers for explanation and to avoid misinterpretation of this information.     \"Dictated utilizing Dragon dictation\"    "

## 2022-06-21 NOTE — PLAN OF CARE
Goal Outcome Evaluation:  Plan of Care Reviewed With: patient, spouse        Progress: no change  Pt arrived to unit with c/o weakness and with UTI - no c/o pain - voice hoarse - rhonci lungs - pt found to be positive for coronavirus NL63 (old version of corona) placed in droplet isolation - rested through the night

## 2022-06-21 NOTE — PLAN OF CARE
Goal Outcome Evaluation:  Plan of Care Reviewed With: patient, spouse           Outcome Evaluation: PT Evaluation complete.  Patient performs supine to sit with min assist.  Patient requires CGA/min assist for static sitting with noted right lateral lean.  Patient performs sit to stand with min assist from bed surface and performs gait with rollator x15 feet with CGA and verbal cues for sequencing and safety.  Patient's balance improved while standing with no lateral lean noted in standing.  Patient with orders to return home today with spouse assistance, recommend home health PT.  Discussed with MD.

## 2022-06-21 NOTE — THERAPY EVALUATION
Patient Name: Cindy Mejia  : 1945                              OT Evaluation  MRN: [id number]                              Today's Date: 2022       Admit Date: 2022    Visit Dx:     ICD-10-CM ICD-9-CM   1. Urinary tract infection in female  N39.0 599.0   2. Weakness  R53.1 780.79     Patient Active Problem List   Diagnosis   • Anxiety   • Tubular adenoma   • Tremor of both hands   • B12 deficiency   • Parkinson disease (HCC)   • Action tremor   • Mixed stress and urge urinary incontinence   • Urinary tract infection without hematuria   • Moderate malnutrition (HCC)   • Dyskinesia   • Balance problems   • Osteopenia of multiple sites   • Age-related osteoporosis without current pathological fracture   • Urinary tract infection in female     Past Medical History:   Diagnosis Date   • Age-related osteoporosis without current pathological fracture 2022   • Anxiety 2016   • Arthritis    • Asymptomatic varicose veins of both lower extremities 2019   • Carcinoma of colon (HCC) 2018   • Colon polyp    • Constipation    • Menopause 2017   • Mixed stress and urge urinary incontinence    • Other hemorrhoids 2018   • Other rosacea 2018   • Parkinson's disease (HCC)    • Uterine prolapse 2016     Past Surgical History:   Procedure Laterality Date   • BLADDER SURGERY     • COLONOSCOPY N/A 2017    Procedure: COLONOSCOPY, polypectomy;  Surgeon: Rabia Mcelroy MD;  Location: [facility];  Service:    • COLONOSCOPY N/A 2017    Procedure: COLONOSCOPY, polypectomy, biopsy, sclerotherapy injection;  Surgeon: Rabia Mcelroy MD;  Location: [facility] OR;  Service:    • HYSTERECTOMY     • TUBAL ABDOMINAL LIGATION        General Information     Row Name 22 1236          OT Time and Intention    Document Type evaluation  -EN     Mode of Treatment occupational therapy  -EN     Row Name 22 1236          General Information    Patient Profile Reviewed yes  Hx  of Parkinson's. Presented to ED with 2-3 wks of fatigue, malaise, and frequent UTIs.  -EN     Prior Level of Function --  dgter assists with ADLs, spouse home and able to assist as needed. Walks with a rollator.  -EN     Existing Precautions/Restrictions fall  -EN     Barriers to Rehab previous functional deficit  -EN     Row Name 06/21/22 1236          Living Environment    People in Home spouse  -EN     Row Name 06/21/22 1236          Home Main Entrance    Number of Stairs, Main Entrance two  -EN     Stair Railings, Main Entrance other (see comments)  one handrail  -EN     Row Name 06/21/22 1236          Stairs Within Home, Primary    Stairs, Within Home, Primary multi-level home, stays on first level  -EN     Row Name 06/21/22 1236          Cognition    Orientation Status (Cognition) other (see comments)  oriented to name and place, stated year as 2021  -EN           User Key  (r) = Recorded By, (t) = Taken By, (c) = Cosigned By    Initials Name Provider Type    EN Gini Celestin OTR Occupational Therapist                 Mobility/ADL's     Row Name 06/21/22 1243          Bed Mobility    Bed Mobility supine-sit  -EN     Supine-Sit [place] (Bed Mobility) minimum assist (75% patient effort)  -EN     Assistive Device (Bed Mobility) head of bed elevated;bed rails  -EN     Row Name 06/21/22 1243          Transfers    Transfers sit-stand transfer;stand-sit transfer  -EN     Comment, (Transfers) verbal cues for hand placement  -EN     Sit-Stand [place] (Transfers) minimum assist (75% patient effort);verbal cues  -EN     Stand-Sit [place] (Transfers) verbal cues;minimum assist (75% patient effort)  -EN     Row Name 06/21/22 1243          Sit-Stand Transfer    Assistive Device (Sit-Stand Transfers) walker, 4-wheeled  -EN     Row Name 06/21/22 1243          Stand-Sit Transfer    Assistive Device (Stand-Sit Transfers) walker, 4-wheeled  -EN     Row Name 06/21/22 1243          Functional Mobility     Functional Mobility- Ind. Level contact guard assist;verbal cues required  -EN     Functional Mobility- Device walker, 4-wheeled  -EN     Functional Mobility-Distance (Feet) 10  -EN     Row Name 06/21/22 1243          Activities of Daily Living    BADL Assessment/Intervention lower body dressing  -EN     Row Name 06/21/22 1243          Lower Body Dressing Assessment/Training    Comment, (Lower Body Dressing) Patient receives assist at baseline with ADLs. Anticipate mod assist for LB ADLs, Min assist for UB ADLs.  -EN           User Key  (r) = Recorded By, (t) = Taken By, (c) = Cosigned By    Initials Name Provider Type    EN Gini Celestin OTR Occupational Therapist               Obj/Interventions     Row Name 06/21/22 1248          Range of Motion Comprehensive    General Range of Motion bilateral upper extremity ROM WFL  -EN     Row Name 06/21/22 1248          Strength Comprehensive (MMT)    Comment, General Manual Muscle Testing (MMT) Assessment B UE strength 4/5  -EN     Row Name 06/21/22 1248          Balance    Balance Assessment other (see comments)  CGA to min for static sitting, leaned to right side  -EN     Balance Interventions sitting;standing  -EN     Comment, Balance Patient performed static sitting with CGA/min assist with noted right lateral lean which pt reports has been happening at home also. MD notified. Stood with rollator and CGA for static standing balance.  -EN           User Key  (r) = Recorded By, (t) = Taken By, (c) = Cosigned By    Initials Name Provider Type    Gini Herzog OTR Occupational Therapist               Goals/Plan     Row Name 06/21/22 9213          Transfer Goal 1 (OT)    Activity/Assistive Device (Transfer Goal 1, OT) toilet;sit-to-stand/stand-to-sit;commode, 3-in-1;walker, rolling  -EN     [place] Level/Cues Needed (Transfer Goal 1, OT) verbal cues required  -EN     Time Frame (Transfer Goal 1, OT) by discharge  -EN     Progress/Outcome (Transfer  Goal 1, OT) other (see comments)  new goal  -EN     Row Name 06/21/22 1509          Dressing Goal 1 (OT)    Activity/Device (Dressing Goal 1, OT) lower body dressing;other (see comments)  LH AE as needed  -EN     [place]/Cues Needed (Dressing Goal 1, OT) contact guard required  -EN     Time Frame (Dressing Goal 1, OT) 1 week  -EN     Progress/Outcome (Dressing Goal 1, OT) other (see comments)  new goal  -EN     Row Name 06/21/22 1509          Problem Specific Goal 1 (OT)    Problem Specific Goal 1 (OT) Patient will demonstrate independence with UE HEP.  -EN     Row Name 06/21/22 1509          Therapy Assessment/Plan (OT)    Planned Therapy Interventions (OT) adaptive equipment training;BADL retraining;functional balance retraining;strengthening exercise;transfer/mobility retraining  -EN           User Key  (r) = Recorded By, (t) = Taken By, (c) = Cosigned By    Initials Name Provider Type    EN Gini Celestin OTR Occupational Therapist               Clinical Impression     Row Name 06/21/22 1250          Pain Assessment    Pretreatment Pain Rating 0/10 - no pain  -EN     Posttreatment Pain Rating 0/10 - no pain  -EN     Row Name 06/21/22 1251 06/21/22 1250       Plan of Care Review    Plan of Care Reviewed With -- patient;spouse  -EN    Outcome Evaluation OT evaluation completed. Patient performed supine to sit with min assist. Patient requied CGA/min assist for static sitting with right lateral lean. Patient stated she has noticed she leans to the right side at home also. MD notified. Patient performed sit to stand with min assist and performed static standing with rollator and CGA. Patient  performed in room mobility with rollator and CGA with v/cs for safety when backing up to sit in chair. Patient receives assist at baseline with ADLs. May benefit from long handled AE for improved ADL independence. Patient plans to return home with spouse at discharge, would benefit from home health OT.  -EN --     Row Name 06/21/22 1251          Therapy Assessment/Plan (OT)    Rehab Potential (OT) good, to achieve stated therapy goals  -EN     Criteria for Skilled Therapeutic Interventions Met (OT) yes  -EN     Therapy Frequency (OT) 5 times/wk  -EN     Predicted Duration of Therapy Intervention (OT) one week  -EN     Row Name 06/21/22 1251          Therapy Plan Review/Discharge Plan (OT)    Anticipated Discharge Disposition (OT) home with home health;home with assist  -EN     Row Name 06/21/22 1251          Positioning and Restraints    Pre-Treatment Position in bed  -EN     Post Treatment Position chair  -EN     In Chair reclined;call light within reach;encouraged to call for assist;with family/caregiver  -EN           User Key  (r) = Recorded By, (t) = Taken By, (c) = Cosigned By    Initials Name Provider Type    Gini Herzog, OTR Occupational Therapist               Outcome Measures     Row Name 06/21/22 1511          How much help from another is currently needed...    Putting on and taking off regular lower body clothing? 3  -EN     Bathing (including washing, rinsing, and drying) 3  -EN     Toileting (which includes using toilet bed pan or urinal) 3  -EN     Putting on and taking off regular upper body clothing 4  -EN     Taking care of personal grooming (such as brushing teeth) 4  -EN     Eating meals 4  -EN     AM-PAC 6 Clicks Score (OT) 21  -EN     Row Name 06/21/22 1032          How much help from another person do you currently need...    Turning from your back to your side while in flat bed without using bedrails? 3  -JW     Moving from lying on back to sitting on the side of a flat bed without bedrails? 3  -JW     Moving to and from a bed to a chair (including a wheelchair)? 3  -JW     Standing up from a chair using your arms (e.g., wheelchair, bedside chair)? 3  -JW     Climbing 3-5 steps with a railing? 2  -JW     To walk in hospital room? 3  -JW     AM-PAC 6 Clicks Score (PT) 17  -JW     Highest  level of mobility 5 --> Static standing  -     Row Name 06/21/22 1511 06/21/22 1032       Functional Assessment    Outcome Measure Options AM-PAC 6 Clicks Daily Activity (OT)  -EN AM-PAC 6 Clicks Basic Mobility (PT)  -JW          User Key  (r) = Recorded By, (t) = Taken By, (c) = Cosigned By    Initials Name Provider Type    Gini Herzog OTR Occupational Therapist    JW Cat Mcgee, PT Physical Therapist                  OT Recommendation and Plan  Planned Therapy Interventions (OT): adaptive equipment training, BADL retraining, functional balance retraining, strengthening exercise, transfer/mobility retraining  Therapy Frequency (OT): 5 times/wk  Plan of Care Review  Plan of Care Reviewed With: patient, spouse  Outcome Evaluation: OT evaluation completed. Patient performed supine to sit with min assist. Patient requied CGA/min assist for static sitting with right lateral lean. Patient stated she has noticed she leans to the right side at home also. MD notified. Patient performed sit to stand with min assist and performed static standing with rollator and CGA. Patient  performed in room mobility with rollator and CGA with v/cs for safety when backing up to sit in chair. Patient receives assist at baseline with ADLs. May benefit from long handled AE for improved ADL independence. Patient plans to return home with spouse at discharge, would benefit from home health OT.     Time Calculation:    Time Calculation- OT     Row Name 06/21/22 1512             Time Calculation- OT    OT Start Time 1032  -EN      OT Stop Time 1059  -EN      OT Time Calculation (min) 27 min  -EN            User Key  (r) = Recorded By, (t) = Taken By, (c) = Cosigned By    Initials Name Provider Type    Gini Herzog OTR Occupational Therapist              Therapy Charges for Today     Code Description Service Date Service Provider Modifiers Qty    06010831854  OT EVAL LOW COMPLEXITY 2 6/21/2022 Gini Celestin  E, OTR GO 1               Gini Celestin, OTR  6/21/2022

## 2022-06-21 NOTE — PROGRESS NOTES
Adult Nutrition  Assessment/PES    Patient Name:  Cindy Mejia  YOB: 1945  MRN: [id number]  Admit Date:  6/20/2022    Assessment Date:  6/21/2022    Comments:  Agree with Regular diet.  Recommend: Boost Plus samson daily per  Home regimen & pt request.  Pt meets criteria for chronic moderate malnutrition based on exam with muscle wasting, fat loss, poor appetite/intake, and 14.7% wt loss over past year per EMR data.  Spoke w pt & spouse about smaller amounts more often & easy protein sources as well as cont oral supplment use at home.  Will cont to follow.      Reason for Assessment     Row Name 06/21/22 1151          Reason for Assessment    Reason For Assessment identified at risk by screening criteria  age, BMI     Diagnosis neurologic conditions;infection/sepsis  URI-noted a coronavirus, UTI, Parkinson hx malnutrition                Nutrition/Diet History     Row Name 06/21/22 1153          Nutrition/Diet History    Typical Intake (Food/Fluid/EN/PN) Spoke w pt &  w permission, pt in chair. NKFA. Denies issue chew/swallowing. Reports spouse cooks/assist with eating. Hard time eating in bed today per pt. Pt does have weighted untensils at home. Some mobility w help & device. Appetite been down forawhile. loves sweets snacks on those during day in very small amounts like half cookie     Supplemental Drinks/Foods/Additives Boost Samson in am                Anthropometrics     Row Name 06/21/22 1155          Anthropometrics    Weight --  98.9#                Labs/Tests/Procedures/Meds     Row Name 06/21/22 1155          Labs/Procedures/Meds    Lab Results Reviewed reviewed     Lab Results Comments phos 2 L, Na 135            Diagnostic Tests/Procedures    Diagnostic Test/Procedure Reviewed reviewed            Medications    Pertinent Medications Reviewed reviewed     Pertinent Medications Comments B12                Physical Findings     Row Name 06/21/22 1155          Physical Findings     Overall Physical Appearance temporal, clavicle, dorsal, calf, patella muscle wasting as well as buccal, orbital and upper arm fat loss     Exam Agreement consented                Estimated/Assessed Needs - Anthropometrics     Row Name 06/21/22 1155          Anthropometrics    Weight --  98.9#            Estimated/Assessed Needs    Additional Documentation Estimated Calorie Needs (Group);Fluid Requirements (Group);Protein Requirements (Group)            Estimated Calorie Needs    Estimated Calorie Requirement (kcal/day) 4532-3194 kcal ( mifflin 1.25-1.4)     Estimated Calorie Need Method [place]-St Jeor            Protein Requirements    Est Protein Requirement Amount (gms/kg) 1.2 gm protein  54 gm pro            Fluid Requirements    Estimated Fluid Requirement Method RDA Method  4204-5705 ml                Nutrition Prescription Ordered     Row Name 06/21/22 1157          Nutrition Prescription PO    Current PO Diet Regular                Evaluation of Received Nutrient/Fluid Intake     Row Name 06/21/22 1157          Fluid Intake Evaluation    Oral Fluid (mL) 240  insufficient data            PO Evaluation    Number of Meals 1     % PO Intake 75                  Malnutrition Severity Assessment     Row Name 06/21/22 1157          Malnutrition Severity Assessment    Malnutrition Type Chronic Disease - Related Malnutrition            Insufficient Energy Intake     Insufficient Energy Intake  <75% of est. energy requirement for > or equal to 1 month            Unintentional Weight Loss     Unintentional Weight Loss  --  14.7% over past year per EMR data            Muscle Loss    Temple Region Moderate - slight depression     Clavicle Bone Region Moderate - some protrusion in females, visible in males     Dorsal Hand Region Moderate - slight depression     Patellar Region Moderate - patella more prominent, less muscle definition around patella     Posterior Calf Region Moderate - some roundness, slight firmness             Fat Loss    Orbital Region  Moderate -  somewhat hollowness, slightly dark circles     Upper Arm Region Severe - mostly skin, very little space between folds, fingers touch            Criteria Met (Must meet criteria for severity in at least 2 of these categories: M Wasting, Fat Loss, Fluid, Secondary Signs, Wt. Status, Intake)    Patient meets criteria for  Moderate (non-severe) Malnutrition                 Problem/Interventions:   Problem 1     Row Name 06/21/22 1158          Nutrition Diagnoses Problem 1    Problem 1 Malnutrition     Etiology (related to) Factors Affecting Nutrition;Functional Diagnosis     Functional Diagnosis --  susie salgado     Signs/Symptoms (evidenced by) Report/Observation                      Intervention Goal     Row Name 06/21/22 1154          Intervention Goal    General Meet nutritional needs for age/condition     PO Establish PO;PO intake (%)     PO Intake % 50 %  or greater     Weight Maintain weight                Nutrition Intervention     Row Name 06/21/22 1154          Nutrition Intervention    RD/Tech Action Interview for preference;Advise available snack;Encourage intake;Recommend/ordered;Follow Tx progress     Recommended/Ordered Supplement                  Education/Evaluation     Row Name 06/21/22 8389          Education    Education Other (comment)  spoke about MSA exam for muscle/fat loss. concern wtih malnutrition in past, decreased po, & wt loss. edu smaller amounts more often, pro each time, easy sources of protein to have handy, & cont oral supplement at home            Monitor/Evaluation    Monitor Per protocol;I&O;PO intake;Supplement intake;Pertinent labs;Weight;Symptoms     Education Follow-up Other (comment)  pt & spouse verbalize understanding                 Electronically signed by:  Sarah Spear RD  06/21/22 12:01 EDT

## 2022-06-21 NOTE — DISCHARGE PLACEMENT REQUEST
"Cindy Hamilton (76 y.o. Female)             Date of Birth   1945    Social Security Number       Address   [address] [place] [id number]    Home Phone   [phone]    MRN   [id number]       Tenriism   None    Marital Status                               Admission Date   6/20/22    Admission Type   Emergency    Admitting Provider   Hollis Beauchamp MD    Attending Provider   Hollis Beauchamp MD    Department, Room/Bed   [facility] MED SURG, 1415/1       Discharge Date       Discharge Disposition       Discharge Destination                               Attending Provider: Hollis Beauchamp MD    Allergies: Penicillins    Isolation: Droplet   Infection: None   Code Status: CPR   Advance Care Planning Activity    Ht: 152.4 cm (60\")   Wt: 44.9 kg (98 lb 14.4 oz)    Admission Cmt: None   Principal Problem: None                Active Insurance as of 6/20/2022     Primary Coverage     Payor Plan Insurance Group Employer/Plan Group    [facility] MEDICARE REPLACEMENT [facility] MEDICARE REPLACEMENT [id number]     Payor Plan Address Payor Plan Phone Number Payor Plan Fax Number Effective Dates    [address] [address]   1/1/2021 - None Entered    [facility] [id number]       Subscriber Name Subscriber Birth Date Member ID       CINDY HAMILTON 1945 [id number]                 Emergency Contacts      (Rel.) Home Phone Work Phone Mobile Phone    Jd Hamilton (Spouse) [phone] -- --              "

## 2022-06-21 NOTE — DISCHARGE SUMMARY
Cindy Mejia  1945  [id number]    Hospitalists Discharge Summary    Date of Admission: 6/20/2022  Date of Discharge:  6/21/2022    Primary Discharge Diagnoses:  1.  Coronavirus NL63 infection  2.  Suspected UTI    Secondary Discharge Diagnoses:  1.  Parkinson's Disease  2.  Chronic dCHF    History of Present Illness (taken from H&P):  76 year old with PMH of Parkinson's disease and frequent UTIs on Macrobid daily presenting with 2-3 weeks of fatigue, malaise, and dysuria. Associated decrease appetite rhinorrhea, sore throat, non-productive cough, hoarseness that started today. Denies fever, chills, dyspnea, chest pain, rash. Nothing seemed to make it better or worse. Admitted in June 2021 with similar symptoms and had pan-sensitive E. Coli UTI.  No other antibiotics (other than Macrobid) since then.     Follows with Dr. Purcell, Urology    Hospital Course:  Ms. Mejia was admitted to the Med/Surg unit and continued on Rocephin.  Her viral panel was positive for Coronavirus NL63, but her procaclcitonin was also positive so abx therapy continued.    PCP  Patient Care Team:  Shannon Nguyen APRN as PCP - General (Family Medicine)  Ant Baxter MD as Consulting Physician (Neurology)  Rabia Mcelroy MD as Consulting Physician (Gastroenterology)    Consults:   Consults     No orders found for last 30 day(s).          Operations and Procedures Performed:       XR Chest 1 View    Result Date: 6/20/2022  Narrative: CR Chest 1 Vw INDICATION: Weakness and cough congestion for 5 days. COMPARISON:  Chest x-ray the 24th 2021. FINDINGS: Portable AP view(s) of the chest.  The interval development of mild cardiac silhouette enlargement with central vascular and interstitial edema. There is patchy bibasilar airspace disease and findings are most consistent with mild to moderate congestive heart failure. Clinical correlation and follow-up to resolution recommended to exclude underlying pathology or there is  evidence for old granulomatous disease. There is no pneumothorax. There is no obvious pleural effusion.     Impression: Findings are most consistent with interval development of mild to moderate congestive heart failure. Clinical correlation and follow-up recommended to confirm Signer Name: Astrid Lacy MD  Signed: 6/20/2022 5:33 PM  Workstation Name: [email or web address]  [facility]      Allergies:  is allergic to penicillins.    Yong  not reviewed    Discharge Medications:     Discharge Medications      New Medications      Instructions Start Date   cefdinir 300 MG capsule  Commonly known as: OMNICEF   300 mg, Oral, 2 Times Daily         Continue These Medications      Instructions Start Date   carbidopa-levodopa  MG per tablet  Commonly known as: SINEMET   1 tablet, Oral, 2 Times Daily      pramipexole 0.25 MG tablet  Commonly known as: MIRAPEX   0.25 mg, Oral, Daily      propranolol LA 60 MG 24 hr capsule  Commonly known as: INDERAL LA   60 mg, Oral, Daily      VH ESSENTIALS UTI RELIEF PO   Oral      vitamin B-12 100 MCG tablet  Commonly known as: CYANOCOBALAMIN   50 mcg, Oral, Daily         Stop These Medications    nitrofurantoin (macrocrystal-monohydrate) 100 MG capsule  Commonly known as: MACROBID            Last Lab Results:   Lab Results (most recent)     Procedure Component Value Units Date/Time    Procalcitonin [[id number]]  (Abnormal) Collected: 06/21/22 0351    Specimen: Blood Updated: 06/21/22 0457     Procalcitonin 0.40 ng/mL     Narrative:      As a Marker for Sepsis (Non-Neonates):    1. <0.5 ng/mL represents a low risk of severe sepsis and/or septic shock.  2. >2 ng/mL represents a high risk of severe sepsis and/or septic shock.    As a Marker for Lower Respiratory Tract Infections that require antibiotic therapy:    PCT on Admission    Antibiotic Therapy       6-12 Hrs later    >0.5                Strongly Recommended  >0.25 - <0.5        Recommended   0.1 - 0.25           "Discouraged              Remeasure/reassess PCT  <0.1                Strongly Discouraged     Remeasure/reassess PCT    As 28 day mortality risk marker: \"Change in Procalcitonin Result\" (>80% or <=80%) if Day 0 (or Day 1) and Day 4 values are available. Refer to http://www."[employer]"[facility]-pct-calculator.com    Change in PCT <=80%  A decrease of PCT levels below or equal to 80% defines a positive change in PCT test result representing a higher risk for 28-day all-cause mortality of patients diagnosed with severe sepsis for septic shock.    Change in PCT >80%  A decrease of PCT levels of more than 80% defines a negative change in PCT result representing a lower risk for 28-day all-cause mortality of patients diagnosed with severe sepsis or septic shock.       Basic Metabolic Panel [[id number]]  (Abnormal) Collected: 06/21/22 0351    Specimen: Blood Updated: 06/21/22 0454     Glucose 86 mg/dL      BUN 15 mg/dL      Creatinine 0.52 mg/dL      Sodium 135 mmol/L      Potassium 3.8 mmol/L      Comment: Slight hemolysis detected by analyzer. Results may be affected.        Chloride 103 mmol/L      CO2 19.8 mmol/L      Calcium 8.4 mg/dL      BUN/Creatinine Ratio 28.8     Anion Gap 12.2 mmol/L      eGFR 97.0 mL/min/1.73      Comment: National Kidney Foundation and American Society of Nephrology (ASN) Task Force recommended calculation based on the Chronic Kidney Disease Epidemiology Collaboration (CKD-EPI) equation refit without adjustment for race.       Narrative:      GFR Normal >60  Chronic Kidney Disease <60  Kidney Failure <15      Phosphorus [[id number]]  (Abnormal) Collected: 06/21/22 0351    Specimen: Blood Updated: 06/21/22 0454     Phosphorus 2.0 mg/dL     Magnesium [[id number]]  (Normal) Collected: 06/21/22 0351    Specimen: Blood Updated: 06/21/22 0454     Magnesium 2.0 mg/dL     CBC Auto Differential [[id number]]  (Abnormal) Collected: 06/21/22 0351    Specimen: Blood Updated: 06/21/22 0446     WBC 10.63 10*3/mm3      RBC " 3.49 10*6/mm3      Hemoglobin 10.3 g/dL      Hematocrit 32.4 %      MCV 92.8 fL      MCH 29.5 pg      MCHC 31.8 g/dL      RDW 13.6 %      RDW-SD 45.1 fl      MPV 10.7 fL      Platelets 204 10*3/mm3      Neutrophil % 81.6 %      Lymphocyte % 8.8 %      Monocyte % 7.6 %      Eosinophil % 1.4 %      Basophil % 0.3 %      Immature Grans % 0.3 %      Neutrophils, Absolute 8.67 10*3/mm3      Lymphocytes, Absolute 0.94 10*3/mm3      Monocytes, Absolute 0.81 10*3/mm3      Eosinophils, Absolute 0.15 10*3/mm3      Basophils, Absolute 0.03 10*3/mm3      Immature Grans, Absolute 0.03 10*3/mm3      nRBC 0.0 /100 WBC     Narrative:      Flags resolved upon repeat analysis      Respiratory Panel PCR w/COVID-19(SARS-CoV-2) ALEXANDRA/HELIO/JUAN/PAD/COR/MAD/MANE In-House, NP Swab in UTM/VTM, 3-4 HR TAT - Swab, Nasopharynx [[id number]]  (Abnormal) Collected: 06/20/22 1811    Specimen: Swab from Nasopharynx Updated: 06/20/22 2207     ADENOVIRUS, PCR Not Detected     Coronavirus 229E Not Detected     Coronavirus HKU1 Not Detected     Coronavirus NL63 Detected     Coronavirus OC43 Not Detected     COVID19 Not Detected     Human Metapneumovirus Not Detected     Human Rhinovirus/Enterovirus Not Detected     Influenza A PCR Not Detected     Influenza B PCR Not Detected     Parainfluenza Virus 1 Not Detected     Parainfluenza Virus 2 Not Detected     Parainfluenza Virus 3 Not Detected     Parainfluenza Virus 4 Not Detected     RSV, PCR Not Detected     Bordetella pertussis pcr Not Detected     Bordetella parapertussis PCR Not Detected     Chlamydophila pneumoniae PCR Not Detected     Mycoplasma pneumo by PCR Not Detected    Narrative:      In the setting of a positive respiratory panel with a viral infection PLUS a negative procalcitonin without other underlying concern for bacterial infection, consider observing off antibiotics or discontinuation of antibiotics and continue supportive care. If the respiratory panel is positive for atypical bacterial  "infection (Bordetella pertussis, Chlamydophila pneumoniae, or Mycoplasma pneumoniae), consider antibiotic de-escalation to target atypical bacterial infection.    BNP [[id number]]  (Normal) Collected: 06/20/22 1610    Specimen: Blood Updated: 06/20/22 1845     proBNP 616.7 pg/mL     Narrative:      Among patients with dyspnea, NT-proBNP is highly sensitive for the detection of acute congestive heart failure. In addition NT-proBNP of <300 pg/ml effectively rules out acute congestive heart failure with 99% negative predictive value.    Results may be falsely decreased if patient taking Biotin.      Blood Culture - Blood, Arm, Right [[id number]] Collected: 06/20/22 1810    Specimen: Blood from Arm, Right Updated: 06/20/22 1817    Procalcitonin [[id number]]  (Abnormal) Collected: 06/20/22 1610    Specimen: Blood from Arm, Right Updated: 06/20/22 1720     Procalcitonin 0.31 ng/mL     Narrative:      As a Marker for Sepsis (Non-Neonates):    1. <0.5 ng/mL represents a low risk of severe sepsis and/or septic shock.  2. >2 ng/mL represents a high risk of severe sepsis and/or septic shock.    As a Marker for Lower Respiratory Tract Infections that require antibiotic therapy:    PCT on Admission    Antibiotic Therapy       6-12 Hrs later    >0.5                Strongly Recommended  >0.25 - <0.5        Recommended   0.1 - 0.25          Discouraged              Remeasure/reassess PCT  <0.1                Strongly Discouraged     Remeasure/reassess PCT    As 28 day mortality risk marker: \"Change in Procalcitonin Result\" (>80% or <=80%) if Day 0 (or Day 1) and Day 4 values are available. Refer to http://www.[facility]-pct-calculator.com    Change in PCT <=80%  A decrease of PCT levels below or equal to 80% defines a positive change in PCT test result representing a higher risk for 28-day all-cause mortality of patients diagnosed with severe sepsis for septic shock.    Change in PCT >80%  A decrease of PCT levels of more than 80% defines " a negative change in PCT result representing a lower risk for 28-day all-cause mortality of patients diagnosed with severe sepsis or septic shock.       CBC & Differential [[id number]]  (Abnormal) Collected: 06/20/22 1610    Specimen: Blood from Arm, Right Updated: 06/20/22 1720    Narrative:      The following orders were created for panel order CBC & Differential.  Procedure                               Abnormality         Status                     ---------                               -----------         ------                     CBC Auto Differential[[id number]]        Abnormal            Final result               Scan Slide[[id number]]                                       Final result                 Please view results for these tests on the individual orders.    Scan Slide [[id number]] Collected: 06/20/22 1610    Specimen: Blood from Arm, Right Updated: 06/20/22 1720     RBC Morphology Normal     WBC Morphology Normal     Platelet Estimate Adequate    Comprehensive Metabolic Panel [[id number]]  (Abnormal) Collected: 06/20/22 1610    Specimen: Blood from Arm, Right Updated: 06/20/22 1717     Glucose 122 mg/dL      BUN 21 mg/dL      Creatinine 0.74 mg/dL      Sodium 136 mmol/L      Potassium 4.1 mmol/L      Chloride 101 mmol/L      CO2 23.1 mmol/L      Calcium 8.5 mg/dL      Total Protein 7.0 g/dL      Albumin 4.10 g/dL      ALT (SGPT) 7 U/L      AST (SGOT) 16 U/L      Alkaline Phosphatase 63 U/L      Total Bilirubin 0.5 mg/dL      Globulin 2.9 gm/dL      A/G Ratio 1.4 g/dL      BUN/Creatinine Ratio 28.4     Anion Gap 11.9 mmol/L      eGFR 84.5 mL/min/1.73      Comment: National Kidney Foundation and American Society of Nephrology (ASN) Task Force recommended calculation based on the Chronic Kidney Disease Epidemiology Collaboration (CKD-EPI) equation refit without adjustment for race.       Narrative:      GFR Normal >60  Chronic Kidney Disease <60  Kidney Failure <15      Lipase [[id number]]  (Normal)  Collected: 06/20/22 1610    Specimen: Blood from Arm, Right Updated: 06/20/22 1717     Lipase 15 U/L     Protime-INR [[id number]]  (Normal) Collected: 06/20/22 1610    Specimen: Blood from Arm, Right Updated: 06/20/22 1714     Protime 14.0 Seconds      INR 1.10    Narrative:      Therapeutic Ranges for INR: 2.0-3.0 (PT 20-30)                              2.5-3.5 (PT 25-34)    aPTT [[id number]]  (Normal) Collected: 06/20/22 1610    Specimen: Blood from Arm, Right Updated: 06/20/22 1714     PTT 34.6 seconds     Narrative:      PTT = The equivalent PTT values for the therapeutic range of heparin levels at 0.1 to 0.7 U/ml are 53 to 110 seconds.      Lactic Acid, Plasma [[id number]]  (Normal) Collected: 06/20/22 1610    Specimen: Blood from Arm, Right Updated: 06/20/22 1708     Lactate 1.1 mmol/L     COVID-19 and FLU A/B PCR - Swab, Nasopharynx [[id number]]  (Normal) Collected: 06/20/22 1612    Specimen: Swab from Nasopharynx Updated: 06/20/22 1708     COVID19 Not Detected     Influenza A PCR Not Detected     Influenza B PCR Not Detected    Narrative:      Fact sheet for providers: https://www.fda.gov/media/887275/download    Fact sheet for patients: https://www.fda.gov/media/555063/download    Test performed by PCR.    Urinalysis, Microscopic Only - Urine, Catheter [[id number]]  (Abnormal) Collected: 06/20/22 1637    Specimen: Urine, Catheter Updated: 06/20/22 1659     RBC, UA Too Numerous to Count /HPF      WBC, UA Too Numerous to Count /HPF      Bacteria, UA 4+ /HPF      Squamous Epithelial Cells, UA 3-6 /HPF      Hyaline Casts, UA None Seen /LPF      WBC Clumps, UA Large/3+ /HPF      Methodology Manual Light Microscopy    Urine Culture - Urine, Urine, Catheter [[id number]] Collected: 06/20/22 1637    Specimen: Urine, Catheter Updated: 06/20/22 1659    Urinalysis With Culture If Indicated - Urine, Catheter [[id number]]  (Abnormal) Collected: 06/20/22 1637    Specimen: Urine, Catheter Updated: 06/20/22 1657     Color, UA  Yellow     Appearance, UA Cloudy     pH, UA 5.5     Specific Gravity, UA 1.020     Glucose, UA Negative     Ketones, UA 15 mg/dL (1+)     Bilirubin, UA Negative     Blood, UA Large (3+)     Protein, UA >=300 mg/dL (3+)     Leuk Esterase, UA Large (3+)     Nitrite, UA Negative     Urobilinogen, UA 0.2 E.U./dL    Narrative:      In absence of clinical symptoms, the presence of pyuria, bacteria, and/or nitrites on the urinalysis result does not correlate with infection.    CBC Auto Differential [[id number]]  (Abnormal) Collected: 06/20/22 1610    Specimen: Blood from Arm, Right Updated: 06/20/22 1652     WBC 12.14 10*3/mm3      RBC 3.50 10*6/mm3      Hemoglobin 10.4 g/dL      Hematocrit 32.6 %      MCV 93.1 fL      MCH 29.7 pg      MCHC 31.9 g/dL      RDW 13.6 %      RDW-SD 46.2 fl      MPV 10.4 fL      Platelets 195 10*3/mm3      Neutrophil % 87.5 %      Lymphocyte % 4.0 %      Monocyte % 7.7 %      Eosinophil % 0.2 %      Basophil % 0.2 %      Immature Grans % 0.4 %      Neutrophils, Absolute 10.62 10*3/mm3      Lymphocytes, Absolute 0.48 10*3/mm3      Monocytes, Absolute 0.93 10*3/mm3      Eosinophils, Absolute 0.03 10*3/mm3      Basophils, Absolute 0.03 10*3/mm3      Immature Grans, Absolute 0.05 10*3/mm3      nRBC 0.0 /100 WBC     Blood Culture - Blood, Arm, Right [[id number]] Collected: 06/20/22 1610    Specimen: Blood from Arm, Right Updated: 06/20/22 1650        Imaging Results (Most Recent)     Procedure Component Value Units Date/Time    XR Chest 1 View [[id number]] Collected: 06/20/22 1733     Updated: 06/20/22 1736    Narrative:      CR Chest 1 Vw    INDICATION:   Weakness and cough congestion for 5 days.     COMPARISON:    Chest x-ray the 24th 2021.    FINDINGS:  Portable AP view(s) of the chest.  The interval development of mild cardiac silhouette enlargement with central vascular and interstitial edema. There is patchy bibasilar airspace disease and findings are most consistent with mild to moderate  congestive  heart failure. Clinical correlation and follow-up to resolution recommended to exclude underlying pathology or there is evidence for old granulomatous disease. There is no pneumothorax. There is no obvious pleural effusion.      Impression:      Findings are most consistent with interval development of mild to moderate congestive heart failure. Clinical correlation and follow-up recommended to confirm    Signer Name: Astrid Lacy MD   Signed: 6/20/2022 5:33 PM   Workstation Name: [email or web address]    [facility]          PROCEDURES      Condition on Discharge:  Stable    Physical Exam at Discharge  Vital Signs  Temp:  [97.5 °F (36.4 °C)-98.8 °F (37.1 °C)] 97.6 °F (36.4 °C)  Heart Rate:  [76-87] 76  Resp:  [14-16] 16  BP: ()/(52-77) 148/73    Physical Exam:  Physical Exam   Constitutional: Patient appears well-developed and well-nourished and in no acute distress   Cardiovascular: Regular rate, regular rhythm, S1 normal and S2 normal.  Exam reveals no gallop and no friction rub.  No murmur heard.  Pulmonary/Chest: Lungs are clear to auscultation bilaterally. No respiratory distress. No wheezes. No rhonchi. No rales.   Abdominal: Soft. Bowel sounds are normal. There is no tenderness.   Musculoskeletal: Normal Muscle tone  Extremities: No edema.   Neurological: Patient is alert and oriented to person, place, and time. Cranial nerves II-XII are grossly intact with no focal deficits.    Discharge Disposition  Home    Visiting Nurse:    No     Home PT/OT:  No     Home Safety Evaluation:  No     DME  None    Discharge Diet:      Dietary Orders (From admission, onward)     Start     Ordered    06/20/22 2022  Diet Regular  Diet Effective Now        Question:  Diet Texture / Consistency  Answer:  Regular    06/20/22 2023                Activity at Discharge:  As tolerated      Follow-up Appointments  Future Appointments   Date Time Provider Department Center   7/15/2022  1:30 PM [facility]  [facility]   7/22/2022  9:45 AM Shannon Nguyen APRN [facility]   11/7/2022 11:00 AM ROOM 1 [facility]     Additional Instructions for the Follow-ups that You Need to Schedule     Discharge Follow-up with PCP   As directed       Currently Documented PCP:    Shannon Nguyen APRN    PCP Phone Number:    [phone]     Follow Up Details: Keep scheduled appointment.         Discharge Follow-up with Specialty: [facility]   As directed      Specialty: [facility]               Test Results Pending at Discharge  Pending Labs     Order Current Status    Blood Culture - Blood, Arm, Right In process    Blood Culture - Blood, Arm, Right In process    Urine Culture - Urine, Urine, Catheter In process           Hollis Beauchamp MD  06/21/22  11:54 EDT

## 2022-06-22 NOTE — CASE MANAGEMENT/SOCIAL WORK
Case Management Discharge Note      Final Note: Discharged home.         Selected Continued Care - Discharged on 6/21/2022 Admission date: 6/20/2022 - Discharge disposition: Home-Health Care Svc    Destination    No services have been selected for the patient.              Durable Medical Equipment    No services have been selected for the patient.              Dialysis/Infusion    No services have been selected for the patient.              Home Medical Care    No services have been selected for the patient.              Therapy    No services have been selected for the patient.              Community Resources    No services have been selected for the patient.              Community & DME    No services have been selected for the patient.                       Final Discharge Disposition Code: 01 - home or self-care

## 2022-06-22 NOTE — OUTREACH NOTE
Call Center TCM Note    Flowsheet Row Responses   [facility] [place] patient discharged from? [place]   Does the patient have one of the following disease processes/diagnoses(primary or secondary)? Other   TCM attempt successful? Yes  [no verbal release]   Call start time 1406   Call end time 1412   Discharge diagnosis UTI,  URI   Meds reviewed with patient/caregiver? Yes   Is the patient having any side effects they believe may be caused by any medication additions or changes? No   Does the patient have all medications ordered at discharge? Yes   Is the patient taking all medications as directed (includes completed medication regime)? Yes   Does the patient have a primary care provider?  Yes   Does the patient have an appointment with their PCP within 7 days of discharge? Greater than 7 days   Comments regarding PCP Hospital PCP FOLLOW UP APPOINTMENT IS 7/1/22   Nursing Interventions Verified appointment date/time/provider   Has home health visited the patient within 72 hours of discharge? N/A   Psychosocial issues? No   Did the patient receive a copy of their discharge instructions? Yes   Nursing interventions Reviewed instructions with patient   What is the patient's perception of their health status since discharge? New symptoms unrelated to diagnosis  [UTI s/s resolved, reports she has a rash on her buttock that is painful and about 3 inches long, non-pruitic but red, uncertain if warm to touch but will check.  She cannot recall if she had any pressure to area or moisture--]   Is the patient/caregiver able to teach back signs and symptoms related to disease process for when to call PCP? Yes   Is the patient/caregiver able to teach back signs and symptoms related to disease process for when to call 911? Yes   Additional teach back comments Patient will monitor area of rash and if no improvement or worsens or new areas develop she will notify PCP.    TCM call completed? Yes          Karine Olsen RN    6/22/2022,  14:16 EDT

## 2022-07-01 NOTE — PROGRESS NOTES
Subjective   Cindy Mejia is a 76 y.o. female presenting today for follow up of   Chief Complaint   Patient presents with   • Urinary Tract Infection     Hospital f/u from 6/20, admitted 1 night. Finished antibiotic for uti on 6/26 and feels like symptoms are gone.        History of Present Illness     Patient Active Problem List   Diagnosis   • Anxiety   • Tubular adenoma   • Tremor of both hands   • B12 deficiency   • Parkinson disease (HCC)   • Action tremor   • Mixed stress and urge urinary incontinence   • Urinary tract infection without hematuria   • Moderate malnutrition (HCC)   • Dyskinesia   • Balance problems   • Osteopenia of multiple sites   • Age-related osteoporosis without current pathological fracture   • Urinary tract infection in female       Outpatient Medications Marked as Taking for the 7/1/22 encounter (Office Visit) with Shannon Nguyen APRN   Medication Sig Dispense Refill   • carbidopa-levodopa (SINEMET)  MG per tablet Take 1 tablet by mouth 2 (Two) Times a Day.     • pramipexole (MIRAPEX) 0.25 MG tablet Take 0.25 mg by mouth Daily.     • propranolol LA (INDERAL LA) 60 MG 24 hr capsule Take 60 mg by mouth Daily.     • vitamin B-12 (CYANOCOBALAMIN) 100 MCG tablet Take 50 mcg by mouth Daily.       Date of Admission: 6/20/2022  Date of Discharge:  6/21/2022     Primary Discharge Diagnoses:  1.  Coronavirus NL63 infection  2.  Suspected UTI     Secondary Discharge Diagnoses:  1.  Parkinson's Disease  2.  Chronic dCHF     History of Present Illness (taken from H&P):  76 year old with PMH of Parkinson's disease and frequent UTIs on Macrobid daily presenting with 2-3 weeks of fatigue, malaise, and dysuria. Associated decrease appetite rhinorrhea, sore throat, non-productive cough, hoarseness that started today. Denies fever, chills, dyspnea, chest pain, rash. Nothing seemed to make it better or worse. Admitted in June 2021 with similar symptoms and had pan-sensitive E. Coli UTI.  No  "other antibiotics (other than Macrobid) since then.     Follows with Dr. Purcell, Urology     Hospital Course:  Ms. Mejia was admitted to the Med/Surg unit and continued on Rocephin.  Her viral panel was positive for Coronavirus NL63, but her procaclcitonin was also positive so abx therapy continued.    Interval History:  She has completed 5 days of BID Cefdinir.  She has no lingering S&S.  She has an appt scheduled for initial consultation w/ [facility].    The following portions of the patient's history were reviewed and updated as appropriate: allergies, current medications, past family history, past medical history, past social history, past surgical history and problem list.    Review of Systems   Constitutional: Negative for chills and fever.   Respiratory: Negative for cough and shortness of breath.    Cardiovascular: Negative for chest pain.   Gastrointestinal: Negative for abdominal pain and diarrhea.   Genitourinary: Negative for dysuria, frequency, hematuria and urgency.       Objective   Vitals:    07/01/22 1024   BP: 106/54   Pulse: 51   Temp: 96.8 °F (36 °C)   SpO2: 97%   Weight: 44.5 kg (98 lb)   Height: 152.4 cm (60\")       BP Readings from Last 3 Encounters:   07/01/22 106/54   06/21/22 148/73   06/20/22 (!) 83/52        Wt Readings from Last 3 Encounters:   07/01/22 44.5 kg (98 lb)   06/20/22 44.9 kg (98 lb 14.4 oz)   06/20/22 45.4 kg (100 lb)        Body mass index is 19.14 kg/m².  Nursing notes and vitals reviewed.    Physical Exam  Constitutional:       General: She is not in acute distress.     Appearance: She is well-developed.   Cardiovascular:      Rate and Rhythm: Regular rhythm.      Heart sounds: S1 normal and S2 normal.   Pulmonary:      Effort: Pulmonary effort is normal.      Breath sounds: Normal breath sounds.   Neurological:      Mental Status: She is alert and oriented to person, place, and time.   Psychiatric:         Attention and Perception: She is attentive.         " Behavior: Behavior normal.         Thought Content: Thought content normal.         Recent Results (from the past 672 hour(s))   Comprehensive Metabolic Panel    Collection Time: 06/20/22  4:10 PM    Specimen: Arm, Right; Blood   Result Value Ref Range    Glucose 122 (H) 65 - 99 mg/dL    BUN 21 8 - 23 mg/dL    Creatinine 0.74 0.57 - 1.00 mg/dL    Sodium 136 136 - 145 mmol/L    Potassium 4.1 3.5 - 5.2 mmol/L    Chloride 101 98 - 107 mmol/L    CO2 23.1 22.0 - 29.0 mmol/L    Calcium 8.5 (L) 8.6 - 10.5 mg/dL    Total Protein 7.0 6.0 - 8.5 g/dL    Albumin 4.10 3.50 - 5.20 g/dL    ALT (SGPT) 7 1 - 33 U/L    AST (SGOT) 16 1 - 32 U/L    Alkaline Phosphatase 63 39 - 117 U/L    Total Bilirubin 0.5 0.0 - 1.2 mg/dL    Globulin 2.9 gm/dL    A/G Ratio 1.4 g/dL    BUN/Creatinine Ratio 28.4 (H) 7.0 - 25.0    Anion Gap 11.9 5.0 - 15.0 mmol/L    eGFR 84.5 >60.0 mL/min/1.73   Protime-INR    Collection Time: 06/20/22  4:10 PM    Specimen: Arm, Right; Blood   Result Value Ref Range    Protime 14.0 12.1 - 15.0 Seconds    INR 1.10 0.90 - 1.10   aPTT    Collection Time: 06/20/22  4:10 PM    Specimen: Arm, Right; Blood   Result Value Ref Range    PTT 34.6 24.3 - 38.1 seconds   Lipase    Collection Time: 06/20/22  4:10 PM    Specimen: Arm, Right; Blood   Result Value Ref Range    Lipase 15 13 - 60 U/L   Blood Culture - Blood, Arm, Right    Collection Time: 06/20/22  4:10 PM    Specimen: Arm, Right; Blood   Result Value Ref Range    Blood Culture No growth at 5 days    Lactic Acid, Plasma    Collection Time: 06/20/22  4:10 PM    Specimen: Arm, Right; Blood   Result Value Ref Range    Lactate 1.1 0.5 - 2.0 mmol/L   Procalcitonin    Collection Time: 06/20/22  4:10 PM    Specimen: Arm, Right; Blood   Result Value Ref Range    Procalcitonin 0.31 (H) 0.00 - 0.25 ng/mL   CBC Auto Differential    Collection Time: 06/20/22  4:10 PM    Specimen: Arm, Right; Blood   Result Value Ref Range    WBC 12.14 (H) 3.40 - 10.80 10*3/mm3    RBC 3.50 (L) 3.77 -  5.28 10*6/mm3    Hemoglobin 10.4 (L) 12.0 - 15.9 g/dL    Hematocrit 32.6 (L) 34.0 - 46.6 %    MCV 93.1 79.0 - 97.0 fL    MCH 29.7 26.6 - 33.0 pg    MCHC 31.9 31.5 - 35.7 g/dL    RDW 13.6 12.3 - 15.4 %    RDW-SD 46.2 37.0 - 54.0 fl    MPV 10.4 6.0 - 12.0 fL    Platelets 195 140 - 450 10*3/mm3    Neutrophil % 87.5 (H) 42.7 - 76.0 %    Lymphocyte % 4.0 (L) 19.6 - 45.3 %    Monocyte % 7.7 5.0 - 12.0 %    Eosinophil % 0.2 (L) 0.3 - 6.2 %    Basophil % 0.2 0.0 - 1.5 %    Immature Grans % 0.4 0.0 - 0.5 %    Neutrophils, Absolute 10.62 (H) 1.70 - 7.00 10*3/mm3    Lymphocytes, Absolute 0.48 (L) 0.70 - 3.10 10*3/mm3    Monocytes, Absolute 0.93 (H) 0.10 - 0.90 10*3/mm3    Eosinophils, Absolute 0.03 0.00 - 0.40 10*3/mm3    Basophils, Absolute 0.03 0.00 - 0.20 10*3/mm3    Immature Grans, Absolute 0.05 0.00 - 0.05 10*3/mm3    nRBC 0.0 0.0 - 0.2 /100 WBC   Scan Slide    Collection Time: 06/20/22  4:10 PM    Specimen: Arm, Right; Blood   Result Value Ref Range    RBC Morphology Normal Normal    WBC Morphology Normal Normal    Platelet Estimate Adequate Normal   BNP    Collection Time: 06/20/22  4:10 PM    Specimen: Blood   Result Value Ref Range    proBNP 616.7 0.0 - 1,800.0 pg/mL   COVID-19 and FLU A/B PCR - Swab, Nasopharynx    Collection Time: 06/20/22  4:12 PM    Specimen: Nasopharynx; Swab   Result Value Ref Range    COVID19 Not Detected Not Detected - Ref. Range    Influenza A PCR Not Detected Not Detected    Influenza B PCR Not Detected Not Detected   Urinalysis With Culture If Indicated - Urine, Catheter    Collection Time: 06/20/22  4:37 PM    Specimen: Urine, Catheter   Result Value Ref Range    Color, UA Yellow Yellow, Straw    Appearance, UA Cloudy (A) Clear    pH, UA 5.5 4.5 - 8.0    Specific Gravity, UA 1.020 1.003 - 1.030    Glucose, UA Negative Negative    Ketones, UA 15 mg/dL (1+) (A) Negative    Bilirubin, UA Negative Negative    Blood, UA Large (3+) (A) Negative    Protein, UA >=300 mg/dL (3+) (A) Negative     Leuk Esterase, UA Large (3+) (A) Negative    Nitrite, UA Negative Negative    Urobilinogen, UA 0.2 E.U./dL 0.2 - 1.0 E.U./dL   Urinalysis, Microscopic Only - Urine, Catheter    Collection Time: 06/20/22  4:37 PM    Specimen: Urine, Catheter   Result Value Ref Range    RBC, UA Too Numerous to Count (A) None Seen /HPF    WBC, UA Too Numerous to Count (A) None Seen /HPF    Bacteria, UA 4+ (A) None Seen /HPF    Squamous Epithelial Cells, UA 3-6 (A) None Seen, 0-2 /HPF    Hyaline Casts, UA None Seen None Seen /LPF    WBC Clumps, UA Large/3+ None Seen /HPF    Methodology Manual Light Microscopy    Urine Culture - Urine, Urine, Catheter    Collection Time: 06/20/22  4:37 PM    Specimen: Urine, Catheter   Result Value Ref Range    Urine Culture >100,000 CFU/mL Mixed [place] Isolated    Blood Culture - Blood, Arm, Right    Collection Time: 06/20/22  6:10 PM    Specimen: Arm, Right; Blood   Result Value Ref Range    Blood Culture No growth at 5 days    Respiratory Panel PCR w/COVID-19(SARS-CoV-2) ALEXANDRA/HELIO/JUAN/PAD/COR/MAD/MANE In-House, NP Swab in UTM/VTM, 3-4 HR TAT - Swab, Nasopharynx    Collection Time: 06/20/22  6:11 PM    Specimen: Nasopharynx; Swab   Result Value Ref Range    ADENOVIRUS, PCR Not Detected Not Detected    Coronavirus 229E Not Detected Not Detected    Coronavirus HKU1 Not Detected Not Detected    Coronavirus NL63 Detected (A) Not Detected    Coronavirus OC43 Not Detected Not Detected    COVID19 Not Detected Not Detected - Ref. Range    Human Metapneumovirus Not Detected Not Detected    Human Rhinovirus/Enterovirus Not Detected Not Detected    Influenza A PCR Not Detected Not Detected    Influenza B PCR Not Detected Not Detected    Parainfluenza Virus 1 Not Detected Not Detected    Parainfluenza Virus 2 Not Detected Not Detected    Parainfluenza Virus 3 Not Detected Not Detected    Parainfluenza Virus 4 Not Detected Not Detected    RSV, PCR Not Detected Not Detected    Bordetella pertussis pcr Not Detected Not  Detected    Bordetella parapertussis PCR Not Detected Not Detected    Chlamydophila pneumoniae PCR Not Detected Not Detected    Mycoplasma pneumo by PCR Not Detected Not Detected   Basic Metabolic Panel    Collection Time: 06/21/22  3:51 AM    Specimen: Blood   Result Value Ref Range    Glucose 86 65 - 99 mg/dL    BUN 15 8 - 23 mg/dL    Creatinine 0.52 (L) 0.57 - 1.00 mg/dL    Sodium 135 (L) 136 - 145 mmol/L    Potassium 3.8 3.5 - 5.2 mmol/L    Chloride 103 98 - 107 mmol/L    CO2 19.8 (L) 22.0 - 29.0 mmol/L    Calcium 8.4 (L) 8.6 - 10.5 mg/dL    BUN/Creatinine Ratio 28.8 (H) 7.0 - 25.0    Anion Gap 12.2 5.0 - 15.0 mmol/L    eGFR 97.0 >60.0 mL/min/1.73   CBC Auto Differential    Collection Time: 06/21/22  3:51 AM    Specimen: Blood   Result Value Ref Range    WBC 10.63 3.40 - 10.80 10*3/mm3    RBC 3.49 (L) 3.77 - 5.28 10*6/mm3    Hemoglobin 10.3 (L) 12.0 - 15.9 g/dL    Hematocrit 32.4 (L) 34.0 - 46.6 %    MCV 92.8 79.0 - 97.0 fL    MCH 29.5 26.6 - 33.0 pg    MCHC 31.8 31.5 - 35.7 g/dL    RDW 13.6 12.3 - 15.4 %    RDW-SD 45.1 37.0 - 54.0 fl    MPV 10.7 6.0 - 12.0 fL    Platelets 204 140 - 450 10*3/mm3    Neutrophil % 81.6 (H) 42.7 - 76.0 %    Lymphocyte % 8.8 (L) 19.6 - 45.3 %    Monocyte % 7.6 5.0 - 12.0 %    Eosinophil % 1.4 0.3 - 6.2 %    Basophil % 0.3 0.0 - 1.5 %    Immature Grans % 0.3 0.0 - 0.5 %    Neutrophils, Absolute 8.67 (H) 1.70 - 7.00 10*3/mm3    Lymphocytes, Absolute 0.94 0.70 - 3.10 10*3/mm3    Monocytes, Absolute 0.81 0.10 - 0.90 10*3/mm3    Eosinophils, Absolute 0.15 0.00 - 0.40 10*3/mm3    Basophils, Absolute 0.03 0.00 - 0.20 10*3/mm3    Immature Grans, Absolute 0.03 0.00 - 0.05 10*3/mm3    nRBC 0.0 0.0 - 0.2 /100 WBC   Phosphorus    Collection Time: 06/21/22  3:51 AM    Specimen: Blood   Result Value Ref Range    Phosphorus 2.0 (L) 2.5 - 4.5 mg/dL   Magnesium    Collection Time: 06/21/22  3:51 AM    Specimen: Blood   Result Value Ref Range    Magnesium 2.0 1.6 - 2.4 mg/dL   Procalcitonin     Collection Time: 06/21/22  3:51 AM    Specimen: Blood   Result Value Ref Range    Procalcitonin 0.40 (H) 0.00 - 0.25 ng/mL         Assessment & Plan   Diagnoses and all orders for this visit:    1. Acute cystitis without hematuria (Primary)  -     Urinalysis With Microscopic - Urine, Clean Catch  -     Urine Culture - , Urine, Clean Catch    2. Coronavirus infection        1. Appears resolved.   Check UA and C&S.  F/U scheduled w/ Uro.    2. Appears resolved.      Medications, including side effects, were discussed with the patient. Patient verbalized understanding.  The plan of care was discussed. All questions were answered. Patient verbalized understanding.      Return for As Previously Scheduled.

## 2022-07-01 NOTE — TELEPHONE ENCOUNTER
Patient was not able to leave urine sample at the office. She was given a sterile cup to take home and will bring it back to the office.

## 2022-07-08 NOTE — TELEPHONE ENCOUNTER
Caller: Jd Mejia    Relationship: Emergency Contact    Best call back number: [phone] (H)    Caller requesting test results: SPOUSE ON VERBAL    What test was performed: URINE SAMPLE     When was the test performed: DROPPED OFF Tuesday

## 2022-07-11 NOTE — TELEPHONE ENCOUNTER
Okay for hub to read,Attempted to call pt, vm box is full okay for please inform pt that  Pt has persistent UTI. Sending Rx for antibiotic and needs Urology appt.They will call and schedule.

## 2022-08-26 NOTE — PROGRESS NOTES
"Subjective   Cindy Mejia is a 76 y.o. female presenting today for follow up of   Chief Complaint   Patient presents with   • healthcare maintenance     Follow up on labs       History of Present Illness       Outpatient Medications Marked as Taking for the 8/26/22 encounter (Office Visit) with Shannon Nguyen APRN   Medication Sig Dispense Refill   • carbidopa-levodopa (SINEMET)  MG per tablet Take 1 tablet by mouth 2 (Two) Times a Day.     • estradiol (ESTRACE) 0.1 MG/GM vaginal cream Apply pea sized amount nightly for 2 weeks, then 3 times per week.     • pramipexole (MIRAPEX) 0.25 MG tablet Take 0.25 mg by mouth Daily.     • propranolol LA (INDERAL LA) 60 MG 24 hr capsule Take 60 mg by mouth Daily.     • Vibegron 75 MG tablet Take 75 mg by mouth Daily.     • vitamin B-12 (CYANOCOBALAMIN) 100 MCG tablet Take 50 mcg by mouth Daily.       Cindy presents for f/u r/t recurrent UTI, anemia, and malnutrition.  In terms of frequent UTI. She has been w/o infection for several months. She is now being f/b Urology w/ [facility].  She is trying to remember QD Boost. She just doesn't have much appetite or cravings for food anymore.    The following portions of the patient's history were reviewed and updated as appropriate: allergies, current medications, past family history, past medical history, past social history, past surgical history and problem list.    Review of Systems   Gastrointestinal: Positive for constipation. Negative for anal bleeding and blood in stool.       Objective   Vitals:    08/26/22 1236   BP: 118/76   BP Location: Left arm   Patient Position: Sitting   Cuff Size: Adult   Pulse: 86   Resp: 16   Temp: 98.2 °F (36.8 °C)   TempSrc: Tympanic   SpO2: 94%   Weight: 43.5 kg (95 lb 12.8 oz)   Height: 152.4 cm (60\")       BP Readings from Last 3 Encounters:   08/26/22 118/76   07/01/22 106/54   06/21/22 148/73        Wt Readings from Last 3 Encounters:   08/26/22 43.5 kg (95 lb 12.8 oz)   07/01/22 44.5 " kg (98 lb)   06/20/22 44.9 kg (98 lb 14.4 oz)        Body mass index is 18.71 kg/m².  Nursing notes and vitals reviewed.    Physical Exam  Constitutional:       General: She is not in acute distress.     Appearance: She is well-developed and underweight.      Comments: Chronically ill appearing   Pulmonary:      Effort: Pulmonary effort is normal.   Neurological:      Mental Status: She is alert.      Motor: Weakness and tremor present.      Gait: Gait abnormal.   Psychiatric:         Attention and Perception: She is attentive.         Speech: Speech normal.         Recent Results (from the past 672 hour(s))   CBC & Differential    Collection Time: 08/18/22  9:56 AM    Specimen: Blood   Result Value Ref Range    WBC 6.4 3.4 - 10.8 x10E3/uL    RBC 3.78 3.77 - 5.28 x10E6/uL    Hemoglobin 10.9 (L) 11.1 - 15.9 g/dL    Hematocrit 33.4 (L) 34.0 - 46.6 %    MCV 88 79 - 97 fL    MCH 28.8 26.6 - 33.0 pg    MCHC 32.6 31.5 - 35.7 g/dL    RDW 13.9 11.7 - 15.4 %    Platelets 287 150 - 450 x10E3/uL    Neutrophil Rel % 75 Not Estab. %    Lymphocyte Rel % 12 Not Estab. %    Monocyte Rel % 9 Not Estab. %    Eosinophil Rel % 3 Not Estab. %    Basophil Rel % 1 Not Estab. %    Neutrophils Absolute 4.8 1.4 - 7.0 x10E3/uL    Lymphocytes Absolute 0.8 0.7 - 3.1 x10E3/uL    Monocytes Absolute 0.6 0.1 - 0.9 x10E3/uL    Eosinophils Absolute 0.2 0.0 - 0.4 x10E3/uL    Basophils Absolute 0.1 0.0 - 0.2 x10E3/uL    Immature Granulocyte Rel % 0 Not Estab. %    Immature Grans Absolute 0.0 0.0 - 0.1 x10E3/uL   Ferritin    Collection Time: 08/18/22  9:56 AM    Specimen: Blood   Result Value Ref Range    Ferritin 60 15 - 150 ng/mL   Folate    Collection Time: 08/18/22  9:56 AM    Specimen: Blood   Result Value Ref Range    Folate 10.6 >3.0 ng/mL   Iron Profile    Collection Time: 08/18/22  9:56 AM    Specimen: Blood   Result Value Ref Range    TIBC 315 250 - 450 ug/dL    UIBC 251 118 - 369 ug/dL    Iron 64 27 - 139 ug/dL    Iron Saturation 20 15 - 55 %    Vitamin B12    Collection Time: 08/18/22  9:56 AM    Specimen: Blood   Result Value Ref Range    Vitamin B-12 >2000 (H) 232 - 1245 pg/mL         Assessment & Plan   Diagnoses and all orders for this visit:    1. Underweight due to inadequate caloric intake (Primary)   - discussed therapeutic lifestyle interventions    2. Recurrent UTI   - current asymptomatic   - Continue to follow with specialty care as directed by them.    3. Parkinson disease (HCC)   - Continue to follow with specialty care as directed by them.    4. Normocytic normochromic anemia   - Iron, B12, folate levels all WNL   - Hgb up from 10.3, now 10.9    5. Tubular adenoma  Tissue Exam (05/22/2017 10:44)  Tissue Pathology Exam (08/28/2017 08:47)  We had a lengthy discussion r/t CLS in light of PMH of tubular adenoma and persistent anemia. Cindy and her  VU but do not wish to proceed w/ CLS at this time. We discussed the possibility of CRC. VU but again do not wish to proceed w/ CLS.            Medications, including side effects, were discussed with the patient. Patient verbalized understanding.  The plan of care was discussed. All questions were answered. Patient verbalized understanding.      Return in about 7 months (around 3/26/2023) for fasting labs one week prior to, [employer] Well[employer].

## 2022-09-30 PROBLEM — Z85.038 HISTORY OF MALIGNANT NEOPLASM OF LARGE INTESTINE: Status: ACTIVE | Noted: 2018-01-25

## 2022-09-30 PROBLEM — N39.0 ACUTE UTI: Status: ACTIVE | Noted: 2022-01-01

## 2022-09-30 NOTE — PROGRESS NOTES
"Subjective   Cindy Mejia is a 76 y.o. female presenting today for follow up of   Chief Complaint   Patient presents with   • Weakness - Generalized       History of Present Illness     Outpatient Medications Marked as Taking for the 9/30/22 encounter (Office Visit) with Shannon Nguyen APRN   Medication Sig Dispense Refill   • carbidopa-levodopa (SINEMET)  MG per tablet Take 1 tablet by mouth 2 (Two) Times a Day.     • pramipexole (MIRAPEX) 0.25 MG tablet Take 0.25 mg by mouth Daily.     • vitamin B-12 (CYANOCOBALAMIN) 100 MCG tablet Take 50 mcg by mouth Daily.       Her  accompanies her and provides the majority of the history. He notes approx 1 mo of generalized weakness and falls. This is much worse over the past two days. Her  contacted the neurologist and was instructed to stop the propranolol which she was taking for tremors. He has been checking her BP and most readings have been quite low with diastolics in the 30-40s.    The following portions of the patient's history were reviewed and updated as appropriate: allergies, current medications, past family history, past medical history, past social history, past surgical history and problem list.    Review of Systems   Constitutional: Positive for appetite change (She is not eating much) and unexpected weight loss. Negative for fever.   Respiratory: Negative for shortness of breath.    Cardiovascular: Negative for chest pain.   Gastrointestinal: Positive for constipation (at baseline). Negative for nausea and vomiting.   Genitourinary: Negative for dysuria, frequency and urgency.       Objective   Vitals:    09/30/22 1101 09/30/22 1143   BP: 96/48 90/40   BP Location: Left arm    Patient Position: Sitting    Cuff Size: Adult    Pulse: 116    Resp: 16    Temp: 97.1 °F (36.2 °C)    TempSrc: Infrared    SpO2: 97%    Weight: 42.1 kg (92 lb 12.8 oz)    Height: 152.4 cm (60\")      Vitals:    09/30/22 1101 09/30/22 1118   Orthostatic BP:  " 124/68   Orthostatic Pulse:  106   Patient Position: Sitting Lying     Unable to assess orthostatics d/t pt's inability to stand.    BP Readings from Last 3 Encounters:   09/30/22 90/40   08/26/22 118/76   07/01/22 106/54        Wt Readings from Last 3 Encounters:   09/30/22 42.1 kg (92 lb 12.8 oz)   08/26/22 43.5 kg (95 lb 12.8 oz)   07/01/22 44.5 kg (98 lb)        Body mass index is 18.12 kg/m².  Nursing notes and vitals reviewed.    Physical Exam  Constitutional:       General: She is awake. She is not in acute distress.     Appearance: She is underweight.      Comments: Frail   Cardiovascular:      Rate and Rhythm: Regular rhythm.      Heart sounds: S1 normal and S2 normal.   Pulmonary:      Effort: Pulmonary effort is normal.      Breath sounds: Normal breath sounds.   Abdominal:      General: Bowel sounds are normal. There is no distension.      Palpations: Abdomen is soft. There is no hepatomegaly, splenomegaly or mass.      Tenderness: There is no abdominal tenderness.   Neurological:      Mental Status: She is alert.      Sensory: Sensation is intact.      Motor: Weakness present.      Coordination: Coordination abnormal.      Gait: Gait abnormal.   Psychiatric:         Behavior: Behavior is cooperative.         No results found for this or any previous visit (from the past 672 hour(s)).      Assessment & Plan   Diagnoses and all orders for this visit:    1. Weakness (Primary)  -     CBC & Differential  -     Comprehensive Metabolic Panel  -     Urinalysis With Microscopic - Urine, Clean Catch    2. Hypotension, unspecified hypotension type  -     CBC & Differential  -     Comprehensive Metabolic Panel  -     Urinalysis With Microscopic - Urine, Clean Catch  -     midodrine (PROAMATINE) 2.5 MG tablet; Take 1 tablet by mouth 2 (Two) Times a Day Before Meals.  Dispense: 30 tablet; Refill: 0    3. Recurrent UTI  -     CBC & Differential  -     Comprehensive Metabolic Panel  -     Urinalysis With Microscopic -  Urine, Clean Catch  -     Urine Culture - , Urine, Clean Catch  -     nitrofurantoin, macrocrystal-monohydrate, (Macrobid) 100 MG capsule; Take 1 capsule by mouth 2 (Two) Times a Day for 7 days.  Dispense: 14 capsule; Refill: 0              Medications, including side effects, were discussed with the patient. Patient verbalized understanding.  The plan of care was discussed. All questions were answered. Patient verbalized understanding.      Return in about 4 days (around 10/4/2022).      Addendum: while attempting to help pt transfer to the toilet to collect urine sample, pt experienced what appeared to be a syncopal episode. She was transferred to the ED for further evaluation.

## 2022-10-01 NOTE — PLAN OF CARE
Goal Outcome Evaluation:  Plan of Care Reviewed With: patient, spouse (spouse in room at end of evaluation)           Outcome Evaluation: OT evaluation completed. Pt with c/o increasing weakness over the past week at home. She was admitted to r/o UTI and due to recent low blood pressures. She has a history of parkinson's. Pt receives assistance with adls' from her daughter/spouse as needed at home, and she manages mobility with supervison using a rollator. Pt states at home she was able to keep her balance while standing during caregiver assist with adl's. Pt c/o dizziness with position changes during the evaluation (supine to sitting and sit to stand). The dizziness limited her activity level today. She required min assist for bed mobility. Pt managed a sit to stand transfer from EOB and a stand pivot transfer from bed to a recliner with CGA/min assist using a rolling walker for support. Pt is concerned that her dizziness will impact her mobility and safety wtih adl tasks. Rec OT services to address safety with adl's and transfers. Plan is to return home with family support if her dizziness/mobility improves.

## 2022-10-01 NOTE — PLAN OF CARE
Goal Outcome Evaluation:  Plan of Care Reviewed With: patient        Progress: no change  Outcome Evaluation: pt with episode of dizziness with position change and orthostatic b/P, LR bolus infused as ordered. IV antibiotics given for uti, tolerated without adverse reactions.  at bedside.

## 2022-10-01 NOTE — THERAPY EVALUATION
Acute Care - Physical Therapy Initial Evaluation   Jil Villagomez     Patient Name: Cindy Mejia  : 1945  MRN: [id number]  Today's Date: 10/1/2022      Visit Dx:     ICD-10-CM ICD-9-CM   1. Acute UTI  N39.0 599.0   2. Weakness  R53.1 780.79     Patient Active Problem List   Diagnosis   • Anxiety   • Tubular adenoma   • Tremor of both hands   • B12 deficiency   • Parkinson disease (HCC)   • Action tremor   • Mixed stress and urge urinary incontinence   • Urinary tract infection without hematuria   • Moderate malnutrition (HCC)   • Dyskinesia   • Balance problems   • Osteopenia of multiple sites   • Age-related osteoporosis without current pathological fracture   • Urinary tract infection in female   • Acute UTI   • History of malignant neoplasm of large intestine     Past Medical History:   Diagnosis Date   • Age-related osteoporosis without current pathological fracture 2022   • Anxiety 2016   • Arthritis    • Asymptomatic varicose veins of both lower extremities 2019   • Carcinoma of colon (HCC) 2018   • Colon polyp    • Constipation    • Menopause 2017   • Mixed stress and urge urinary incontinence    • Other hemorrhoids 2018   • Other rosacea 2018   • Parkinson's disease (HCC)    • Uterine prolapse 2016     Past Surgical History:   Procedure Laterality Date   • BLADDER SURGERY     • COLONOSCOPY N/A 2017    Procedure: COLONOSCOPY, polypectomy;  Surgeon: Rabia Mcelroy MD;  Location: [facility];  Service:    • COLONOSCOPY N/A 2017    Procedure: COLONOSCOPY, polypectomy, biopsy, sclerotherapy injection;  Surgeon: Rabia Mcelroy MD;  Location: [facility] OR;  Service:    • HYSTERECTOMY     • TUBAL ABDOMINAL LIGATION       PT Assessment (last 12 hours)     PT Evaluation and Treatment    No documentation.                 Physical Therapy Education                 Title: PT OT SLP Therapies (Done)     Topic: Physical Therapy (Done)     Point: Mobility training  (Done)     Learning Progress Summary           Patient Acceptance, E,TB, VU by AS at 10/1/2022 1225   Family Acceptance, E,TB, VU by AS at 10/1/2022 1225                               User Key     Initials Effective Dates Name Provider Type Discipline    AS 06/16/21 -  Nando Ham, PT Physical Therapist PT              PT Recommendation and Plan  Anticipated Discharge Disposition (PT):  (will continue to assess. May not be safe to return home at this time)  Planned Therapy Interventions (PT): gait training  Therapy Frequency (PT): daily  Plan of Care Reviewed With: patient, spouse  Outcome Evaluation: PT evaluation complete this morning. Patient supine in bed upon entry to room and agrees to PT treatment today. Patient performs supine to sitting EOB with Min A. Once sitting EOB she reports dizziness and lightheadedness and requires Min A for sitting balance as patient held onto FWW. Patient stood from sitting EOB with Min A and continued to report dizziness. Patient stood for about 1 minute before sitting back on the bed. Patient stood again and was able to perform a stand pivot transfer to a chair using a FWW and Mod A x2, especially when turning. During the transfer patient demonstrates a posterior lean and shuffling gait. Patient encouraged to sit in chair. Will continue to follow patient during her hospital and will continue to assess for D/C placement.       Time Calculation:    PT Charges     Row Name 10/01/22 1226             Time Calculation    Start Time 1130  -AS      Stop Time 1158  -AS      Time Calculation (min) 28 min  -AS            User Key  (r) = Recorded By, (t) = Taken By, (c) = Cosigned By    Initials Name Provider Type    AS Nando Ham, PT Physical Therapist              Therapy Charges for Today     Code Description Service Date Service Provider Modifiers Qty    97807222422  PT EVAL LOW COMPLEXITY 2 10/1/2022 Nando Ham, PT GP 1          PT G-Codes  Outcome  Measure Options: AM-[facility] 6 Clicks Basic Mobility (PT)  -[facility] 6 Clicks Score (PT): 15    Nando Ham, PT  10/1/2022

## 2022-10-01 NOTE — THERAPY EVALUATION
Patient Name: Cindy Mejia  : 1945    MRN: [id number]                              Today's Date: 10/1/2022       Admit Date: 2022    Visit Dx:     ICD-10-CM ICD-9-CM   1. Acute UTI  N39.0 599.0   2. Weakness  R53.1 780.79     Patient Active Problem List   Diagnosis   • Anxiety   • Tubular adenoma   • Tremor of both hands   • B12 deficiency   • Parkinson disease (HCC)   • Action tremor   • Mixed stress and urge urinary incontinence   • Urinary tract infection without hematuria   • Moderate malnutrition (HCC)   • Dyskinesia   • Balance problems   • Osteopenia of multiple sites   • Age-related osteoporosis without current pathological fracture   • Urinary tract infection in female   • Acute UTI   • History of malignant neoplasm of large intestine     Past Medical History:   Diagnosis Date   • Age-related osteoporosis without current pathological fracture 2022   • Anxiety 2016   • Arthritis    • Asymptomatic varicose veins of both lower extremities 2019   • Carcinoma of colon (HCC) 2018   • Colon polyp    • Constipation    • Menopause 2017   • Mixed stress and urge urinary incontinence    • Other hemorrhoids 2018   • Other rosacea 2018   • Parkinson's disease (HCC)    • Uterine prolapse 2016     Past Surgical History:   Procedure Laterality Date   • BLADDER SURGERY     • COLONOSCOPY N/A 2017    Procedure: COLONOSCOPY, polypectomy;  Surgeon: Rabia Mcelroy MD;  Location: [facility];  Service:    • COLONOSCOPY N/A 2017    Procedure: COLONOSCOPY, polypectomy, biopsy, sclerotherapy injection;  Surgeon: Rabia Mcelroy MD;  Location: [facility] OR;  Service:    • HYSTERECTOMY     • TUBAL ABDOMINAL LIGATION        General Information     Row Name 10/01/22 1451          OT Time and Intention    Document Type evaluation  -SD     Mode of Treatment occupational therapy  -SD     Row Name 10/01/22 1451          General Information    Patient Profile Reviewed yes  Pt  with c/o increasing weakness over the past week. She has a history of parkinson's. She receives assistanc with adls' from her daugher/spouse as needed.  She manages mobility at home using a rollator.  -SD     Prior Level of Function mod assist:;ADL's  -SD     Existing Precautions/Restrictions fall  -SD     Barriers to Rehab previous functional deficit  history of parkinson's with a decline in function over the past few years  -SD     Row Name 10/01/22 1451          Occupational Profile    Reason for Services/Referral (Occupational Profile) decreased adl function  -SD     Successful Occupations (Occupational Profile) pt manages mobility with a rollator at home with min assist. she states her daughter and  assist with adl's as needed. Pt states she was able to keep her balance while standing I using her rollator for support when family assisted with adl's  -SD     Occupational History/Life Experiences (Occupational Profile) hx of parkinson's with decreased function over the past few years  -SD     Environmental Supports and Barriers (Occupational Profile) spouse and daughter assist with daily tasks  -SD     Patient Goals (Occupational Profile) return home with family support  -SD     Row Name 10/01/22 1451          Living Environment    People in Home spouse  -SD     Row Name 10/01/22 1451          Home Main Entrance    Number of Stairs, Main Entrance two  -SD     Stair Railings, Main Entrance railing on left side (ascending)  -SD     Row Name 10/01/22 1451          Cognition    Orientation Status (Cognition) oriented x 4  -SD     Row Name 10/01/22 1451          Safety Issues, Functional Mobility    Impairments Affecting Function (Mobility) balance;strength;motor control  -SD           User Key  (r) = Recorded By, (t) = Taken By, (c) = Cosigned By    Initials Name Provider Type    SD Camilo Da Silva OTR Occupational Therapist                 Mobility/ADL's     Row Name 10/01/22 1458          Bed Mobility     Bed Mobility supine-sit  -SD     Supine-Sit [place] (Bed Mobility) minimum assist (75% patient effort)  -SD     Assistive Device (Bed Mobility) head of bed elevated  -SD     Row Name 10/01/22 1459          Transfers    Comment, (Transfers) pt c/o dizziness with position changes (supine to sitting and from sitting to standing-nurse notified)  -SD     Bed-Chair [place] (Transfers) moderate assist (50% patient effort);2 person assist;verbal cues;nonverbal cues (demo/gesture)  -SD     Assistive Device (Bed-Chair Transfers) walker, front-wheeled  -SD     Sit-Stand [place] (Transfers) minimum assist (75% patient effort)  -SD     Row Name 10/01/22 1459          Sit-Stand Transfer    Assistive Device (Sit-Stand Transfers) walker, front-wheeled  -SD     Row Name 10/01/22 1459          Functional Mobility    Functional Mobility- Comment mobility activity was limited due to c/o dizziness  -SD     Row Name 10/01/22 1459          Activities of Daily Living    BADL Assessment/Intervention bathing;lower body dressing  -SD     Row Name 10/01/22 1459          Bathing Assessment/Intervention    [place] Level (Bathing) lower body;maximum assist (25% patient effort)  -SD     Row Name 10/01/22 1459          Lower Body Dressing Assessment/Training    [place] Level (Lower Body Dressing) maximum assist (25% patient effort)  -SD           User Key  (r) = Recorded By, (t) = Taken By, (c) = Cosigned By    Initials Name Provider Type    SD Camilo Da Silva, OTR Occupational Therapist               Obj/Interventions     Row Name 10/01/22 1500          Range of Motion Comprehensive    Comment, General Range of Motion BUE rom wfl  -SD     Row Name 10/01/22 1500          Strength Comprehensive (MMT)    Comment, General Manual Muscle Testing (MMT) Assessment BUE strength 3+/5  -SD     Row Name 10/01/22 1500          Balance    Comment, Balance pt c/o dizziness when sitting at EOB and when standing. CGA assist for sitting  balance and min assist for standing balance  -SD           User Key  (r) = Recorded By, (t) = Taken By, (c) = Cosigned By    Initials Name Provider Type    Camilo Potter OTR Occupational Therapist               Goals/Plan     Row Name 10/01/22 1511          Transfer Goal 1 (OT)    Activity/Assistive Device (Transfer Goal 1, OT) commode, 3-in-1  -SD     [place] Level/Cues Needed (Transfer Goal 1, OT) contact guard required;verbal cues required  -SD     Time Frame (Transfer Goal 1, OT) by discharge  -SD     Progress/Outcome (Transfer Goal 1, OT) new goal  -SD     Row Name 10/01/22 1511          Problem Specific Goal 1 (OT)    Problem Specific Goal 1 (OT) Pt to maintai her static standing balance for 2-3 minutes using a rolling walker for support with CGA to allow for caregiver assist for adl's.  -SD     Time Frame (Problem Specific Goal 1, OT) by discharge  -SD     Progress/Outcome (Problem Specific Goal 1, OT) new goal  -SD     Row Name 10/01/22 1511          Therapy Assessment/Plan (OT)    Planned Therapy Interventions (OT) patient/caregiver education/training;transfer/mobility retraining;BADL retraining;activity tolerance training  -SD           User Key  (r) = Recorded By, (t) = Taken By, (c) = Cosigned By    Initials Name Provider Type    Camilo Potter OTR Occupational Therapist               Clinical Impression     Row Name 10/01/22 1501          Pain Assessment    Pretreatment Pain Rating 0/10 - no pain  -SD     Posttreatment Pain Rating 0/10 - no pain  -SD     Row Name 10/01/22 1501          Plan of Care Review    Plan of Care Reviewed With patient;spouse  spouse in room at end of evaluation  -SD     Outcome Evaluation OT evaluation completed. Pt with c/o increasing weakness over the past week at home. She was admitted to r/o UTI and due to recent low blood pressure. She has a history of parkinson's. Pt receives assistance with adls' from her daughter/spouse as needed at home, and she  manages mobility with supervison using a rollator. Pt states at home she was able to keep her balance while standing during caregiver assist with adl's. Pt c/o dizziness with position changes during the evaluation (supine to sitting and sit to stand). The dizziness limited her activity level today. She required min assist for bed mobility. Pt managed a sit to stand transfer from EOB and a stand pivot transfer from bed to a recliner with CGA/min assist using a rolling walker for support. Pt is concerned that her dizziness will impact her mobility and safety wtih adl tasks. Rec OT services to address safety with adl's and transfers. Plan is to return home with family support if her dizziness/mobility improves.  -SD     Row Name 10/01/22 1501          Therapy Assessment/Plan (OT)    Patient/Family Therapy Goal Statement (OT) go home when dizziness/mobility improves  -SD     Rehab Potential (OT) good, to achieve stated therapy goals  -SD     Criteria for Skilled Therapeutic Interventions Met (OT) yes;skilled treatment is necessary  -SD     Therapy Frequency (OT) 5 times/wk  -SD     Predicted Duration of Therapy Intervention (OT) 1 week  -SD     Row Name 10/01/22 1501          Therapy Plan Review/Discharge Plan (OT)    Anticipated Discharge Disposition (OT) home with assist;home with home health  -SD     Row Name 10/01/22 1501          Positioning and Restraints    Pre-Treatment Position in bed  -SD     Post Treatment Position chair  -SD     In Chair notified nsg;sitting;call light within reach;encouraged to call for assist;with family/caregiver;exit alarm on  -SD           User Key  (r) = Recorded By, (t) = Taken By, (c) = Cosigned By    Initials Name Provider Type    SD Camilo Da Silva, OTR Occupational Therapist               Outcome Measures     Row Name 10/01/22 8401          How much help from another is currently needed...    Putting on and taking off regular lower body clothing? 2  -SD     Bathing (including  washing, rinsing, and drying) 2  -SD     Toileting (which includes using toilet bed pan or urinal) 2  -SD     Putting on and taking off regular upper body clothing 3  -SD     Taking care of personal grooming (such as brushing teeth) 3  -SD     Eating meals 3  -SD     AM-PAC 6 Clicks Score (OT) 15  -SD     Row Name 10/01/22 1224          How much help from another person do you currently need...    Turning from your back to your side while in flat bed without using bedrails? 3  -AS     Moving from lying on back to sitting on the side of a flat bed without bedrails? 3  -AS     Moving to and from a bed to a chair (including a wheelchair)? 2  -AS     Standing up from a chair using your arms (e.g., wheelchair, bedside chair)? 3  -AS     Climbing 3-5 steps with a railing? 2  -AS     To walk in hospital room? 2  -AS     AM-PAC 6 Clicks Score (PT) 15  -AS     Highest level of mobility 4 --> Transferred to chair/commode  -AS     Row Name 10/01/22 1513 10/01/22 1224       Functional Assessment    Outcome Measure Options AM-PAC 6 Clicks Daily Activity (OT)  -SD AM-PAC 6 Clicks Basic Mobility (PT)  -AS          User Key  (r) = Recorded By, (t) = Taken By, (c) = Cosigned By    Initials Name Provider Type    Camilo Potter OTR Occupational Therapist    AS Nando Ham, PT Physical Therapist                Occupational Therapy Education                 Title: PT OT SLP Therapies (Done)     Topic: Occupational Therapy (Done)     Point: ADL training (Done)     Description:   Instruct learner(s) on proper safety adaptation and remediation techniques during self care or transfers.   Instruct in proper use of assistive devices.              Learning Progress Summary           Patient Acceptance, E, VU by SD at 10/1/2022 1513    Comment: Education regarding OT services, benefits of activity and safety with daily tasks.                               User Key     Initials Effective Dates Name Provider Type Discipline    SD  06/16/21 -  Camilo Da Silva OTR Occupational Therapist OT              OT Recommendation and Plan  Planned Therapy Interventions (OT): patient/caregiver education/training, transfer/mobility retraining, BADL retraining, activity tolerance training  Therapy Frequency (OT): 5 times/wk  Plan of Care Review  Plan of Care Reviewed With: patient, spouse (spouse in room at end of evaluation)  Outcome Evaluation: OT evaluation completed. Pt with c/o increasing weakness over the past week at home. She was admitted to r/o UTI and due to recent low blood pressure. She has a history of parkinson's. Pt receives assistance with adls' from her daughter/spouse as needed at home, and she manages mobility with supervison using a rollator. Pt states at home she was able to keep her balance while standing during caregiver assist with adl's. Pt c/o dizziness with position changes during the evaluation (supine to sitting and sit to stand). The dizziness limited her activity level today. She required min assist for bed mobility. Pt managed a sit to stand transfer from EOB and a stand pivot transfer from bed to a recliner with CGA/min assist using a rolling walker for support. Pt is concerned that her dizziness will impact her mobility and safety wtih adl tasks. Rec OT services to address safety with adl's and transfers. Plan is to return home with family support if her dizziness/mobility improves.     Time Calculation:    Time Calculation- OT     Row Name 10/01/22 1515             Time Calculation- OT    OT Start Time 1130  -SD      OT Stop Time 1157  -SD      OT Time Calculation (min) 27 min  -SD              Untimed Charges    OT Eval/Re-eval Minutes 27  -SD              Total Minutes    Untimed Charges Total Minutes 27  -SD       Total Minutes 27  -SD            User Key  (r) = Recorded By, (t) = Taken By, (c) = Cosigned By    Initials Name Provider Type    Camilo Potter OTCARRIE Occupational Therapist              Therapy  Charges for Today     Code Description Service Date Service Provider Modifiers Qty    35726253866 HC OT EVAL LOW COMPLEXITY 2 10/1/2022 Camilo Da Silva, OTR GO 1               MILA Corcoran  10/1/2022

## 2022-10-01 NOTE — H&P
Patient Name: Cindy Mejia  :1945  76 y.o.    Date of Hospital Visit: 2022 12:21 PM  21:04 EDT    Encounter Provider: Rudi Akhtar MD    Place of Service: [facility].  [facility].    Patient Care Team:  Shannon Nguyen APRN as PCP - General (Family Medicine)  Ant Baxter MD as Consulting Physician (Neurology)  Rabia Mcelroy MD as Consulting Physician (Gastroenterology)      Chief complaint:  Weakness, urinary tract infection  History of Present Illness:  Patient is a 76-year-old female with progressing Parkinson's disease.  Has been feeling poorly for the past 1 week.  But having trouble with recent low blood pressures.  Had to stop her home propranolol which she was using for Parkinson's tremor.  Has history of frequent urinary tract infection.  Not been eating and drinking well.  Weak all over.  Not able to get out of bed.  Denies any recent falls head injury chest pain shortness of air.  She is cared for by her  at home      Past Medical History:   Diagnosis Date   • Age-related osteoporosis without current pathological fracture 2022   • Anxiety 2016   • Arthritis    • Asymptomatic varicose veins of both lower extremities 2019   • Carcinoma of colon (HCC) 2018   • Colon polyp    • Constipation    • Menopause 2017   • Mixed stress and urge urinary incontinence    • Other hemorrhoids 2018   • Other rosacea 2018   • Parkinson's disease (HCC)    • Uterine prolapse 2016       Past Surgical History:   Procedure Laterality Date   • BLADDER SURGERY     • COLONOSCOPY N/A 2017    Procedure: COLONOSCOPY, polypectomy;  Surgeon: Rabia Mcelroy MD;  Location: [facility];  Service:    • COLONOSCOPY N/A 2017    Procedure: COLONOSCOPY, polypectomy, biopsy, sclerotherapy injection;  Surgeon: Rabia Mcelroy MD;  Location: [facility];  Service:    • HYSTERECTOMY      • TUBAL ABDOMINAL LIGATION           Prior to Admission medications    Medication Sig Start Date End Date Taking? Authorizing Provider   carbidopa-levodopa (SINEMET)  MG per tablet Take 1 tablet by mouth 2 (Two) Times a Day.   Yes Benita Salgado MD   estradiol (ESTRACE) 0.1 MG/GM vaginal cream Apply pea sized amount nightly for 2 weeks, then 3 times per week. 7/14/22 7/14/23 Yes Benita Salgado MD   pramipexole (MIRAPEX) 0.25 MG tablet Take 0.25 mg by mouth Daily.   Yes Benita Salgado MD   vitamin B-12 (CYANOCOBALAMIN) 100 MCG tablet Take 50 mcg by mouth Daily.   Yes Benita Salgado MD   cefuroxime (CEFTIN) 500 MG tablet Take 1 tablet by mouth 2 (Two) Times a Day for 7 days. 9/30/22 10/7/22  Jocelin Arriola PA-C   midodrine (PROAMATINE) 2.5 MG tablet Take 1 tablet by mouth 2 (Two) Times a Day Before Meals. 9/30/22 9/30/22  Shannon Nguyen APRN   nitrofurantoin, macro crystal-monohydrate, (Macrobid) 100 MG capsule Take 1 capsule by mouth 2 (Two) Times a Day for 7 days. 9/30/22 9/30/22  Shannon Nguyen APRN   propranolol LA (INDERAL LA) 60 MG 24 hr capsule Take 60 mg by mouth Daily. 6/8/18 9/30/22  Benita Salgado MD   Vibegron 75 MG tablet Take 75 mg by mouth Daily. 8/24/22 9/30/22  Benita Salgado MD       Allergies   Allergen Reactions   • Penicillins Rash       Social History     Socioeconomic History   • Marital status:    Tobacco Use   • Smoking status: Never Smoker   • Smokeless tobacco: Never Used   Vaping Use   • Vaping Use: Never used   Substance and Sexual Activity   • Alcohol use: No   • Drug use: No   • Sexual activity: Defer       Family History   Problem Relation Age of Onset   • Breast cancer Mother    • Pancreatic cancer Father    • Thyroid disease Sister    • Heart disease Brother 60       REVIEW OF SYSTEMS:   If not otherwise mentioned in the HPI the following: General, HEENT, cardio, pulmonary, GI, , skin, neuro, muscle skeletal,  derm are negative or not pertinent to current case.         Objective:     Vitals:    09/30/22 2029 09/30/22 2044 09/30/22 2046 09/30/22 2059   BP: 172/75  167/74    Patient Position:       Pulse: 107 102 103 106   Resp:       Temp:       SpO2: 96% 97% 96% 96%   Weight:       Height:         Body mass index is 18.94 kg/m².    Constitutional: Patient is oriented to person, place, and time.   Week.  He gives a good history.  Patient appears well-developed. Does not appear ill.   HENT:   Head: Normocephalic and atraumatic. Head is without contusion.   Right and Left Ear: Hearing normal to conversational tones. No visible drainage.    Nose: No epistaxis. External nose normal.   Eyes: Lids are normal. Pupils are equal, round, and reactive to light. Right and left eye exhibit no exudate. Conjunctiva has no hemorrhage.   Neck: No JVD present. No carotid bruit. No tracheal deviation present. No thyroid mass and no thyromegaly.  Cardiovascular: Normal rate, regular rhythm and normal heart sounds.    Pulses:present in radial and DP  Pulmonary/Chest: Effort normal and breath sounds normal.   Abdominal: Soft. Normal appearance and bowel sounds are normal. There is no tenderness.   Musculoskeletal: Normal range of motion.   Neurological: She is alert.  Not able to get up out of the bed and stand on her own without assistance.  She is a fall risk  Skin: Skin is warm, dry and intact. No rash noted.   Psychiatric:Patient has a normal mood and affect. Patient behavior is normal. Thought content normal.       Lab Review:   Lab Results   Component Value Date    WBC 7.12 09/30/2022    HGB 11.1 (L) 09/30/2022    HCT 35.2 09/30/2022    MCV 94.9 09/30/2022     09/30/2022       Lab Results   Component Value Date    TROPONINT 0.020 09/30/2022       Glucose   Date Value Ref Range Status   09/30/2022 112 (H) 65 - 99 mg/dL Final     BUN   Date Value Ref Range Status   09/30/2022 17 8 - 23 mg/dL Final     Creatinine   Date Value Ref  Range Status   09/30/2022 0.92 0.57 - 1.00 mg/dL Final     Sodium   Date Value Ref Range Status   09/30/2022 138 136 - 145 mmol/L Final     Potassium   Date Value Ref Range Status   09/30/2022 4.3 3.5 - 5.2 mmol/L Final     Comment:     Slight hemolysis detected by analyzer. Results may be affected.     Chloride   Date Value Ref Range Status   09/30/2022 100 98 - 107 mmol/L Final     CO2   Date Value Ref Range Status   09/30/2022 30.8 (H) 22.0 - 29.0 mmol/L Final     Calcium   Date Value Ref Range Status   09/30/2022 10.0 8.6 - 10.5 mg/dL Final     Total Protein   Date Value Ref Range Status   09/30/2022 7.3 6.0 - 8.5 g/dL Final     Albumin   Date Value Ref Range Status   09/30/2022 4.30 3.50 - 5.20 g/dL Final     ALT (SGPT)   Date Value Ref Range Status   09/30/2022 <5 1 - 33 U/L Final     AST (SGOT)   Date Value Ref Range Status   09/30/2022 17 1 - 32 U/L Final     Alkaline Phosphatase   Date Value Ref Range Status   09/30/2022 71 39 - 117 U/L Final     Total Bilirubin   Date Value Ref Range Status   09/30/2022 0.3 0.0 - 1.2 mg/dL Final     BUN/Creatinine Ratio   Date Value Ref Range Status   09/30/2022 18.5 7.0 - 25.0 Final     Anion Gap   Date Value Ref Range Status   09/30/2022 7.2 5.0 - 15.0 mmol/L Final           I personally viewed:  the patient's EKG/Telemetry data, the current radiology reports, the data from the emergency room, and reviewed all pertinent data in our EMR.     Imaging Results (Last 24 Hours)     Procedure Component Value Units Date/Time    MRI Brain Without Contrast [[id number]] Collected: 09/30/22 1701     Updated: 09/30/22 1703    Narrative:          MRI Brain WO     Clinical history: One-month history of weakness with severe blood pressure fluctuations.    Comparison: CT of the head of same date.    Technique: Sagittal, axial and coronal images of the head were obtained using the standard protocol.    Findings:  The diffusion sequence shows no evidence of restricted diffusion to indicate  acute infarction. The gradient echo sequence demonstrates no abnormal susceptibility artifact.    The FLAIR sequence shows the ventricles to be generous in size. Cortical sulci are correspondingly prominent. No extra-axial fluid collections. No significant shift of midline structures. There are prominent areas of FLAIR hyperintensity in the  periventricular and subcortical white matter likely representing prominent microvascular disease. Remote lacunar infarct demonstrated in the left basal ganglia. Prominent perivascular spaces also demonstrated in the left basal ganglia.    The pituitary is within normal limits. The cervicomedullary junction is normal. The 7th and 8th cranial nerve complexes are within normal limits.    Mastoid air cells are clear. Visualized paranasal sinuses are clear. No acute orbital findings.      Impression:      Impression:  1.  No evidence of acute infarct.    2.  Generalized cerebral atrophy and prominent white matter microvascular disease. Remote infarct in left basal ganglia.        Signer Name: Madan Cartwright MD   Signed: 9/30/2022 5:01 PM   Workstation Name: [id number]    [facility]    XR Chest 1 View [[id number]] Collected: 09/30/22 1556     Updated: 09/30/22 1600    Narrative:      CHEST X-RAY, 09/30/2022         HISTORY:  76-year-old female in the ED complaining of weakness since last evening.  She notes recent falls.     TECHNIQUE:  AP portable chest x-ray.     COMPARISON:  *  Chest x-ray, 06/20/2022.     FINDINGS:  Heart size and pulmonary vascularity are normal. Mild right basilar  scarring. No visible airspace consolidation or pleural effusion. Benign  calcified granulomas left midlung and left hilum.       Impression:      No active disease.      This report was finalized on 9/30/2022 3:58 PM by Dr. Srikanth Raman MD.       CT Head Without Contrast [[id number]] Collected: 09/30/22 1428     Updated: 09/30/22 1430    Narrative:      CT Head WO    HISTORY:    Weakness since last night, multiple falls over the last few days, generalized weakness    TECHNIQUE:   Routine noncontrast head CT. Radiation dose reduction techniques included automated exposure control or exposure modulation based on body size. Radiation audit for CT and nuclear cardiology exams in the last 12 months: 0.    COMPARISON:   None.    FINDINGS:       No acute intracranial hemorrhage, mass lesion, or abnormal extra-axial fluid collection. No midline shift or focal mass effect. Ventricular system is normal in size and configuration. .    The gray-white matter differentiation is preserved. There are scattered ill-defined and patchy hypoattenuating foci within the bilateral cerebral and deep white matter which are nonspecific but most commonly associated with chronic microvascular ischemic  change. 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    Visualized paranasal sinuses are clear. Visualized mastoid air cells are clear.    No acute osseous abnormality.        Impression:        1.  Fairly extensive presumed chronic microvascular ischemic changes with evidence of old left-sided lacunar infarcts. No definite superimposed acute process though MRI of the head may better assess for potential acute pathology.    Signer Name: YAMILE ZARATE MD   Signed: 9/30/2022 2:28 PM   Workstation Name: [id number]    [facility]            Assessment and Plan:       1.  Acute urinary tract infection with generalized fatigue and weakness.  Not eating and drinking well.  Parkinson's disease.  With recent hypotension.  Has had elevated blood pressures here in the emergency room.  Patient is not able to walk and ambulate on her own without assistance.  For urinary tract infection we will continue her IV Rocephin.  Order urine culture  We will continue her home medications  IV fluids for recent poor p.o. intake and generalized weakness.  Mild chronic anemia  Recurrent urinary tract  infections        Rudi Akhtar MD  09/30/22  21:04 EDT

## 2022-10-01 NOTE — PROGRESS NOTES
Nursing communicated that patient's blood pressure was critically low.  I reviewed patient's chart and evaluated patient.  I am not completely convinced that patient actually has urinary tract infection.  Her procalcitonin is negative when she does not have symptoms.  However, in the setting of frequent UTIs with resistant organisms and a critically low blood pressure we will continue to treat for now.  Reviewed last urine culture at this facility, patient's organism at that time was resistant to Rocephin.  It was sensitive to ciprofloxacin.  Have switched patient to Levaquin.  Giving 1 L bolus of LR as well.  We will monitor patient closely.    Her blood pressure changes could be natural progression of her Parkinson's disease as this malady is known to cause refractory orthostatic hypotension.  The patient's blood pressure is lowest when she tries to rise.  However, cannot yet completely rule out that UTI is contributing and the benefit of treating the possible UTI outweighs the risk of not treating her.    Discussed with patient, , and nurse at bedside.     More than 30 minutes of critical care time was spent reviewing the patient's chart, evaluating the patient, and adjusting her treatment regimen.  The patient's blood pressure has since improved with IV fluids so will not transfer to the ICU at this time.

## 2022-10-01 NOTE — PLAN OF CARE
Goal Outcome Evaluation:  Plan of Care Reviewed With: patient, spouse           Outcome Evaluation: PT evaluation complete this morning. Patient supine in bed upon entry to room and agrees to PT treatment today. Patient performs supine to sitting EOB with Min A. Once sitting EOB she reports dizziness and lightheadedness and requires Min A for sitting balance as patient held onto FWW. Patient stood from sitting EOB with Min A and continued to report dizziness. Patient stood for about 1 minute before sitting back on the bed. Patient stood again and was able to perform a stand pivot transfer to a chair using a FWW and Mod A x2, especially when turning. During the transfer patient demonstrates a posterior lean and shuffling gait. Patient encouraged to sit in chair. Will continue to follow patient during her hospital and will continue to assess for D/C placement.

## 2022-10-01 NOTE — PLAN OF CARE
Goal Outcome Evaluation:  Plan of Care Reviewed With: patient        Progress: no change  Outcome Evaluation: VSS, tele: NSR, rocephin given, courtney, monitor labs.

## 2022-10-01 NOTE — CASE MANAGEMENT/SOCIAL WORK
"Continued Stay Note  [facility]     Patient Name: Cindy Mejia  MRN: [id number]  Today's Date: 10/1/2022    Admit Date: 9/30/2022    Plan: Home with    Discharge Plan     Row Name 10/01/22 1338       Plan    Plan Home with     Patient/Family in Agreement with Plan yes    Plan Comments Into room and introduced self and role of CM. Discussed discharge disposition with patient and her  Jd with permission. Patient is currently sitting up in the chair with no complaints. Patient confirms that the info on her face sheet is correct and that she see's EMMA Lorenz as PCP. She states that she uses [facility] in [place] and currently has no problem paying for her meds and her  picks them up. She also states that she does have a living will but it is not on file her and CM encouraged patient to bring in a copy at her earliest convenience and she and her  verbalized understanding. Patient states she lives with her  in a 1 1/2 story house with two steps to gain entry and states she currently has no issues maneuvering the steps or within the home. She states that she is \"mostly\" independent with her ADL's but if she does need help such as washing hair and bathing her daughter and  are able to assist. She states that she no longer drives and her  provides her transportation and will be able to provide ride home at discharge. She also states that she currently uses grab bars, rollator and shower chair at home and does not anticipate needing any other equipment at discharge. Patient states she has used home health in the past for PT but is unsure which agency it was and states she will not need these services at discharge. CM offered community resources such as HH, STR and LTC but patient declines the need for them at this time. Patient states he plans on returning home at discharge with her  and daughter to help as needed, no discharge needs voiced " by patient at this time. Patient and  had no other questions or concerns regarding discharge plans. Name and number placed on white board in room. CM will continue to follow for needs.               Discharge Codes    No documentation.                     Dianelys Thomas RN

## 2022-10-01 NOTE — CONSULTS
Adult Nutrition  Assessment/PES    Patient Name:  Cindy Mejia  YOB: 1945  MRN: [id number]  Admit Date:  9/30/2022    Assessment Date:  10/1/2022    Comments:  Pt wt down 2# since June per EMR data.   Recommend: Add Boost Plus 2-3 times per day to provide an easily consumable source of nutrition  Will cont to follow and monitor.      Reason for Assessment     Row Name 10/01/22 1510          Reason for Assessment    Reason For Assessment identified at risk by screening criteria     Diagnosis infection/sepsis  UTI hx Parkinson, Malnutrition     Identified At Risk by Screening Criteria MST SCORE 2+                Nutrition/Diet History     Row Name 10/01/22 1511          Nutrition/Diet History    Typical Intake (Food/Fluid/EN/PN) Pt with CNA receiving care/bath at visit .                Labs/Tests/Procedures/Meds     Row Name 10/01/22 1511          Labs/Procedures/Meds    Lab Results Reviewed reviewed     Lab Results Comments glu 123            Diagnostic Tests/Procedures    Diagnostic Test/Procedure Reviewed reviewed            Medications    Pertinent Medications Reviewed reviewed                  Estimated/Assessed Needs - Anthropometrics     Row Name 10/01/22 1511          Anthropometrics    Weight --  97#     Additional Documentation --  6/2022 98.9#            Estimated/Assessed Needs    Additional Documentation Estimated Calorie Needs (Group);Fluid Requirements (Group);Protein Requirements (Group)            Estimated Calorie Needs    Estimated Calorie Requirement (kcal/day) 9401-4989 kcal ( 30-35 kcal/kg )     Estimated Calorie Need Method kcal/kg            Protein Requirements    Est Protein Requirement Amount (gms/kg) 1.2 gm protein  53 gm pro            Fluid Requirements    Estimated Fluid Requirement Method RDA Method  2895-3040 ml                Nutrition Prescription Ordered     Row Name 10/01/22 1512          Nutrition Prescription PO    Current PO Diet Regular                 Evaluation of Received Nutrient/Fluid Intake     Row Name 10/01/22 1512          Fluid Intake Evaluation    Oral Fluid (mL) 260  insufficient data            PO Evaluation    Number of Days PO Intake Evaluated Insufficient Data     Number of Meals 1     % PO Intake 100                   Problem/Interventions:   Problem 1     Row Name 10/01/22 1512          Nutrition Diagnoses Problem 1    Problem 1 Inadequate Nutrient Intake     Etiology (related to) Medical Diagnosis;Factors Affecting Nutrition     Signs/Symptoms (evidenced by) Report/Observation                      Intervention Goal     Row Name 10/01/22 1513          Intervention Goal    General Meet nutritional needs for age/condition     PO PO intake (%)     PO Intake % 75 %  or greater     Weight Maintain weight                Nutrition Intervention     Row Name 10/01/22 1513          Nutrition Intervention    RD/Tech Action Encourage intake;Follow Tx progress;Recommend/ordered     Recommended/Ordered Supplement                Nutrition Prescription     Row Name 10/01/22 1513          Nutrition Prescription PO    PO Prescription Begin/change supplement     Supplement Boost Plus     Supplement Frequency 3 times a day                Education/Evaluation     Row Name 10/01/22 1513          Education    Education Education not appropriate at this time            Monitor/Evaluation    Monitor Per protocol;I&O;PO intake;Supplement intake;Pertinent labs;Weight;Symptoms                 Electronically signed by:  Sarah Spear RD  10/01/22 15:13 EDT

## 2022-10-01 NOTE — PROGRESS NOTES
Patient Name: Cindy Mejia  :1945  76 y.o.      Daily Progress Note    Date of Hospital Visit: 2022 12:21 PM  Encounter Provider: Rudi Akhtar MD  [facility] of Service: [facility]  Patient Care Team:  Shannon Nguyen APRN as PCP - General (Family Medicine)  Ant Baxter MD as Consulting Physician (Neurology)  Rabia Mcelroy MD as Consulting Physician (Gastroenterology)      Chief complaint:   Chief Complaint   Patient presents with   • Weakness - Generalized     History of Present Illness:  Did well over the evening.  Slept well.  Has been in bed most of the time.  Was able to eat her breakfast this morning.  Vital signs are stable blood pressure is somewhat elevated but having no issues with that.  Again she normally has hypotension at home due to her Parkinson's.  No abdominal pain.  No dysuria.  No fever chills nausea vomiting.    Past Medical History:   Diagnosis Date   • Age-related osteoporosis without current pathological fracture 2022   • Anxiety 2016   • Arthritis    • Asymptomatic varicose veins of both lower extremities 2019   • Carcinoma of colon (HCC) 2018   • Colon polyp    • Constipation    • Menopause 2017   • Mixed stress and urge urinary incontinence    • Other hemorrhoids 2018   • Other rosacea 2018   • Parkinson's disease (HCC)    • Uterine prolapse 2016       Allergies   Allergen Reactions   • Penicillins Rash     REVIEW OF SYSTEMS:   All other systems reviewed and negative.        Objective:     Vitals:    09/30/22 2059 09/30/22 2101 09/30/22 2117 10/01/22 0528   BP:  156/72 152/81 154/68   BP Location:   Right arm Right arm   Patient Position:   Lying Lying   Pulse: 106 103 106 65   Resp:   18 18   Temp:   97.8 °F (36.6 °C) 97.6 °F (36.4 °C)   TempSrc:   Oral Oral   SpO2: 96%  93% 94%   Weight:       Height:           Constitutional:  Patient is awake in hospital bed or chair. Conversant.  Appears well-developed and not toxic.   HEENT:   Head: Normocephalic and atraumatic. Head is without contusion.   Ears: Hearing grossly normal to conversational voice. No drainage.   Nose: No nasal deformity. No epistaxis.   Throat: normal.  Eyes: Lids are normal. Pupils are equal, round, and reactive to light.  Eyes exhibits no exudate.  No conjunctiva hemorrhage.   Neck: No JVD present. Carotid bruit is not present. No tracheal deviation. No thyroid mass nor thyromegaly.   Cardiovascular: Normal rate, regular rhythm and no murmur.    Pulses:present in radial and DP  Pulmonary/Chest: Effort normal and breath sounds normal.   Abdominal: Soft. Normal appearance. Bowel sounds are normal. There is no tenderness.   Musculoskeletal: Normal range of motion.   Neurological: Patient is oriented to person, place, and time. Adequate strength in 4 extremities.   Skin: Skin is warm, dry and intact. No rash noted.   Psychiatric:Normal mood and affect. Behavior is normal. Thought content normal.         Current Facility-Administered Medications:   •  carbidopa-levodopa (SINEMET)  MG per tablet 1 tablet, 1 tablet, Oral, BID, Rudi Akhtar MD, 1 tablet at 10/01/22 0923  •  cefTRIAXone (ROCEPHIN) IVPB 2 g/50ml dextrose (premix), 2 g, Intravenous, Q24H, Rudi Akhtar MD  •  dextrose 5 % and sodium chloride 0.45 % with KCl 20 mEq/L infusion, 100 mL/hr, Intravenous, Continuous, Rudi Akhtar MD, Last Rate: 100 mL/hr at 10/01/22 0923, 100 mL/hr at 10/01/22 0923  •  Enoxaparin Sodium (LOVENOX) syringe 30 mg, 30 mg, Subcutaneous, Nightly, Rudi Akhtar MD, 30 mg at 09/30/22 3053  •  nitroglycerin (NITROSTAT) SL tablet 0.4 mg, 0.4 mg, Sublingual, Q5 Min PRN, Rudi Akhtar MD  •  pramipexole (MIRAPEX) tablet 0.25 mg, 0.25 mg, Oral, Daily, Rudi Akhtar MD, 0.25 mg at 10/01/22 0923  •  sodium chloride 0.9 % flush 10 mL, 10 mL, Intravenous, PRN,  Nikolas Waters MD  •  [COMPLETED] Insert peripheral IV, , , Once **AND** sodium chloride 0.9 % flush 10 mL, 10 mL, Intravenous, PRN, Jocelin Arriola PA-C  •  sodium chloride 0.9 % flush 10 mL, 10 mL, Intravenous, PRN, Rudi Akhtar MD  •  sodium chloride 0.9 % flush 10 mL, 10 mL, Intravenous, Q12H, Rudi Akhtar MD, 10 mL at 10/01/22 0924  •  sodium chloride 0.9 % infusion 40 mL, 40 mL, Intravenous, PRN, Rudi Akhtar MD    Lab Review:   Lab Results   Component Value Date    WBC 7.12 09/30/2022    HGB 11.1 (L) 09/30/2022    HCT 35.2 09/30/2022    MCV 94.9 09/30/2022     09/30/2022     No results found for: BLOODCX  Lab Results   Component Value Date    TROPONINT 0.020 09/30/2022     No results found for: URINECX  Lab Results   Component Value Date    GLUCOSE 112 (H) 09/30/2022    BUN 17 09/30/2022    CREATININE 0.92 09/30/2022    EGFRIFNONA 121 06/17/2021    EGFRIFAFRI 146 06/17/2021    BCR 18.5 09/30/2022    CO2 30.8 (H) 09/30/2022    CALCIUM 10.0 09/30/2022    PROTENTOTREF 7.1 03/22/2022    ALBUMIN 4.30 09/30/2022    LABIL2 1.5 03/22/2022    AST 17 09/30/2022    ALT <5 09/30/2022       I personally viewed the patient's EKG/Telemetry data.           Assessment and Plan:     1.  Acute urinary tract infection with generalized fatigue and weakness.  Not eating and drinking well, prior to hospital stay.  But was able to eat a full breakfast this morning    2.Parkinson's disease.  With recent hypotension, at home.    3. Has had elevated blood pressures here in the hospital.  Not high enough to begin treatment.  Patient is not able to walk and ambulate on her own without assistance.  We will have physical therapy see her today to assess if she is able to ambulate safely.  Her  does provide excellent care for her at home.    But she will need to be able to safely ambulate transfer so we can take her home.    For urinary tract infection we will continue her IV Rocephin.    urine culture  is still pending this morning as well as her blood cultures   we will continue her home medications  IV fluids for recent poor p.o. intake and generalized weakness.    4.Mild chronic anemia    Recurrent urinary tract infections.  Patient has pure wick in place      Rudi Akhtar MD  10/01/22  10:42 EDT

## 2022-10-02 PROBLEM — I95.9 SYMPTOMATIC HYPOTENSION: Status: ACTIVE | Noted: 2022-01-01

## 2022-10-02 PROBLEM — G90.3 ORTHOSTATIC HYPOTENSION DUE TO PARKINSON'S DISEASE: Status: ACTIVE | Noted: 2019-01-04

## 2022-10-02 NOTE — PLAN OF CARE
Goal Outcome Evaluation:  Plan of Care Reviewed With: patient        Progress: improving  Outcome Evaluation: pt up to chair with assist x 1 this shift, susanna dizziness, no c/o pain voiced.  at bedside.

## 2022-10-02 NOTE — CASE MANAGEMENT/SOCIAL WORK
"Continued Stay Note  MIRNA Corey     Patient Name: Cindy Mejia  MRN: [id number]  Today's Date: 10/2/2022    Admit Date: 9/30/2022    Plan: Home with    Discharge Plan     Row Name 10/02/22 1513       Plan    Plan Home with     Plan Comments CM informed patient and her  that at this time none of the home health agencies accept her insurance and they can not accept. CM suggested to patient and  to call the 1-800 service number on her insurance card tomorrow to she if they can assist with home health services and they both verbalized understanding. CM will continue to follow.    Row Name 10/02/22 1452       Plan    Plan Comments Secure chat received from [facility] and they states they are out of network with patient's insurance and can not accept at this time. Referral put in for [facility] and [facility]. CM informed patient of such.    Row Name 10/02/22 1431       Plan    Plan Comments Call received back from Socorro at [facility] and referral given. Socorro states she is unsure at this time if they accept patient's insurance and will not be able to find out until tomorrow. She is requestions info be put in the [employer] bakset and she will follow up with patient tomorrow. CM informed patient of such. CM will continue to follow for needs.    Row Name 10/02/22 1423       Plan    Plan Home with [facility]    Patient/Family in Agreement with Plan yes    Plan Comments Call received from Nicolás at [facility] who states they can not accept patient at this time. CM spoke with patient and her  and informed them of such. CM provided a list of agencies for their preference and they both state \"just see you can get to accept\". Referral in [facility] for [facility] and [facility]. CM called and spoke with Linda with [facility] and she states they do not accept patient's insurance. CM called and paged the RN on call for [facility]. CM awaits return call.    Row Name 10/02/22 7924       " Plan    Plan Home with  and [facility]    Plan Comments Notified per Camilo in OT that patient would like PT/OT services at discharge through [facility]. He also states that she is requestions [facility] for her services. CM called and spoke with the answering service at [facility] regarding referral and she states she will give the info to the on call nurse and she will call me back. Per THOMAS Monreal patient will be discharged home today and CM sent a secure chat to MD asking for home health order. CM will continue to follow for needs.               Discharge Codes    No documentation.                     Dianelys Thomas, RN

## 2022-10-02 NOTE — CASE MANAGEMENT/SOCIAL WORK
"Continued Stay Note   [place]     Patient Name: Cindy Mejia  MRN: [id number]  Today's Date: 10/2/2022    Admit Date: 9/30/2022    Plan: Home with [facility]   Discharge Plan     Row Name 10/02/22 1452       Plan    Plan Comments Secure chat received from [facility] [place] and they states they are out of network with patient's insurance and can not accept at this time. Referral put in for [facility] and [facility]. CM informed patient of such.    Row Name 10/02/22 1431       Plan    Plan Comments Call received back from Socorro at [facility] [place] and referral given. Socorro states she is unsure at this time if they accept patient's insurance and will not be able to find out until tomorrow. She is requestions info be put in the [facility] bakset and she will follow up with patient tomorrow. CM informed patient of such. CM will continue to follow for needs.    Row Name 10/02/22 1423       Plan    Plan Home with [facility]    Patient/Family in Agreement with Plan yes    Plan Comments Call received from Nicolás at [facility] who states they can not accept patient at this time. CM spoke with patient and her  and informed them of such. CM provided a list of agencies for their preference and they both state \"just see you can get to accept\". Referral in [facility] for [facility] and [facility] [place]. MYNOR called and spoke with Linda with [facility] and she states they do not accept patient's insurance. MYNOR called and paged the RN on call for [facility] [place]. CM awaits return call.    Row Name 10/02/22 0296       Plan    Plan Home with  and [facility]    Plan Comments Notified per Camilo in OT that patient would like PT/OT services at discharge through [facility]. He also states that she is requestions [facility] for her services. MYNOR called and spoke with the answering service at [facility] regarding referral and she states she will give the info to the on call nurse and she will call me back. Per THOMAS Monreal patient " will be discharged home today and CM sent a secure chat to MD asking for home health order. CM will continue to follow for needs.               Discharge Codes    No documentation.                     Dianelys Thomas RN

## 2022-10-02 NOTE — CASE MANAGEMENT/SOCIAL WORK
"Continued Stay Note  MIRNA Corey     Patient Name: Cindy Mejia  MRN: [id number]  Today's Date: 10/2/2022    Admit Date: 9/30/2022    Plan: Home with [facility]   Discharge Plan     Row Name 10/02/22 8913       Plan    Plan Home with [facility]    Patient/Family in Agreement with Plan yes    Plan Comments Call received from Chica at [facility] who states they can not accept patient at this time. CM spoke with patient and her  and informed them of such. CM provided a list of agencies for their preference and they both state \"just see you can get to accept\". Referral in [facility] for [facility] and [facility]. CM called and spoke with Linda with [facility] and she states they do not accept patient's insurance. MYNOR called and paged the RN on call for [facility]. CM awaits return call.    Row Name 10/02/22 5097       Plan    Plan Home with  and [facility]    Plan Comments Notified per Camilo in OT that patient would like PT/OT services at discharge through [facility]. He also states that she is requesting [facility] for her services. MYNOR called and spoke with the answering service at [facility] regarding referral and she states she will give the info to the on call nurse and she will call me back. Per THOMAS Monreal patient will be discharged home today and CM sent a secure chat to MD asking for home health order. CM will continue to follow for needs.               Discharge Codes    No documentation.                     Dianelys Thomas RN    "

## 2022-10-02 NOTE — DISCHARGE PLACEMENT REQUEST
"Cindy Hamilton (76 y.o. Female)             Date of Birth   1945    Social Security Number       Address   [address] [place] [id number]    Home Phone   [phone]    MRN   [id number]       Confucianism   None    Marital Status                               Admission Date   9/30/22    Admission Type   Emergency    Admitting Provider   Rudi Akhtar MD    Attending Provider   Beti Correa DO    Department, Room/Bed   [facility] MED SURG, 1421/1       Discharge Date       Discharge Disposition       Discharge Destination                               Attending Provider: Beti Correa DO    Allergies: Penicillins    Isolation: None   Infection: None   Code Status: CPR   Advance Care Planning Activity    Ht: 152.4 cm (60\")   Wt: 44 kg (97 lb)    Admission Cmt: None   Principal Problem: None                Active Insurance as of 9/30/2022     Primary Coverage     Payor Plan Insurance Group Employer/Plan Group    [facility] MEDICARE REPLACEMENT [facility] MEDICARE REPLACEMENT [id number]     Payor Plan Address Payor Plan Phone Number Payor Plan Fax Number Effective Dates    [address] [address]   1/1/2021 - None Entered    [facility] [id number]       Subscriber Name Subscriber Birth Date Member ID       CINDY HAMILTON 1945 [id number]                 Emergency Contacts      (Rel.) Home Phone Work Phone Mobile Phone    Jd Hamilton (Spouse) [phone] -- --              "

## 2022-10-02 NOTE — PLAN OF CARE
Goal Outcome Evaluation:  Plan of Care Reviewed With: patient         Patient will not feel dizzy when up

## 2022-10-02 NOTE — DISCHARGE SUMMARY
Cindy Mejia  1945  [id number]    Hospitalists Discharge Summary    Date of Admission: 9/30/2022  Date of Discharge:  10/2/2022    History of Present Illness from [facility] on admit: Patient is a 76-year-old female with progressing Parkinson's disease.  Has been feeling poorly for the past 1 week.  But having trouble with recent low blood pressures.  Had to stop her home propranolol which she was using for Parkinson's tremor.  Has history of frequent urinary tract infection.  Not been eating and drinking well.  Weak all over.  Not able to get out of bed.  Denies any recent falls head injury chest pain shortness of air.  She is cared for by her  at home    Primary Discharge diagnoses: Symptomatic hypotension, hypovolemia, suspected UTI    Secondary Discharge Diagnoses: Parkinson's disease    Hospital Course Summary: Patient was admitted for symptomatic hypotension.  The patient reported frequent urinary tract infections on admission and this was highest in the differential initially.  However, patient's urine culture did not grow any bacteria and the patient was not having her usual symptoms when she has UTI.  Her blood cultures also did not grow any bacteria.  Her chest x-ray did not show signs of pneumonia.  Her procalcitonin was negative on admission and on subsequent checks, although the subsequent checks were after antibiotics have been started.  The patient did state that she had not been drinking enough in the week prior to admission.  While it is possible that the patient became hypovolemic which led to her weakness and generalized malaise, it is also possible that she had an impending UTI and that her blood pressure is so low at baseline it dropping even a small amount caused her to need medical attention.  On admission the patient was started on Rocephin but upon further review of her chart her last urine culture grew Citrobacter freundii that was resistant to Rocephin.  However, the bacteria was  sensitive to fluoroquinolones.  Patient was given IV fluids and a course of Levaquin.  She has been discharged with 3 more days of Levaquin.  Discussed potential side effects.  The bacteria was also sensitive to Bactrim, however, the patient is high risk for renal dysfunction with her hypotension so it was decided that fluoroquinolone would be the safer option. On 10/2/2022 patient's condition had improved.  She was deemed stable for discharge and advised to take all medications as prescribed, follow up with PCP within 1 week.  No medications were changed other than the addition of Levaquin.     The patient should also follow-up with her neurologist for her regular appointment for her Parkinson's disease.  Case management attempted to get home health for the patient but her insurance reportedly would not cover the services offered here.  Her and her  were advised to call the [phone] on her insurance card to see if this could be rectified.  However, her  lives with her and assists her and it was felt that she should be safe to return home with him and close follow-up.  They have been counseled to keep a close eye on her blood pressure, especially if she becomes symptomatic with lightheaded or dizziness.    A consideration if the patient's blood pressure does not start improving is to place her on a medication such as midodrine as needed for when her blood pressure drops.  Have a high suspicion that most of her labile blood pressure has to do with her Parkinson's disease progressing, but she has been instructed to remain hydrated which should also assist.  Discussed with the patient and her  and since she has not symptomatic on day of discharge will not start this medication in the hospital. Another consideration is to check an AM cortisol and work up adrenal dysfunction, however, the patient wished to leave and the decision for DC was made in the afternoon so this will have to be done when  she follows up.    The patient would likely benefit from home PT with home health if able to obtain. If not, an outpatient order may be helpful. However, she could also likely do the exercises she has been shown at home if motivated.     If there are any issues patient should contact PCP and/or return to the ED for follow up if needed. Patient was agreeable to the plan and subsequently discharged at this time.     PCP  Patient Care Team:  Shannon Nguyen APRN as PCP - General (Family Medicine)  Ant Baxter MD as Consulting Physician (Neurology)  Rabia Mcelroy MD as Consulting Physician (Gastroenterology)    Consults:   Consults     No orders found from 9/1/2022 to 10/1/2022.          Operations and Procedures Performed:       CT Head Without Contrast    Result Date: 9/30/2022  Narrative: CT Head WO HISTORY: Weakness since last night, multiple falls over the last few days, generalized weakness TECHNIQUE: Routine noncontrast head CT. Radiation dose reduction techniques included automated exposure control or exposure modulation based on body size. Radiation audit for CT and nuclear cardiology exams in the last 12 months: 0. COMPARISON: None. FINDINGS: No acute intracranial hemorrhage, mass lesion, or abnormal extra-axial fluid collection. No midline shift or focal mass effect. Ventricular system is normal in size and configuration. . The gray-white matter differentiation is preserved. There are scattered ill-defined and patchy hypoattenuating foci within the bilateral cerebral and deep white matter which are nonspecific but most commonly associated with chronic microvascular ischemic change. 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 Visualized paranasal sinuses are clear. Visualized mastoid air cells are clear. No acute osseous abnormality.     Impression: 1.  Fairly extensive presumed chronic microvascular ischemic changes with evidence of old left-sided lacunar infarcts. No definite  superimposed acute process though MRI of the head may better assess for potential acute pathology. Signer Name: YAMILE ZARATE MD  Signed: 9/30/2022 2:28 PM  Workstation Name: [id number]  [facility]    MRI Brain Without Contrast    Result Date: 9/30/2022  Narrative:   MRI Brain WO Clinical history: One-month history of weakness with severe blood pressure fluctuations. Comparison: CT of the head of same date. Technique: Sagittal, axial and coronal images of the head were obtained using the standard protocol. Findings: The diffusion sequence shows no evidence of restricted diffusion to indicate acute infarction. The gradient echo sequence demonstrates no abnormal susceptibility artifact. The FLAIR sequence shows the ventricles to be generous in size. Cortical sulci are correspondingly prominent. No extra-axial fluid collections. No significant shift of midline structures. There are prominent areas of FLAIR hyperintensity in the periventricular and subcortical white matter likely representing prominent microvascular disease. Remote lacunar infarct demonstrated in the left basal ganglia. Prominent perivascular spaces also demonstrated in the left basal ganglia. The pituitary is within normal limits. The cervicomedullary junction is normal. The 7th and 8th cranial nerve complexes are within normal limits. Mastoid air cells are clear. Visualized paranasal sinuses are clear. No acute orbital findings.     Impression: Impression: 1.  No evidence of acute infarct. 2.  Generalized cerebral atrophy and prominent white matter microvascular disease. Remote infarct in left basal ganglia. Signer Name: Madan Cartwright MD  Signed: 9/30/2022 5:01 PM  Workstation Name: [id number]  [facility]    XR Chest 1 View    Result Date: 9/30/2022  Narrative: CHEST X-RAY, 09/30/2022     HISTORY: 76-year-old female in the ED complaining of weakness since last evening. She notes recent falls.  TECHNIQUE: AP  portable chest x-ray.  COMPARISON: *  Chest x-ray, 06/20/2022.  FINDINGS: Heart size and pulmonary vascularity are normal. Mild right basilar scarring. No visible airspace consolidation or pleural effusion. Benign calcified granulomas left midlung and left hilum.      Impression: No active disease.  This report was finalized on 9/30/2022 3:58 PM by Dr. Srikanth Raman MD.        Allergies:  is allergic to penicillins.    Yong  reviewed    Discharge Medications:     Discharge Medications      New Medications      Instructions Start Date   acetaminophen 325 MG tablet  Commonly known as: TYLENOL   650 mg, Oral, Every 6 Hours PRN      levoFLOXacin 750 MG tablet  Commonly known as: Levaquin   750 mg, Oral, Daily         Continue These Medications      Instructions Start Date   carbidopa-levodopa  MG per tablet  Commonly known as: SINEMET   1 tablet, Oral, 2 Times Daily      pramipexole 0.25 MG tablet  Commonly known as: MIRAPEX   0.25 mg, Oral, Nightly      pramipexole 0.25 MG tablet  Commonly known as: MIRAPEX   0.25 mg, Oral, Daily   Start Date: October 3, 2022     vitamin B-12 100 MCG tablet  Commonly known as: CYANOCOBALAMIN   50 mcg, Oral, Daily             Last Lab Results:   Lab Results (most recent)     Procedure Component Value Units Date/Time    Blood Culture - Blood, Arm, Right [[id number]]  (Normal) Collected: 09/30/22 1726    Specimen: Blood from Arm, Right Updated: 10/02/22 1745     Blood Culture No growth at 2 days    Blood Culture - Blood, Blood, Venous Line [[id number]]  (Normal) Collected: 09/30/22 1726    Specimen: Blood, Venous Line Updated: 10/02/22 1745     Blood Culture No growth at 2 days    Basic Metabolic Panel [[id number]]  (Abnormal) Collected: 10/02/22 0414    Specimen: Blood Updated: 10/02/22 0558     Glucose 116 mg/dL      BUN 15 mg/dL      Creatinine 0.72 mg/dL      Sodium 140 mmol/L      Potassium 3.9 mmol/L      Chloride 105 mmol/L      CO2 24.4 mmol/L      Calcium 8.8 mg/dL       "BUN/Creatinine Ratio 20.8     Anion Gap 10.6 mmol/L      eGFR 87.3 mL/min/1.73      Comment: National Kidney Foundation and American Society of Nephrology (ASN) Task Force recommended calculation based on the Chronic Kidney Disease Epidemiology Collaboration (CKD-EPI) equation refit without adjustment for race.       Narrative:      GFR Normal >60  Chronic Kidney Disease <60  Kidney Failure <15      Procalcitonin [[id number]]  (Normal) Collected: 10/02/22 0414    Specimen: Blood Updated: 10/02/22 0555     Procalcitonin 0.04 ng/mL     Narrative:      As a Marker for Sepsis (Non-Neonates):    1. <0.5 ng/mL represents a low risk of severe sepsis and/or septic shock.  2. >2 ng/mL represents a high risk of severe sepsis and/or septic shock.    As a Marker for Lower Respiratory Tract Infections that require antibiotic therapy:    PCT on Admission    Antibiotic Therapy       6-12 Hrs later    >0.5                Strongly Recommended  >0.25 - <0.5        Recommended   0.1 - 0.25          Discouraged              Remeasure/reassess PCT  <0.1                Strongly Discouraged     Remeasure/reassess PCT    As 28 day mortality risk marker: \"Change in Procalcitonin Result\" (>80% or <=80%) if Day 0 (or Day 1) and Day 4 values are available. Refer to http://www.[employer]-pct-calculator.com    Change in PCT <=80%  A decrease of PCT levels below or equal to 80% defines a positive change in PCT test result representing a higher risk for 28-day all-cause mortality of patients diagnosed with severe sepsis for septic shock.    Change in PCT >80%  A decrease of PCT levels of more than 80% defines a negative change in PCT result representing a lower risk for 28-day all-cause mortality of patients diagnosed with severe sepsis or septic shock.       CBC & Differential [[id number]]  (Abnormal) Collected: 10/02/22 0414    Specimen: Blood Updated: 10/02/22 0534    Narrative:      The following orders were created for panel order CBC & " Differential.  Procedure                               Abnormality         Status                     ---------                               -----------         ------                     CBC Auto Differential[[id number]]        Abnormal            Final result                 Please view results for these tests on the individual orders.    CBC Auto Differential [[id number]]  (Abnormal) Collected: 10/02/22 0414    Specimen: Blood Updated: 10/02/22 0534     WBC 11.43 10*3/mm3      RBC 3.24 10*6/mm3      Hemoglobin 10.0 g/dL      Hematocrit 31.0 %      MCV 95.7 fL      MCH 30.9 pg      MCHC 32.3 g/dL      RDW 13.3 %      RDW-SD 47.1 fl      MPV 9.8 fL      Platelets 249 10*3/mm3      Neutrophil % 87.5 %      Lymphocyte % 4.3 %      Monocyte % 7.2 %      Eosinophil % 0.4 %      Basophil % 0.3 %      Immature Grans % 0.3 %      Neutrophils, Absolute 10.00 10*3/mm3      Lymphocytes, Absolute 0.49 10*3/mm3      Monocytes, Absolute 0.82 10*3/mm3      Eosinophils, Absolute 0.05 10*3/mm3      Basophils, Absolute 0.04 10*3/mm3      Immature Grans, Absolute 0.03 10*3/mm3      nRBC 0.0 /100 WBC     Urine Culture - Urine, Urine, Clean Catch [[id number]]  (Normal) Collected: 09/30/22 1510    Specimen: Urine, Clean Catch Updated: 10/01/22 2321     Urine Culture No growth    Procalcitonin [[id number]]  (Normal) Collected: 09/30/22 1726    Specimen: Blood Updated: 09/30/22 1809     Procalcitonin 0.04 ng/mL     Narrative:      As a Marker for Sepsis (Non-Neonates):    1. <0.5 ng/mL represents a low risk of severe sepsis and/or septic shock.  2. >2 ng/mL represents a high risk of severe sepsis and/or septic shock.    As a Marker for Lower Respiratory Tract Infections that require antibiotic therapy:    PCT on Admission    Antibiotic Therapy       6-12 Hrs later    >0.5                Strongly Recommended  >0.25 - <0.5        Recommended   0.1 - 0.25          Discouraged              Remeasure/reassess PCT  <0.1                Strongly  "Discouraged     Remeasure/reassess PCT    As 28 day mortality risk marker: \"Change in Procalcitonin Result\" (>80% or <=80%) if Day 0 (or Day 1) and Day 4 values are available. Refer to http://www.[facility]-pct-calculator.com    Change in PCT <=80%  A decrease of PCT levels below or equal to 80% defines a positive change in PCT test result representing a higher risk for 28-day all-cause mortality of patients diagnosed with severe sepsis for septic shock.    Change in PCT >80%  A decrease of PCT levels of more than 80% defines a negative change in PCT result representing a lower risk for 28-day all-cause mortality of patients diagnosed with severe sepsis or septic shock.       Lactic Acid, Plasma [[id number]]  (Normal) Collected: 09/30/22 1726    Specimen: Blood Updated: 09/30/22 1755     Lactate 0.9 mmol/L     Urinalysis, Microscopic Only - Urine, Clean Catch [[id number]]  (Abnormal) Collected: 09/30/22 1510    Specimen: Urine, Clean Catch Updated: 09/30/22 1541     RBC, UA None Seen /HPF      WBC, UA 6-12 /HPF      Bacteria, UA 4+ /HPF      Squamous Epithelial Cells, UA 0-2 /HPF      Hyaline Casts, UA None Seen /LPF      Methodology Manual Light Microscopy    Urinalysis With Microscopic If Indicated (No Culture) - Urine, Clean Catch [[id number]]  (Abnormal) Collected: 09/30/22 1510    Specimen: Urine, Clean Catch Updated: 09/30/22 1535     Color, UA Yellow     Appearance, UA Slightly Cloudy     pH, UA 6.5     Specific Gravity, UA 1.025     Glucose, UA Negative     Ketones, UA Trace     Bilirubin, UA Negative     Blood, UA Negative     Protein, UA 30 mg/dL (1+)     Leuk Esterase, UA Moderate (2+)     Nitrite, UA Positive     Urobilinogen, UA 0.2 E.U./dL    BNP [[id number]]  (Normal) Collected: 09/30/22 1247    Specimen: Blood Updated: 09/30/22 1428     proBNP 838.7 pg/mL     Narrative:      Among patients with dyspnea, NT-proBNP is highly sensitive for the detection of acute congestive heart failure. In addition NT-proBNP " of <300 pg/ml effectively rules out acute congestive heart failure with 99% negative predictive value.    Results may be falsely decreased if patient taking Biotin.      [place] Draw [[id number]] Collected: 09/30/22 1247    Specimen: Blood Updated: 09/30/22 1404    Narrative:      The following orders were created for panel order [place] Draw.  Procedure                               Abnormality         Status                     ---------                               -----------         ------                     Green Top (Gel)[[id number]]                                  Final result               Lavender Top[[id number]]                                     Final result               Gold Top - SST[[id number]]                                   Final result               Light Blue Top[[id number]]                                   Final result                 Please view results for these tests on the individual orders.    Green Top (Gel) [[id number]] Collected: 09/30/22 1247    Specimen: Blood Updated: 09/30/22 1404     Extra Tube Hold for add-ons.     Comment: Auto resulted.       Lavender Top [[id number]] Collected: 09/30/22 1247    Specimen: Blood Updated: 09/30/22 1404     Extra Tube hold for add-on     Comment: Auto resulted       Gold Top - SST [[id number]] Collected: 09/30/22 1247    Specimen: Blood Updated: 09/30/22 1404     Extra Tube Hold for add-ons.     Comment: Auto resulted.       Light Blue Top [[id number]] Collected: 09/30/22 1247    Specimen: Blood Updated: 09/30/22 1404     Extra Tube Hold for add-ons.     Comment: Auto resulted       TSH [[id number]]  (Normal) Collected: 09/30/22 1247    Specimen: Blood Updated: 09/30/22 1349     TSH 2.790 uIU/mL     T4, Free [[id number]]  (Normal) Collected: 09/30/22 1247    Specimen: Blood Updated: 09/30/22 1349     Free T4 1.39 ng/dL     Narrative:      Results may be falsely increased if patient taking Biotin.      COVID-19 and FLU A/B PCR - Swab, Nasopharynx  [[id number]]  (Normal) Collected: 09/30/22 1304    Specimen: Swab from Nasopharynx Updated: 09/30/22 1329     COVID19 Not Detected     Influenza A PCR Not Detected     Influenza B PCR Not Detected    Narrative:      Fact sheet for providers: https://www.fda.gov/media/494040/download    Fact sheet for patients: https://www.fda.gov/media/582579/download    Test performed by PCR.    Troponin [[id number]]  (Normal) Collected: 09/30/22 1247    Specimen: Blood Updated: 09/30/22 1327     Troponin T 0.020 ng/mL     Narrative:      Troponin T Reference Range:  <= 0.03 ng/mL-   Negative for AMI  >0.03 ng/mL-     Abnormal for myocardial necrosis.  Clinicians would have to utilize clinical acumen, EKG, Troponin and serial changes to determine if it is an Acute Myocardial Infarction or myocardial injury due to an underlying chronic condition.       Results may be falsely decreased if patient taking Biotin.      Comprehensive Metabolic Panel [[id number]]  (Abnormal) Collected: 09/30/22 1247    Specimen: Blood Updated: 09/30/22 1327     Glucose 112 mg/dL      BUN 17 mg/dL      Creatinine 0.92 mg/dL      Sodium 138 mmol/L      Potassium 4.3 mmol/L      Comment: Slight hemolysis detected by analyzer. Results may be affected.        Chloride 100 mmol/L      CO2 30.8 mmol/L      Calcium 10.0 mg/dL      Total Protein 7.3 g/dL      Albumin 4.30 g/dL      ALT (SGPT) <5 U/L      AST (SGOT) 17 U/L      Alkaline Phosphatase 71 U/L      Total Bilirubin 0.3 mg/dL      Globulin 3.0 gm/dL      A/G Ratio 1.4 g/dL      BUN/Creatinine Ratio 18.5     Anion Gap 7.2 mmol/L      eGFR 65.1 mL/min/1.73      Comment: National Kidney Foundation and American Society of Nephrology (ASN) Task Force recommended calculation based on the Chronic Kidney Disease Epidemiology Collaboration (CKD-EPI) equation refit without adjustment for race.       Narrative:      GFR Normal >60  Chronic Kidney Disease <60  Kidney Failure <15      Magnesium [[id number]]  (Normal)  Collected: 09/30/22 1247    Specimen: Blood Updated: 09/30/22 1324     Magnesium 2.1 mg/dL     POC Glucose Once [[id number]]  (Normal) Collected: 09/30/22 1301    Specimen: Blood Updated: 09/30/22 1308     Glucose 123 mg/dL      Comment: Meter: [id number] : [id number] Tiffany Anderson RN       CBC & Differential [[id number]]  (Abnormal) Collected: 09/30/22 1247    Specimen: Blood Updated: 09/30/22 1252    Narrative:      The following orders were created for panel order CBC & Differential.  Procedure                               Abnormality         Status                     ---------                               -----------         ------                     CBC Auto Differential[[id number]]        Abnormal            Final result                 Please view results for these tests on the individual orders.    CBC Auto Differential [[id number]]  (Abnormal) Collected: 09/30/22 1247    Specimen: Blood Updated: 09/30/22 1252     WBC 7.12 10*3/mm3      RBC 3.71 10*6/mm3      Hemoglobin 11.1 g/dL      Hematocrit 35.2 %      MCV 94.9 fL      MCH 29.9 pg      MCHC 31.5 g/dL      RDW 13.2 %      RDW-SD 45.5 fl      MPV 9.5 fL      Platelets 256 10*3/mm3      Neutrophil % 80.6 %      Lymphocyte % 8.0 %      Monocyte % 9.7 %      Eosinophil % 0.8 %      Basophil % 0.6 %      Immature Grans % 0.3 %      Neutrophils, Absolute 5.74 10*3/mm3      Lymphocytes, Absolute 0.57 10*3/mm3      Monocytes, Absolute 0.69 10*3/mm3      Eosinophils, Absolute 0.06 10*3/mm3      Basophils, Absolute 0.04 10*3/mm3      Immature Grans, Absolute 0.02 10*3/mm3      nRBC 0.0 /100 WBC         Imaging Results (Most Recent)     Procedure Component Value Units Date/Time    MRI Brain Without Contrast [[id number]] Collected: 09/30/22 1701     Updated: 09/30/22 1703    Narrative:          MRI Brain WO     Clinical history: One-month history of weakness with severe blood pressure fluctuations.    Comparison: CT of the head of same date.    Technique:  Sagittal, axial and coronal images of the head were obtained using the standard protocol.    Findings:  The diffusion sequence shows no evidence of restricted diffusion to indicate acute infarction. The gradient echo sequence demonstrates no abnormal susceptibility artifact.    The FLAIR sequence shows the ventricles to be generous in size. Cortical sulci are correspondingly prominent. No extra-axial fluid collections. No significant shift of midline structures. There are prominent areas of FLAIR hyperintensity in the  periventricular and subcortical white matter likely representing prominent microvascular disease. Remote lacunar infarct demonstrated in the left basal ganglia. Prominent perivascular spaces also demonstrated in the left basal ganglia.    The pituitary is within normal limits. The cervicomedullary junction is normal. The 7th and 8th cranial nerve complexes are within normal limits.    Mastoid air cells are clear. Visualized paranasal sinuses are clear. No acute orbital findings.      Impression:      Impression:  1.  No evidence of acute infarct.    2.  Generalized cerebral atrophy and prominent white matter microvascular disease. Remote infarct in left basal ganglia.        Signer Name: Madan Cartwright MD   Signed: 9/30/2022 5:01 PM   Workstation Name: [id number]    [facility] Chest 1 View [[id number]] Collected: 09/30/22 1556     Updated: 09/30/22 1600    Narrative:      CHEST X-RAY, 09/30/2022         HISTORY:  76-year-old female in the ED complaining of weakness since last evening.  She notes recent falls.     TECHNIQUE:  AP portable chest x-ray.     COMPARISON:  *  Chest x-ray, 06/20/2022.     FINDINGS:  Heart size and pulmonary vascularity are normal. Mild right basilar  scarring. No visible airspace consolidation or pleural effusion. Benign  calcified granulomas left midlung and left hilum.       Impression:      No active disease.      This report was finalized on  9/30/2022 3:58 PM by Dr. Srikanth Raman MD.       CT Head Without Contrast [[id number]] Collected: 09/30/22 1428     Updated: 09/30/22 1430    Narrative:      CT Head WO    HISTORY:   Weakness since last night, multiple falls over the last few days, generalized weakness    TECHNIQUE:   Routine noncontrast head CT. Radiation dose reduction techniques included automated exposure control or exposure modulation based on body size. Radiation audit for CT and nuclear cardiology exams in the last 12 months: 0.    COMPARISON:   None.    FINDINGS:       No acute intracranial hemorrhage, mass lesion, or abnormal extra-axial fluid collection. No midline shift or focal mass effect. Ventricular system is normal in size and configuration. .    The gray-white matter differentiation is preserved. There are scattered ill-defined and patchy hypoattenuating foci within the bilateral cerebral and deep white matter which are nonspecific but most commonly associated with chronic microvascular ischemic  change. 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    Visualized paranasal sinuses are clear. Visualized mastoid air cells are clear.    No acute osseous abnormality.        Impression:        1.  Fairly extensive presumed chronic microvascular ischemic changes with evidence of old left-sided lacunar infarcts. No definite superimposed acute process though MRI of the head may better assess for potential acute pathology.    Signer Name: YAMILE ZARATE MD   Signed: 9/30/2022 2:28 PM   Workstation Name: [id number]    [facility]          PROCEDURES      Condition on Discharge:  Stable, Improved.     Physical Exam at Discharge  Vital Signs  Temp:  [97.5 °F (36.4 °C)-98.2 °F (36.8 °C)] 97.9 °F (36.6 °C)  Heart Rate:  [] 95  Resp:  [18] 18  BP: ()/(46-80) 122/72    Physical Exam  Physical Exam:  General: Patient is a 76 year old, awake and alert, chronically ill-appearing, pleasant female in NAD on  RA  HENT: Head is atraumatic, normocephalic. Hearing is grossly intact. Nose is without obvious congestion and appears patent. Neck is supple and trachea is midline.   Eyes: Vision is grossly intact. Pupils appear equal and round.   Cardiovascular: Heart has regular rate and rhythm with no murmurs, rubs or gallops noted.   Respiratory: Lungs are clear to ausculation without wheezes, rhonchi or rales.   Abdominal/GI: Soft, nontender, bowel sounds present. No rebound or guarding present.   Extremities: No peripheral edema noted.   Musculoskeletal: Spontaneous movement of bilateral upper and lower extremities against gravity noted. No signs of injury or deformity noted.   Skin: Warm and dry.   Psych: Mood and affect are appropriate. Cooperative with exam.   Neuro: Mild tremor noted. Mild masked facies. No facial asymmetry noted. No focal deficits noted, hearing and vision are grossly intact.    Discharge Disposition  Home with     Home PT/OT:  See summary above, recommended by PT/OT but her insurance would not cover. Patient is going home with  and they have been instructed further on calling insurance to try to get this covered. Patient is no longer dizzy when standing and feels safe returning home with her .    Home Safety Evaluation:  N/A    DME  Walker, no changes    Discharge Diet:   Regular     Dietary Orders (From admission, onward)     Start     Ordered    09/30/22 2152  Diet Regular  Diet Effective Now        Question:  Diet Texture / Consistency  Answer:  Regular    09/30/22 2151                Activity at Discharge:  As tolerated    Pre-discharge education  Discussed what is in summary above with patient and   Also educated on monitoring BP closely      Follow-up Appointments  Future Appointments   Date Time Provider Department [place]   11/7/2022 11:00 AM ROOM 1 [facility] ACU [facility]   3/20/2023  9[facility]30 [facility]   3/27/2023 11:30 AM Patrick  EMMA Rai [facility] PC LAG2 LAG     Additional Instructions for the Follow-ups that You Need to Schedule     Discharge Follow-up with PCP   As directed       Currently Documented PCP:    Shannon Nguyen APRN    PCP Phone Number:    [phone]     Follow Up Details: Hospital follow-up               Test Results Pending at Discharge  Pending Labs     Order Current Status    Blood Culture - Blood, Arm, Right Preliminary result    Blood Culture - Blood, Blood, Venous Line Preliminary result           As of April 2021, as required by the Federal 21st Century Cures Act, medical records (including provider notes and laboratory/imaging results) are to be made available to patients and/or their designees as soon as the documents are signed/resulted. While the intention is to ensure transparency and to engage patients in their healthcare, this immediate access may create unintended consequences because this document uses language intended for communication between medical providers for interpretation with the entirety of the patient's clinical picture in mind. It is recommended that patients and/or their designees review all available information with their primary or specialist providers for explanation and to avoid misinterpretation of this information.    Beti Correa,   10/02/22  18:12 EDT

## 2022-10-02 NOTE — PLAN OF CARE
Goal Outcome Evaluation:  Plan of Care Reviewed With: patient        Progress: improving  Outcome Evaluation: Patient improved with mobility today , no dizziness reported. Gait trained in room [id number] with RW requiring minimial assist due to difficult turns with RW. 1 lateral Lob with correction. Patient states  can assist at home. Patient does want home health and pt will benefit greatly from PT/OT.

## 2022-10-02 NOTE — CASE MANAGEMENT/SOCIAL WORK
Continued Stay Note  MIRNA Corey     Patient Name: Cindy Mejia  MRN: [id number]  Today's Date: 10/2/2022    Admit Date: 9/30/2022    Plan: Home with  and [facility]    Discharge Plan     Row Name 10/02/22 1359       Plan    Plan Home with  and [facility]     Plan Comments Notified per Camilo in OT that patient would like PT/OT services at discharge through [facility]. He also states that she is requesting Ruchi for her services. MYNOR called and spoke with the answering service at [facility] regarding referral and she states she will give the info to the on call nurse and she will call me back. Per THOMAS Monreal patient will be discharged home today and CM sent a secure chat to MD asking for home health order. CM will continue to follow for needs.               Discharge Codes    No documentation.                     Dianelys Thomas RN

## 2022-10-02 NOTE — THERAPY TREATMENT NOTE
Patient Name: Cindy Mejia  : 1945    MRN: [id number]                              Today's Date: 10/2/2022       Admit Date: 2022    Visit Dx:     ICD-10-CM ICD-9-CM   1. Acute UTI  N39.0 599.0   2. Weakness  R53.1 780.79     Patient Active Problem List   Diagnosis   • Anxiety   • Tubular adenoma   • Tremor of both hands   • B12 deficiency   • Parkinson disease (HCC)   • Action tremor   • Mixed stress and urge urinary incontinence   • Urinary tract infection without hematuria   • Moderate malnutrition (HCC)   • Dyskinesia   • Balance problems   • Osteopenia of multiple sites   • Age-related osteoporosis without current pathological fracture   • Urinary tract infection in female   • Acute UTI   • History of malignant neoplasm of large intestine     Past Medical History:   Diagnosis Date   • Age-related osteoporosis without current pathological fracture 2022   • Anxiety 2016   • Arthritis    • Asymptomatic varicose veins of both lower extremities 2019   • Carcinoma of colon (HCC) 2018   • Colon polyp    • Constipation    • Menopause 2017   • Mixed stress and urge urinary incontinence    • Other hemorrhoids 2018   • Other rosacea 2018   • Parkinson's disease (HCC)    • Uterine prolapse 2016     Past Surgical History:   Procedure Laterality Date   • BLADDER SURGERY     • COLONOSCOPY N/A 2017    Procedure: COLONOSCOPY, polypectomy;  Surgeon: Rabia Mcelroy MD;  Location: [facility];  Service:    • COLONOSCOPY N/A 2017    Procedure: COLONOSCOPY, polypectomy, biopsy, sclerotherapy injection;  Surgeon: Rabia Mcelroy MD;  Location: [facility];  Service:    • HYSTERECTOMY     • TUBAL ABDOMINAL LIGATION        General Information     Row Name 10/02/22 1035          OT Time and Intention    Document Type therapy note (daily note)  -SD     Mode of Treatment occupational therapy  -SD     Row Name 10/02/22 1035          General Information    Existing  "Precautions/Restrictions fall  -SD     Row Name 10/02/22 1035          Safety Issues, Functional Mobility    Impairments Affecting Function (Mobility) balance;motor planning  -SD     Comment, Safety Issues/Impairments (Mobility) Pt stated \"sometimes my legs just won't move. Pt with difficulty with initiating movements and with motor planning deficits which are typical of Parkinson's.  -SD           User Key  (r) = Recorded By, (t) = Taken By, (c) = Cosigned By    Initials Name Provider Type    Camilo Potter, OTR Occupational Therapist                 Mobility/ADL's     Row Name 10/02/22 1037          Transfers    Transfers sit-stand transfer;stand-sit transfer  -SD     Sit-Stand [place] (Transfers) minimum assist (75% patient effort)  -SD     Stand-Sit [place] (Transfers) minimum assist (75% patient effort);verbal cues  -SD     Row Name 10/02/22 1037          Sit-Stand Transfer    Assistive Device (Sit-Stand Transfers) walker, front-wheeled  -SD     Row Name 10/02/22 1037          Functional Mobility    Functional Mobility- Ind. Level minimum assist (75% patient effort);verbal cues required;moderate assist (50% patient effort)  -SD     Functional Mobility-Distance (Feet) 30  -SD     Functional Mobility- Comment mod assistance needed to manuever the walker with turn. Pt with no c/o dizziness today with position changes or during mobility. Overall much better than yesterday.  -SD     Row Name 10/02/22 1037          Activities of Daily Living    BADL Assessment/Intervention grooming  -SD     Row Name 10/02/22 1037          Grooming Assessment/Training    [place] Level (Grooming) grooming skills;wash face, hands;set up;independent  -SD     Position (Grooming) supported sitting  -SD           User Key  (r) = Recorded By, (t) = Taken By, (c) = Cosigned By    Initials Name Provider Type    Camilo Potter OTR Occupational Therapist               Obj/Interventions     Row Name 10/02/22 1041       "    Motor Skills    Therapeutic Exercise --  Education with patient regarding benefits of BUE arom program.  -SD     Row Name 10/02/22 1041          Balance    Comment, Balance CGA for static sitting balance.  -SD           User Key  (r) = Recorded By, (t) = Taken By, (c) = Cosigned By    Initials Name Provider Type    Camilo Potter, OTR Occupational Therapist               Goals/Plan     Row Name 10/02/22 1047          Transfer Goal 1 (OT)    Activity/Assistive Device (Transfer Goal 1, OT) commode, 3-in-1  -SD     [place] Level/Cues Needed (Transfer Goal 1, OT) contact guard required;verbal cues required  -SD     Time Frame (Transfer Goal 1, OT) by discharge  -SD     Progress/Outcome (Transfer Goal 1, OT) goal ongoing  -SD     Row Name 10/02/22 1047          Problem Specific Goal 1 (OT)    Problem Specific Goal 1 (OT) Pt to maintai her static standing balance for 2-3 minutes using a rolling walker for support with CGA to allow for caregiver assist for adl's.  -SD     Time Frame (Problem Specific Goal 1, OT) by discharge  -SD     Progress/Outcome (Problem Specific Goal 1, OT) goal ongoing  -SD     Row Name 10/02/22 1047          Therapy Assessment/Plan (OT)    Planned Therapy Interventions (OT) patient/caregiver education/training;transfer/mobility retraining;functional balance retraining  -SD           User Key  (r) = Recorded By, (t) = Taken By, (c) = Cosigned By    Initials Name Provider Type    Camilo Potter OTR Occupational Therapist               Clinical Impression     Row Name 10/02/22 1042          Pain Assessment    Pretreatment Pain Rating 0/10 - no pain  -SD     Posttreatment Pain Rating 0/10 - no pain  -SD     Row Name 10/02/22 1042          Plan of Care Review    Plan of Care Reviewed With patient;spouse  -SD     Outcome Evaluation OT:  Overall improvement today and much closer to her baseline (per pt report) for transfers and mobility. Pt with no c/o dizziness today with position  "changes or during mobility. Pt required min/mod assistance for in room mobility (more assistance is needed to manuever the walker with turns). Pt demonstrates motor planning deficits and has difficulty initiating movements at times. Pt stated \"sometimes my legs just won't move\". Pt feels much more comfortable returning home with family support. Rec HH OT/PT upon discharge to address her safety and function for daily tasks.  -SD     Row Name 10/02/22 1042          Therapy Plan Review/Discharge Plan (OT)    Anticipated Discharge Disposition (OT) home with assist;home with home health  -SD     Row Name 10/02/22 1042          Positioning and Restraints    Pre-Treatment Position sitting in chair/recliner  -SD     Post Treatment Position chair  -SD     In Chair sitting;call light within reach;encouraged to call for assist;with family/caregiver;exit alarm on  -SD           User Key  (r) = Recorded By, (t) = Taken By, (c) = Cosigned By    Initials Name Provider Type    SD Camilo Da Silva, OTR Occupational Therapist               Outcome Measures     Row Name 10/02/22 1048          How much help from another is currently needed...    Putting on and taking off regular lower body clothing? 2  -SD     Bathing (including washing, rinsing, and drying) 2  -SD     Toileting (which includes using toilet bed pan or urinal) 2  -SD     Putting on and taking off regular upper body clothing 3  -SD     Taking care of personal grooming (such as brushing teeth) 3  -SD     Eating meals 3  -SD     AM-PAC 6 Clicks Score (OT) 15  -SD     Row Name 10/02/22 0907          How much help from another person do you currently need...    Turning from your back to your side while in flat bed without using bedrails? 3  -EA     Moving from lying on back to sitting on the side of a flat bed without bedrails? 3  -EA     Moving to and from a bed to a chair (including a wheelchair)? 3  -EA     Standing up from a chair using your arms (e.g., wheelchair, " bedside chair)? 3  -EA     Climbing 3-5 steps with a railing? 2  -EA     To walk in hospital room? 3  -EA     AM-PAC 6 Clicks Score (PT) 17  -EA     Highest level of mobility 5 --> Static standing  -EA     Row Name 10/02/22 1048 10/02/22 0907       Functional Assessment    Outcome Measure Options AM-PAC 6 Clicks Daily Activity (OT)  -SD AM-PAC 6 Clicks Daily Activity (OT)  -EA          User Key  (r) = Recorded By, (t) = Taken By, (c) = Cosigned By    Initials Name Provider Type    SD Camilo Da Silva OTR Occupational Therapist    Ruchi Souza, PTA Physical Therapist Assistant                Occupational Therapy Education                 Title: PT OT SLP Therapies (Done)     Topic: Occupational Therapy (Done)     Point: ADL training (Done)     Description:   Instruct learner(s) on proper safety adaptation and remediation techniques during self care or transfers.   Instruct in proper use of assistive devices.              Learning Progress Summary           Patient Acceptance, E,TB,D, VU,DU by SD at 10/2/2022 1033    Comment: Education regarding OT services, impact of BUE tremors on daily tasks/compensatory strategies/AE and safety with adl's, transfers and mobilty.    Acceptance, E, VU by SD at 10/1/2022 1513    Comment: Education regarding OT services, benefits of activity and safety with daily tasks.   Family Acceptance, E,TB,D, VU,DU by SD at 10/2/2022 1033    Comment: Education regarding OT services, impact of BUE tremors on daily tasks/compensatory strategies/AE and safety with adl's, transfers and mobilty.                               User Key     Initials Effective Dates Name Provider Type Discipline    SD 06/16/21 -  Camilo Da Silva OTCARRIE Occupational Therapist OT              OT Recommendation and Plan  Planned Therapy Interventions (OT): patient/caregiver education/training, transfer/mobility retraining, functional balance retraining  Therapy Frequency (OT): 5 times/wk  Plan of Care Review  Plan of  "Care Reviewed With: patient, spouse  Outcome Evaluation: OT:  Overall improvement today and much closer to her baseline (per pt report) for transfers and mobility. Pt with no c/o dizziness today with position changes or during mobility. Pt required min/mod assistance for in room mobility (more assistance is needed to manuever the walker with turns). Pt demonstrates motor planning deficits and has difficulty initiating movements at times. Pt stated \"sometimes my legs just won't move\". Pt feels much more comfortable returning home with family support. Rec HH OT/PT upon discharge to address her safety and function for daily tasks.     Time Calculation:    Time Calculation- OT     Row Name 10/02/22 1034 10/02/22 1032          Time Calculation- OT    OT Start Time 0856  -SD 0810  -SD     OT Stop Time 0907  -SD 0815  pt requested nursing assistance prior to tx  -SD     OT Time Calculation (min) 11 min  -SD 5 min  -SD            Timed Charges    60177 - OT Self Care/Mgmt Minutes 11  -SD --            Total Minutes    Timed Charges Total Minutes 11  -SD --      Total Minutes 11  -SD --           User Key  (r) = Recorded By, (t) = Taken By, (c) = Cosigned By    Initials Name Provider Type    Camilo Potter OTR Occupational Therapist              Therapy Charges for Today     Code Description Service Date Service Provider Modifiers Qty    [id number]  OT SELF CARE/MGMT/TRAIN EA 15 MIN 10/2/2022 Camilo Da Silva OTR GO 1               MILA Corcoran  10/2/2022  "

## 2022-10-02 NOTE — CASE MANAGEMENT/SOCIAL WORK
"Continued Stay Note  MIRNA LeyO'Kean     Patient Name: Cindy Mejia  MRN: [id number]  Today's Date: 10/2/2022    Admit Date: 9/30/2022    Plan: Home with [facility]   Discharge Plan     Row Name 10/02/22 1431       Plan                                     Home with Home Health    Plan Comments Call received back from Socorro at [facility] and referral given. Socorro states she is unsure at this time if they accept patient's insurance and will not be able to find out until tomorrow. She is requesting info be put in the EPIC basket and she will follow up with patient tomorrow. CM informed patient of such. CM will continue to follow for needs.    Row Name 10/02/22 1423       Plan    Plan Home with [facility] services    Patient/Family in Agreement with Plan yes    Plan Comments Call received from Chica at [facility] who states they can not accept patient at this time. CM spoke with patient and her  and informed them of such. CM provided a list of agencies for their preference and they both state \"just see you can get to accept\". Referral in [facility] for [facility] and [facility]. CM called and spoke with Linda with [facility] and she states they do not accept patient's insurance. CM called and paged the RN on call for [facility]. CM awaits return call.    Row Name 10/02/22 7170       Plan    Plan Home with  and [facility]    Plan Comments Notified per Camilo in OT that patient would like PT/OT services at discharge through [facility]. He also states that she is requestions [facility] for her services. MYNOR called and spoke with the answering service at [facility] regarding referral and she states she will give the info to the on call nurse and she will call me back. Per THOMAS Monreal patient will be discharged home today and CM sent a secure chat to MD asking for home health order. CM will continue to follow for needs.               Discharge Codes    No documentation.                     Dianelys MONROE" TIGIST Thomas

## 2022-10-02 NOTE — THERAPY TREATMENT NOTE
Acute Care - Physical Therapy Treatment Note   Jil Villagomez     Patient Name: Cindy Mejia  : 1945  MRN: [id number]  Today's Date: 10/2/2022      Visit Dx:     ICD-10-CM ICD-9-CM   1. Acute UTI  N39.0 599.0   2. Weakness  R53.1 780.79     Patient Active Problem List   Diagnosis   • Anxiety   • Tubular adenoma   • Tremor of both hands   • B12 deficiency   • Parkinson disease (HCC)   • Action tremor   • Mixed stress and urge urinary incontinence   • Urinary tract infection without hematuria   • Moderate malnutrition (HCC)   • Dyskinesia   • Balance problems   • Osteopenia of multiple sites   • Age-related osteoporosis without current pathological fracture   • Urinary tract infection in female   • Acute UTI   • History of malignant neoplasm of large intestine     Past Medical History:   Diagnosis Date   • Age-related osteoporosis without current pathological fracture 2022   • Anxiety 2016   • Arthritis    • Asymptomatic varicose veins of both lower extremities 2019   • Carcinoma of colon (HCC) 2018   • Colon polyp    • Constipation    • Menopause 2017   • Mixed stress and urge urinary incontinence    • Other hemorrhoids 2018   • Other rosacea 2018   • Parkinson's disease (HCC)    • Uterine prolapse 2016     Past Surgical History:   Procedure Laterality Date   • BLADDER SURGERY     • COLONOSCOPY N/A 2017    Procedure: COLONOSCOPY, polypectomy;  Surgeon: Rabia Mcelroy MD;  Location: [facility];  Service:    • COLONOSCOPY N/A 2017    Procedure: COLONOSCOPY, polypectomy, biopsy, sclerotherapy injection;  Surgeon: Rabia Mcelroy MD;  Location: [facility];  Service:    • HYSTERECTOMY     • TUBAL ABDOMINAL LIGATION       PT Assessment (last 12 hours)     PT Evaluation and Treatment     Row Name 10/02/22 0907          Physical Therapy Time and Intention    Subjective Information complains of;fatigue;weakness  -EA     Document Type therapy note (daily note)  -ZIA      Mode of Treatment physical therapy  -EA     Patient Effort good  -EA     Comment Pt states she isn't dizzy today. feeling better.  in at end of visit  -EA     Row Name 10/02/22 0907          General Information    Patient Profile Reviewed yes  -EA     Existing Precautions/Restrictions fall  -EA     Row Name 10/02/22 0907          Home Use of Assistive/Adaptive Equipment    Equipment Currently Used at Home rollator  -EA     Row Name 10/02/22 0907          Pain    Pretreatment Pain Rating 0/10 - no pain  -EA     Posttreatment Pain Rating 0/10 - no pain  -EA     Row Name 10/02/22 0907          Bed Mobility    Comment, (Bed Mobility) up in chair upon arrival  -EA     Row Name 10/02/22 0907          Transfers    Transfers sit-stand transfer;stand-sit transfer  -EA     Comment, (Transfers) required Minimal assistance to scoot forward in chair  -EA     Sit-Stand [place] (Transfers) minimum assist (75% patient effort)  -EA     Stand-Sit [place] (Transfers) minimum assist (75% patient effort);verbal cues  -EA     Row Name 10/02/22 0907          Sit-Stand Transfer    Assistive Device (Sit-Stand Transfers) walker, front-wheeled  -EA     Row Name 10/02/22 0907          Gait/Stairs (Locomotion)    Gait/Stairs Locomotion gait/ambulation assistive device  -EA     [place] Level (Gait) minimum assist (75% patient effort)  specifically for RW navigation and turns  -EA     Assistive Device (Gait) walker, front-wheeled  -EA     Distance in Feet (Gait) 23  -EA     Pattern (Gait) step-to  -EA     Comment, (Gait/Stairs) --  Improved gait distance , no dizziness reported today. Required assistance for rw turns as patient is used to rollator at home.  -EA     Row Name 10/02/22 0907          Safety Issues, Functional Mobility    Safety Issues Affecting Function (Mobility) positioning of assistive device;sequencing abilities  -EA     Row Name 10/02/22 0907          Balance    Balance Assessment sitting static balance   -EA     Static Sitting Balance contact guard  -EA     Static Standing Balance minimal assist  Lob during turn, required assistance.  -EA     Position/Device Used, Standing Balance walker, front-wheeled  -EA     Row Name 10/02/22 0907          Plan of Care Review    Plan of Care Reviewed With patient  -EA     Progress improving  -EA     Outcome Evaluation Patient improved with mobility today , no dizziness reported. Gait trained in room [id number] with RW requiring minimial assist due to difficult turns with RW. 1 lateral Lob with correction. Patient states  can assist at home. Patient does want home health and pt will benefit greatly from PT/OT.  -EA     Row Name 10/02/22 0907          Positioning and Restraints    Pre-Treatment Position sitting in chair/recliner  -EA     Post Treatment Position chair  -EA     In Chair sitting;call light within reach;encouraged to call for assist;with family/caregiver  -EA     Row Name 10/02/22 0907          Therapy Assessment/Plan (PT)    Rehab Potential (PT) good, to achieve stated therapy goals  -EA     Therapy Frequency (PT) daily  -EA     Row Name 10/02/22 0907          Progress Summary (PT)    Progress Toward Functional Goals (PT) progress toward functional goals is good  -EA     Daily Progress Summary (PT) improved  -EA     Barriers to Overall Progress (PT) weakness/parkinsons  -EA     Row Name 10/02/22 0907          Therapy Plan Review/Discharge Plan (PT)    Therapy Plan Review (PT) care plan/treatment goals reviewed;risks/benefits reviewed;patient;father  -EA           User Key  (r) = Recorded By, (t) = Taken By, (c) = Cosigned By    Initials Name Provider Type    Ruchi Souza PTA Physical Therapist Assistant                Physical Therapy Education                 Title: PT OT SLP Therapies (Done)     Topic: Physical Therapy (Done)     Point: Mobility training (Done)     Learning Progress Summary           Patient Acceptance, E, VU by EA at 10/2/2022 1020     Acceptance, E,TB, VU by AS at 10/1/2022 1225   Family Acceptance, E,TB, VU by AS at 10/1/2022 1225                               User Key     Initials Effective Dates Name Provider Type Discipline    AS 06/16/21 -  Nando Ham, PT Physical Therapist PT    EA 06/20/21 -  Ruchi Paredes, PTA Physical Therapist Assistant PT              PT Recommendation and Plan  Anticipated Discharge Disposition (PT): home with home health (spoke with cm)  Planned Therapy Interventions (PT): gait training, balance training, home exercise program, patient/family education, strengthening, transfer training  Therapy Frequency (PT): daily  Progress Summary (PT)  Progress Toward Functional Goals (PT): progress toward functional goals is good  Daily Progress Summary (PT): improved  Barriers to Overall Progress (PT): weakness/parkinsons  Plan of Care Reviewed With: patient  Progress: improving  Outcome Evaluation: Patient improved with mobility today , no dizziness reported. Gait trained in room [id number] with RW requiring minimial assist due to difficult turns with RW. 1 lateral Lob with correction. Patient states  can assist at home. Patient does want home health and pt will benefit greatly from PT/OT.   Outcome Measures     Row Name 10/02/22 0907             How much help from another person do you currently need...    Turning from your back to your side while in flat bed without using bedrails? 3  -EA      Moving from lying on back to sitting on the side of a flat bed without bedrails? 3  -EA      Moving to and from a bed to a chair (including a wheelchair)? 3  -EA      Standing up from a chair using your arms (e.g., wheelchair, bedside chair)? 3  -EA      Climbing 3-5 steps with a railing? 2  -EA      To walk in hospital room? 3  -EA      AM-PAC 6 Clicks Score (PT) 17  -EA              Functional Assessment    Outcome Measure Options AM-PAC 6 Clicks Daily Activity (OT)  -EA            User Key  (r) = Recorded By, (t) =  Taken By, (c) = Cosigned By    Initials Name Provider Type    Ruchi Souza PTA Physical Therapist Assistant                 Time Calculation:    PT Charges     Row Name 10/02/22 1023             Time Calculation    Start Time 0907  -EA      Stop Time 0929  -EA      Time Calculation (min) 22 min  -EA            User Key  (r) = Recorded By, (t) = Taken By, (c) = Cosigned By    Initials Name Provider Type    Ruchi Souza PTA Physical Therapist Assistant              Therapy Charges for Today     Code Description Service Date Service Provider Modifiers Qty    [id number] HC GAIT TRAINING EA 15 MIN 10/2/2022 Ruchi Paredes PTA GP 1          PT G-Codes  Outcome Measure Options: AM-PAC 6 Clicks Daily Activity (OT)  AM-PAC 6 Clicks Score (PT): 17  AM-PAC 6 Clicks Score (OT): 15    Ruchi Paredes PTA  10/2/2022

## 2022-10-02 NOTE — PLAN OF CARE
"Goal Outcome Evaluation:  Plan of Care Reviewed With: patient, spouse           Outcome Evaluation: OT:  Overall improvement today and much closer to her baseline (per pt report) for transfers and mobility. Pt with no c/o dizziness today with position changes or during mobility. Pt required min/mod assistance for in room mobility (more assistance is needed to manuever the walker with turns). Pt demonstrates motor planning deficits and has difficulty initiating movements at times. Pt stated \"sometimes my legs just won't move\". Pt feels much more comfortable returning home with family support. Rec HH OT/PT upon discharge to address her safety and function for daily tasks.  "

## 2022-10-03 NOTE — OUTREACH NOTE
Prep Survey    Flowsheet Row Responses   [facility] facility patient discharged from? [facility]   Is LACE score < 7 ? Yes   Emergency Room discharge w/ pulse ox? No   Eligibility [facility]   Hospital [place]   Date of Admission 09/30/22   Date of Discharge 10/02/22   Discharge Disposition Home or Self Care   Discharge diagnosis symptomatic hypotension, hypovolemia, suspected UTI, Parkinson's disease   Does the patient have one of the following disease processes/diagnoses(primary or secondary)? Other   Does the patient have Home health ordered? No   Is there a DME ordered? No   Prep survey completed? Yes          CARMEN FIELD - Registered Nurse

## 2022-10-03 NOTE — OUTREACH NOTE
Call Center TCM Note    Flowsheet Row Responses   [facility] patient discharged from? [place]   Does the patient have one of the following disease processes/diagnoses(primary or secondary)? Other   TCM attempt successful? Yes   Discharge diagnosis symptomatic hypotension, hypovolemia, suspected UTI, Parkinson's disease   Meds reviewed with patient/caregiver? Yes   Is the patient having any side effects they believe may be caused by any medication additions or changes? No   Does the patient have all medications ordered at discharge? Yes   Is the patient taking all medications as directed (includes completed medication regime)? Yes   Comments TCM APPT with PCP Shannon Nguyen on 10/06/2022   Has home health visited the patient within 72 hours of discharge? N/A   Psychosocial issues? No   Did the patient receive a copy of their discharge instructions? Yes   Nursing interventions Reviewed instructions with patient   What is the patient's perception of their health status since discharge? Improving   Is the patient/caregiver able to teach back signs and symptoms related to disease process for when to call PCP? Yes   Is the patient/caregiver able to teach back signs and symptoms related to disease process for when to call 911? Yes   Is the patient/caregiver able to teach back the hierarchy of who to call/visit for symptoms/problems? PCP, Specialist, Home health nurse, Urgent Care, ED, 911 Yes   If the patient is a current smoker, are they able to teach back resources for cessation? Not a smoker   TCM call completed? Yes   Wrap up additional comments D/C DX: symptomatic hypotension, hypovolemia, suspected UTI, Parkinson's disease ** Pt sounds very weak, but states she feels a bit better. Trying to stay hydrated, monitoring BP which she states is better. She could not provide any readings. New rx Levaquin in place. No questions at this time. Pt does have follow up with Urology on 10/05/2022, and TCM APPT with PCP  Shannon Nguyen on 10/06/2022          Winnie Dawkins MA    10/3/2022, 15:36 EDT

## 2022-10-03 NOTE — CASE MANAGEMENT/SOCIAL WORK
Case Management Discharge Note      Final Note: dc home         Selected Continued Care - Discharged on 10/2/2022 Admission date: 9/30/2022 - Discharge disposition: Home or Self Care    Destination    No services have been selected for the patient.              Durable Medical Equipment    No services have been selected for the patient.              Dialysis/Infusion    No services have been selected for the patient.              Home Medical Care    No services have been selected for the patient.              Therapy    No services have been selected for the patient.              Community Resources    No services have been selected for the patient.              Community & DME    No services have been selected for the patient.                       Final Discharge Disposition Code: 01 - home or self-care

## 2022-10-03 NOTE — TELEPHONE ENCOUNTER
Caller: Jd Mejia    Relationship: Emergency Contact    Best call back number: [phone]    What is the best time to reach you: ANYTIME     Who are you requesting to speak with (clinical staff, provider,  specific staff member): CLINICAL     What was the call regarding: PATIENT'S SPOUSE STATES THAT HE IS WANTING TO KNOW IF HE IS NEEDING TO GET BOTH PRESCRIPTIONS (ONE FROM [facility] AND THE ONE FROM THE HOSPITAL) OR JUST THE ONE FROM THE HOSPITAL.     PATIENT'S SPOUSE STATES THAT THE HOSPITAL TOLD THEM TO JUST GET THE ONE PRESCRIBED BY THE HOSPITAL BUT HE WANTED TO DOUBLE CHECK FIRST.     Do you require a callback: YES

## 2022-10-06 PROBLEM — G20.A1 PARKINSON DISEASE: Status: ACTIVE | Noted: 2022-01-01

## 2022-10-06 PROBLEM — G20 PARKINSON DISEASE (HCC): Status: ACTIVE | Noted: 2022-01-01

## 2022-10-06 NOTE — PROGRESS NOTES
Subjective   Cindy Mejia is a 76 y.o. female presenting today for follow up of   Chief Complaint   Patient presents with   • Hospital Follow Up Visit       History of Present Illness     Outpatient Medications Marked as Taking for the 10/6/22 encounter (Office Visit) with Shannon Nguyen APRN   Medication Sig Dispense Refill   • carbidopa-levodopa (SINEMET)  MG per tablet Take 1 tablet by mouth 2 (Two) Times a Day.     • pramipexole (MIRAPEX) 0.25 MG tablet Take 0.25 mg by mouth Every Night.     • vitamin B-12 (CYANOCOBALAMIN) 100 MCG tablet Take 50 mcg by mouth Daily.       Date of Admission: 9/30/2022  Date of Discharge:  10/2/2022     History of Present Illness from hospitals on admit: Patient is a 76-year-old female with progressing Parkinson's disease.  Has been feeling poorly for the past 1 week.  But having trouble with recent low blood pressures.  Had to stop her home propranolol which she was using for Parkinson's tremor.  Has history of frequent urinary tract infection.  Not been eating and drinking well.  Weak all over.  Not able to get out of bed.  Denies any recent falls head injury chest pain shortness of air.  She is cared for by her  at home     Primary Discharge diagnoses: Symptomatic hypotension, hypovolemia, suspected UTI     Secondary Discharge Diagnoses: Parkinson's disease     Hospital Course Summary: Patient was admitted for symptomatic hypotension.  The patient reported frequent urinary tract infections on admission and this was highest in the differential initially.  However, patient's urine culture did not grow any bacteria and the patient was not having her usual symptoms when she has UTI.  Her blood cultures also did not grow any bacteria.  Her chest x-ray did not show signs of pneumonia.  Her procalcitonin was negative on admission and on subsequent checks, although the subsequent checks were after antibiotics have been started.  The patient did state that she had not  been drinking enough in the week prior to admission.  While it is possible that the patient became hypovolemic which led to her weakness and generalized malaise, it is also possible that she had an impending UTI and that her blood pressure is so low at baseline it dropping even a small amount caused her to need medical attention.  On admission the patient was started on Rocephin but upon further review of her chart her last urine culture grew Citrobacter freundii that was resistant to Rocephin.  However, the bacteria was sensitive to fluoroquinolones.  Patient was given IV fluids and a course of Levaquin.  She has been discharged with 3 more days of Levaquin.  Discussed potential side effects.  The bacteria was also sensitive to Bactrim, however, the patient is high risk for renal dysfunction with her hypotension so it was decided that fluoroquinolone would be the safer option. On 10/2/2022 patient's condition had improved.  She was deemed stable for discharge and advised to take all medications as prescribed, follow up with PCP within 1 week.  No medications were changed other than the addition of Levaquin.      The patient should also follow-up with her neurologist for her regular appointment for her Parkinson's disease.  Case management attempted to get home health for the patient but her insurance reportedly would not cover the services offered here.  Her and her  were advised to call the [phone] on her insurance card to see if this could be rectified.  However, her  lives with her and assists her and it was felt that she should be safe to return home with him and close follow-up.  They have been counseled to keep a close eye on her blood pressure, especially if she becomes symptomatic with lightheaded or dizziness.     A consideration if the patient's blood pressure does not start improving is to place her on a medication such as midodrine as needed for when her blood pressure drops.  Have a  "high suspicion that most of her labile blood pressure has to do with her Parkinson's disease progressing, but she has been instructed to remain hydrated which should also assist.  Discussed with the patient and her  and since she has not symptomatic on day of discharge will not start this medication in the hospital. Another consideration is to check an AM cortisol and work up adrenal dysfunction, however, the patient wished to leave and the decision for DC was made in the afternoon so this will have to be done when she follows up.     The patient would likely benefit from home PT with home health if able to obtain. If not, an outpatient order may be helpful. However, she could also likely do the exercises she has been shown at home if motivated.      BP readings have been 110s/60s since d/c.   Her  is working hard to encourage food and fluids.      The following portions of the patient's history were reviewed and updated as appropriate: allergies, current medications, past family history, past medical history, past social history, past surgical history and problem list.        Objective   Vitals:    10/06/22 1318   BP: 128/60   Pulse: 97   Temp: 97.1 °F (36.2 °C)   SpO2: 97%   Weight: 44 kg (97 lb)   Height: 152.4 cm (60\")       BP Readings from Last 3 Encounters:   10/06/22 128/60   10/02/22 122/72   09/30/22 90/40        Wt Readings from Last 3 Encounters:   10/06/22 44 kg (97 lb)   09/30/22 44 kg (97 lb)   09/30/22 42.1 kg (92 lb 12.8 oz)        Body mass index is 18.94 kg/m².  Nursing notes and vitals reviewed.    Physical Exam  Constitutional:       General: She is awake. She is not in acute distress.     Appearance: She is underweight. She is not ill-appearing.      Comments: Frail   Cardiovascular:      Rate and Rhythm: Regular rhythm.      Heart sounds: S1 normal and S2 normal.   Pulmonary:      Effort: Pulmonary effort is normal.      Breath sounds: Normal breath sounds.   Neurological:      " Mental Status: She is alert.      Gait: Gait is intact.         Recent Results (from the past 672 hour(s))   Comprehensive Metabolic Panel    Collection Time: 09/30/22 12:47 PM    Specimen: Blood   Result Value Ref Range    Glucose 112 (H) 65 - 99 mg/dL    BUN 17 8 - 23 mg/dL    Creatinine 0.92 0.57 - 1.00 mg/dL    Sodium 138 136 - 145 mmol/L    Potassium 4.3 3.5 - 5.2 mmol/L    Chloride 100 98 - 107 mmol/L    CO2 30.8 (H) 22.0 - 29.0 mmol/L    Calcium 10.0 8.6 - 10.5 mg/dL    Total Protein 7.3 6.0 - 8.5 g/dL    Albumin 4.30 3.50 - 5.20 g/dL    ALT (SGPT) <5 1 - 33 U/L    AST (SGOT) 17 1 - 32 U/L    Alkaline Phosphatase 71 39 - 117 U/L    Total Bilirubin 0.3 0.0 - 1.2 mg/dL    Globulin 3.0 gm/dL    A/G Ratio 1.4 g/dL    BUN/Creatinine Ratio 18.5 7.0 - 25.0    Anion Gap 7.2 5.0 - 15.0 mmol/L    eGFR 65.1 >60.0 mL/min/1.73   Troponin    Collection Time: 09/30/22 12:47 PM    Specimen: Blood   Result Value Ref Range    Troponin T 0.020 0.000 - 0.030 ng/mL   Magnesium    Collection Time: 09/30/22 12:47 PM    Specimen: Blood   Result Value Ref Range    Magnesium 2.1 1.6 - 2.4 mg/dL   Green Top (Gel)    Collection Time: 09/30/22 12:47 PM   Result Value Ref Range    Extra Tube Hold for add-ons.    Lavender Top    Collection Time: 09/30/22 12:47 PM   Result Value Ref Range    Extra Tube hold for add-on    Gold Top - SST    Collection Time: 09/30/22 12:47 PM   Result Value Ref Range    Extra Tube Hold for add-ons.    Light Blue Top    Collection Time: 09/30/22 12:47 PM   Result Value Ref Range    Extra Tube Hold for add-ons.    CBC Auto Differential    Collection Time: 09/30/22 12:47 PM    Specimen: Blood   Result Value Ref Range    WBC 7.12 3.40 - 10.80 10*3/mm3    RBC 3.71 (L) 3.77 - 5.28 10*6/mm3    Hemoglobin 11.1 (L) 12.0 - 15.9 g/dL    Hematocrit 35.2 34.0 - 46.6 %    MCV 94.9 79.0 - 97.0 fL    MCH 29.9 26.6 - 33.0 pg    MCHC 31.5 31.5 - 35.7 g/dL    RDW 13.2 12.3 - 15.4 %    RDW-SD 45.5 37.0 - 54.0 fl    MPV 9.5 6.0 -  12.0 fL    Platelets 256 140 - 450 10*3/mm3    Neutrophil % 80.6 (H) 42.7 - 76.0 %    Lymphocyte % 8.0 (L) 19.6 - 45.3 %    Monocyte % 9.7 5.0 - 12.0 %    Eosinophil % 0.8 0.3 - 6.2 %    Basophil % 0.6 0.0 - 1.5 %    Immature Grans % 0.3 0.0 - 0.5 %    Neutrophils, Absolute 5.74 1.70 - 7.00 10*3/mm3    Lymphocytes, Absolute 0.57 (L) 0.70 - 3.10 10*3/mm3    Monocytes, Absolute 0.69 0.10 - 0.90 10*3/mm3    Eosinophils, Absolute 0.06 0.00 - 0.40 10*3/mm3    Basophils, Absolute 0.04 0.00 - 0.20 10*3/mm3    Immature Grans, Absolute 0.02 0.00 - 0.05 10*3/mm3    nRBC 0.0 0.0 - 0.2 /100 WBC   BNP    Collection Time: 09/30/22 12:47 PM    Specimen: Blood   Result Value Ref Range    proBNP 838.7 0.0 - 1,800.0 pg/mL   TSH    Collection Time: 09/30/22 12:47 PM    Specimen: Blood   Result Value Ref Range    TSH 2.790 0.270 - 4.200 uIU/mL   T4, Free    Collection Time: 09/30/22 12:47 PM    Specimen: Blood   Result Value Ref Range    Free T4 1.39 0.93 - 1.70 ng/dL   ECG 12 Lead    Collection Time: 09/30/22 12:49 PM   Result Value Ref Range    QT Interval 378 ms   POC Glucose Once    Collection Time: 09/30/22  1:01 PM    Specimen: Blood   Result Value Ref Range    Glucose 123 70 - 130 mg/dL   COVID-19 and FLU A/B PCR - Swab, Nasopharynx    Collection Time: 09/30/22  1:04 PM    Specimen: Nasopharynx; Swab   Result Value Ref Range    COVID19 Not Detected Not Detected - Ref. Range    Influenza A PCR Not Detected Not Detected    Influenza B PCR Not Detected Not Detected   Urinalysis With Microscopic If Indicated (No Culture) - Urine, Clean Catch    Collection Time: 09/30/22  3:10 PM    Specimen: Urine, Clean Catch   Result Value Ref Range    Color, UA Yellow Yellow, Straw    Appearance, UA Slightly Cloudy (A) Clear    pH, UA 6.5 4.5 - 8.0    Specific Gravity, UA 1.025 1.003 - 1.030    Glucose, UA Negative Negative    Ketones, UA Trace (A) Negative    Bilirubin, UA Negative Negative    Blood, UA Negative Negative    Protein, UA 30 mg/dL  (1+) (A) Negative    Leuk Esterase, UA Moderate (2+) (A) Negative    Nitrite, UA Positive (A) Negative    Urobilinogen, UA 0.2 E.U./dL 0.2 - 1.0 E.U./dL   Urinalysis, Microscopic Only - Urine, Clean Catch    Collection Time: 09/30/22  3:10 PM    Specimen: Urine, Clean Catch   Result Value Ref Range    RBC, UA None Seen None Seen /HPF    WBC, UA 6-12 (A) None Seen /HPF    Bacteria, UA 4+ (A) None Seen /HPF    Squamous Epithelial Cells, UA 0-2 None Seen, 0-2 /HPF    Hyaline Casts, UA None Seen None Seen /LPF    Methodology Manual Light Microscopy    Urine Culture - Urine, Urine, Clean Catch    Collection Time: 09/30/22  3:10 PM    Specimen: Urine, Clean Catch   Result Value Ref Range    Urine Culture No growth    Lactic Acid, Plasma    Collection Time: 09/30/22  5:26 PM    Specimen: Blood   Result Value Ref Range    Lactate 0.9 0.5 - 2.0 mmol/L   Blood Culture - Blood, Arm, Right    Collection Time: 09/30/22  5:26 PM    Specimen: Arm, Right; Blood   Result Value Ref Range    Blood Culture No growth at 5 days    Blood Culture - Blood, Blood, Venous Line    Collection Time: 09/30/22  5:26 PM    Specimen: Blood, Venous Line   Result Value Ref Range    Blood Culture No growth at 5 days    Procalcitonin    Collection Time: 09/30/22  5:26 PM    Specimen: Blood   Result Value Ref Range    Procalcitonin 0.04 0.00 - 0.25 ng/mL   Basic Metabolic Panel    Collection Time: 10/02/22  4:14 AM    Specimen: Blood   Result Value Ref Range    Glucose 116 (H) 65 - 99 mg/dL    BUN 15 8 - 23 mg/dL    Creatinine 0.72 0.57 - 1.00 mg/dL    Sodium 140 136 - 145 mmol/L    Potassium 3.9 3.5 - 5.2 mmol/L    Chloride 105 98 - 107 mmol/L    CO2 24.4 22.0 - 29.0 mmol/L    Calcium 8.8 8.6 - 10.5 mg/dL    BUN/Creatinine Ratio 20.8 7.0 - 25.0    Anion Gap 10.6 5.0 - 15.0 mmol/L    eGFR 87.3 >60.0 mL/min/1.73   Procalcitonin    Collection Time: 10/02/22  4:14 AM    Specimen: Blood   Result Value Ref Range    Procalcitonin 0.04 0.00 - 0.25 ng/mL   CBC  Auto Differential    Collection Time: 10/02/22  4:14 AM    Specimen: Blood   Result Value Ref Range    WBC 11.43 (H) 3.40 - 10.80 10*3/mm3    RBC 3.24 (L) 3.77 - 5.28 10*6/mm3    Hemoglobin 10.0 (L) 12.0 - 15.9 g/dL    Hematocrit 31.0 (L) 34.0 - 46.6 %    MCV 95.7 79.0 - 97.0 fL    MCH 30.9 26.6 - 33.0 pg    MCHC 32.3 31.5 - 35.7 g/dL    RDW 13.3 12.3 - 15.4 %    RDW-SD 47.1 37.0 - 54.0 fl    MPV 9.8 6.0 - 12.0 fL    Platelets 249 140 - 450 10*3/mm3    Neutrophil % 87.5 (H) 42.7 - 76.0 %    Lymphocyte % 4.3 (L) 19.6 - 45.3 %    Monocyte % 7.2 5.0 - 12.0 %    Eosinophil % 0.4 0.3 - 6.2 %    Basophil % 0.3 0.0 - 1.5 %    Immature Grans % 0.3 0.0 - 0.5 %    Neutrophils, Absolute 10.00 (H) 1.70 - 7.00 10*3/mm3    Lymphocytes, Absolute 0.49 (L) 0.70 - 3.10 10*3/mm3    Monocytes, Absolute 0.82 0.10 - 0.90 10*3/mm3    Eosinophils, Absolute 0.05 0.00 - 0.40 10*3/mm3    Basophils, Absolute 0.04 0.00 - 0.20 10*3/mm3    Immature Grans, Absolute 0.03 0.00 - 0.05 10*3/mm3    nRBC 0.0 0.0 - 0.2 /100 WBC         Assessment & Plan   Diagnoses and all orders for this visit:    1. Encounter for examination following treatment at hospital (Primary)    2. Symptomatic hypotension    3. Parkinson disease (HCC)  -     Ambulatory Referral to Physical Therapy    4. Generalized weakness  -     Ambulatory Referral to Physical Therapy        No med change.  May consider Midodrine if needed in the furture.  QD BP checks and if S&S.  Push calories and fluids.  Pt again declines colonoscopy.    Medications, including side effects, were discussed with the patient. Patient verbalized understanding.  The plan of care was discussed. All questions were answered. Patient verbalized understanding.      Return in about 6 weeks (around 11/17/2022).

## 2022-11-07 NOTE — NURSING NOTE
NURSE PROGRESS NOTE    PT. ARRIVED @ [facility] FOR SCHEDULED INJECTION AT 1100 .  INJECTION WAS PERFORMED WITHOUT INCIDENT.  PT. DISCHARGED TO HOME AT 1230.

## 2022-11-07 NOTE — PATIENT INSTRUCTIONS
"  Call [facility] at [phone] if you have any problems or concerns.    We know you have a Choice in healthcare and appreciate you using [facility].  Our purpose is to provide you \"Excellent Care\".  We hope that you will always choose us in the future and continue to recommend us to your family and friends.             "

## 2023-01-01 ENCOUNTER — APPOINTMENT (OUTPATIENT)
Dept: CARDIOLOGY | Facility: HOSPITAL | Age: 78
DRG: 177 | End: 2023-01-01
Payer: COMMERCIAL

## 2023-01-01 ENCOUNTER — APPOINTMENT (OUTPATIENT)
Dept: CT IMAGING | Facility: HOSPITAL | Age: 78
DRG: 177 | End: 2023-01-01
Payer: COMMERCIAL

## 2023-01-01 ENCOUNTER — APPOINTMENT (OUTPATIENT)
Dept: GENERAL RADIOLOGY | Facility: HOSPITAL | Age: 78
DRG: 177 | End: 2023-01-01
Payer: COMMERCIAL

## 2023-01-01 ENCOUNTER — HOSPITAL ENCOUNTER (INPATIENT)
Facility: HOSPITAL | Age: 78
LOS: 1 days | DRG: 951 | End: 2023-03-06
Attending: STUDENT IN AN ORGANIZED HEALTH CARE EDUCATION/TRAINING PROGRAM | Admitting: STUDENT IN AN ORGANIZED HEALTH CARE EDUCATION/TRAINING PROGRAM
Payer: COMMERCIAL

## 2023-01-01 ENCOUNTER — HOSPITAL ENCOUNTER (INPATIENT)
Facility: HOSPITAL | Age: 78
LOS: 10 days | Discharge: HOSPICE | DRG: 177 | End: 2023-03-05
Attending: EMERGENCY MEDICINE | Admitting: HOSPITALIST
Payer: COMMERCIAL

## 2023-01-01 VITALS
BODY MASS INDEX: 17.75 KG/M2 | TEMPERATURE: 98.3 F | WEIGHT: 90.4 LBS | DIASTOLIC BLOOD PRESSURE: 50 MMHG | HEART RATE: 104 BPM | SYSTOLIC BLOOD PRESSURE: 104 MMHG | OXYGEN SATURATION: 93 % | RESPIRATION RATE: 20 BRPM | HEIGHT: 60 IN

## 2023-01-01 VITALS — TEMPERATURE: 97.6 F

## 2023-01-01 DIAGNOSIS — G93.41 ACUTE METABOLIC ENCEPHALOPATHY: Primary | ICD-10-CM

## 2023-01-01 DIAGNOSIS — G20 PARKINSON'S DISEASE: ICD-10-CM

## 2023-01-01 DIAGNOSIS — N39.0 ACUTE UTI: ICD-10-CM

## 2023-01-01 LAB
ABO GROUP BLD: NORMAL
ALBUMIN SERPL-MCNC: 2.8 G/DL (ref 3.5–5.2)
ALBUMIN SERPL-MCNC: 2.9 G/DL (ref 3.5–5.2)
ALBUMIN SERPL-MCNC: 3.3 G/DL (ref 3.5–5.2)
ALBUMIN SERPL-MCNC: 3.5 G/DL (ref 3.5–5.2)
ALBUMIN SERPL-MCNC: 3.6 G/DL (ref 3.5–5.2)
ALBUMIN SERPL-MCNC: 3.6 G/DL (ref 3.5–5.2)
ALBUMIN SERPL-MCNC: 3.9 G/DL (ref 3.5–5.2)
ALBUMIN/GLOB SERPL: 1.2 G/DL
ALBUMIN/GLOB SERPL: 1.4 G/DL
ALBUMIN/GLOB SERPL: 1.6 G/DL
ALP SERPL-CCNC: 43 U/L (ref 39–117)
ALP SERPL-CCNC: 44 U/L (ref 39–117)
ALP SERPL-CCNC: 49 U/L (ref 39–117)
ALP SERPL-CCNC: 70 U/L (ref 39–117)
ALP SERPL-CCNC: 70 U/L (ref 39–117)
ALP SERPL-CCNC: 72 U/L (ref 39–117)
ALP SERPL-CCNC: 74 U/L (ref 39–117)
ALT SERPL W P-5'-P-CCNC: 12 U/L (ref 1–33)
ALT SERPL W P-5'-P-CCNC: 15 U/L (ref 1–33)
ALT SERPL W P-5'-P-CCNC: 15 U/L (ref 1–33)
ALT SERPL W P-5'-P-CCNC: 22 U/L (ref 1–33)
ALT SERPL W P-5'-P-CCNC: 7 U/L (ref 1–33)
ALT SERPL W P-5'-P-CCNC: <5 U/L (ref 1–33)
ALT SERPL W P-5'-P-CCNC: <5 U/L (ref 1–33)
ANION GAP SERPL CALCULATED.3IONS-SCNC: 3 MMOL/L (ref 5–15)
ANION GAP SERPL CALCULATED.3IONS-SCNC: 4.6 MMOL/L (ref 5–15)
ANION GAP SERPL CALCULATED.3IONS-SCNC: 5.9 MMOL/L (ref 5–15)
ANION GAP SERPL CALCULATED.3IONS-SCNC: 7 MMOL/L (ref 5–15)
ANION GAP SERPL CALCULATED.3IONS-SCNC: 7.2 MMOL/L (ref 5–15)
ANION GAP SERPL CALCULATED.3IONS-SCNC: 9 MMOL/L (ref 5–15)
ANION GAP SERPL CALCULATED.3IONS-SCNC: 9 MMOL/L (ref 5–15)
ANION GAP SERPL CALCULATED.3IONS-SCNC: 9.3 MMOL/L (ref 5–15)
AORTIC DIMENSIONLESS INDEX: 0.9 (DI)
ARTERIAL PATENCY WRIST A: ABNORMAL
ARTERIAL PATENCY WRIST A: ABNORMAL
ARTERIAL PATENCY WRIST A: POSITIVE
AST SERPL-CCNC: 21 U/L (ref 1–32)
AST SERPL-CCNC: 22 U/L (ref 1–32)
AST SERPL-CCNC: 23 U/L (ref 1–32)
AST SERPL-CCNC: 28 U/L (ref 1–32)
AST SERPL-CCNC: 38 U/L (ref 1–32)
AST SERPL-CCNC: 51 U/L (ref 1–32)
AST SERPL-CCNC: 57 U/L (ref 1–32)
ATMOSPHERIC PRESS: 744.9 MMHG
ATMOSPHERIC PRESS: 748.3 MMHG
ATMOSPHERIC PRESS: 753.4 MMHG
ATMOSPHERIC PRESS: 754.6 MMHG
ATMOSPHERIC PRESS: 754.9 MMHG
ATMOSPHERIC PRESS: 760.2 MMHG
ATMOSPHERIC PRESS: 760.7 MMHG
ATMOSPHERIC PRESS: 761.6 MMHG
B PARAPERT DNA SPEC QL NAA+PROBE: NOT DETECTED
B PERT DNA SPEC QL NAA+PROBE: NOT DETECTED
BACTERIA SPEC AEROBE CULT: ABNORMAL
BACTERIA SPEC AEROBE CULT: NORMAL
BACTERIA SPEC AEROBE CULT: NORMAL
BACTERIA UR QL AUTO: ABNORMAL /HPF
BACTERIA UR QL AUTO: ABNORMAL /HPF
BASE EXCESS BLDA CALC-SCNC: -0.8 MMOL/L (ref 0–2)
BASE EXCESS BLDA CALC-SCNC: -1 MMOL/L (ref 0–2)
BASE EXCESS BLDA CALC-SCNC: -2.1 MMOL/L (ref 0–2)
BASE EXCESS BLDA CALC-SCNC: -2.2 MMOL/L (ref 0–2)
BASE EXCESS BLDA CALC-SCNC: -4.5 MMOL/L (ref 0–2)
BASE EXCESS BLDA CALC-SCNC: 0.5 MMOL/L (ref 0–2)
BASE EXCESS BLDA CALC-SCNC: 2.1 MMOL/L (ref 0–2)
BASE EXCESS BLDA CALC-SCNC: 2.3 MMOL/L (ref 0–2)
BASOPHILS # BLD AUTO: 0.01 10*3/MM3 (ref 0–0.2)
BASOPHILS # BLD AUTO: 0.05 10*3/MM3 (ref 0–0.2)
BASOPHILS NFR BLD AUTO: 0.1 % (ref 0–1.5)
BASOPHILS NFR BLD AUTO: 0.2 % (ref 0–1.5)
BDY SITE: ABNORMAL
BH BB BLOOD EXPIRATION DATE: NORMAL
BH BB BLOOD EXPIRATION DATE: NORMAL
BH BB BLOOD TYPE BARCODE: 5100
BH BB BLOOD TYPE BARCODE: 5100
BH BB DISPENSE STATUS: NORMAL
BH BB DISPENSE STATUS: NORMAL
BH BB PRODUCT CODE: NORMAL
BH BB PRODUCT CODE: NORMAL
BH BB UNIT NUMBER: NORMAL
BH BB UNIT NUMBER: NORMAL
BH CV ECHO MEAS - AO MAX PG: 9 MMHG
BH CV ECHO MEAS - AO MEAN PG: 5 MMHG
BH CV ECHO MEAS - AO V2 MAX: 150 CM/SEC
BH CV ECHO MEAS - AO V2 VTI: 31 CM
BH CV ECHO MEAS - AVA(I,D): 2.6 CM2
BH CV ECHO MEAS - EDV(CUBED): 39.3 ML
BH CV ECHO MEAS - EDV(MOD-SP2): 45 ML
BH CV ECHO MEAS - EDV(MOD-SP4): 49 ML
BH CV ECHO MEAS - EF(MOD-BP): 74.2 %
BH CV ECHO MEAS - EF(MOD-SP2): 75.6 %
BH CV ECHO MEAS - EF(MOD-SP4): 73.5 %
BH CV ECHO MEAS - ESV(CUBED): 12.5 ML
BH CV ECHO MEAS - ESV(MOD-SP2): 11 ML
BH CV ECHO MEAS - ESV(MOD-SP4): 13 ML
BH CV ECHO MEAS - FS: 31.8 %
BH CV ECHO MEAS - IVS/LVPW: 0.69 CM
BH CV ECHO MEAS - IVSD: 1.2 CM
BH CV ECHO MEAS - LAT PEAK E' VEL: 8.2 CM/SEC
BH CV ECHO MEAS - LV DIASTOLIC VOL/BSA (35-75): 35.8 CM2
BH CV ECHO MEAS - LV MASS(C)D: 180.9 GRAMS
BH CV ECHO MEAS - LV MAX PG: 7.1 MMHG
BH CV ECHO MEAS - LV MEAN PG: 4 MMHG
BH CV ECHO MEAS - LV SYSTOLIC VOL/BSA (12-30): 9.5 CM2
BH CV ECHO MEAS - LV V1 MAX: 133 CM/SEC
BH CV ECHO MEAS - LV V1 VTI: 27.2 CM
BH CV ECHO MEAS - LVIDD: 3.4 CM
BH CV ECHO MEAS - LVIDS: 2.32 CM
BH CV ECHO MEAS - LVOT AREA: 3 CM2
BH CV ECHO MEAS - LVOT DIAM: 1.96 CM
BH CV ECHO MEAS - LVPWD: 1.75 CM
BH CV ECHO MEAS - MED PEAK E' VEL: 6.5 CM/SEC
BH CV ECHO MEAS - MV A MAX VEL: 89.5 CM/SEC
BH CV ECHO MEAS - MV DEC SLOPE: 655.5 CM/SEC2
BH CV ECHO MEAS - MV DEC TIME: 0.25 MSEC
BH CV ECHO MEAS - MV E MAX VEL: 72.4 CM/SEC
BH CV ECHO MEAS - MV E/A: 0.81
BH CV ECHO MEAS - MV MAX PG: 4.2 MMHG
BH CV ECHO MEAS - MV MEAN PG: 1.85 MMHG
BH CV ECHO MEAS - MV P1/2T: 46.1 MSEC
BH CV ECHO MEAS - MV V2 VTI: 25.4 CM
BH CV ECHO MEAS - MVA(P1/2T): 4.8 CM2
BH CV ECHO MEAS - MVA(VTI): 3.2 CM2
BH CV ECHO MEAS - PA ACC TIME: 0.08 SEC
BH CV ECHO MEAS - PA PR(ACCEL): 41 MMHG
BH CV ECHO MEAS - RAP SYSTOLE: 8 MMHG
BH CV ECHO MEAS - RV MAX PG: 1.13 MMHG
BH CV ECHO MEAS - RV V1 MAX: 53.1 CM/SEC
BH CV ECHO MEAS - RV V1 VTI: 11 CM
BH CV ECHO MEAS - RVSP: 38.8 MMHG
BH CV ECHO MEAS - SI(MOD-SP2): 24.8 ML/M2
BH CV ECHO MEAS - SI(MOD-SP4): 26.3 ML/M2
BH CV ECHO MEAS - SV(LVOT): 81.9 ML
BH CV ECHO MEAS - SV(MOD-SP2): 34 ML
BH CV ECHO MEAS - SV(MOD-SP4): 36 ML
BH CV ECHO MEAS - TR MAX PG: 30.8 MMHG
BH CV ECHO MEAS - TR MAX VEL: 277.5 CM/SEC
BH CV ECHO MEASUREMENTS AVERAGE E/E' RATIO: 9.85
BH CV LOW VAS LEFT COMMON FEMORAL SPONT: 1
BH CV LOW VAS LEFT MID FEMORAL SPONT: 1
BH CV LOW VAS LEFT POPLITEAL SPONT: 1
BH CV LOW VAS RIGHT COMMON FEMORAL SPONT: 1
BH CV LOW VAS RIGHT MID FEMORAL SPONT: 1
BH CV LOW VAS RIGHT POPLITEAL SPONT: 1
BH CV LOWER VASCULAR LEFT COMMON FEMORAL AUGMENT: NORMAL
BH CV LOWER VASCULAR LEFT COMMON FEMORAL COMPETENT: NORMAL
BH CV LOWER VASCULAR LEFT COMMON FEMORAL COMPRESS: NORMAL
BH CV LOWER VASCULAR LEFT COMMON FEMORAL PHASIC: NORMAL
BH CV LOWER VASCULAR LEFT COMMON FEMORAL SPONT: NORMAL
BH CV LOWER VASCULAR LEFT DISTAL FEMORAL COMPRESS: NORMAL
BH CV LOWER VASCULAR LEFT GASTRONEMIUS COMPRESS: NORMAL
BH CV LOWER VASCULAR LEFT GREATER SAPH AK COMPRESS: NORMAL
BH CV LOWER VASCULAR LEFT GREATER SAPH BK COMPRESS: NORMAL
BH CV LOWER VASCULAR LEFT LESSER SAPH COMPRESS: NORMAL
BH CV LOWER VASCULAR LEFT MID FEMORAL AUGMENT: NORMAL
BH CV LOWER VASCULAR LEFT MID FEMORAL COMPETENT: NORMAL
BH CV LOWER VASCULAR LEFT MID FEMORAL COMPRESS: NORMAL
BH CV LOWER VASCULAR LEFT MID FEMORAL PHASIC: NORMAL
BH CV LOWER VASCULAR LEFT MID FEMORAL SPONT: NORMAL
BH CV LOWER VASCULAR LEFT PERONEAL COMPRESS: NORMAL
BH CV LOWER VASCULAR LEFT POPLITEAL AUGMENT: NORMAL
BH CV LOWER VASCULAR LEFT POPLITEAL COMPETENT: NORMAL
BH CV LOWER VASCULAR LEFT POPLITEAL COMPRESS: NORMAL
BH CV LOWER VASCULAR LEFT POPLITEAL PHASIC: NORMAL
BH CV LOWER VASCULAR LEFT POPLITEAL SPONT: NORMAL
BH CV LOWER VASCULAR LEFT POSTERIOR TIBIAL COMPRESS: NORMAL
BH CV LOWER VASCULAR LEFT PROFUNDA FEMORAL COMPRESS: NORMAL
BH CV LOWER VASCULAR LEFT PROXIMAL FEMORAL COMPRESS: NORMAL
BH CV LOWER VASCULAR LEFT SAPHENOFEMORAL JUNCTION COMPRESS: NORMAL
BH CV LOWER VASCULAR RIGHT COMMON FEMORAL AUGMENT: NORMAL
BH CV LOWER VASCULAR RIGHT COMMON FEMORAL COMPETENT: NORMAL
BH CV LOWER VASCULAR RIGHT COMMON FEMORAL COMPRESS: NORMAL
BH CV LOWER VASCULAR RIGHT COMMON FEMORAL PHASIC: NORMAL
BH CV LOWER VASCULAR RIGHT COMMON FEMORAL SPONT: NORMAL
BH CV LOWER VASCULAR RIGHT DISTAL FEMORAL COMPRESS: NORMAL
BH CV LOWER VASCULAR RIGHT GASTRONEMIUS COMPRESS: NORMAL
BH CV LOWER VASCULAR RIGHT GREATER SAPH AK COMPRESS: NORMAL
BH CV LOWER VASCULAR RIGHT GREATER SAPH BK COMPRESS: NORMAL
BH CV LOWER VASCULAR RIGHT LESSER SAPH COMPRESS: NORMAL
BH CV LOWER VASCULAR RIGHT MID FEMORAL AUGMENT: NORMAL
BH CV LOWER VASCULAR RIGHT MID FEMORAL COMPETENT: NORMAL
BH CV LOWER VASCULAR RIGHT MID FEMORAL COMPRESS: NORMAL
BH CV LOWER VASCULAR RIGHT MID FEMORAL PHASIC: NORMAL
BH CV LOWER VASCULAR RIGHT MID FEMORAL SPONT: NORMAL
BH CV LOWER VASCULAR RIGHT PERONEAL COMPRESS: NORMAL
BH CV LOWER VASCULAR RIGHT POPLITEAL AUGMENT: NORMAL
BH CV LOWER VASCULAR RIGHT POPLITEAL COMPETENT: NORMAL
BH CV LOWER VASCULAR RIGHT POPLITEAL COMPRESS: NORMAL
BH CV LOWER VASCULAR RIGHT POPLITEAL PHASIC: NORMAL
BH CV LOWER VASCULAR RIGHT POPLITEAL SPONT: NORMAL
BH CV LOWER VASCULAR RIGHT POSTERIOR TIBIAL COMPRESS: NORMAL
BH CV LOWER VASCULAR RIGHT PROFUNDA FEMORAL COMPRESS: NORMAL
BH CV LOWER VASCULAR RIGHT PROXIMAL FEMORAL COMPRESS: NORMAL
BH CV LOWER VASCULAR RIGHT SAPHENOFEMORAL JUNCTION COMPRESS: NORMAL
BH CV XLRA - RV BASE: 3.5 CM
BH CV XLRA - RV LENGTH: 5.8 CM
BH CV XLRA - RV MID: 2.5 CM
BILIRUB CONJ SERPL-MCNC: <0.2 MG/DL (ref 0–0.3)
BILIRUB INDIRECT SERPL-MCNC: ABNORMAL MG/DL
BILIRUB INDIRECT SERPL-MCNC: NORMAL MG/DL
BILIRUB SERPL-MCNC: 0.2 MG/DL (ref 0–1.2)
BILIRUB SERPL-MCNC: 0.3 MG/DL (ref 0–1.2)
BILIRUB SERPL-MCNC: 0.4 MG/DL (ref 0–1.2)
BILIRUB SERPL-MCNC: 0.4 MG/DL (ref 0–1.2)
BILIRUB SERPL-MCNC: <0.2 MG/DL (ref 0–1.2)
BILIRUB UR QL STRIP: ABNORMAL
BILIRUB UR QL STRIP: NEGATIVE
BLD GP AB SCN SERPL QL: NEGATIVE
BUN SERPL-MCNC: 17 MG/DL (ref 8–23)
BUN SERPL-MCNC: 21 MG/DL (ref 8–23)
BUN SERPL-MCNC: 21 MG/DL (ref 8–23)
BUN SERPL-MCNC: 24 MG/DL (ref 8–23)
BUN SERPL-MCNC: 27 MG/DL (ref 8–23)
BUN SERPL-MCNC: 32 MG/DL (ref 8–23)
BUN SERPL-MCNC: 34 MG/DL (ref 8–23)
BUN SERPL-MCNC: 36 MG/DL (ref 8–23)
BUN/CREAT SERPL: 32 (ref 7–25)
BUN/CREAT SERPL: 36.2 (ref 7–25)
BUN/CREAT SERPL: 36.2 (ref 7–25)
BUN/CREAT SERPL: 38.2 (ref 7–25)
BUN/CREAT SERPL: 41.2 (ref 7–25)
BUN/CREAT SERPL: 49 (ref 7–25)
BUN/CREAT SERPL: 61 (ref 7–25)
BUN/CREAT SERPL: 61.4 (ref 7–25)
C PNEUM DNA NPH QL NAA+NON-PROBE: NOT DETECTED
CALCIUM SPEC-SCNC: 6.8 MG/DL (ref 8.6–10.5)
CALCIUM SPEC-SCNC: 7.2 MG/DL (ref 8.6–10.5)
CALCIUM SPEC-SCNC: 7.5 MG/DL (ref 8.6–10.5)
CALCIUM SPEC-SCNC: 7.9 MG/DL (ref 8.6–10.5)
CALCIUM SPEC-SCNC: 8.5 MG/DL (ref 8.6–10.5)
CALCIUM SPEC-SCNC: 8.6 MG/DL (ref 8.6–10.5)
CHLORIDE SERPL-SCNC: 100 MMOL/L (ref 98–107)
CHLORIDE SERPL-SCNC: 103 MMOL/L (ref 98–107)
CHLORIDE SERPL-SCNC: 104 MMOL/L (ref 98–107)
CHLORIDE SERPL-SCNC: 108 MMOL/L (ref 98–107)
CHLORIDE SERPL-SCNC: 110 MMOL/L (ref 98–107)
CHLORIDE SERPL-SCNC: 119 MMOL/L (ref 98–107)
CHLORIDE SERPL-SCNC: 126 MMOL/L (ref 98–107)
CHLORIDE SERPL-SCNC: 98 MMOL/L (ref 98–107)
CLARITY UR: ABNORMAL
CLARITY UR: CLEAR
CO2 SERPL-SCNC: 25 MMOL/L (ref 22–29)
CO2 SERPL-SCNC: 25.4 MMOL/L (ref 22–29)
CO2 SERPL-SCNC: 25.7 MMOL/L (ref 22–29)
CO2 SERPL-SCNC: 27 MMOL/L (ref 22–29)
CO2 SERPL-SCNC: 27 MMOL/L (ref 22–29)
CO2 SERPL-SCNC: 32.8 MMOL/L (ref 22–29)
CO2 SERPL-SCNC: 33 MMOL/L (ref 22–29)
CO2 SERPL-SCNC: 33.1 MMOL/L (ref 22–29)
COLOR UR: ABNORMAL
COLOR UR: YELLOW
CREAT SERPL-MCNC: 0.44 MG/DL (ref 0.57–1)
CREAT SERPL-MCNC: 0.47 MG/DL (ref 0.57–1)
CREAT SERPL-MCNC: 0.49 MG/DL (ref 0.57–1)
CREAT SERPL-MCNC: 0.51 MG/DL (ref 0.57–1)
CREAT SERPL-MCNC: 0.55 MG/DL (ref 0.57–1)
CREAT SERPL-MCNC: 0.59 MG/DL (ref 0.57–1)
CREAT SERPL-MCNC: 0.64 MG/DL (ref 0.57–1)
CREAT SERPL-MCNC: 0.66 MG/DL (ref 0.57–1)
CREAT SERPL-MCNC: 0.84 MG/DL (ref 0.57–1)
CREAT SERPL-MCNC: 0.86 MG/DL (ref 0.57–1)
CREAT SERPL-MCNC: 0.94 MG/DL (ref 0.57–1)
CREAT SERPL-MCNC: 1 MG/DL (ref 0.57–1)
CROSSMATCH INTERPRETATION: NORMAL
CROSSMATCH INTERPRETATION: NORMAL
D DIMER PPP FEU-MCNC: 1.46 MCGFEU/ML (ref 0–0.76)
D-LACTATE SERPL-SCNC: 1.1 MMOL/L (ref 0.5–2)
D-LACTATE SERPL-SCNC: 2 MMOL/L (ref 0.5–2)
D-LACTATE SERPL-SCNC: 2.1 MMOL/L (ref 0.5–2)
DEPRECATED RDW RBC AUTO: 41.1 FL (ref 37–54)
DEPRECATED RDW RBC AUTO: 43.1 FL (ref 37–54)
DEPRECATED RDW RBC AUTO: 43.5 FL (ref 37–54)
DEPRECATED RDW RBC AUTO: 43.6 FL (ref 37–54)
DEPRECATED RDW RBC AUTO: 43.7 FL (ref 37–54)
DEPRECATED RDW RBC AUTO: 44.3 FL (ref 37–54)
DEPRECATED RDW RBC AUTO: 44.9 FL (ref 37–54)
DEPRECATED RDW RBC AUTO: 46.8 FL (ref 37–54)
EGFRCR SERPLBLD CKD-EPI 2021: 100.4 ML/MIN/1.73
EGFRCR SERPLBLD CKD-EPI 2021: 58.5 ML/MIN/1.73
EGFRCR SERPLBLD CKD-EPI 2021: 63 ML/MIN/1.73
EGFRCR SERPLBLD CKD-EPI 2021: 70.1 ML/MIN/1.73
EGFRCR SERPLBLD CKD-EPI 2021: 72.1 ML/MIN/1.73
EGFRCR SERPLBLD CKD-EPI 2021: 91 ML/MIN/1.73
EGFRCR SERPLBLD CKD-EPI 2021: 91.7 ML/MIN/1.73
EGFRCR SERPLBLD CKD-EPI 2021: 93.5 ML/MIN/1.73
EGFRCR SERPLBLD CKD-EPI 2021: 95.1 ML/MIN/1.73
EGFRCR SERPLBLD CKD-EPI 2021: 96.9 ML/MIN/1.73
EGFRCR SERPLBLD CKD-EPI 2021: 97.8 ML/MIN/1.73
EGFRCR SERPLBLD CKD-EPI 2021: 98.8 ML/MIN/1.73
EOSINOPHIL # BLD AUTO: 0 10*3/MM3 (ref 0–0.4)
EOSINOPHIL # BLD AUTO: 0.01 10*3/MM3 (ref 0–0.4)
EOSINOPHIL NFR BLD AUTO: 0 % (ref 0.3–6.2)
EOSINOPHIL NFR BLD AUTO: 0 % (ref 0.3–6.2)
ERYTHROCYTE [DISTWIDTH] IN BLOOD BY AUTOMATED COUNT: 12.8 % (ref 12.3–15.4)
ERYTHROCYTE [DISTWIDTH] IN BLOOD BY AUTOMATED COUNT: 12.9 % (ref 12.3–15.4)
ERYTHROCYTE [DISTWIDTH] IN BLOOD BY AUTOMATED COUNT: 13.4 % (ref 12.3–15.4)
ERYTHROCYTE [DISTWIDTH] IN BLOOD BY AUTOMATED COUNT: 13.7 % (ref 12.3–15.4)
ERYTHROCYTE [DISTWIDTH] IN BLOOD BY AUTOMATED COUNT: 13.7 % (ref 12.3–15.4)
ERYTHROCYTE [DISTWIDTH] IN BLOOD BY AUTOMATED COUNT: 13.8 % (ref 12.3–15.4)
ERYTHROCYTE [DISTWIDTH] IN BLOOD BY AUTOMATED COUNT: 13.9 % (ref 12.3–15.4)
ERYTHROCYTE [DISTWIDTH] IN BLOOD BY AUTOMATED COUNT: 14.3 % (ref 12.3–15.4)
FLUAV SUBTYP SPEC NAA+PROBE: NOT DETECTED
FLUBV RNA ISLT QL NAA+PROBE: NOT DETECTED
GAS FLOW AIRWAY: 10 LPM
GAS FLOW AIRWAY: 6 LPM
GLOBULIN UR ELPH-MCNC: 2.3 GM/DL
GLOBULIN UR ELPH-MCNC: 2.5 GM/DL
GLOBULIN UR ELPH-MCNC: 2.5 GM/DL
GLUCOSE BLDC GLUCOMTR-MCNC: 102 MG/DL (ref 70–130)
GLUCOSE BLDC GLUCOMTR-MCNC: 112 MG/DL (ref 70–130)
GLUCOSE BLDC GLUCOMTR-MCNC: 125 MG/DL (ref 70–130)
GLUCOSE BLDC GLUCOMTR-MCNC: 138 MG/DL (ref 70–130)
GLUCOSE BLDC GLUCOMTR-MCNC: 143 MG/DL (ref 70–130)
GLUCOSE BLDC GLUCOMTR-MCNC: 143 MG/DL (ref 70–130)
GLUCOSE BLDC GLUCOMTR-MCNC: 156 MG/DL (ref 70–130)
GLUCOSE BLDC GLUCOMTR-MCNC: 159 MG/DL (ref 70–130)
GLUCOSE BLDC GLUCOMTR-MCNC: 164 MG/DL (ref 70–130)
GLUCOSE BLDC GLUCOMTR-MCNC: 167 MG/DL (ref 70–130)
GLUCOSE BLDC GLUCOMTR-MCNC: 168 MG/DL (ref 70–130)
GLUCOSE BLDC GLUCOMTR-MCNC: 174 MG/DL (ref 70–130)
GLUCOSE BLDC GLUCOMTR-MCNC: 175 MG/DL (ref 70–130)
GLUCOSE BLDC GLUCOMTR-MCNC: 176 MG/DL (ref 70–130)
GLUCOSE BLDC GLUCOMTR-MCNC: 177 MG/DL (ref 70–130)
GLUCOSE BLDC GLUCOMTR-MCNC: 179 MG/DL (ref 70–130)
GLUCOSE BLDC GLUCOMTR-MCNC: 179 MG/DL (ref 70–130)
GLUCOSE BLDC GLUCOMTR-MCNC: 181 MG/DL (ref 70–130)
GLUCOSE BLDC GLUCOMTR-MCNC: 183 MG/DL (ref 70–130)
GLUCOSE BLDC GLUCOMTR-MCNC: 186 MG/DL (ref 70–130)
GLUCOSE BLDC GLUCOMTR-MCNC: 192 MG/DL (ref 70–130)
GLUCOSE BLDC GLUCOMTR-MCNC: 197 MG/DL (ref 70–130)
GLUCOSE BLDC GLUCOMTR-MCNC: 202 MG/DL (ref 70–130)
GLUCOSE BLDC GLUCOMTR-MCNC: 204 MG/DL (ref 70–130)
GLUCOSE BLDC GLUCOMTR-MCNC: 205 MG/DL (ref 70–130)
GLUCOSE BLDC GLUCOMTR-MCNC: 207 MG/DL (ref 70–130)
GLUCOSE BLDC GLUCOMTR-MCNC: 208 MG/DL (ref 70–130)
GLUCOSE BLDC GLUCOMTR-MCNC: 221 MG/DL (ref 70–130)
GLUCOSE BLDC GLUCOMTR-MCNC: 224 MG/DL (ref 70–130)
GLUCOSE BLDC GLUCOMTR-MCNC: 226 MG/DL (ref 70–130)
GLUCOSE BLDC GLUCOMTR-MCNC: 252 MG/DL (ref 70–130)
GLUCOSE BLDC GLUCOMTR-MCNC: 261 MG/DL (ref 70–130)
GLUCOSE BLDC GLUCOMTR-MCNC: 264 MG/DL (ref 70–130)
GLUCOSE BLDC GLUCOMTR-MCNC: 99 MG/DL (ref 70–130)
GLUCOSE SERPL-MCNC: 112 MG/DL (ref 65–99)
GLUCOSE SERPL-MCNC: 120 MG/DL (ref 65–99)
GLUCOSE SERPL-MCNC: 129 MG/DL (ref 65–99)
GLUCOSE SERPL-MCNC: 133 MG/DL (ref 65–99)
GLUCOSE SERPL-MCNC: 148 MG/DL (ref 65–99)
GLUCOSE SERPL-MCNC: 182 MG/DL (ref 65–99)
GLUCOSE SERPL-MCNC: 254 MG/DL (ref 65–99)
GLUCOSE SERPL-MCNC: 92 MG/DL (ref 65–99)
GLUCOSE UR STRIP-MCNC: ABNORMAL MG/DL
GLUCOSE UR STRIP-MCNC: NEGATIVE MG/DL
HADV DNA SPEC NAA+PROBE: NOT DETECTED
HCO3 BLDA-SCNC: 21.5 MMOL/L (ref 22–28)
HCO3 BLDA-SCNC: 24.1 MMOL/L (ref 22–28)
HCO3 BLDA-SCNC: 25.7 MMOL/L (ref 22–28)
HCO3 BLDA-SCNC: 26 MMOL/L (ref 22–28)
HCO3 BLDA-SCNC: 27.1 MMOL/L (ref 22–28)
HCO3 BLDA-SCNC: 27.2 MMOL/L (ref 22–28)
HCO3 BLDA-SCNC: 27.9 MMOL/L (ref 22–28)
HCO3 BLDA-SCNC: 28.8 MMOL/L (ref 22–28)
HCOV 229E RNA SPEC QL NAA+PROBE: NOT DETECTED
HCOV HKU1 RNA SPEC QL NAA+PROBE: NOT DETECTED
HCOV NL63 RNA SPEC QL NAA+PROBE: NOT DETECTED
HCOV OC43 RNA SPEC QL NAA+PROBE: NOT DETECTED
HCT VFR BLD AUTO: 21 % (ref 34–46.6)
HCT VFR BLD AUTO: 22.2 % (ref 34–46.6)
HCT VFR BLD AUTO: 22.6 % (ref 34–46.6)
HCT VFR BLD AUTO: 22.8 % (ref 34–46.6)
HCT VFR BLD AUTO: 23.9 % (ref 34–46.6)
HCT VFR BLD AUTO: 24.3 % (ref 34–46.6)
HCT VFR BLD AUTO: 25.1 % (ref 34–46.6)
HCT VFR BLD AUTO: 25.7 % (ref 34–46.6)
HCT VFR BLD AUTO: 26 % (ref 34–46.6)
HCT VFR BLD AUTO: 26.1 % (ref 34–46.6)
HCT VFR BLD AUTO: 26.1 % (ref 34–46.6)
HCT VFR BLD AUTO: 26.4 % (ref 34–46.6)
HCT VFR BLD AUTO: 26.5 % (ref 34–46.6)
HCT VFR BLD AUTO: 26.8 % (ref 34–46.6)
HCT VFR BLD AUTO: 26.8 % (ref 34–46.6)
HCT VFR BLD AUTO: 27.1 % (ref 34–46.6)
HCT VFR BLD AUTO: 27.4 % (ref 34–46.6)
HCT VFR BLD AUTO: 27.6 % (ref 34–46.6)
HCT VFR BLD AUTO: 27.6 % (ref 34–46.6)
HCT VFR BLD AUTO: 27.7 % (ref 34–46.6)
HCT VFR BLD AUTO: 27.8 % (ref 34–46.6)
HCT VFR BLD AUTO: 28.2 % (ref 34–46.6)
HCT VFR BLD AUTO: 28.3 % (ref 34–46.6)
HCT VFR BLD AUTO: 28.7 % (ref 34–46.6)
HCT VFR BLD AUTO: 28.9 % (ref 34–46.6)
HCT VFR BLD AUTO: 30.1 % (ref 34–46.6)
HCT VFR BLD AUTO: 30.4 % (ref 34–46.6)
HCT VFR BLD AUTO: 32.6 % (ref 34–46.6)
HEMOCCULT STL QL: POSITIVE
HGB BLD-MCNC: 10.3 G/DL (ref 12–15.9)
HGB BLD-MCNC: 10.5 G/DL (ref 12–15.9)
HGB BLD-MCNC: 6.8 G/DL (ref 12–15.9)
HGB BLD-MCNC: 7.3 G/DL (ref 12–15.9)
HGB BLD-MCNC: 7.5 G/DL (ref 12–15.9)
HGB BLD-MCNC: 7.6 G/DL (ref 12–15.9)
HGB BLD-MCNC: 7.9 G/DL (ref 12–15.9)
HGB BLD-MCNC: 8.3 G/DL (ref 12–15.9)
HGB BLD-MCNC: 8.6 G/DL (ref 12–15.9)
HGB BLD-MCNC: 8.7 G/DL (ref 12–15.9)
HGB BLD-MCNC: 8.7 G/DL (ref 12–15.9)
HGB BLD-MCNC: 8.8 G/DL (ref 12–15.9)
HGB BLD-MCNC: 8.8 G/DL (ref 12–15.9)
HGB BLD-MCNC: 9 G/DL (ref 12–15.9)
HGB BLD-MCNC: 9.1 G/DL (ref 12–15.9)
HGB BLD-MCNC: 9.2 G/DL (ref 12–15.9)
HGB BLD-MCNC: 9.2 G/DL (ref 12–15.9)
HGB BLD-MCNC: 9.3 G/DL (ref 12–15.9)
HGB BLD-MCNC: 9.4 G/DL (ref 12–15.9)
HGB BLD-MCNC: 9.5 G/DL (ref 12–15.9)
HGB BLD-MCNC: 9.7 G/DL (ref 12–15.9)
HGB UR QL STRIP.AUTO: ABNORMAL
HGB UR QL STRIP.AUTO: ABNORMAL
HMPV RNA NPH QL NAA+NON-PROBE: NOT DETECTED
HOLD SPECIMEN: NORMAL
HOLD SPECIMEN: NORMAL
HPIV1 RNA ISLT QL NAA+PROBE: NOT DETECTED
HPIV2 RNA SPEC QL NAA+PROBE: NOT DETECTED
HPIV3 RNA NPH QL NAA+PROBE: NOT DETECTED
HPIV4 P GENE NPH QL NAA+PROBE: NOT DETECTED
HYALINE CASTS UR QL AUTO: ABNORMAL /LPF
HYALINE CASTS UR QL AUTO: ABNORMAL /LPF
IMM GRANULOCYTES # BLD AUTO: 0.95 10*3/MM3 (ref 0–0.05)
IMM GRANULOCYTES NFR BLD AUTO: 4.1 % (ref 0–0.5)
INHALED O2 CONCENTRATION: 30 %
KETONES UR QL STRIP: NEGATIVE
KETONES UR QL STRIP: NEGATIVE
LEFT ATRIUM VOLUME INDEX: 26.6 ML/M2
LEUKOCYTE ESTERASE UR QL STRIP.AUTO: ABNORMAL
LEUKOCYTE ESTERASE UR QL STRIP.AUTO: NEGATIVE
LYMPHOCYTES # BLD AUTO: 0.22 10*3/MM3 (ref 0.7–3.1)
LYMPHOCYTES # BLD AUTO: 0.28 10*3/MM3 (ref 0.7–3.1)
LYMPHOCYTES # BLD MANUAL: 0 10*3/MM3 (ref 0.7–3.1)
LYMPHOCYTES # BLD MANUAL: 0.25 10*3/MM3 (ref 0.7–3.1)
LYMPHOCYTES # BLD MANUAL: 0.36 10*3/MM3 (ref 0.7–3.1)
LYMPHOCYTES NFR BLD AUTO: 1.2 % (ref 19.6–45.3)
LYMPHOCYTES NFR BLD AUTO: 3.2 % (ref 19.6–45.3)
LYMPHOCYTES NFR BLD MANUAL: 10 % (ref 5–12)
LYMPHOCYTES NFR BLD MANUAL: 4 % (ref 5–12)
LYMPHOCYTES NFR BLD MANUAL: 7 % (ref 5–12)
M PNEUMO IGG SER IA-ACNC: NOT DETECTED
MAGNESIUM SERPL-MCNC: 2 MG/DL (ref 1.6–2.4)
MAGNESIUM SERPL-MCNC: 2.2 MG/DL (ref 1.6–2.4)
MAGNESIUM SERPL-MCNC: 2.3 MG/DL (ref 1.6–2.4)
MAGNESIUM SERPL-MCNC: 2.3 MG/DL (ref 1.6–2.4)
MAGNESIUM SERPL-MCNC: 2.4 MG/DL (ref 1.6–2.4)
MAXIMAL PREDICTED HEART RATE: 144 BPM
MAXIMAL PREDICTED HEART RATE: 144 BPM
MCH RBC QN AUTO: 28.8 PG (ref 26.6–33)
MCH RBC QN AUTO: 29.6 PG (ref 26.6–33)
MCH RBC QN AUTO: 29.7 PG (ref 26.6–33)
MCH RBC QN AUTO: 29.7 PG (ref 26.6–33)
MCH RBC QN AUTO: 29.8 PG (ref 26.6–33)
MCH RBC QN AUTO: 29.9 PG (ref 26.6–33)
MCH RBC QN AUTO: 29.9 PG (ref 26.6–33)
MCH RBC QN AUTO: 30 PG (ref 26.6–33)
MCHC RBC AUTO-ENTMCNC: 32.2 G/DL (ref 31.5–35.7)
MCHC RBC AUTO-ENTMCNC: 32.5 G/DL (ref 31.5–35.7)
MCHC RBC AUTO-ENTMCNC: 32.9 G/DL (ref 31.5–35.7)
MCHC RBC AUTO-ENTMCNC: 33.3 G/DL (ref 31.5–35.7)
MCHC RBC AUTO-ENTMCNC: 33.3 G/DL (ref 31.5–35.7)
MCHC RBC AUTO-ENTMCNC: 33.6 G/DL (ref 31.5–35.7)
MCHC RBC AUTO-ENTMCNC: 33.8 G/DL (ref 31.5–35.7)
MCHC RBC AUTO-ENTMCNC: 34.2 G/DL (ref 31.5–35.7)
MCV RBC AUTO: 87.4 FL (ref 79–97)
MCV RBC AUTO: 88.1 FL (ref 79–97)
MCV RBC AUTO: 88.4 FL (ref 79–97)
MCV RBC AUTO: 88.6 FL (ref 79–97)
MCV RBC AUTO: 89.1 FL (ref 79–97)
MCV RBC AUTO: 90 FL (ref 79–97)
MCV RBC AUTO: 90.5 FL (ref 79–97)
MCV RBC AUTO: 92.6 FL (ref 79–97)
METAMYELOCYTES NFR BLD MANUAL: 2 % (ref 0–0)
METAMYELOCYTES NFR BLD MANUAL: 4 % (ref 0–0)
MODALITY: ABNORMAL
MONOCYTES # BLD AUTO: 0.38 10*3/MM3 (ref 0.1–0.9)
MONOCYTES # BLD AUTO: 1.87 10*3/MM3 (ref 0.1–0.9)
MONOCYTES # BLD: 0.62 10*3/MM3 (ref 0.1–0.9)
MONOCYTES # BLD: 0.99 10*3/MM3 (ref 0.1–0.9)
MONOCYTES # BLD: 2.66 10*3/MM3 (ref 0.1–0.9)
MONOCYTES NFR BLD AUTO: 5.6 % (ref 5–12)
MONOCYTES NFR BLD AUTO: 8.1 % (ref 5–12)
NEUTROPHILS # BLD AUTO: 23.42 10*3/MM3 (ref 1.7–7)
NEUTROPHILS # BLD AUTO: 23.43 10*3/MM3 (ref 1.7–7)
NEUTROPHILS # BLD AUTO: 7.56 10*3/MM3 (ref 1.7–7)
NEUTROPHILS NFR BLD AUTO: 19.91 10*3/MM3 (ref 1.7–7)
NEUTROPHILS NFR BLD AUTO: 6.17 10*3/MM3 (ref 1.7–7)
NEUTROPHILS NFR BLD AUTO: 86.4 % (ref 42.7–76)
NEUTROPHILS NFR BLD AUTO: 90.8 % (ref 42.7–76)
NEUTROPHILS NFR BLD MANUAL: 85 % (ref 42.7–76)
NEUTROPHILS NFR BLD MANUAL: 88 % (ref 42.7–76)
NEUTROPHILS NFR BLD MANUAL: 95 % (ref 42.7–76)
NITRITE UR QL STRIP: NEGATIVE
NITRITE UR QL STRIP: POSITIVE
NRBC BLD AUTO-RTO: 0 /100 WBC (ref 0–0.2)
NRBC BLD AUTO-RTO: 0 /100 WBC (ref 0–0.2)
NRBC SPEC MANUAL: 1 /100 WBC (ref 0–0.2)
O2 A-A PPRESDIFF RESPIRATORY: 0.3 MMHG
O2 A-A PPRESDIFF RESPIRATORY: 0.4 MMHG
O2 A-A PPRESDIFF RESPIRATORY: 0.5 MMHG
O2 A-A PPRESDIFF RESPIRATORY: 0.5 MMHG
O2 A-A PPRESDIFF RESPIRATORY: 0.6 MMHG
O2 A-A PPRESDIFF RESPIRATORY: 0.7 MMHG
PCO2 BLDA: 31.3 MM HG (ref 35–45)
PCO2 BLDA: 40.6 MM HG (ref 35–45)
PCO2 BLDA: 43.9 MM HG (ref 35–45)
PCO2 BLDA: 47.8 MM HG (ref 35–45)
PCO2 BLDA: 50.1 MM HG (ref 35–45)
PCO2 BLDA: 61.4 MM HG (ref 35–45)
PCO2 BLDA: 63.2 MM HG (ref 35–45)
PCO2 BLDA: 91.5 MM HG (ref 35–45)
PEEP RESPIRATORY: 8 CM[H2O]
PEEP RESPIRATORY: 8 CM[H2O]
PH BLDA: 7.08 PH UNITS (ref 7.35–7.45)
PH BLDA: 7.24 PH UNITS (ref 7.35–7.45)
PH BLDA: 7.27 PH UNITS (ref 7.35–7.45)
PH BLDA: 7.32 PH UNITS (ref 7.35–7.45)
PH BLDA: 7.37 PH UNITS (ref 7.35–7.45)
PH BLDA: 7.38 PH UNITS (ref 7.35–7.45)
PH BLDA: 7.4 PH UNITS (ref 7.35–7.45)
PH BLDA: 7.44 PH UNITS (ref 7.35–7.45)
PH UR STRIP.AUTO: 8 [PH] (ref 5–8)
PH UR STRIP.AUTO: <=5 [PH] (ref 5–8)
PHOSPHATE SERPL-MCNC: 3.2 MG/DL (ref 2.5–4.5)
PHOSPHATE SERPL-MCNC: 4.1 MG/DL (ref 2.5–4.5)
PHOSPHATE SERPL-MCNC: 4.4 MG/DL (ref 2.5–4.5)
PLAT MORPH BLD: NORMAL
PLATELET # BLD AUTO: 144 10*3/MM3 (ref 140–450)
PLATELET # BLD AUTO: 146 10*3/MM3 (ref 140–450)
PLATELET # BLD AUTO: 158 10*3/MM3 (ref 140–450)
PLATELET # BLD AUTO: 175 10*3/MM3 (ref 140–450)
PLATELET # BLD AUTO: 179 10*3/MM3 (ref 140–450)
PLATELET # BLD AUTO: 225 10*3/MM3 (ref 140–450)
PLATELET # BLD AUTO: 249 10*3/MM3 (ref 140–450)
PLATELET # BLD AUTO: 256 10*3/MM3 (ref 140–450)
PMV BLD AUTO: 10.3 FL (ref 6–12)
PMV BLD AUTO: 10.4 FL (ref 6–12)
PMV BLD AUTO: 10.4 FL (ref 6–12)
PMV BLD AUTO: 10.6 FL (ref 6–12)
PMV BLD AUTO: 10.9 FL (ref 6–12)
PMV BLD AUTO: 11 FL (ref 6–12)
PMV BLD AUTO: 11.1 FL (ref 6–12)
PMV BLD AUTO: 11.2 FL (ref 6–12)
PO2 BLDA: 105.3 MM HG (ref 80–100)
PO2 BLDA: 143.8 MM HG (ref 80–100)
PO2 BLDA: 59.6 MM HG (ref 80–100)
PO2 BLDA: 64.8 MM HG (ref 80–100)
PO2 BLDA: 74.9 MM HG (ref 80–100)
PO2 BLDA: 82.2 MM HG (ref 80–100)
PO2 BLDA: 88.7 MM HG (ref 80–100)
PO2 BLDA: 95.1 MM HG (ref 80–100)
POTASSIUM SERPL-SCNC: 3.2 MMOL/L (ref 3.5–5.2)
POTASSIUM SERPL-SCNC: 4 MMOL/L (ref 3.5–5.2)
POTASSIUM SERPL-SCNC: 4.4 MMOL/L (ref 3.5–5.2)
POTASSIUM SERPL-SCNC: 4.5 MMOL/L (ref 3.5–5.2)
POTASSIUM SERPL-SCNC: 4.6 MMOL/L (ref 3.5–5.2)
POTASSIUM SERPL-SCNC: 4.9 MMOL/L (ref 3.5–5.2)
POTASSIUM SERPL-SCNC: 5 MMOL/L (ref 3.5–5.2)
POTASSIUM SERPL-SCNC: 5.7 MMOL/L (ref 3.5–5.2)
PROCALCITONIN SERPL-MCNC: 0.07 NG/ML (ref 0–0.25)
PROCALCITONIN SERPL-MCNC: 0.15 NG/ML (ref 0–0.25)
PROCALCITONIN SERPL-MCNC: 0.43 NG/ML (ref 0–0.25)
PROCALCITONIN SERPL-MCNC: 0.78 NG/ML (ref 0–0.25)
PROT SERPL-MCNC: 5.1 G/DL (ref 6–8.5)
PROT SERPL-MCNC: 5.1 G/DL (ref 6–8.5)
PROT SERPL-MCNC: 5.9 G/DL (ref 6–8.5)
PROT SERPL-MCNC: 6.1 G/DL (ref 6–8.5)
PROT SERPL-MCNC: 6.1 G/DL (ref 6–8.5)
PROT SERPL-MCNC: 6.3 G/DL (ref 6–8.5)
PROT SERPL-MCNC: 6.4 G/DL (ref 6–8.5)
PROT UR QL STRIP: ABNORMAL
PROT UR QL STRIP: ABNORMAL
QT INTERVAL: 384 MS
RBC # BLD AUTO: 2.56 10*6/MM3 (ref 3.77–5.28)
RBC # BLD AUTO: 2.78 10*6/MM3 (ref 3.77–5.28)
RBC # BLD AUTO: 2.9 10*6/MM3 (ref 3.77–5.28)
RBC # BLD AUTO: 3.06 10*6/MM3 (ref 3.77–5.28)
RBC # BLD AUTO: 3.2 10*6/MM3 (ref 3.77–5.28)
RBC # BLD AUTO: 3.24 10*6/MM3 (ref 3.77–5.28)
RBC # BLD AUTO: 3.28 10*6/MM3 (ref 3.77–5.28)
RBC # BLD AUTO: 3.52 10*6/MM3 (ref 3.77–5.28)
RBC # UR STRIP: ABNORMAL /HPF
RBC # UR STRIP: ABNORMAL /HPF
RBC MORPH BLD: NORMAL
REF LAB TEST METHOD: ABNORMAL
REF LAB TEST METHOD: ABNORMAL
RH BLD: POSITIVE
RHINOVIRUS RNA SPEC NAA+PROBE: NOT DETECTED
RSV RNA NPH QL NAA+NON-PROBE: NOT DETECTED
SAO2 % BLDCOA: 90.2 % (ref 92–99)
SAO2 % BLDCOA: 90.2 % (ref 92–99)
SAO2 % BLDCOA: 93.4 % (ref 92–99)
SAO2 % BLDCOA: 95.6 % (ref 92–99)
SAO2 % BLDCOA: 96.6 % (ref 92–99)
SAO2 % BLDCOA: 96.9 % (ref 92–99)
SAO2 % BLDCOA: 97.1 % (ref 92–99)
SAO2 % BLDCOA: 97.8 % (ref 92–99)
SARS-COV-2 RNA NPH QL NAA+NON-PROBE: DETECTED
SET MECH RESP RATE: 20
SET MECH RESP RATE: 24
SINUS: 2.43 CM
SODIUM SERPL-SCNC: 136 MMOL/L (ref 136–145)
SODIUM SERPL-SCNC: 137 MMOL/L (ref 136–145)
SODIUM SERPL-SCNC: 143 MMOL/L (ref 136–145)
SODIUM SERPL-SCNC: 143 MMOL/L (ref 136–145)
SODIUM SERPL-SCNC: 144 MMOL/L (ref 136–145)
SODIUM SERPL-SCNC: 146 MMOL/L (ref 136–145)
SODIUM SERPL-SCNC: 147 MMOL/L (ref 136–145)
SODIUM SERPL-SCNC: 156 MMOL/L (ref 136–145)
SP GR UR STRIP: 1.01 (ref 1–1.03)
SP GR UR STRIP: 1.02 (ref 1–1.03)
SQUAMOUS #/AREA URNS HPF: ABNORMAL /HPF
SQUAMOUS #/AREA URNS HPF: ABNORMAL /HPF
STRESS TARGET HR: 122 BPM
STRESS TARGET HR: 122 BPM
T&S EXPIRATION DATE: NORMAL
TOTAL RATE: 15 BREATHS/MINUTE
TOTAL RATE: 20 BREATHS/MINUTE
TOTAL RATE: 21 BREATHS/MINUTE
TOTAL RATE: 22 BREATHS/MINUTE
TOTAL RATE: 24 BREATHS/MINUTE
TOTAL RATE: 24 BREATHS/MINUTE
TOTAL RATE: 26 BREATHS/MINUTE
TROPONIN T SERPL HS-MCNC: 43 NG/L
UNIT  ABO: NORMAL
UNIT  ABO: NORMAL
UNIT  RH: NORMAL
UNIT  RH: NORMAL
UROBILINOGEN UR QL STRIP: ABNORMAL
UROBILINOGEN UR QL STRIP: ABNORMAL
VANCOMYCIN SERPL-MCNC: 10.9 MCG/ML (ref 5–40)
VANCOMYCIN SERPL-MCNC: 11.6 MCG/ML (ref 5–40)
VANCOMYCIN TROUGH SERPL-MCNC: 10.8 MCG/ML (ref 5–20)
VARIANT LYMPHS NFR BLD MANUAL: 0 % (ref 19.6–45.3)
VARIANT LYMPHS NFR BLD MANUAL: 1 % (ref 19.6–45.3)
VARIANT LYMPHS NFR BLD MANUAL: 4 % (ref 19.6–45.3)
VT ON VENT VENT: 500 ML
VT ON VENT VENT: 500 ML
VT ON VENT VENT: 897 ML
WBC # UR STRIP: ABNORMAL /HPF
WBC # UR STRIP: ABNORMAL /HPF
WBC MORPH BLD: NORMAL
WBC NRBC COR # BLD: 12.68 10*3/MM3 (ref 3.4–10.8)
WBC NRBC COR # BLD: 14.56 10*3/MM3 (ref 3.4–10.8)
WBC NRBC COR # BLD: 23.07 10*3/MM3 (ref 3.4–10.8)
WBC NRBC COR # BLD: 24.65 10*3/MM3 (ref 3.4–10.8)
WBC NRBC COR # BLD: 26.63 10*3/MM3 (ref 3.4–10.8)
WBC NRBC COR # BLD: 6.8 10*3/MM3 (ref 3.4–10.8)
WBC NRBC COR # BLD: 8.89 10*3/MM3 (ref 3.4–10.8)
WBC NRBC COR # BLD: 9.95 10*3/MM3 (ref 3.4–10.8)
WHOLE BLOOD HOLD COAG: NORMAL
WHOLE BLOOD HOLD SPECIMEN: NORMAL

## 2023-01-01 PROCEDURE — 94664 DEMO&/EVAL PT USE INHALER: CPT

## 2023-01-01 PROCEDURE — 99223 1ST HOSP IP/OBS HIGH 75: CPT | Performed by: STUDENT IN AN ORGANIZED HEALTH CARE EDUCATION/TRAINING PROGRAM

## 2023-01-01 PROCEDURE — 80076 HEPATIC FUNCTION PANEL: CPT | Performed by: INTERNAL MEDICINE

## 2023-01-01 PROCEDURE — 85018 HEMOGLOBIN: CPT

## 2023-01-01 PROCEDURE — 82962 GLUCOSE BLOOD TEST: CPT

## 2023-01-01 PROCEDURE — 87086 URINE CULTURE/COLONY COUNT: CPT | Performed by: EMERGENCY MEDICINE

## 2023-01-01 PROCEDURE — 63710000001 INSULIN LISPRO (HUMAN) PER 5 UNITS: Performed by: INTERNAL MEDICINE

## 2023-01-01 PROCEDURE — 85379 FIBRIN DEGRADATION QUANT: CPT | Performed by: INTERNAL MEDICINE

## 2023-01-01 PROCEDURE — 99222 1ST HOSP IP/OBS MODERATE 55: CPT | Performed by: INTERNAL MEDICINE

## 2023-01-01 PROCEDURE — 94799 UNLISTED PULMONARY SVC/PX: CPT

## 2023-01-01 PROCEDURE — 93005 ELECTROCARDIOGRAM TRACING: CPT

## 2023-01-01 PROCEDURE — 82565 ASSAY OF CREATININE: CPT | Performed by: NURSE PRACTITIONER

## 2023-01-01 PROCEDURE — 94761 N-INVAS EAR/PLS OXIMETRY MLT: CPT

## 2023-01-01 PROCEDURE — 25010000002 VANCOMYCIN PER 500 MG: Performed by: INTERNAL MEDICINE

## 2023-01-01 PROCEDURE — 85007 BL SMEAR W/DIFF WBC COUNT: CPT | Performed by: STUDENT IN AN ORGANIZED HEALTH CARE EDUCATION/TRAINING PROGRAM

## 2023-01-01 PROCEDURE — 25010000002 MORPHINE PER 10 MG: Performed by: STUDENT IN AN ORGANIZED HEALTH CARE EDUCATION/TRAINING PROGRAM

## 2023-01-01 PROCEDURE — 83735 ASSAY OF MAGNESIUM: CPT

## 2023-01-01 PROCEDURE — 36430 TRANSFUSION BLD/BLD COMPNT: CPT

## 2023-01-01 PROCEDURE — 97535 SELF CARE MNGMENT TRAINING: CPT

## 2023-01-01 PROCEDURE — 25010000002 REMDESIVIR 100 MG/20ML SOLUTION 1 EACH VIAL: Performed by: NURSE PRACTITIONER

## 2023-01-01 PROCEDURE — 36415 COLL VENOUS BLD VENIPUNCTURE: CPT

## 2023-01-01 PROCEDURE — 84145 PROCALCITONIN (PCT): CPT | Performed by: INTERNAL MEDICINE

## 2023-01-01 PROCEDURE — 94760 N-INVAS EAR/PLS OXIMETRY 1: CPT

## 2023-01-01 PROCEDURE — G0378 HOSPITAL OBSERVATION PER HR: HCPCS

## 2023-01-01 PROCEDURE — 85025 COMPLETE CBC W/AUTO DIFF WBC: CPT | Performed by: STUDENT IN AN ORGANIZED HEALTH CARE EDUCATION/TRAINING PROGRAM

## 2023-01-01 PROCEDURE — 36600 WITHDRAWAL OF ARTERIAL BLOOD: CPT

## 2023-01-01 PROCEDURE — 85014 HEMATOCRIT: CPT | Performed by: INTERNAL MEDICINE

## 2023-01-01 PROCEDURE — P9016 RBC LEUKOCYTES REDUCED: HCPCS

## 2023-01-01 PROCEDURE — 99285 EMERGENCY DEPT VISIT HI MDM: CPT

## 2023-01-01 PROCEDURE — 99232 SBSQ HOSP IP/OBS MODERATE 35: CPT | Performed by: INTERNAL MEDICINE

## 2023-01-01 PROCEDURE — 71250 CT THORAX DX C-: CPT

## 2023-01-01 PROCEDURE — 71045 X-RAY EXAM CHEST 1 VIEW: CPT

## 2023-01-01 PROCEDURE — 86923 COMPATIBILITY TEST ELECTRIC: CPT

## 2023-01-01 PROCEDURE — 86900 BLOOD TYPING SEROLOGIC ABO: CPT

## 2023-01-01 PROCEDURE — 97110 THERAPEUTIC EXERCISES: CPT

## 2023-01-01 PROCEDURE — 25010000002 POTASSIUM CHLORIDE PER 2 MEQ OF POTASSIUM: Performed by: INTERNAL MEDICINE

## 2023-01-01 PROCEDURE — 84100 ASSAY OF PHOSPHORUS: CPT | Performed by: STUDENT IN AN ORGANIZED HEALTH CARE EDUCATION/TRAINING PROGRAM

## 2023-01-01 PROCEDURE — 25010000002 CEFTRIAXONE PER 250 MG: Performed by: INTERNAL MEDICINE

## 2023-01-01 PROCEDURE — 92526 ORAL FUNCTION THERAPY: CPT

## 2023-01-01 PROCEDURE — 85018 HEMOGLOBIN: CPT | Performed by: INTERNAL MEDICINE

## 2023-01-01 PROCEDURE — 25010000002 CEFTRIAXONE PER 250 MG: Performed by: NURSE PRACTITIONER

## 2023-01-01 PROCEDURE — 80048 BASIC METABOLIC PNL TOTAL CA: CPT | Performed by: INTERNAL MEDICINE

## 2023-01-01 PROCEDURE — 80048 BASIC METABOLIC PNL TOTAL CA: CPT | Performed by: STUDENT IN AN ORGANIZED HEALTH CARE EDUCATION/TRAINING PROGRAM

## 2023-01-01 PROCEDURE — 84145 PROCALCITONIN (PCT): CPT | Performed by: STUDENT IN AN ORGANIZED HEALTH CARE EDUCATION/TRAINING PROGRAM

## 2023-01-01 PROCEDURE — 25010000002 FUROSEMIDE PER 20 MG: Performed by: INTERNAL MEDICINE

## 2023-01-01 PROCEDURE — 25010000002 REMDESIVIR 100 MG/20ML SOLUTION 1 EACH VIAL: Performed by: INTERNAL MEDICINE

## 2023-01-01 PROCEDURE — 25510000001 PERFLUTREN (DEFINITY) 8.476 MG IN SODIUM CHLORIDE (PF) 0.9 % 10 ML INJECTION: Performed by: INTERNAL MEDICINE

## 2023-01-01 PROCEDURE — 94660 CPAP INITIATION&MGMT: CPT

## 2023-01-01 PROCEDURE — 82248 BILIRUBIN DIRECT: CPT | Performed by: NURSE PRACTITIONER

## 2023-01-01 PROCEDURE — 25010000002 DEXAMETHASONE PER 1 MG: Performed by: NURSE PRACTITIONER

## 2023-01-01 PROCEDURE — 93306 TTE W/DOPPLER COMPLETE: CPT | Performed by: INTERNAL MEDICINE

## 2023-01-01 PROCEDURE — 97530 THERAPEUTIC ACTIVITIES: CPT

## 2023-01-01 PROCEDURE — 25510000001 IOPAMIDOL PER 1 ML: Performed by: INTERNAL MEDICINE

## 2023-01-01 PROCEDURE — 81001 URINALYSIS AUTO W/SCOPE: CPT

## 2023-01-01 PROCEDURE — 82803 BLOOD GASES ANY COMBINATION: CPT

## 2023-01-01 PROCEDURE — 97163 PT EVAL HIGH COMPLEX 45 MIN: CPT

## 2023-01-01 PROCEDURE — 25010000002 DEXAMETHASONE PER 1 MG: Performed by: INTERNAL MEDICINE

## 2023-01-01 PROCEDURE — 81001 URINALYSIS AUTO W/SCOPE: CPT | Performed by: STUDENT IN AN ORGANIZED HEALTH CARE EDUCATION/TRAINING PROGRAM

## 2023-01-01 PROCEDURE — XW033E5 INTRODUCTION OF REMDESIVIR ANTI-INFECTIVE INTO PERIPHERAL VEIN, PERCUTANEOUS APPROACH, NEW TECHNOLOGY GROUP 5: ICD-10-PCS | Performed by: INTERNAL MEDICINE

## 2023-01-01 PROCEDURE — 71275 CT ANGIOGRAPHY CHEST: CPT

## 2023-01-01 PROCEDURE — 99231 SBSQ HOSP IP/OBS SF/LOW 25: CPT | Performed by: INTERNAL MEDICINE

## 2023-01-01 PROCEDURE — 85025 COMPLETE CBC W/AUTO DIFF WBC: CPT | Performed by: INTERNAL MEDICINE

## 2023-01-01 PROCEDURE — 85027 COMPLETE CBC AUTOMATED: CPT | Performed by: INTERNAL MEDICINE

## 2023-01-01 PROCEDURE — 80202 ASSAY OF VANCOMYCIN: CPT | Performed by: INTERNAL MEDICINE

## 2023-01-01 PROCEDURE — 83735 ASSAY OF MAGNESIUM: CPT | Performed by: STUDENT IN AN ORGANIZED HEALTH CARE EDUCATION/TRAINING PROGRAM

## 2023-01-01 PROCEDURE — 82272 OCCULT BLD FECES 1-3 TESTS: CPT | Performed by: INTERNAL MEDICINE

## 2023-01-01 PROCEDURE — 83605 ASSAY OF LACTIC ACID: CPT | Performed by: INTERNAL MEDICINE

## 2023-01-01 PROCEDURE — 80076 HEPATIC FUNCTION PANEL: CPT | Performed by: NURSE PRACTITIONER

## 2023-01-01 PROCEDURE — 25010000002 VANCOMYCIN 750 MG RECONSTITUTED SOLUTION 1 EACH VIAL: Performed by: INTERNAL MEDICINE

## 2023-01-01 PROCEDURE — 92611 MOTION FLUOROSCOPY/SWALLOW: CPT

## 2023-01-01 PROCEDURE — 85025 COMPLETE CBC W/AUTO DIFF WBC: CPT

## 2023-01-01 PROCEDURE — 83605 ASSAY OF LACTIC ACID: CPT | Performed by: EMERGENCY MEDICINE

## 2023-01-01 PROCEDURE — 80053 COMPREHEN METABOLIC PANEL: CPT | Performed by: INTERNAL MEDICINE

## 2023-01-01 PROCEDURE — 85007 BL SMEAR W/DIFF WBC COUNT: CPT | Performed by: INTERNAL MEDICINE

## 2023-01-01 PROCEDURE — 82565 ASSAY OF CREATININE: CPT | Performed by: INTERNAL MEDICINE

## 2023-01-01 PROCEDURE — 93010 ELECTROCARDIOGRAM REPORT: CPT | Performed by: INTERNAL MEDICINE

## 2023-01-01 PROCEDURE — 80053 COMPREHEN METABOLIC PANEL: CPT | Performed by: NURSE PRACTITIONER

## 2023-01-01 PROCEDURE — P9612 CATHETERIZE FOR URINE SPEC: HCPCS

## 2023-01-01 PROCEDURE — 93970 EXTREMITY STUDY: CPT

## 2023-01-01 PROCEDURE — 86901 BLOOD TYPING SEROLOGIC RH(D): CPT

## 2023-01-01 PROCEDURE — 74230 X-RAY XM SWLNG FUNCJ C+: CPT

## 2023-01-01 PROCEDURE — 84484 ASSAY OF TROPONIN QUANT: CPT

## 2023-01-01 PROCEDURE — 85014 HEMATOCRIT: CPT

## 2023-01-01 PROCEDURE — 97166 OT EVAL MOD COMPLEX 45 MIN: CPT

## 2023-01-01 PROCEDURE — 92610 EVALUATE SWALLOWING FUNCTION: CPT

## 2023-01-01 PROCEDURE — 74176 CT ABD & PELVIS W/O CONTRAST: CPT

## 2023-01-01 PROCEDURE — 99232 SBSQ HOSP IP/OBS MODERATE 35: CPT | Performed by: NURSE PRACTITIONER

## 2023-01-01 PROCEDURE — 94640 AIRWAY INHALATION TREATMENT: CPT

## 2023-01-01 PROCEDURE — 0202U NFCT DS 22 TRGT SARS-COV-2: CPT | Performed by: NURSE PRACTITIONER

## 2023-01-01 PROCEDURE — 83735 ASSAY OF MAGNESIUM: CPT | Performed by: INTERNAL MEDICINE

## 2023-01-01 PROCEDURE — 86850 RBC ANTIBODY SCREEN: CPT

## 2023-01-01 PROCEDURE — 84145 PROCALCITONIN (PCT): CPT | Performed by: EMERGENCY MEDICINE

## 2023-01-01 PROCEDURE — 80053 COMPREHEN METABOLIC PANEL: CPT

## 2023-01-01 PROCEDURE — 87040 BLOOD CULTURE FOR BACTERIA: CPT | Performed by: EMERGENCY MEDICINE

## 2023-01-01 PROCEDURE — 93306 TTE W/DOPPLER COMPLETE: CPT

## 2023-01-01 PROCEDURE — 25010000002 CEFTRIAXONE PER 250 MG: Performed by: EMERGENCY MEDICINE

## 2023-01-01 RX ORDER — LORAZEPAM 2 MG/ML
2 CONCENTRATE ORAL
Status: DISCONTINUED | OUTPATIENT
Start: 2023-01-01 | End: 2023-01-01 | Stop reason: HOSPADM

## 2023-01-01 RX ORDER — ACETAMINOPHEN 160 MG/5ML
650 SOLUTION ORAL EVERY 4 HOURS PRN
Status: DISCONTINUED | OUTPATIENT
Start: 2023-01-01 | End: 2023-01-01

## 2023-01-01 RX ORDER — LORAZEPAM 2 MG/ML
0.5 INJECTION INTRAMUSCULAR
Status: CANCELLED | OUTPATIENT
Start: 2023-01-01 | End: 2023-03-12

## 2023-01-01 RX ORDER — GLYCOPYRROLATE 0.2 MG/ML
0.2 INJECTION INTRAMUSCULAR; INTRAVENOUS
Status: CANCELLED | OUTPATIENT
Start: 2023-01-01

## 2023-01-01 RX ORDER — LORAZEPAM 2 MG/ML
1 INJECTION INTRAMUSCULAR
Status: CANCELLED | OUTPATIENT
Start: 2023-01-01 | End: 2023-03-12

## 2023-01-01 RX ORDER — MORPHINE SULFATE 10 MG/ML
6 INJECTION INTRAMUSCULAR; INTRAVENOUS; SUBCUTANEOUS
Status: CANCELLED | OUTPATIENT
Start: 2023-01-01 | End: 2023-03-12

## 2023-01-01 RX ORDER — LORAZEPAM 2 MG/ML
0.5 CONCENTRATE ORAL
Status: DISCONTINUED | OUTPATIENT
Start: 2023-01-01 | End: 2023-01-01 | Stop reason: HOSPADM

## 2023-01-01 RX ORDER — GLYCOPYRROLATE 0.2 MG/ML
0.4 INJECTION INTRAMUSCULAR; INTRAVENOUS
Status: CANCELLED | OUTPATIENT
Start: 2023-01-01

## 2023-01-01 RX ORDER — SODIUM CHLORIDE 9 MG/ML
100 INJECTION, SOLUTION INTRAVENOUS CONTINUOUS
Status: CANCELLED | OUTPATIENT
Start: 2023-01-01

## 2023-01-01 RX ORDER — LORAZEPAM 2 MG/ML
0.5 INJECTION INTRAMUSCULAR
Status: DISCONTINUED | OUTPATIENT
Start: 2023-01-01 | End: 2023-01-01 | Stop reason: HOSPADM

## 2023-01-01 RX ORDER — MORPHINE SULFATE 20 MG/ML
5 SOLUTION ORAL
Status: DISCONTINUED | OUTPATIENT
Start: 2023-01-01 | End: 2023-01-01 | Stop reason: HOSPADM

## 2023-01-01 RX ORDER — SODIUM CHLORIDE 0.9 % (FLUSH) 0.9 %
10 SYRINGE (ML) INJECTION EVERY 12 HOURS SCHEDULED
Status: CANCELLED | OUTPATIENT
Start: 2023-01-01

## 2023-01-01 RX ORDER — MORPHINE SULFATE 4 MG/ML
4 INJECTION, SOLUTION INTRAMUSCULAR; INTRAVENOUS
Status: DISCONTINUED | OUTPATIENT
Start: 2023-01-01 | End: 2023-01-01 | Stop reason: HOSPADM

## 2023-01-01 RX ORDER — DIPHENOXYLATE HYDROCHLORIDE AND ATROPINE SULFATE 2.5; .025 MG/1; MG/1
1 TABLET ORAL
Status: DISCONTINUED | OUTPATIENT
Start: 2023-01-01 | End: 2023-01-01 | Stop reason: HOSPADM

## 2023-01-01 RX ORDER — ONDANSETRON 2 MG/ML
4 INJECTION INTRAMUSCULAR; INTRAVENOUS EVERY 6 HOURS PRN
Status: DISCONTINUED | OUTPATIENT
Start: 2023-01-01 | End: 2023-01-01

## 2023-01-01 RX ORDER — SCOLOPAMINE TRANSDERMAL SYSTEM 1 MG/1
1 PATCH, EXTENDED RELEASE TRANSDERMAL
Status: CANCELLED | OUTPATIENT
Start: 2023-01-01

## 2023-01-01 RX ORDER — SODIUM CHLORIDE 0.9 % (FLUSH) 0.9 %
10 SYRINGE (ML) INJECTION AS NEEDED
Status: DISCONTINUED | OUTPATIENT
Start: 2023-01-01 | End: 2023-01-01 | Stop reason: HOSPADM

## 2023-01-01 RX ORDER — NITROGLYCERIN 0.4 MG/1
0.4 TABLET SUBLINGUAL
Status: DISCONTINUED | OUTPATIENT
Start: 2023-01-01 | End: 2023-01-01

## 2023-01-01 RX ORDER — LORAZEPAM 2 MG/ML
2 INJECTION INTRAMUSCULAR
Status: CANCELLED | OUTPATIENT
Start: 2023-01-01 | End: 2023-03-12

## 2023-01-01 RX ORDER — LORAZEPAM 2 MG/ML
2 INJECTION INTRAMUSCULAR
Status: DISCONTINUED | OUTPATIENT
Start: 2023-01-01 | End: 2023-01-01 | Stop reason: HOSPADM

## 2023-01-01 RX ORDER — MORPHINE SULFATE 20 MG/ML
10 SOLUTION ORAL
Status: CANCELLED | OUTPATIENT
Start: 2023-01-01 | End: 2023-03-12

## 2023-01-01 RX ORDER — HYDROMORPHONE HCL 110MG/55ML
1.5 PATIENT CONTROLLED ANALGESIA SYRINGE INTRAVENOUS
Status: DISCONTINUED | OUTPATIENT
Start: 2023-01-01 | End: 2023-01-01 | Stop reason: HOSPADM

## 2023-01-01 RX ORDER — GLYCOPYRROLATE 0.2 MG/ML
0.2 INJECTION INTRAMUSCULAR; INTRAVENOUS
Status: DISCONTINUED | OUTPATIENT
Start: 2023-01-01 | End: 2023-01-01 | Stop reason: HOSPADM

## 2023-01-01 RX ORDER — GLYCOPYRROLATE 0.2 MG/ML
0.4 INJECTION INTRAMUSCULAR; INTRAVENOUS
Status: DISCONTINUED | OUTPATIENT
Start: 2023-01-01 | End: 2023-01-01 | Stop reason: HOSPADM

## 2023-01-01 RX ORDER — IPRATROPIUM BROMIDE AND ALBUTEROL SULFATE 2.5; .5 MG/3ML; MG/3ML
3 SOLUTION RESPIRATORY (INHALATION)
Status: DISCONTINUED | OUTPATIENT
Start: 2023-01-01 | End: 2023-01-01

## 2023-01-01 RX ORDER — INSULIN LISPRO 100 [IU]/ML
0-9 INJECTION, SOLUTION INTRAVENOUS; SUBCUTANEOUS EVERY 6 HOURS
Status: DISCONTINUED | OUTPATIENT
Start: 2023-01-01 | End: 2023-01-01

## 2023-01-01 RX ORDER — ACETAMINOPHEN 160 MG/5ML
650 SOLUTION ORAL EVERY 4 HOURS PRN
Status: CANCELLED | OUTPATIENT
Start: 2023-01-01

## 2023-01-01 RX ORDER — ACETAMINOPHEN 325 MG/1
650 TABLET ORAL EVERY 4 HOURS PRN
Status: DISCONTINUED | OUTPATIENT
Start: 2023-01-01 | End: 2023-01-01

## 2023-01-01 RX ORDER — PHENAZOPYRIDINE HYDROCHLORIDE 100 MG/1
100 TABLET, FILM COATED ORAL
Status: DISCONTINUED | OUTPATIENT
Start: 2023-01-01 | End: 2023-01-01

## 2023-01-01 RX ORDER — UBIDECARENONE 75 MG
50 CAPSULE ORAL DAILY
Status: DISCONTINUED | OUTPATIENT
Start: 2023-01-01 | End: 2023-01-01

## 2023-01-01 RX ORDER — ONDANSETRON 4 MG/1
4 TABLET, FILM COATED ORAL EVERY 6 HOURS PRN
Status: DISCONTINUED | OUTPATIENT
Start: 2023-01-01 | End: 2023-01-01 | Stop reason: HOSPADM

## 2023-01-01 RX ORDER — DEXTROSE, SODIUM CHLORIDE, AND POTASSIUM CHLORIDE 5; .2; .15 G/100ML; G/100ML; G/100ML
100 INJECTION INTRAVENOUS CONTINUOUS
Status: DISCONTINUED | OUTPATIENT
Start: 2023-01-01 | End: 2023-01-01

## 2023-01-01 RX ORDER — SCOLOPAMINE TRANSDERMAL SYSTEM 1 MG/1
1 PATCH, EXTENDED RELEASE TRANSDERMAL
Status: DISCONTINUED | OUTPATIENT
Start: 2023-01-01 | End: 2023-01-01 | Stop reason: HOSPADM

## 2023-01-01 RX ORDER — SODIUM CHLORIDE 9 MG/ML
100 INJECTION, SOLUTION INTRAVENOUS CONTINUOUS
Status: DISCONTINUED | OUTPATIENT
Start: 2023-01-01 | End: 2023-01-01

## 2023-01-01 RX ORDER — ONDANSETRON 2 MG/ML
4 INJECTION INTRAMUSCULAR; INTRAVENOUS EVERY 6 HOURS PRN
Status: DISCONTINUED | OUTPATIENT
Start: 2023-01-01 | End: 2023-01-01 | Stop reason: HOSPADM

## 2023-01-01 RX ORDER — INSULIN LISPRO 100 [IU]/ML
0-14 INJECTION, SOLUTION INTRAVENOUS; SUBCUTANEOUS EVERY 6 HOURS
Status: DISCONTINUED | OUTPATIENT
Start: 2023-01-01 | End: 2023-01-01

## 2023-01-01 RX ORDER — LORAZEPAM 2 MG/ML
1 INJECTION INTRAMUSCULAR
Status: DISCONTINUED | OUTPATIENT
Start: 2023-01-01 | End: 2023-01-01 | Stop reason: HOSPADM

## 2023-01-01 RX ORDER — HYDROMORPHONE HYDROCHLORIDE 1 MG/ML
0.5 INJECTION, SOLUTION INTRAMUSCULAR; INTRAVENOUS; SUBCUTANEOUS
Status: DISCONTINUED | OUTPATIENT
Start: 2023-01-01 | End: 2023-01-01 | Stop reason: HOSPADM

## 2023-01-01 RX ORDER — KETOROLAC TROMETHAMINE 15 MG/ML
15 INJECTION, SOLUTION INTRAMUSCULAR; INTRAVENOUS EVERY 6 HOURS PRN
Status: DISCONTINUED | OUTPATIENT
Start: 2023-01-01 | End: 2023-01-01 | Stop reason: HOSPADM

## 2023-01-01 RX ORDER — MORPHINE SULFATE 2 MG/ML
2 INJECTION, SOLUTION INTRAMUSCULAR; INTRAVENOUS
Status: DISCONTINUED | OUTPATIENT
Start: 2023-01-01 | End: 2023-01-01 | Stop reason: HOSPADM

## 2023-01-01 RX ORDER — LABETALOL HYDROCHLORIDE 5 MG/ML
20 INJECTION, SOLUTION INTRAVENOUS
Status: DISCONTINUED | OUTPATIENT
Start: 2023-01-01 | End: 2023-01-01

## 2023-01-01 RX ORDER — POTASSIUM CHLORIDE 750 MG/1
40 TABLET, FILM COATED, EXTENDED RELEASE ORAL AS NEEDED
Status: DISCONTINUED | OUTPATIENT
Start: 2023-01-01 | End: 2023-01-01

## 2023-01-01 RX ORDER — DEXAMETHASONE SODIUM PHOSPHATE 10 MG/ML
6 INJECTION INTRAMUSCULAR; INTRAVENOUS DAILY
Status: COMPLETED | OUTPATIENT
Start: 2023-01-01 | End: 2023-01-01

## 2023-01-01 RX ORDER — LORAZEPAM 2 MG/ML
1 CONCENTRATE ORAL
Status: CANCELLED | OUTPATIENT
Start: 2023-01-01 | End: 2023-03-12

## 2023-01-01 RX ORDER — SODIUM CHLORIDE 9 MG/ML
40 INJECTION, SOLUTION INTRAVENOUS AS NEEDED
Status: CANCELLED | OUTPATIENT
Start: 2023-01-01

## 2023-01-01 RX ORDER — PANTOPRAZOLE SODIUM 40 MG/10ML
40 INJECTION, POWDER, LYOPHILIZED, FOR SOLUTION INTRAVENOUS EVERY 12 HOURS SCHEDULED
Status: DISCONTINUED | OUTPATIENT
Start: 2023-01-01 | End: 2023-01-01

## 2023-01-01 RX ORDER — MORPHINE SULFATE 20 MG/ML
20 SOLUTION ORAL
Status: CANCELLED | OUTPATIENT
Start: 2023-01-01 | End: 2023-03-12

## 2023-01-01 RX ORDER — ACETAMINOPHEN 325 MG/1
650 TABLET ORAL EVERY 4 HOURS PRN
Status: CANCELLED | OUTPATIENT
Start: 2023-01-01

## 2023-01-01 RX ORDER — ACETAMINOPHEN 650 MG/1
650 SUPPOSITORY RECTAL EVERY 4 HOURS PRN
Status: DISCONTINUED | OUTPATIENT
Start: 2023-01-01 | End: 2023-01-01 | Stop reason: HOSPADM

## 2023-01-01 RX ORDER — MORPHINE SULFATE 2 MG/ML
4 INJECTION, SOLUTION INTRAMUSCULAR; INTRAVENOUS
Status: DISCONTINUED | OUTPATIENT
Start: 2023-01-01 | End: 2023-01-01 | Stop reason: HOSPADM

## 2023-01-01 RX ORDER — HYDROMORPHONE HYDROCHLORIDE 1 MG/ML
0.5 INJECTION, SOLUTION INTRAMUSCULAR; INTRAVENOUS; SUBCUTANEOUS
Status: CANCELLED | OUTPATIENT
Start: 2023-01-01 | End: 2023-03-10

## 2023-01-01 RX ORDER — LORAZEPAM 2 MG/ML
0.5 CONCENTRATE ORAL
Status: CANCELLED | OUTPATIENT
Start: 2023-01-01 | End: 2023-03-12

## 2023-01-01 RX ORDER — KETOROLAC TROMETHAMINE 15 MG/ML
15 INJECTION, SOLUTION INTRAMUSCULAR; INTRAVENOUS EVERY 6 HOURS PRN
Status: CANCELLED | OUTPATIENT
Start: 2023-01-01 | End: 2023-03-10

## 2023-01-01 RX ORDER — AMLODIPINE BESYLATE 5 MG/1
5 TABLET ORAL
Status: DISCONTINUED | OUTPATIENT
Start: 2023-01-01 | End: 2023-01-01

## 2023-01-01 RX ORDER — PANTOPRAZOLE SODIUM 40 MG/10ML
80 INJECTION, POWDER, LYOPHILIZED, FOR SOLUTION INTRAVENOUS ONCE
Status: COMPLETED | OUTPATIENT
Start: 2023-01-01 | End: 2023-01-01

## 2023-01-01 RX ORDER — MORPHINE SULFATE 2 MG/ML
2 INJECTION, SOLUTION INTRAMUSCULAR; INTRAVENOUS
Status: CANCELLED | OUTPATIENT
Start: 2023-01-01 | End: 2023-03-10

## 2023-01-01 RX ORDER — MORPHINE SULFATE 4 MG/ML
4 INJECTION, SOLUTION INTRAMUSCULAR; INTRAVENOUS
Status: CANCELLED | OUTPATIENT
Start: 2023-01-01 | End: 2023-03-12

## 2023-01-01 RX ORDER — ACETAMINOPHEN 650 MG/1
650 SUPPOSITORY RECTAL EVERY 4 HOURS PRN
Status: CANCELLED | OUTPATIENT
Start: 2023-01-01

## 2023-01-01 RX ORDER — MORPHINE SULFATE 20 MG/ML
5 SOLUTION ORAL
Status: CANCELLED | OUTPATIENT
Start: 2023-01-01 | End: 2023-03-10

## 2023-01-01 RX ORDER — LORAZEPAM 2 MG/ML
1 CONCENTRATE ORAL
Status: DISCONTINUED | OUTPATIENT
Start: 2023-01-01 | End: 2023-01-01 | Stop reason: HOSPADM

## 2023-01-01 RX ORDER — PRAMIPEXOLE DIHYDROCHLORIDE 0.25 MG/1
0.25 TABLET ORAL NIGHTLY
Status: DISCONTINUED | OUTPATIENT
Start: 2023-01-01 | End: 2023-01-01

## 2023-01-01 RX ORDER — VANCOMYCIN HYDROCHLORIDE 1 G/200ML
1000 INJECTION, SOLUTION INTRAVENOUS EVERY 24 HOURS
Status: COMPLETED | OUTPATIENT
Start: 2023-01-01 | End: 2023-01-01

## 2023-01-01 RX ORDER — POTASSIUM CHLORIDE 1.5 G/1.77G
40 POWDER, FOR SOLUTION ORAL AS NEEDED
Status: DISCONTINUED | OUTPATIENT
Start: 2023-01-01 | End: 2023-01-01

## 2023-01-01 RX ORDER — FUROSEMIDE 10 MG/ML
40 INJECTION INTRAMUSCULAR; INTRAVENOUS ONCE
Status: COMPLETED | OUTPATIENT
Start: 2023-01-01 | End: 2023-01-01

## 2023-01-01 RX ORDER — ACETAMINOPHEN 160 MG/5ML
650 SOLUTION ORAL EVERY 4 HOURS PRN
Status: DISCONTINUED | OUTPATIENT
Start: 2023-01-01 | End: 2023-01-01 | Stop reason: HOSPADM

## 2023-01-01 RX ORDER — SODIUM CHLORIDE 0.9 % (FLUSH) 0.9 %
10 SYRINGE (ML) INJECTION AS NEEDED
Status: CANCELLED | OUTPATIENT
Start: 2023-01-01

## 2023-01-01 RX ORDER — MORPHINE SULFATE 10 MG/ML
6 INJECTION INTRAMUSCULAR; INTRAVENOUS; SUBCUTANEOUS
Status: DISCONTINUED | OUTPATIENT
Start: 2023-01-01 | End: 2023-01-01 | Stop reason: HOSPADM

## 2023-01-01 RX ORDER — MORPHINE SULFATE 20 MG/ML
10 SOLUTION ORAL
Status: DISCONTINUED | OUTPATIENT
Start: 2023-01-01 | End: 2023-01-01 | Stop reason: HOSPADM

## 2023-01-01 RX ORDER — DIPHENOXYLATE HYDROCHLORIDE AND ATROPINE SULFATE 2.5; .025 MG/1; MG/1
1 TABLET ORAL
Status: CANCELLED | OUTPATIENT
Start: 2023-01-01

## 2023-01-01 RX ORDER — POTASSIUM CHLORIDE 7.45 MG/ML
10 INJECTION INTRAVENOUS
Status: DISCONTINUED | OUTPATIENT
Start: 2023-01-01 | End: 2023-01-01

## 2023-01-01 RX ORDER — MAGNESIUM SULFATE HEPTAHYDRATE 40 MG/ML
4 INJECTION, SOLUTION INTRAVENOUS AS NEEDED
Status: DISCONTINUED | OUTPATIENT
Start: 2023-01-01 | End: 2023-01-01

## 2023-01-01 RX ORDER — VANCOMYCIN HYDROCHLORIDE 1 G/200ML
20 INJECTION, SOLUTION INTRAVENOUS ONCE
Status: COMPLETED | OUTPATIENT
Start: 2023-01-01 | End: 2023-01-01

## 2023-01-01 RX ORDER — IBUPROFEN 600 MG/1
1 TABLET ORAL
Status: DISCONTINUED | OUTPATIENT
Start: 2023-01-01 | End: 2023-01-01

## 2023-01-01 RX ORDER — SODIUM CHLORIDE 9 MG/ML
125 INJECTION, SOLUTION INTRAVENOUS CONTINUOUS
Status: DISCONTINUED | OUTPATIENT
Start: 2023-01-01 | End: 2023-01-01

## 2023-01-01 RX ORDER — CALCIUM GLUCONATE 20 MG/ML
1 INJECTION, SOLUTION INTRAVENOUS AS NEEDED
Status: DISCONTINUED | OUTPATIENT
Start: 2023-01-01 | End: 2023-01-01

## 2023-01-01 RX ORDER — DEXTROSE MONOHYDRATE 25 G/50ML
25 INJECTION, SOLUTION INTRAVENOUS
Status: DISCONTINUED | OUTPATIENT
Start: 2023-01-01 | End: 2023-01-01

## 2023-01-01 RX ORDER — HYDROMORPHONE HCL 110MG/55ML
1.5 PATIENT CONTROLLED ANALGESIA SYRINGE INTRAVENOUS
Status: CANCELLED | OUTPATIENT
Start: 2023-01-01 | End: 2023-03-12

## 2023-01-01 RX ORDER — NICOTINE POLACRILEX 4 MG
15 LOZENGE BUCCAL
Status: DISCONTINUED | OUTPATIENT
Start: 2023-01-01 | End: 2023-01-01

## 2023-01-01 RX ORDER — MORPHINE SULFATE 20 MG/ML
20 SOLUTION ORAL
Status: DISCONTINUED | OUTPATIENT
Start: 2023-01-01 | End: 2023-01-01 | Stop reason: HOSPADM

## 2023-01-01 RX ORDER — ACETAMINOPHEN 325 MG/1
650 TABLET ORAL EVERY 4 HOURS PRN
Status: DISCONTINUED | OUTPATIENT
Start: 2023-01-01 | End: 2023-01-01 | Stop reason: HOSPADM

## 2023-01-01 RX ORDER — SODIUM CHLORIDE 9 MG/ML
75 INJECTION, SOLUTION INTRAVENOUS CONTINUOUS
Status: ACTIVE | OUTPATIENT
Start: 2023-01-01 | End: 2023-01-01

## 2023-01-01 RX ORDER — MAGNESIUM SULFATE 1 G/100ML
1 INJECTION INTRAVENOUS AS NEEDED
Status: DISCONTINUED | OUTPATIENT
Start: 2023-01-01 | End: 2023-01-01

## 2023-01-01 RX ORDER — ACETAMINOPHEN 500 MG
1000 TABLET ORAL ONCE
Status: COMPLETED | OUTPATIENT
Start: 2023-01-01 | End: 2023-01-01

## 2023-01-01 RX ORDER — MAGNESIUM SULFATE HEPTAHYDRATE 40 MG/ML
2 INJECTION, SOLUTION INTRAVENOUS AS NEEDED
Status: DISCONTINUED | OUTPATIENT
Start: 2023-01-01 | End: 2023-01-01

## 2023-01-01 RX ORDER — LORAZEPAM 2 MG/ML
2 CONCENTRATE ORAL
Status: CANCELLED | OUTPATIENT
Start: 2023-01-01 | End: 2023-03-12

## 2023-01-01 RX ORDER — ACETAMINOPHEN 650 MG/1
650 SUPPOSITORY RECTAL EVERY 4 HOURS PRN
Status: DISCONTINUED | OUTPATIENT
Start: 2023-01-01 | End: 2023-01-01

## 2023-01-01 RX ADMIN — INSULIN LISPRO 4 UNITS: 100 INJECTION, SOLUTION INTRAVENOUS; SUBCUTANEOUS at 00:45

## 2023-01-01 RX ADMIN — IPRATROPIUM BROMIDE AND ALBUTEROL SULFATE 3 ML: 2.5; .5 SOLUTION RESPIRATORY (INHALATION) at 23:25

## 2023-01-01 RX ADMIN — PRAMIPEXOLE DIHYDROCHLORIDE 0.25 MG: 0.25 TABLET ORAL at 21:30

## 2023-01-01 RX ADMIN — DEXAMETHASONE SODIUM PHOSPHATE 6 MG: 10 INJECTION INTRAMUSCULAR; INTRAVENOUS at 09:54

## 2023-01-01 RX ADMIN — CARBIDOPA AND LEVODOPA 1 TABLET: 25; 100 TABLET ORAL at 16:05

## 2023-01-01 RX ADMIN — PANTOPRAZOLE SODIUM 40 MG: 40 INJECTION, POWDER, FOR SOLUTION INTRAVENOUS at 08:57

## 2023-01-01 RX ADMIN — IPRATROPIUM BROMIDE AND ALBUTEROL SULFATE 3 ML: 2.5; .5 SOLUTION RESPIRATORY (INHALATION) at 11:42

## 2023-01-01 RX ADMIN — PANTOPRAZOLE SODIUM 40 MG: 40 INJECTION, POWDER, FOR SOLUTION INTRAVENOUS at 18:33

## 2023-01-01 RX ADMIN — MORPHINE SULFATE 2 MG: 2 INJECTION, SOLUTION INTRAMUSCULAR; INTRAVENOUS at 09:58

## 2023-01-01 RX ADMIN — IPRATROPIUM BROMIDE AND ALBUTEROL SULFATE 3 ML: 2.5; .5 SOLUTION RESPIRATORY (INHALATION) at 00:30

## 2023-01-01 RX ADMIN — CARBIDOPA AND LEVODOPA 1 TABLET: 25; 100 TABLET ORAL at 20:30

## 2023-01-01 RX ADMIN — DEXAMETHASONE SODIUM PHOSPHATE 6 MG: 10 INJECTION INTRAMUSCULAR; INTRAVENOUS at 08:08

## 2023-01-01 RX ADMIN — SODIUM CHLORIDE 500 ML: 9 INJECTION, SOLUTION INTRAVENOUS at 21:32

## 2023-01-01 RX ADMIN — SODIUM CHLORIDE 125 ML/HR: 9 INJECTION, SOLUTION INTRAVENOUS at 22:35

## 2023-01-01 RX ADMIN — REMDESIVIR 100 MG: 100 INJECTION, POWDER, LYOPHILIZED, FOR SOLUTION INTRAVENOUS at 03:19

## 2023-01-01 RX ADMIN — IPRATROPIUM BROMIDE AND ALBUTEROL SULFATE 3 ML: 2.5; .5 SOLUTION RESPIRATORY (INHALATION) at 11:26

## 2023-01-01 RX ADMIN — IPRATROPIUM BROMIDE AND ALBUTEROL SULFATE 3 ML: 2.5; .5 SOLUTION RESPIRATORY (INHALATION) at 23:54

## 2023-01-01 RX ADMIN — DEXAMETHASONE SODIUM PHOSPHATE 6 MG: 10 INJECTION INTRAMUSCULAR; INTRAVENOUS at 08:00

## 2023-01-01 RX ADMIN — PANTOPRAZOLE SODIUM 40 MG: 40 INJECTION, POWDER, FOR SOLUTION INTRAVENOUS at 20:20

## 2023-01-01 RX ADMIN — CARBIDOPA AND LEVODOPA 1 TABLET: 25; 100 TABLET ORAL at 10:32

## 2023-01-01 RX ADMIN — IPRATROPIUM BROMIDE AND ALBUTEROL SULFATE 3 ML: 2.5; .5 SOLUTION RESPIRATORY (INHALATION) at 08:04

## 2023-01-01 RX ADMIN — IPRATROPIUM BROMIDE AND ALBUTEROL SULFATE 3 ML: 2.5; .5 SOLUTION RESPIRATORY (INHALATION) at 23:05

## 2023-01-01 RX ADMIN — PANTOPRAZOLE SODIUM 40 MG: 40 INJECTION, POWDER, FOR SOLUTION INTRAVENOUS at 15:38

## 2023-01-01 RX ADMIN — DEXAMETHASONE SODIUM PHOSPHATE 6 MG: 10 INJECTION INTRAMUSCULAR; INTRAVENOUS at 09:22

## 2023-01-01 RX ADMIN — MORPHINE SULFATE 2 MG: 2 INJECTION, SOLUTION INTRAMUSCULAR; INTRAVENOUS at 10:32

## 2023-01-01 RX ADMIN — MORPHINE SULFATE 2 MG: 2 INJECTION, SOLUTION INTRAMUSCULAR; INTRAVENOUS at 14:43

## 2023-01-01 RX ADMIN — IPRATROPIUM BROMIDE AND ALBUTEROL SULFATE 3 ML: 2.5; .5 SOLUTION RESPIRATORY (INHALATION) at 07:31

## 2023-01-01 RX ADMIN — CEFTRIAXONE SODIUM 1 G: 1 INJECTION, POWDER, FOR SOLUTION INTRAMUSCULAR; INTRAVENOUS at 20:53

## 2023-01-01 RX ADMIN — Medication 50 MCG: at 09:04

## 2023-01-01 RX ADMIN — INSULIN LISPRO 3 UNITS: 100 INJECTION, SOLUTION INTRAVENOUS; SUBCUTANEOUS at 00:43

## 2023-01-01 RX ADMIN — ACETAMINOPHEN 500 MG: 500 TABLET ORAL at 21:27

## 2023-01-01 RX ADMIN — AMLODIPINE BESYLATE 5 MG: 5 TABLET ORAL at 15:05

## 2023-01-01 RX ADMIN — CEFTRIAXONE SODIUM 1 G: 1 INJECTION, POWDER, FOR SOLUTION INTRAMUSCULAR; INTRAVENOUS at 20:41

## 2023-01-01 RX ADMIN — INSULIN LISPRO 3 UNITS: 100 INJECTION, SOLUTION INTRAVENOUS; SUBCUTANEOUS at 06:27

## 2023-01-01 RX ADMIN — IPRATROPIUM BROMIDE AND ALBUTEROL SULFATE 3 ML: 2.5; .5 SOLUTION RESPIRATORY (INHALATION) at 07:38

## 2023-01-01 RX ADMIN — AMLODIPINE BESYLATE 5 MG: 5 TABLET ORAL at 09:53

## 2023-01-01 RX ADMIN — DEXAMETHASONE SODIUM PHOSPHATE 6 MG: 10 INJECTION INTRAMUSCULAR; INTRAVENOUS at 09:08

## 2023-01-01 RX ADMIN — ACETAMINOPHEN 650 MG: 325 TABLET, FILM COATED ORAL at 09:04

## 2023-01-01 RX ADMIN — PANTOPRAZOLE SODIUM 40 MG: 40 INJECTION, POWDER, FOR SOLUTION INTRAVENOUS at 20:29

## 2023-01-01 RX ADMIN — MORPHINE SULFATE 2 MG: 2 INJECTION, SOLUTION INTRAMUSCULAR; INTRAVENOUS at 16:16

## 2023-01-01 RX ADMIN — INSULIN LISPRO 5 UNITS: 100 INJECTION, SOLUTION INTRAVENOUS; SUBCUTANEOUS at 18:25

## 2023-01-01 RX ADMIN — VANCOMYCIN HYDROCHLORIDE 1000 MG: 1 INJECTION, SOLUTION INTRAVENOUS at 11:01

## 2023-01-01 RX ADMIN — PANTOPRAZOLE SODIUM 40 MG: 40 INJECTION, POWDER, FOR SOLUTION INTRAVENOUS at 06:37

## 2023-01-01 RX ADMIN — Medication 50 MCG: at 09:54

## 2023-01-01 RX ADMIN — DEXAMETHASONE SODIUM PHOSPHATE 6 MG: 10 INJECTION INTRAMUSCULAR; INTRAVENOUS at 08:57

## 2023-01-01 RX ADMIN — VANCOMYCIN HYDROCHLORIDE 1000 MG: 1 INJECTION, SOLUTION INTRAVENOUS at 09:52

## 2023-01-01 RX ADMIN — Medication 50 MCG: at 08:57

## 2023-01-01 RX ADMIN — INSULIN LISPRO 8 UNITS: 100 INJECTION, SOLUTION INTRAVENOUS; SUBCUTANEOUS at 18:18

## 2023-01-01 RX ADMIN — CARBIDOPA AND LEVODOPA 1 TABLET: 25; 100 TABLET ORAL at 15:02

## 2023-01-01 RX ADMIN — VANCOMYCIN HYDROCHLORIDE 750 MG: 750 INJECTION, POWDER, LYOPHILIZED, FOR SOLUTION INTRAVENOUS at 12:05

## 2023-01-01 RX ADMIN — PERFLUTREN 2 ML: 6.52 INJECTION, SUSPENSION INTRAVENOUS at 12:48

## 2023-01-01 RX ADMIN — BARIUM SULFATE 50 ML: 400 SUSPENSION ORAL at 13:41

## 2023-01-01 RX ADMIN — REMDESIVIR 100 MG: 100 INJECTION, POWDER, LYOPHILIZED, FOR SOLUTION INTRAVENOUS at 04:07

## 2023-01-01 RX ADMIN — ACETAMINOPHEN 650 MG: 325 SUSPENSION ORAL at 04:44

## 2023-01-01 RX ADMIN — IPRATROPIUM BROMIDE AND ALBUTEROL SULFATE 3 ML: 2.5; .5 SOLUTION RESPIRATORY (INHALATION) at 20:14

## 2023-01-01 RX ADMIN — CARBIDOPA AND LEVODOPA 1 TABLET: 25; 100 TABLET ORAL at 21:49

## 2023-01-01 RX ADMIN — IPRATROPIUM BROMIDE AND ALBUTEROL SULFATE 3 ML: 2.5; .5 SOLUTION RESPIRATORY (INHALATION) at 15:15

## 2023-01-01 RX ADMIN — IPRATROPIUM BROMIDE AND ALBUTEROL SULFATE 3 ML: 2.5; .5 SOLUTION RESPIRATORY (INHALATION) at 03:24

## 2023-01-01 RX ADMIN — INSULIN LISPRO 3 UNITS: 100 INJECTION, SOLUTION INTRAVENOUS; SUBCUTANEOUS at 18:25

## 2023-01-01 RX ADMIN — INSULIN LISPRO 3 UNITS: 100 INJECTION, SOLUTION INTRAVENOUS; SUBCUTANEOUS at 00:59

## 2023-01-01 RX ADMIN — POTASSIUM CHLORIDE 40 MEQ: 1.5 POWDER, FOR SOLUTION ORAL at 11:06

## 2023-01-01 RX ADMIN — INSULIN LISPRO 4 UNITS: 100 INJECTION, SOLUTION INTRAVENOUS; SUBCUTANEOUS at 06:46

## 2023-01-01 RX ADMIN — AMLODIPINE BESYLATE 5 MG: 5 TABLET ORAL at 08:05

## 2023-01-01 RX ADMIN — IPRATROPIUM BROMIDE AND ALBUTEROL SULFATE 3 ML: 2.5; .5 SOLUTION RESPIRATORY (INHALATION) at 11:02

## 2023-01-01 RX ADMIN — PANTOPRAZOLE SODIUM 40 MG: 40 INJECTION, POWDER, FOR SOLUTION INTRAVENOUS at 13:33

## 2023-01-01 RX ADMIN — PANTOPRAZOLE SODIUM 40 MG: 40 INJECTION, POWDER, FOR SOLUTION INTRAVENOUS at 20:40

## 2023-01-01 RX ADMIN — PANTOPRAZOLE SODIUM 80 MG: 40 INJECTION, POWDER, FOR SOLUTION INTRAVENOUS at 18:33

## 2023-01-01 RX ADMIN — VANCOMYCIN HYDROCHLORIDE 1000 MG: 1 INJECTION, SOLUTION INTRAVENOUS at 12:26

## 2023-01-01 RX ADMIN — PRAMIPEXOLE DIHYDROCHLORIDE 0.25 MG: 0.25 TABLET ORAL at 21:49

## 2023-01-01 RX ADMIN — INSULIN LISPRO 8 UNITS: 100 INJECTION, SOLUTION INTRAVENOUS; SUBCUTANEOUS at 11:47

## 2023-01-01 RX ADMIN — PANTOPRAZOLE SODIUM 40 MG: 40 INJECTION, POWDER, FOR SOLUTION INTRAVENOUS at 08:06

## 2023-01-01 RX ADMIN — PANTOPRAZOLE SODIUM 40 MG: 40 INJECTION, POWDER, FOR SOLUTION INTRAVENOUS at 12:06

## 2023-01-01 RX ADMIN — IPRATROPIUM BROMIDE AND ALBUTEROL SULFATE 3 ML: 2.5; .5 SOLUTION RESPIRATORY (INHALATION) at 08:07

## 2023-01-01 RX ADMIN — POTASSIUM CHLORIDE: 2 INJECTION, SOLUTION, CONCENTRATE INTRAVENOUS at 23:02

## 2023-01-01 RX ADMIN — BARIUM SULFATE 55 ML: 0.81 POWDER, FOR SUSPENSION ORAL at 13:41

## 2023-01-01 RX ADMIN — PANTOPRAZOLE SODIUM 40 MG: 40 INJECTION, POWDER, FOR SOLUTION INTRAVENOUS at 09:04

## 2023-01-01 RX ADMIN — PANTOPRAZOLE SODIUM 40 MG: 40 INJECTION, POWDER, FOR SOLUTION INTRAVENOUS at 03:11

## 2023-01-01 RX ADMIN — DEXAMETHASONE SODIUM PHOSPHATE 6 MG: 10 INJECTION INTRAMUSCULAR; INTRAVENOUS at 08:05

## 2023-01-01 RX ADMIN — IPRATROPIUM BROMIDE AND ALBUTEROL SULFATE 3 ML: 2.5; .5 SOLUTION RESPIRATORY (INHALATION) at 23:50

## 2023-01-01 RX ADMIN — PANTOPRAZOLE SODIUM 40 MG: 40 INJECTION, POWDER, FOR SOLUTION INTRAVENOUS at 20:44

## 2023-01-01 RX ADMIN — INSULIN LISPRO 5 UNITS: 100 INJECTION, SOLUTION INTRAVENOUS; SUBCUTANEOUS at 11:58

## 2023-01-01 RX ADMIN — PANTOPRAZOLE SODIUM 40 MG: 40 INJECTION, POWDER, FOR SOLUTION INTRAVENOUS at 18:12

## 2023-01-01 RX ADMIN — IPRATROPIUM BROMIDE AND ALBUTEROL SULFATE 3 ML: 2.5; .5 SOLUTION RESPIRATORY (INHALATION) at 11:21

## 2023-01-01 RX ADMIN — BARIUM SULFATE 4 ML: 980 POWDER, FOR SUSPENSION ORAL at 13:41

## 2023-01-01 RX ADMIN — INSULIN LISPRO 5 UNITS: 100 INJECTION, SOLUTION INTRAVENOUS; SUBCUTANEOUS at 12:52

## 2023-01-01 RX ADMIN — FUROSEMIDE 40 MG: 10 INJECTION, SOLUTION INTRAMUSCULAR; INTRAVENOUS at 08:28

## 2023-01-01 RX ADMIN — IPRATROPIUM BROMIDE AND ALBUTEROL SULFATE 3 ML: 2.5; .5 SOLUTION RESPIRATORY (INHALATION) at 21:00

## 2023-01-01 RX ADMIN — IPRATROPIUM BROMIDE AND ALBUTEROL SULFATE 3 ML: 2.5; .5 SOLUTION RESPIRATORY (INHALATION) at 11:37

## 2023-01-01 RX ADMIN — Medication 50 MCG: at 08:04

## 2023-01-01 RX ADMIN — IPRATROPIUM BROMIDE AND ALBUTEROL SULFATE 3 ML: 2.5; .5 SOLUTION RESPIRATORY (INHALATION) at 20:40

## 2023-01-01 RX ADMIN — DEXAMETHASONE SODIUM PHOSPHATE 6 MG: 10 INJECTION INTRAMUSCULAR; INTRAVENOUS at 03:40

## 2023-01-01 RX ADMIN — CEFTRIAXONE SODIUM 1 G: 1 INJECTION, POWDER, FOR SOLUTION INTRAMUSCULAR; INTRAVENOUS at 20:02

## 2023-01-01 RX ADMIN — CARBIDOPA AND LEVODOPA 1 TABLET: 25; 100 TABLET ORAL at 08:04

## 2023-01-01 RX ADMIN — IPRATROPIUM BROMIDE AND ALBUTEROL SULFATE 3 ML: 2.5; .5 SOLUTION RESPIRATORY (INHALATION) at 19:41

## 2023-01-01 RX ADMIN — INSULIN LISPRO 6 UNITS: 100 INJECTION, SOLUTION INTRAVENOUS; SUBCUTANEOUS at 19:01

## 2023-01-01 RX ADMIN — PRAMIPEXOLE DIHYDROCHLORIDE 0.25 MG: 0.25 TABLET ORAL at 20:44

## 2023-01-01 RX ADMIN — IPRATROPIUM BROMIDE AND ALBUTEROL SULFATE 3 ML: 2.5; .5 SOLUTION RESPIRATORY (INHALATION) at 11:48

## 2023-01-01 RX ADMIN — CARBIDOPA AND LEVODOPA 1 TABLET: 25; 100 TABLET ORAL at 15:19

## 2023-01-01 RX ADMIN — PRAMIPEXOLE DIHYDROCHLORIDE 0.25 MG: 0.25 TABLET ORAL at 21:32

## 2023-01-01 RX ADMIN — Medication 10 ML: at 09:05

## 2023-01-01 RX ADMIN — LABETALOL HYDROCHLORIDE 20 MG: 5 INJECTION, SOLUTION INTRAVENOUS at 13:37

## 2023-01-01 RX ADMIN — CARBIDOPA AND LEVODOPA 1 TABLET: 25; 100 TABLET ORAL at 15:32

## 2023-01-01 RX ADMIN — MORPHINE SULFATE 2 MG: 2 INJECTION, SOLUTION INTRAMUSCULAR; INTRAVENOUS at 21:06

## 2023-01-01 RX ADMIN — IPRATROPIUM BROMIDE AND ALBUTEROL SULFATE 3 ML: 2.5; .5 SOLUTION RESPIRATORY (INHALATION) at 15:18

## 2023-01-01 RX ADMIN — IPRATROPIUM BROMIDE AND ALBUTEROL SULFATE 3 ML: 2.5; .5 SOLUTION RESPIRATORY (INHALATION) at 02:38

## 2023-01-01 RX ADMIN — CARBIDOPA AND LEVODOPA 1 TABLET: 25; 100 TABLET ORAL at 08:57

## 2023-01-01 RX ADMIN — DEXAMETHASONE SODIUM PHOSPHATE 6 MG: 10 INJECTION INTRAMUSCULAR; INTRAVENOUS at 08:06

## 2023-01-01 RX ADMIN — IPRATROPIUM BROMIDE AND ALBUTEROL SULFATE 3 ML: 2.5; .5 SOLUTION RESPIRATORY (INHALATION) at 14:55

## 2023-01-01 RX ADMIN — IPRATROPIUM BROMIDE AND ALBUTEROL SULFATE 3 ML: 2.5; .5 SOLUTION RESPIRATORY (INHALATION) at 07:46

## 2023-01-01 RX ADMIN — INSULIN LISPRO 3 UNITS: 100 INJECTION, SOLUTION INTRAVENOUS; SUBCUTANEOUS at 00:50

## 2023-01-01 RX ADMIN — CARBIDOPA AND LEVODOPA 1 TABLET: 25; 100 TABLET ORAL at 20:21

## 2023-01-01 RX ADMIN — SODIUM CHLORIDE 75 ML/HR: 9 INJECTION, SOLUTION INTRAVENOUS at 03:40

## 2023-01-01 RX ADMIN — IPRATROPIUM BROMIDE AND ALBUTEROL SULFATE 3 ML: 2.5; .5 SOLUTION RESPIRATORY (INHALATION) at 20:39

## 2023-01-01 RX ADMIN — PANTOPRAZOLE SODIUM 40 MG: 40 INJECTION, POWDER, FOR SOLUTION INTRAVENOUS at 23:01

## 2023-01-01 RX ADMIN — VANCOMYCIN HYDROCHLORIDE 1000 MG: 1 INJECTION, SOLUTION INTRAVENOUS at 12:52

## 2023-01-01 RX ADMIN — Medication 50 MCG: at 08:08

## 2023-01-01 RX ADMIN — PRAMIPEXOLE DIHYDROCHLORIDE 0.25 MG: 0.25 TABLET ORAL at 20:00

## 2023-01-01 RX ADMIN — PRAMIPEXOLE DIHYDROCHLORIDE 0.25 MG: 0.25 TABLET ORAL at 20:30

## 2023-01-01 RX ADMIN — CARBIDOPA AND LEVODOPA 1 TABLET: 25; 100 TABLET ORAL at 20:44

## 2023-01-01 RX ADMIN — IPRATROPIUM BROMIDE AND ALBUTEROL SULFATE 3 ML: 2.5; .5 SOLUTION RESPIRATORY (INHALATION) at 14:58

## 2023-01-01 RX ADMIN — IOPAMIDOL 95 ML: 755 INJECTION, SOLUTION INTRAVENOUS at 15:00

## 2023-01-01 RX ADMIN — LABETALOL HYDROCHLORIDE 20 MG: 5 INJECTION, SOLUTION INTRAVENOUS at 13:05

## 2023-01-01 RX ADMIN — IPRATROPIUM BROMIDE AND ALBUTEROL SULFATE 3 ML: 2.5; .5 SOLUTION RESPIRATORY (INHALATION) at 14:25

## 2023-01-01 RX ADMIN — POTASSIUM CHLORIDE 40 MEQ: 1.5 POWDER, FOR SOLUTION ORAL at 15:10

## 2023-01-01 RX ADMIN — CARBIDOPA AND LEVODOPA 1 TABLET: 25; 100 TABLET ORAL at 09:53

## 2023-01-01 RX ADMIN — REMDESIVIR 200 MG: 100 INJECTION, POWDER, LYOPHILIZED, FOR SOLUTION INTRAVENOUS at 03:40

## 2023-01-01 RX ADMIN — INSULIN LISPRO 5 UNITS: 100 INJECTION, SOLUTION INTRAVENOUS; SUBCUTANEOUS at 00:12

## 2023-01-01 RX ADMIN — IPRATROPIUM BROMIDE AND ALBUTEROL SULFATE 3 ML: 2.5; .5 SOLUTION RESPIRATORY (INHALATION) at 23:33

## 2023-01-01 RX ADMIN — PANTOPRAZOLE SODIUM 40 MG: 40 INJECTION, POWDER, FOR SOLUTION INTRAVENOUS at 21:32

## 2023-01-01 RX ADMIN — IPRATROPIUM BROMIDE AND ALBUTEROL SULFATE 3 ML: 2.5; .5 SOLUTION RESPIRATORY (INHALATION) at 07:52

## 2023-01-01 RX ADMIN — LABETALOL HYDROCHLORIDE 20 MG: 5 INJECTION, SOLUTION INTRAVENOUS at 10:23

## 2023-01-01 RX ADMIN — AMLODIPINE BESYLATE 5 MG: 5 TABLET ORAL at 09:04

## 2023-01-01 RX ADMIN — CEFTRIAXONE SODIUM 1 G: 1 INJECTION, POWDER, FOR SOLUTION INTRAMUSCULAR; INTRAVENOUS at 20:29

## 2023-01-01 RX ADMIN — CARBIDOPA AND LEVODOPA 1 TABLET: 25; 100 TABLET ORAL at 09:04

## 2023-01-01 RX ADMIN — IPRATROPIUM BROMIDE AND ALBUTEROL SULFATE 3 ML: 2.5; .5 SOLUTION RESPIRATORY (INHALATION) at 10:23

## 2023-01-01 RX ADMIN — INSULIN LISPRO 3 UNITS: 100 INJECTION, SOLUTION INTRAVENOUS; SUBCUTANEOUS at 06:34

## 2023-01-01 RX ADMIN — CARBIDOPA AND LEVODOPA 1 TABLET: 25; 100 TABLET ORAL at 08:05

## 2023-01-01 RX ADMIN — POTASSIUM CHLORIDE: 2 INJECTION, SOLUTION, CONCENTRATE INTRAVENOUS at 11:45

## 2023-01-01 RX ADMIN — AMLODIPINE BESYLATE 5 MG: 5 TABLET ORAL at 08:57

## 2023-01-01 RX ADMIN — ACETAMINOPHEN 650 MG: 325 SUSPENSION ORAL at 15:16

## 2023-01-01 RX ADMIN — CARBIDOPA AND LEVODOPA 1 TABLET: 25; 100 TABLET ORAL at 08:07

## 2023-01-01 RX ADMIN — PANTOPRAZOLE SODIUM 40 MG: 40 INJECTION, POWDER, FOR SOLUTION INTRAVENOUS at 09:54

## 2023-01-01 RX ADMIN — PANTOPRAZOLE SODIUM 40 MG: 40 INJECTION, POWDER, FOR SOLUTION INTRAVENOUS at 21:49

## 2023-01-01 RX ADMIN — PANTOPRAZOLE SODIUM 40 MG: 40 INJECTION, POWDER, FOR SOLUTION INTRAVENOUS at 08:02

## 2023-01-01 RX ADMIN — IPRATROPIUM BROMIDE AND ALBUTEROL SULFATE 3 ML: 2.5; .5 SOLUTION RESPIRATORY (INHALATION) at 04:17

## 2023-01-01 RX ADMIN — IPRATROPIUM BROMIDE AND ALBUTEROL SULFATE 3 ML: 2.5; .5 SOLUTION RESPIRATORY (INHALATION) at 03:14

## 2023-01-01 RX ADMIN — IPRATROPIUM BROMIDE AND ALBUTEROL SULFATE 3 ML: 2.5; .5 SOLUTION RESPIRATORY (INHALATION) at 23:47

## 2023-01-01 RX ADMIN — CARBIDOPA AND LEVODOPA 1 TABLET: 25; 100 TABLET ORAL at 21:32

## 2023-01-01 RX ADMIN — CARBIDOPA AND LEVODOPA 1 TABLET: 25; 100 TABLET ORAL at 15:05

## 2023-01-01 RX ADMIN — INSULIN LISPRO 4 UNITS: 100 INJECTION, SOLUTION INTRAVENOUS; SUBCUTANEOUS at 13:25

## 2023-01-01 RX ADMIN — PANTOPRAZOLE SODIUM 40 MG: 40 INJECTION, POWDER, FOR SOLUTION INTRAVENOUS at 09:31

## 2023-01-01 RX ADMIN — CEFTRIAXONE SODIUM 1 G: 1 INJECTION, POWDER, FOR SOLUTION INTRAMUSCULAR; INTRAVENOUS at 21:32

## 2023-01-01 RX ADMIN — ACETAMINOPHEN 650 MG: 325 SUSPENSION ORAL at 20:13

## 2023-01-01 RX ADMIN — IPRATROPIUM BROMIDE AND ALBUTEROL SULFATE 3 ML: 2.5; .5 SOLUTION RESPIRATORY (INHALATION) at 19:55

## 2023-01-01 RX ADMIN — IPRATROPIUM BROMIDE AND ALBUTEROL SULFATE 3 ML: 2.5; .5 SOLUTION RESPIRATORY (INHALATION) at 15:05

## 2023-01-01 RX ADMIN — PANTOPRAZOLE SODIUM 40 MG: 40 INJECTION, POWDER, FOR SOLUTION INTRAVENOUS at 11:03

## 2023-01-01 RX ADMIN — POTASSIUM CHLORIDE, DEXTROSE MONOHYDRATE AND SODIUM CHLORIDE 100 ML/HR: 150; 5; 200 INJECTION, SOLUTION INTRAVENOUS at 08:49

## 2023-01-01 RX ADMIN — IPRATROPIUM BROMIDE AND ALBUTEROL SULFATE 3 ML: 2.5; .5 SOLUTION RESPIRATORY (INHALATION) at 23:10

## 2023-01-01 RX ADMIN — IPRATROPIUM BROMIDE AND ALBUTEROL SULFATE 3 ML: 2.5; .5 SOLUTION RESPIRATORY (INHALATION) at 02:22

## 2023-01-01 RX ADMIN — CEFTRIAXONE SODIUM 1 G: 1 INJECTION, POWDER, FOR SOLUTION INTRAMUSCULAR; INTRAVENOUS at 21:33

## 2023-01-01 RX ADMIN — CARBIDOPA AND LEVODOPA 1 TABLET: 25; 100 TABLET ORAL at 21:30

## 2023-01-01 RX ADMIN — IPRATROPIUM BROMIDE AND ALBUTEROL SULFATE 3 ML: 2.5; .5 SOLUTION RESPIRATORY (INHALATION) at 15:35

## 2023-01-01 RX ADMIN — IPRATROPIUM BROMIDE AND ALBUTEROL SULFATE 3 ML: 2.5; .5 SOLUTION RESPIRATORY (INHALATION) at 21:17

## 2023-01-01 RX ADMIN — PANTOPRAZOLE SODIUM 40 MG: 40 INJECTION, POWDER, FOR SOLUTION INTRAVENOUS at 16:58

## 2023-01-01 RX ADMIN — Medication 50 MCG: at 08:06

## 2023-01-01 RX ADMIN — PANTOPRAZOLE SODIUM 40 MG: 40 INJECTION, POWDER, FOR SOLUTION INTRAVENOUS at 03:16

## 2023-01-01 RX ADMIN — SODIUM CHLORIDE 100 ML/HR: 9 INJECTION, SOLUTION INTRAVENOUS at 23:32

## 2023-01-01 RX ADMIN — ACETAMINOPHEN 650 MG: 325 SUSPENSION ORAL at 12:13

## 2023-01-01 RX ADMIN — PRAMIPEXOLE DIHYDROCHLORIDE 0.25 MG: 0.25 TABLET ORAL at 20:20

## 2023-01-01 RX ADMIN — IPRATROPIUM BROMIDE AND ALBUTEROL SULFATE 3 ML: 2.5; .5 SOLUTION RESPIRATORY (INHALATION) at 12:38

## 2023-01-01 RX ADMIN — DEXAMETHASONE SODIUM PHOSPHATE 6 MG: 10 INJECTION INTRAMUSCULAR; INTRAVENOUS at 09:04

## 2023-01-01 RX ADMIN — REMDESIVIR 100 MG: 100 INJECTION, POWDER, LYOPHILIZED, FOR SOLUTION INTRAVENOUS at 03:12

## 2023-01-01 RX ADMIN — IPRATROPIUM BROMIDE AND ALBUTEROL SULFATE 3 ML: 2.5; .5 SOLUTION RESPIRATORY (INHALATION) at 20:45

## 2023-01-01 RX ADMIN — REMDESIVIR 100 MG: 100 INJECTION, POWDER, LYOPHILIZED, FOR SOLUTION INTRAVENOUS at 03:32

## 2023-01-01 RX ADMIN — POTASSIUM CHLORIDE: 2 INJECTION, SOLUTION, CONCENTRATE INTRAVENOUS at 18:10

## 2023-02-22 PROBLEM — N30.90 CYSTITIS: Status: ACTIVE | Noted: 2023-01-01

## 2023-02-22 PROBLEM — G20 MODERATE DEMENTIA DUE TO PARKINSON'S DISEASE, WITH MOOD DISTURBANCE (HCC): Status: ACTIVE | Noted: 2022-01-01

## 2023-02-22 PROBLEM — G20.A1 MODERATE DEMENTIA DUE TO PARKINSON'S DISEASE, WITH MOOD DISTURBANCE: Status: ACTIVE | Noted: 2022-01-01

## 2023-02-22 PROBLEM — G93.41 ACUTE METABOLIC ENCEPHALOPATHY: Status: ACTIVE | Noted: 2023-01-01

## 2023-02-22 PROBLEM — R31.9 HEMATURIA: Status: ACTIVE | Noted: 2023-01-01

## 2023-02-22 PROBLEM — J98.11 ATELECTASIS OF LEFT LUNG: Status: ACTIVE | Noted: 2023-01-01

## 2023-02-22 PROBLEM — I72.8 SPLENIC ARTERY ANEURYSM: Status: ACTIVE | Noted: 2023-01-01

## 2023-02-22 PROBLEM — F02.B3 MODERATE DEMENTIA DUE TO PARKINSON'S DISEASE, WITH MOOD DISTURBANCE (HCC): Status: ACTIVE | Noted: 2022-01-01

## 2023-02-22 PROBLEM — R13.11 ORAL PHASE DYSPHAGIA: Status: ACTIVE | Noted: 2023-01-01

## 2023-03-01 NOTE — PROGRESS NOTES
[facility] ([facility]  Inpatient Progress Note    Reason for Follow Up:  melena    Subjective     Interval History:   She has no abdominal pain.  The daughter states that the nurse that her last bowel movement was last dark.  Her hemoglobin is stable at 8.6 today.    Current Facility-Administered Medications:   •  acetaminophen (TYLENOL) tablet 650 mg, 650 mg, Oral, Q4H PRN **OR** acetaminophen (TYLENOL) 160 MG/5ML solution 650 mg, 650 mg, Oral, Q4H PRN, 650 mg at 02/27/23 1516 **OR** acetaminophen (TYLENOL) suppository 650 mg, 650 mg, Rectal, Q4H PRN, Darvin Carrillo Jr., MD  •  amLODIPine (NORVASC) tablet 5 mg, 5 mg, Nasogastric, Q24H, Darvin Carrillo Jr., MD, 5 mg at 03/01/23 0857  •  calcium gluconate 1g/50ml 0.675% NaCl IV SOLN, 1 g, Intravenous, PRN **AND** calcium gluconate 2 g in sodium chloride 0.9 % 500 mL IVPB, 2 g, Intravenous, PRN **AND** Calcium, , , PRN, Darvin Carrillo Jr., MD  •  carbidopa-levodopa (SINEMET)  MG per tablet 1 tablet, 1 tablet, Nasogastric, TID, Darvin Carrillo Jr., MD, 1 tablet at 03/01/23 0857  •  dexamethasone (DECADRON) injection 6 mg, 6 mg, Intravenous, Daily, Darvin Carrillo Jr., MD, 6 mg at 03/01/23 0857  •  dextrose (D50W) (25 g/50 mL) IV injection 25 g, 25 g, Intravenous, Q15 Min PRN, Darvin Carrillo Jr., MD  •  dextrose (GLUTOSE) oral gel 15 g, 15 g, Oral, Q15 Min PRN, Darvin Carrillo Jr., MD  •  glucagon (GLUCAGEN) injection 1 mg, 1 mg, Intramuscular, Q15 Min PRN, Darvin Carrillo Jr., MD  •  insulin lispro (ADMELOG) injection 0-14 Units, 0-14 Units, Subcutaneous, Q6H, Darvin Carrillo Jr., MD, 3 Units at 03/01/23 0627  •  ipratropium-albuterol (DUO-NEB) nebulizer solution 3 mL, 3 mL, Nebulization, Q2H PRN, Darvin Carrillo Jr., MD  •  ipratropium-albuterol (DUO-NEB) nebulizer solution 3 mL, 3 mL, Nebulization, Q4H - RT, Darvin Carrillo Jr., MD, 3 mL at 03/01/23 0738  •  labetalol (NORMODYNE,TRANDATE) injection 20  mg, 20 mg, Intravenous, Q10 Min PRN, Darvin Carrillo Jr., MD, 20 mg at 02/28/23 1337  •  Magnesium Sulfate - Total Dose 4 grams - Magnesium 1 or Less, 4 g, Intravenous, PRN **OR** Magnesium Sulfate - Total Dose 3 grams - Magnesium 1.1 - 1.5, 1 g, Intravenous, PRN **OR** Magnesium Sulfate - Total Dose 2 grams - Magnesium 1.6 - 1.9, 2 g, Intravenous, PRN, Darvin Carrillo Jr., MD  •  nitroglycerin (NITROSTAT) SL tablet 0.4 mg, 0.4 mg, Sublingual, Q5 Min PRN, Darvin Carrillo Jr., MD  •  ondansetron (ZOFRAN) injection 4 mg, 4 mg, Intravenous, Q6H PRN, Darvin Carrillo Jr., MD  •  pantoprazole (PROTONIX) injection 40 mg, 40 mg, Intravenous, Q12H, Darvin Carrillo Jr., MD, 40 mg at 03/01/23 0857  •  Pharmacy to dose vancomycin, , Does not apply, Continuous PRN, Darvin Carrillo Jr., MD  •  potassium chloride (K-DUR,KLOR-CON) ER tablet 40 mEq, 40 mEq, Oral, PRN **OR** potassium chloride (KLOR-CON) packet 40 mEq, 40 mEq, Oral, PRN, 40 mEq at 02/27/23 1510 **OR** potassium chloride 10 mEq in 100 mL IVPB, 10 mEq, Intravenous, Q1H PRN, Darvin Carrillo Jr., MD  •  potassium phosphate 21 mmol in sodium chloride 0.9 % 250 mL infusion, 21 mmol, Intravenous, PRN **OR** potassium phosphate 15 mmol in sodium chloride 0.9 % 100 mL infusion, 15 mmol, Intravenous, PRN **OR** potassium phosphate 9 mmol in sodium chloride 0.9 % 100 mL infusion, 9 mmol, Intravenous, PRN, Darvin Carrillo Jr., MD  •  pramipexole (MIRAPEX) tablet 0.25 mg, 0.25 mg, Nasogastric, Nightly, Darvin Carrillo Jr., MD, 0.25 mg at 02/28/23 2149  •  sodium chloride 0.9 % flush 10 mL, 10 mL, Intravenous, PRN, Darvin Carrillo Jr., MD  •  vancomycin (VANCOCIN) 1000 mg/200 mL dextrose 5% IVPB, 1,000 mg, Intravenous, Q24H, Darvin Carrillo Jr., MD, 1,000 mg at 02/28/23 0926  •  vitamin B-12 (CYANOCOBALAMIN) tablet 50 mcg, 50 mcg, Nasogastric, Daily, Darvin Carrillo Jr., MD, 50 mcg at 03/01/23 0857  Review of Systems:    The  following systems were reviewed and negative;  constitution, respiratory, cardiovascular and musculoskeletal    Objective     Vital Signs  Temp:  [96.6 °F (35.9 °C)-98.6 °F (37 °C)] 98.6 °F (37 °C)  Heart Rate:  [55-86] 78  Resp:  [16-22] 18  BP: ()/() 157/45  Body mass index is 18.16 kg/m².    Intake/Output Summary (Last 24 hours) at 3/1/2023 1009  Last data filed at 3/1/2023 0842  Gross per 24 hour   Intake 1297 ml   Output 1800 ml   Net -503 ml     I/O this shift:  In: -   Out: 100 [Urine:100]     Physical Exam:   General: patient awake, alert and cooperative. Frail on 6 liters of oxygen per NC.   Eyes: Normal lids and lashes, no scleral icterus   Neck: supple, normal ROM   Skin: warm and dry, not jaundiced   Cardiovascular: regular rhythm and rate, no murmurs auscultated   Pulm: clear to auscultation bilaterally, regular and unlabored   Abdomen: soft, nontender, nondistended; normal bowel sounds   Extremities: no rash or edema   Psychiatric: Normal mood and behavior; memory intact     Results Review:     I reviewed the patient's new clinical results.    Results from last 7 days   Lab Units 03/01/23  0750 03/01/23  0054 02/28/23  1822 02/28/23  0800 02/28/23  0542 02/27/23  1759 02/27/23  0826 02/26/23  1450 02/26/23  0616   WBC 10*3/mm3  --   --   --   --  12.68*  --  14.56*  --  9.95   HEMOGLOBIN g/dL 8.6* 9.4* 9.0*   < > 8.7*  8.6*   < > 7.6*  7.6*   < > 8.3*   HEMATOCRIT % 25.1* 27.8* 26.4*   < > 26.1*  26.1*   < > 22.8*  22.8*   < > 24.3*   PLATELETS 10*3/mm3  --   --   --   --  146  --  175  --  179    < > = values in this interval not displayed.     Results from last 7 days   Lab Units 02/28/23  0800 02/27/23  0826 02/26/23  0616 02/25/23  0322 02/24/23  1121   SODIUM mmol/L 147* 156*  --   --  146*   POTASSIUM mmol/L 5.7* 3.2*  --   --  4.4   CHLORIDE mmol/L 119* 126*  --   --  110*   CO2 mmol/L 25.0 25.4  --   --  27.0   BUN mg/dL 21 36*  --   --  34*   CREATININE mg/dL 0.55* 0.59 0.84  0.86 0.94   CALCIUM mg/dL 7.2* 6.8*  --   --  7.5*   BILIRUBIN mg/dL 0.4  --  0.4 <0.2 0.2   ALK PHOS U/L 44  --  49 70 74   ALT (SGPT) U/L 12  --  15 22 15   AST (SGOT) U/L 21  --  28 57* 51*   GLUCOSE mg/dL 254* 182*  --   --  133*         Lab Results   Lab Value Date/Time    LIPASE 15 06/20/2022 1610       Radiology:  XR Chest 1 View   Final Result         Electronically signed by Nando Welsh MD on 02-26-23 at 0604      CT Angiogram Chest   Final Result   1. There is no convincing evidence for pulmonary thromboemboli.   2. Extensive multifocal aspiration pneumonia. Fairly extensive   opacification of bronchi at both lower lobes and right middle lobe and   development of dense peribronchial pneumonia at both lower lobes and to   a lesser degree the right middle lobe, and there is much less dense   pneumonia peripherally at the lower lobes, right middle lobe, and the   dependent aspect of the right upper lobe.       This report was finalized on 2/24/2023 4:09 PM by Dr. Penny Kilpatrick M.D.          XR Chest 1 View   Final Result      CT Abdomen Pelvis Without Contrast   Final Result       1. Patient is noted to have debris within the bronchus intermedius and   bronchus to the left lower lobe, with associated mild atelectasis.   Patient may benefit from bronchoscopy.   2. Peripherally calcified splenic artery aneurysm, measuring up to 1.1   cm. Follow-up CT in one year is suggested.   3. Right renal adrenal myelolipoma.   4. Thick-walled appearance to the urinary bladder, with adjacent   perivesical stranding, suggesting cystitis. In addition, the patient   appears to have pelvic floor laxity, with cystocele.       Radiation dose reduction techniques were utilized, including automated   exposure control and exposure modulation based on body size.       This report was finalized on 2/22/2023 9:33 PM by Dr. Araceli Martinez M.D.          CT Chest Without Contrast Diagnostic   Final Result       1. Patient is noted  to have debris within the bronchus intermedius and   bronchus to the left lower lobe, with associated mild atelectasis.   Patient may benefit from bronchoscopy.   2. Peripherally calcified splenic artery aneurysm, measuring up to 1.1   cm. Follow-up CT in one year is suggested.   3. Right renal adrenal myelolipoma.   4. Thick-walled appearance to the urinary bladder, with adjacent   perivesical stranding, suggesting cystitis. In addition, the patient   appears to have pelvic floor laxity, with cystocele.       Radiation dose reduction techniques were utilized, including automated   exposure control and exposure modulation based on body size.       This report was finalized on 2/22/2023 9:33 PM by Dr. Araceli Martinez M.D.          XR Chest 1 View   Final Result   1. Borderline cardiomegaly.       This report was finalized on 2/22/2023 6:27 PM by Dr. Alex Brumfield M.D.              Assessment & Plan     Active Hospital Problems    Diagnosis    • **Acute metabolic encephalopathy    • Cystitis    • Splenic artery aneurysm (HCC)    • Atelectasis of left lung    • Hematuria    • Oral phase dysphagia    • Moderate dementia due to Parkinson's disease, with mood disturbance (HCC)    • Parkinson disease (HCC)    • Mixed stress and urge urinary incontinence        Assessment/Recommendations:    Assessment:   1. Rectal bleeding a few days ago, now more melenic  2. Anemia  3. COVID-19  4. Acute hypoxemic and hypercapnic respiratory failure now on 6L O2 she is a DO NOT INTUBATE  5. Pneumonia  6. UTI  7. Metabolic encephalopathy  8. Pulmonary hypertension  9. Elevated D-dimer  10. Parkinson's disease     Plan:   • Supportive care  • Transfusion per primary team  • Follow hemoglobin,  her hemoglobin is stable at 8.6 today.  • She is too frail and ill to undergo colonoscopy or EGD at the current moment particularly given concern with her O2 requirements and potential need for intubation for procedure which she does not  want.  She is on 6L O2 with SpO2 90% at time of encounter which is improvement in comparison to previous.  Palliative following.       I discussed the patients findings and my recommendations with patient, family and nursing staff.    Ezio Orozco MD

## 2023-03-01 NOTE — PLAN OF CARE
Goal Outcome Evaluation:  Plan of Care Reviewed With: patient           Outcome Evaluation: Pt adm with AMS, UTI, COVID infection, PNA, required ICU due to hypoxia, found to have dysphagia, now with NG tube. Pt is now out of ICU, seen on 6S. Pt has a hx of Parkinson's, lives with spouse, uses a rwx at home. Pt presents with weakness and impaired functional mobility, she will benefit from PT to address. Pt was eager to participate, showed good effort with activity, pt was able to sit edge of bed, stand, and take a few sidesteps, needed assist of 2. Pt has potential to improve, anticipate that pt will need SNF at discharge

## 2023-03-01 NOTE — PROGRESS NOTES
Name: Cindy Mejia ADMIT: 2023   : 1945  PCP: Shannon Nguyen APRN    MRN: [id number] LOS: 6 days   AGE/SEX: 77 y.o. female  ROOM: [facility]     Subjective   Subjective   In bed, nurse at bedside, she is alert and oriented to person, place, event.  She reports frequent loose stools.  She is asking for food.  She is also complaining of burning with urination.         Objective   Objective   Vital Signs  Temp:  [96.8 °F (36 °C)-98.6 °F (37 °C)] 97.7 °F (36.5 °C)  Heart Rate:  [] 104  Resp:  [16-20] 20  BP: ()/(45-82) 128/82  SpO2:  [89 %-98 %] 93 %  on  Flow (L/min):  [6] 6;   Device (Oxygen Therapy): nasal cannula;humidified  Body mass index is 18.16 kg/m².  Physical Exam  Constitutional:       General: She is not in acute distress.     Appearance: Normal appearance. She is ill-appearing.   HENT:      Nose:      Comments: NG tube in place  Cardiovascular:      Rate and Rhythm: Regular rhythm. Tachycardia present.      Pulses: Normal pulses.      Heart sounds: No murmur heard.  Pulmonary:      Effort: Pulmonary effort is normal. No respiratory distress.      Breath sounds: Rhonchi present. No wheezing.   Abdominal:      General: Abdomen is flat. Bowel sounds are normal. There is no distension.      Palpations: Abdomen is soft.   Musculoskeletal:         General: No swelling or tenderness.      Right lower leg: No edema.      Left lower leg: No edema.   Skin:     General: Skin is warm and dry.   Neurological:      General: No focal deficit present.      Mental Status: She is alert and oriented to person, place, and time. Mental status is at baseline.      Comments: Bilateral upper extremity tremor, bruising of upper arms         Results Review     I reviewed the patient's new clinical results.  Results from last 7 days   Lab Units 23  1617 23  0750 23  0054 23  1822 23  0800 23  0542 23  1759 23  0826 23  1450 23  0616  02/25/23  0127 02/24/23  1121   WBC 10*3/mm3  --   --   --   --   --  12.68*  --  14.56*  --  9.95  --  8.89   HEMOGLOBIN g/dL 9.1* 8.6* 9.4* 9.0*   < > 8.7*  8.6*   < > 7.6*  7.6*   < > 8.3*   < > 10.5*   PLATELETS 10*3/mm3  --   --   --   --   --  146  --  175  --  179  --  158    < > = values in this interval not displayed.     Results from last 7 days   Lab Units 03/01/23  0856 02/28/23  0800 02/27/23 0826 02/26/23 0616 02/25/23 0322 02/24/23  1121   SODIUM mmol/L 143 147* 156*  --   --  146*   POTASSIUM mmol/L 4.6 5.7* 3.2*  --   --  4.4   CHLORIDE mmol/L 108* 119* 126*  --   --  110*   CO2 mmol/L 25.7 25.0 25.4  --   --  27.0   BUN mg/dL 17 21 36*  --   --  34*   CREATININE mg/dL 0.47* 0.55* 0.59 0.84   < > 0.94   GLUCOSE mg/dL 112* 254* 182*  --   --  133*    < > = values in this interval not displayed.   Estimated Creatinine Clearance: 67.5 mL/min (A) (by C-G formula based on SCr of 0.47 mg/dL (L)).  Results from last 7 days   Lab Units 02/28/23 0800 02/26/23 0616 02/25/23 0322 02/24/23  1121   ALBUMIN g/dL 2.8* 2.9* 3.3* 3.6   BILIRUBIN mg/dL 0.4 0.4 <0.2 0.2   ALK PHOS U/L 44 49 70 74   AST (SGOT) U/L 21 28 57* 51*   ALT (SGPT) U/L 12 15 22 15     Results from last 7 days   Lab Units 03/01/23  0856 02/28/23  0800 02/27/23  0826 02/27/23  0519 02/26/23 0616 02/25/23  0322 02/24/23  1121   CALCIUM mg/dL 7.9* 7.2* 6.8*  --   --   --  7.5*   ALBUMIN g/dL  --  2.8*  --   --  2.9* 3.3* 3.6   MAGNESIUM mg/dL  --   --   --  2.4  --   --   --      Results from last 7 days   Lab Units 02/24/23  2046 02/24/23  1851 02/24/23  1403 02/24/23  1121 02/24/23  0555   PROCALCITONIN ng/mL  --   --   --  0.78* 0.43*   LACTATE mmol/L 2.0 2.1* 2.1* 2.1*  --      COVID19   Date Value Ref Range Status   02/22/2023 Detected (C) Not Detected - Ref. Range Final   09/30/2022 Not Detected Not Detected - Ref. Range Final     Glucose   Date/Time Value Ref Range Status   03/01/2023 1812 224 (H) 70 - 130 mg/dL Final     Comment:      Meter: [id number] : [id number] Velasquez Gamino CNA   03/01/2023 1221 205 (H) 70 - 130 mg/dL Final     Comment:     Meter: [id number] : [id number] Velasquez Gamino CNA   03/01/2023 0621 179 (H) 70 - 130 mg/dL Final     Comment:     Meter: [id number] : [id number] Carmelita White NA   03/01/2023 0004 207 (H) 70 - 130 mg/dL Final     Comment:     Meter: [id number] : [id number] Carmelita White NA   02/28/2023 1806 252 (H) 70 - 130 mg/dL Final     Comment:     Meter: [id number] : [id number] Deonna Kaplan RN   02/28/2023 1133 261 (H) 70 - 130 mg/dL Final     Comment:     Meter: [id number] : [id number] Deonna Kaplan RN   02/28/2023 0620 226 (H) 70 - 130 mg/dL Final     Comment:     Meter: [id number] : [id number] Tashi Dumont RN       XR Chest 1 View  Narrative: Patient: DEMETRA HAMILTON  Time Out: 06:04  Exam(s): FILM CXR 1 VIEW     EXAM:    XR Chest, 1 View    CLINICAL HISTORY:     Reason for exam: Follow-up pneumonia and respiratory failure.    TECHNIQUE:    Frontal view of the chest.    COMPARISON:    No relevant prior studies available.    FINDINGS:    Lungs:  Lungs are borderline hyperinflated with slight primary   interstitial markings consistent with mild emphysema.  Questionable mild   infiltrate or atelectasis in the right infrahilar region.  The lungs are   otherwise clear.    Pleural space:  Unremarkable.  No pneumothorax.    Heart:  Unremarkable.  No cardiomegaly.    Mediastinum:  Unremarkable.    Bones joints:  Unremarkable.    Vasculature:  Mild calcification of a nondilated aortic arch.    Upper abdomen:  There is no pneumoperitoneum under the diaphragm.    IMPRESSION:         Lungs are borderline hyperinflated with slight primary interstitial   markings consistent with mild emphysema.  Questionable mild infiltrate or   atelectasis in the right infrahilar region.  The lungs are otherwise   clear.  Impression: Electronically signed by Nando Welsh MD on 02-26-23 at 0604    Scheduled  Medications  amLODIPine, 5 mg, Nasogastric, Q24H  carbidopa-levodopa, 1 tablet, Nasogastric, TID  dexamethasone, 6 mg, Intravenous, Daily  insulin lispro, 0-14 Units, Subcutaneous, Q6H  ipratropium-albuterol, 3 mL, Nebulization, Q4H - RT  pantoprazole, 40 mg, Intravenous, Q12H  phenazopyridine, 100 mg, Oral, TID With Meals  pramipexole, 0.25 mg, Nasogastric, Nightly  vitamin B-12, 50 mcg, Nasogastric, Daily    Infusions  Pharmacy to dose vancomycin,     Diet  NPO Diet NPO Type: Strict NPO         I have personally reviewed:  [x]  Laboratory   [x]  Microbiology   [x]  Radiology   [x]  EKG/Telemetry   [x]  Cardiology/Vascular   []  Pathology   [x]  Records    Assessment/Plan     Active Hospital Problems    Diagnosis  POA   • **Acute metabolic encephalopathy [G93.41]  Yes   • Cystitis [N30.90]  Yes   • Splenic artery aneurysm (HCC) [I72.8]  Yes   • Atelectasis of left lung [J98.11]  Yes   • Hematuria [R31.9]  Yes   • Oral phase dysphagia [R13.11]  Unknown   • Moderate dementia due to Parkinson's disease, 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       77 y.o. female admitted with Acute metabolic encephalopathy.    Acute hypoxemic/hypercapnic respiratory failure  COVID-19 pneumonia  Bibasilar pneumonia-possibly secondary bacterial versus aspiration  -Patient completed a course of Rocephin while in the unit  -Still requiring 6 L via nasal cannula, high risk for recurrent aspiration  -Pulm following  -Speech to reevaluate in the next day or so, otherwise will need to discuss with patient/family about PEG tube versus eating for comfort-today patient requested food several times  -Completed remdesivir, remains on dexamethasone for COVID    Aerococcus UTI  -Patient completed a course of vancomycin  -Patient reported burning with urination, check UA with  urine culture    Anemia  GI bleed/questionable melena  -GI following however they do not recommended EGD/C-scope at this time given her significant O2 requirements (6L) and high risk of requiring intubation  -Trend H&H, transfuse as needed    Altered mental status/metabolic encephalopathy  -Patient improving    Hypertension  -Continue amlodipine    Parkinson's with Parkinson's dementia  -Continue Sinemet, Mirapex    Oropharyngeal dysphagia  -Currently on tube feeds, plan for speech reevaluation soon    · SCDs for DVT prophylaxis.  · DNR.  · Discussed with patient and nursing staff.  · Anticipate discharge to SNU facility timing yet to be determined.      Anabell Cannon MD  [facility]  03/01/23  18:47 EST    I wore protective equipment throughout this patient encounter including gloves and protective eyewear.  Hand hygiene was performed before donning protective equipment and after removal when leaving the room.

## 2023-03-01 NOTE — DISCHARGE PLACEMENT REQUEST
"Cindy Espinoza (77 y.o. Female)     Date of Birth   1945    Social Security Number       Address   [address] [place] [address]    Home Phone   [phone]    MRN   [id number]       Jew   None    Marital Status                               Admission Date   2/22/23    Admission Type   Emergency    Admitting Provider   Ubaldo Argueta MD    Attending Provider   Anabell Cannon MD    Department, Room/Bed   [address], S619/1       Discharge Date       Discharge Disposition       Discharge Destination                               Attending Provider: Anabell Cannon MD    Allergies: Penicillins    Isolation: Enh Drop/Con   Infection: COVID (confirmed) (02/23/23)   Code Status: No CPR    Ht: 152 cm (59.84\")   Wt: 42 kg (92 lb 8 oz)    Admission Cmt: None   Principal Problem: Acute metabolic encephalopathy [G93.41]                 Active Insurance as of 2/22/2023     Primary Coverage     Payor Plan Insurance Group Employer/Plan Group    [facility] MEDICARE REPLACEMENT [facility] MEDICARE REPLACEMENT [id number]     Payor Plan Address Payor Plan Phone Number Payor Plan Fax Number Effective Dates    [address] [address]   1/1/2021 - None Entered    [facility] [id number]       Subscriber Name Subscriber Birth Date Member ID       CINDY ESPINOZA 1945 [id number]                 Emergency Contacts      (Rel.) Home Phone Work Phone Mobile Phone    SoheilaJd marks (Spouse) -- -- [phone]    olgaadrian (Daughter) -- -- [phone]    beverly espinoza (Son) -- -- [phone]              "

## 2023-03-01 NOTE — PROGRESS NOTES
LOS: 6 days   Patient Care Team:  Shannon Nguyen APRN as PCP - General (Family Medicine)  Ant Baxter MD as Consulting Physician (Neurology)  Rabia Mcelroy MD as Consulting Physician (Gastroenterology)    Subjective   Pulmonary following for respiratory failure   Patient has acute hypoxemic and hypercapnic respiratory failure, she has acute COVID-19 infection underlying parkinsonian dementia, anemia and pneumonia.      Patient says she is doing better she is not short of breath on 6 L nasal cannula to her complaints are she is hungry and wants to eat and she is having terrible burning in her periarea.  Review of Systems:          Objective     Vital Signs  Vital Sign Min/Max for last 24 hours  Temp  Min: 96.6 °F (35.9 °C)  Max: 98.6 °F (37 °C)   BP  Min: 75/61  Max: 157/45   Pulse  Min: 55  Max: 104   Resp  Min: 16  Max: 22   SpO2  Min: 89 %  Max: 100 %   Flow (L/min)  Min: 6  Max: 6   Weight  Min: 42 kg (92 lb 8 oz)  Max: 42 kg (92 lb 8 oz)        Ventilator/Non-Invasive Ventilation Settings (From admission, onward)     Start     Ordered    02/24/23 0758  NIPPV-IPPV / BIPAP / CPAP  Until Discontinued        Question Answer Comment   Indication: Acute Respiratory Failure    Type: AVAPS-AE    Rate 16    VT (mL) 500    EPAP Min (cm H2O) 8    EPAP Max (cm H2O) 15    Max Pressure (cm H2O) 30    Pressure Support Min (cm H2O) 6    Pressure Support Max (cm H2O) 15    Titrate for SPO2 90%        02/24/23 0757                             Body mass index is 18.16 kg/m².  I/O last 3 completed shifts:  In: 3197 [I.V.:1838; Blood:300; Other:582; NG/GT:477]  Out: 2300 [Urine:2300]  I/O this shift:  In: -   Out: 500 [Urine:500]        Physical Exam:  General Appearance: Elderly thin almost cachectic appearing white female on heated high flow O2 at 60 L with an FiO2 of 30% her SPO2 is 94% she does not really appear to be laboring to breathe but she looks chronically ill, she sort of has that parkinsonian  flat facies  Eyes: Conjunctiva are clear and anicteric  ENT: Mucous membranes are dry no erythema no exudates, nasal septum midline she has a nasoenteric tube in the left nare.  Neck: No adenopathy no thyromegaly no jugular venous distension, trachea midline  Lungs: Clear nonlabored symmetric expansion no wheezes rales or rhonchi  Cardiac: Regular rate rhythm no murmur  Abdomen: Soft no palpable hepatosplenomegaly or masses  : Not examined  Musculoskeletal: She does not have a whole lot of muscle mass  Skin: Warm and dry no jaundice no petechiae  Neuro: She is awake and alert she is following commands to move her extremities.  Did not have a lot of resting tremor today  Extremities/P Vascular: No clubbing no cyanosis trace lower extremity edema, palpable radial and dorsalis pedis pulses  MSE: Even though she is complaining about food and burning she seems to be in a better mood today       Labs:  Results from last 7 days   Lab Units 02/28/23  0800 02/27/23  0826 02/27/23  0519 02/26/23  0616 02/25/23  0322 02/24/23  1121 02/24/23  0555 02/23/23  0636 02/22/23  1810   GLUCOSE mg/dL 254* 182*  --   --   --  133*  --   --  129*   SODIUM mmol/L 147* 156*  --   --   --  146*  --   --  136   POTASSIUM mmol/L 5.7* 3.2*  --   --   --  4.4  --   --  4.0   MAGNESIUM mg/dL  --   --  2.4  --   --   --   --   --  2.0   CO2 mmol/L 25.0 25.4  --   --   --  27.0  --   --  27.0   CHLORIDE mmol/L 119* 126*  --   --   --  110*  --   --  100   ANION GAP mmol/L 3.0* 4.6*  --   --   --  9.0  --   --  9.0   CREATININE mg/dL 0.55* 0.59  --  0.84 0.86 0.94 0.64 0.66 1.00   BUN mg/dL 21 36*  --   --   --  34*  --   --  32*   BUN / CREAT RATIO  38.2* 61.0*  --   --   --  36.2*  --   --  32.0*   CALCIUM mg/dL 7.2* 6.8*  --   --   --  7.5*  --   --  8.6   ALK PHOS U/L 44  --   --  49 70 74 72 70 43   TOTAL PROTEIN g/dL 5.1*  --   --  5.1* 5.9* 6.1 6.3 6.1 6.4   ALT (SGPT) U/L 12  --   --  15 22 15 7 <5 <5   AST (SGOT) U/L 21  --   --  28 57*  51* 38* 23 22   BILIRUBIN mg/dL 0.4  --   --  0.4 <0.2 0.2 <0.2 <0.2 0.3   ALBUMIN g/dL 2.8*  --   --  2.9* 3.3* 3.6 3.5 3.6 3.9   GLOBULIN gm/dL 2.3  --   --   --   --  2.5  --   --  2.5     Estimated Creatinine Clearance: 57.7 mL/min (A) (by C-G formula based on SCr of 0.55 mg/dL (L)).      Results from last 7 days   Lab Units 03/01/23  0750 03/01/23  0054 02/28/23  1822 02/28/23  0800 02/28/23  0542 02/28/23  0000 02/27/23  1759 02/27/23  0826 02/26/23  1450 02/26/23  0616 02/25/23  0127 02/24/23  1121 02/22/23  1810   WBC 10*3/mm3  --   --   --   --  12.68*  --   --  14.56*  --  9.95  --  8.89 6.80   RBC 10*6/mm3  --   --   --   --  2.90*  --   --  2.56*  --  2.78*  --  3.52* 3.28*   HEMOGLOBIN g/dL 8.6* 9.4* 9.0* 8.8* 8.7*  8.6* 8.6* 6.8* 7.6*  7.6*   < > 8.3*   < > 10.5* 9.7*   HEMATOCRIT % 25.1* 27.8* 26.4* 27.1* 26.1*  26.1* 26.0* 21.0* 22.8*  22.8*   < > 24.3*   < > 32.6* 28.9*   MCV fL  --   --   --   --  90.0  --   --  89.1  --  87.4  --  92.6 88.1   MCH pg  --   --   --   --  30.0  --   --  29.7  --  29.9  --  29.8 29.6   MCHC g/dL  --   --   --   --  33.3  --   --  33.3  --  34.2  --  32.2 33.6   RDW %  --   --   --   --  14.3  --   --  13.8  --  13.4  --  12.9 12.8   RDW-SD fl  --   --   --   --  46.8  --   --  44.9  --  43.1  --  43.6 41.1   MPV fL  --   --   --   --  11.0  --   --  10.4  --  10.9  --  11.2 10.3   PLATELETS 10*3/mm3  --   --   --   --  146  --   --  175  --  179  --  158 144   NEUTROPHIL % %  --   --   --   --   --   --   --   --   --   --   --   --  90.8*   LYMPHOCYTE % %  --   --   --   --   --   --   --   --   --   --   --   --  3.2*   MONOCYTES % %  --   --   --   --   --   --   --   --   --   --   --   --  5.6   EOSINOPHIL % %  --   --   --   --   --   --   --   --   --   --   --   --  0.0*   BASOPHIL % %  --   --   --   --   --   --   --   --   --   --   --   --  0.1   NEUTROS ABS 10*3/mm3  --   --   --   --   --   --   --   --   --   --   --  7.56* 6.17   LYMPHS ABS  10*3/mm3  --   --   --   --   --   --   --   --   --   --   --   --  0.22*   MONOS ABS 10*3/mm3  --   --   --   --   --   --   --   --   --   --   --   --  0.38   EOS ABS 10*3/mm3  --   --   --   --   --   --   --   --   --   --   --   --  0.00   BASOS ABS 10*3/mm3  --   --   --   --   --   --   --   --   --   --   --   --  0.01    < > = values in this interval not displayed.     Results from last 7 days   Lab Units 02/28/23  1321   PH, ARTERIAL pH units 7.444   PO2 ART mm Hg 74.9*   PCO2, ARTERIAL mm Hg 31.3*   HCO3 ART mmol/L 21.5*     Results from last 7 days   Lab Units 02/22/23  1810   HSTROP T ng/L 43*             Results from last 7 days   Lab Units 02/24/23  2046 02/24/23  1851 02/24/23  1403 02/24/23  1121 02/24/23  0555 02/22/23  2124 02/22/23  1810   LACTATE mmol/L 2.0 2.1* 2.1* 2.1*  --  1.1  --    PROCALCITONIN ng/mL  --   --   --  0.78* 0.43*  --  0.15         Microbiology Results (last 10 days)     Procedure Component Value - Date/Time    Respiratory Panel PCR w/COVID-19(SARS-CoV-2) ALEXANDRA/HELIO/JUAN/PAD/COR/MAD/MANE In-House, NP Swab in [facility], 3-4 HR TAT - Swab, Nasopharynx [[id number]]  (Abnormal) Collected: 02/22/23 2332    Lab Status: Final result Specimen: Swab from Nasopharynx Updated: 02/23/23 0052     ADENOVIRUS, PCR Not Detected     Coronavirus 229E Not Detected     Coronavirus HKU1 Not Detected     Coronavirus NL63 Not Detected     Coronavirus OC43 Not Detected     COVID19 Detected     Human Metapneumovirus Not Detected     Human Rhinovirus/Enterovirus Not Detected     Influenza A PCR Not Detected     Influenza B PCR Not Detected     Parainfluenza Virus 1 Not Detected     Parainfluenza Virus 2 Not Detected     Parainfluenza Virus 3 Not Detected     Parainfluenza Virus 4 Not Detected     RSV, PCR Not Detected     Bordetella pertussis pcr Not Detected     Bordetella parapertussis PCR Not Detected     Chlamydophila pneumoniae PCR Not Detected     Mycoplasma pneumo by PCR Not Detected    Narrative:       In the setting of a positive respiratory panel with a viral infection PLUS a negative procalcitonin without other underlying concern for bacterial infection, consider observing off antibiotics or discontinuation of antibiotics and continue supportive care. If the respiratory panel is positive for atypical bacterial infection (Bordetella pertussis, Chlamydophila pneumoniae, or Mycoplasma pneumoniae), consider antibiotic de-escalation to target atypical bacterial infection.    Blood Culture - Blood, Arm, Right [[id number]]  (Normal) Collected: 02/22/23 2124    Lab Status: Final result Specimen: Blood from Arm, Right Updated: 02/27/23 2131     Blood Culture No growth at 5 days    Blood Culture - Blood, Arm, Right [[id number]]  (Normal) Collected: 02/22/23 2124    Lab Status: Final result Specimen: Blood from Arm, Right Updated: 02/27/23 2131     Blood Culture No growth at 5 days    Urine Culture - Urine, Straight Cath [[id number]]  (Abnormal) Collected: 02/22/23 1945    Lab Status: Final result Specimen: Urine from Straight Cath Updated: 02/25/23 0241     Urine Culture >100,000 CFU/mL Aerococcus viridans     Comment:   Aerococcus viridans is usually susceptible to vancomycin. Resistance has been reported to the fluoroquinolones and beta-lactams. Routine susceptibility testing is not recommended. Please call lab to request further workup.       Narrative:      Colonization of the urinary tract without infection is common. Treatment is discouraged unless the patient is symptomatic, pregnant, or undergoing an invasive urologic procedure.              amLODIPine, 5 mg, Nasogastric, Q24H  carbidopa-levodopa, 1 tablet, Nasogastric, TID  dexamethasone, 6 mg, Intravenous, Daily  insulin lispro, 0-14 Units, Subcutaneous, Q6H  ipratropium-albuterol, 3 mL, Nebulization, Q4H - RT  pantoprazole, 40 mg, Intravenous, Q12H  pramipexole, 0.25 mg, Nasogastric, Nightly  vitamin B-12, 50 mcg, Nasogastric, Daily      Pharmacy to dose  vancomycin,         Diagnostics:  Adult Transthoracic Echo Complete W/ Cont if Necessary Per Protocol    Result Date: 2/24/2023  •  Left ventricular systolic function is hyperdynamic (EF > 70%). Calculated left ventricular EF = 74.2% •  Left ventricular wall thickness is consistent with mild concentric hypertrophy. •  The left ventricular cavity is small in size. •  The following left ventricular wall segments are hyperkinetic: basal anterior, basal anterolateral, basal inferolateral, basal inferior, basal inferoseptal, basal anteroseptal, mid anterior, mid anterolateral, mid inferolateral, mid inferior, mid inferoseptal, mid anteroseptal, apical anterior, apical lateral, apical inferior, apical septal and apex. •  Left ventricular diastolic function was indeterminate. •  Peak velocity of the flow distal to the aortic valve is 150 cm/s. Aortic valve maximum pressure gradient is 9 mmHg. Aortic valve mean pressure gradient is 5 mmHg. Aortic valve dimensionless index is 0.9 . •  Trace to mild tricuspid valve regurgitation is present. •  Estimated right ventricular systolic pressure from tricuspid regurgitation is mildly elevated (35-45 mmHg). Calculated right ventricular systolic pressure from tricuspid regurgitation is 39 mmHg.     CT Abdomen Pelvis Without Contrast    Result Date: 2/22/2023  CT CHEST WITHOUT CONTRAST: CT ABDOMEN AND PELVIS WITHOUT CONTRAST  HISTORY: Hypoxia and urinary tract infection  COMPARISON: None available.  TECHNIQUE: Axial CT imaging was obtained from the thoracic inlet through the symphysis pubis. No IV contrast was administered.  FINDINGS: CT OF THE CHEST: The patient is noted to have debris within the bronchus intermedius, with extension into the right middle lobe and right lower lobe. There is some associated atelectasis. There is additional debris which is noted within the bronchus to the left lower lobe, again, with some atelectasis noted. The thyroid gland, trachea, and esophagus are  unremarkable. There are coronary artery calcifications. There is no pleural or pericardial effusion. Mediastinal lymph nodes are not pathologically enlarged. There is some deformity of the superior endplate of T3, age indeterminate, although favored to be chronic. MRI or bone scan would be more sensitive for assessment of acuity.  CT ABDOMEN AND PELVIS: No suspicious hepatic lesions are seen. There is a peripherally calcified splenic artery aneurysm, measuring up to 1.1 cm. There is a right adrenal myelolipoma, measuring up to 3.2 x 2.7 cm. Gallbladder appears unremarkable. Pancreas is within normal limits. Calcified granulomata are seen within the spleen. Punctate nonobstructing stones are noted within both kidneys. Single largest on the right measures up to 7 mm. Single largest on the left measures 3 mm. There are simple appearing left renal cyst. No additional follow-up is necessary. Urinary bladder is thick walled, with adjacent perivesical stranding, in keeping with diabetes. Patient does appear to have pelvic floor laxity, with cystocele noted. There is no bowel obstruction. There is some air seen within the urinary bladder, which may be related to catheterization. There is no evidence of appendicitis. No acute osseous abnormalities are seen.       1. Patient is noted to have debris within the bronchus intermedius and bronchus to the left lower lobe, with associated mild atelectasis. Patient may benefit from bronchoscopy. 2. Peripherally calcified splenic artery aneurysm, measuring up to 1.1 cm. Follow-up CT in one year is suggested. 3. Right renal adrenal myelolipoma. 4. Thick-walled appearance to the urinary bladder, with adjacent perivesical stranding, suggesting cystitis. In addition, the patient appears to have pelvic floor laxity, with cystocele.  Radiation dose reduction techniques were utilized, including automated exposure control and exposure modulation based on body size.  This report was finalized  on 2/22/2023 9:33 PM by Dr. Araceli Martinez M.D.      CT Chest Without Contrast Diagnostic    Result Date: 2/22/2023  CT CHEST WITHOUT CONTRAST: CT ABDOMEN AND PELVIS WITHOUT CONTRAST  HISTORY: Hypoxia and urinary tract infection  COMPARISON: None available.  TECHNIQUE: Axial CT imaging was obtained from the thoracic inlet through the symphysis pubis. No IV contrast was administered.  FINDINGS: CT OF THE CHEST: The patient is noted to have debris within the bronchus intermedius, with extension into the right middle lobe and right lower lobe. There is some associated atelectasis. There is additional debris which is noted within the bronchus to the left lower lobe, again, with some atelectasis noted. The thyroid gland, trachea, and esophagus are unremarkable. There are coronary artery calcifications. There is no pleural or pericardial effusion. Mediastinal lymph nodes are not pathologically enlarged. There is some deformity of the superior endplate of T3, age indeterminate, although favored to be chronic. MRI or bone scan would be more sensitive for assessment of acuity.  CT ABDOMEN AND PELVIS: No suspicious hepatic lesions are seen. There is a peripherally calcified splenic artery aneurysm, measuring up to 1.1 cm. There is a right adrenal myelolipoma, measuring up to 3.2 x 2.7 cm. Gallbladder appears unremarkable. Pancreas is within normal limits. Calcified granulomata are seen within the spleen. Punctate nonobstructing stones are noted within both kidneys. Single largest on the right measures up to 7 mm. Single largest on the left measures 3 mm. There are simple appearing left renal cyst. No additional follow-up is necessary. Urinary bladder is thick walled, with adjacent perivesical stranding, in keeping with diabetes. Patient does appear to have pelvic floor laxity, with cystocele noted. There is no bowel obstruction. There is some air seen within the urinary bladder, which may be related to catheterization.  There is no evidence of appendicitis. No acute osseous abnormalities are seen.       1. Patient is noted to have debris within the bronchus intermedius and bronchus to the left lower lobe, with associated mild atelectasis. Patient may benefit from bronchoscopy. 2. Peripherally calcified splenic artery aneurysm, measuring up to 1.1 cm. Follow-up CT in one year is suggested. 3. Right renal adrenal myelolipoma. 4. Thick-walled appearance to the urinary bladder, with adjacent perivesical stranding, suggesting cystitis. In addition, the patient appears to have pelvic floor laxity, with cystocele.  Radiation dose reduction techniques were utilized, including automated exposure control and exposure modulation based on body size.  This report was finalized on 2/22/2023 9:33 PM by Dr. Araceli Martinez M.D.      CT Angiogram Chest    Result Date: 2/24/2023  CT ANGIOGRAM OF THE CHEST. MULTIPLE CORONAL, SAGITTAL, AND 3-D RECONSTRUCTIONS.  HISTORY: 77-year-old female with shortness of breath. Covid.  TECHNIQUE: Radiation dose reduction techniques were utilized, including automated exposure control and exposure modulation based on body size. CT angiogram of the chest was performed following the administration of IV contrast. Multiple coronal, sagittal, and 3-D reconstruction images were obtained. Compared with previous chest CT 02/22/2023.  FINDINGS: Some of the distal pulmonary artery branches are well seen or opacified, but there is no convincing evidence for pulmonary thromboemboli. There is near complete opacification of multiple left lower lobe bronchi and to a lesser degree at the right lower lobe and right middle lobe. There are mixed density, but predominantly, dense peribronchial airspace consolidations at both lower lobes and to a lesser degree at the right middle lobe. The consolidations extend to the periphery where there is a combination of groundglass and nodular opacities. These are also seen at the posterior  segment of the right upper lobe. There is a stable sub-6 mm left apical pulmonary nodule, image 20. There are no pleural or pericardial effusions. There is no lymphadenopathy within the chest. There is no hiatal hernia. There is no new abnormality at the visualized upper abdomen. Peripherally calcified 1.1 cm splenic artery aneurysm at the splenic hilum is again noted.      1. There is no convincing evidence for pulmonary thromboemboli. 2. Extensive multifocal aspiration pneumonia. Fairly extensive opacification of bronchi at both lower lobes and right middle lobe and development of dense peribronchial pneumonia at both lower lobes and to a lesser degree the right middle lobe, and there is much less dense pneumonia peripherally at the lower lobes, right middle lobe, and the dependent aspect of the right upper lobe.  This report was finalized on 2/24/2023 4:09 PM by Dr. Penny Kilpatrick M.D.      XR Chest 1 View    Result Date: 2/24/2023  XR CHEST 1 VW-  HISTORY:  Shortness of breath, fatigue.  COMPARISON:  Chest radiograph 2/22/2023. CT chest 2/22/2023.  FINDINGS:  A single view of the chest was obtained. The cardiac silhouette is partially obscured. There is calcific aortic atherosclerosis. There are new airspace and prominent interstitial opacities in the mid and lower lungs concerning for edema and/or multifocal pneumonia in the appropriate clinical setting. Short-term follow-up imaging is recommended. There is no large pleural effusion. The visualized osseous structures are unchanged.  This report was finalized on 2/24/2023 7:35 AM by Dr. Ruchi Blair M.D.      XR Chest 1 View    Result Date: 2/22/2023  XR CHEST 1 VW-  02/22/2023  HISTORY: Fatigue. Weakness.  Heart size is at the upper limits of normal. Lungs appear free of acute infiltrates. Tiny calcified granulomas are seen in the left midlung. Bony structures appear unremarkable.      1. Borderline cardiomegaly.  This report was finalized on 2/22/2023 6:27 PM by  Dr. Alex Brumfield M.D.      Results for orders placed during the hospital encounter of 02/22/23    Adult Transthoracic Echo Complete W/ Cont if Necessary Per Protocol    Interpretation Summary  •  Left ventricular systolic function is hyperdynamic (EF > 70%). Calculated left ventricular EF = 74.2%  •  Left ventricular wall thickness is consistent with mild concentric hypertrophy.  •  The left ventricular cavity is small in size.  •  The following left ventricular wall segments are hyperkinetic: basal anterior, basal anterolateral, basal inferolateral, basal inferior, basal inferoseptal, basal anteroseptal, mid anterior, mid anterolateral, mid inferolateral, mid inferior, mid inferoseptal, mid anteroseptal, apical anterior, apical lateral, apical inferior, apical septal and apex.  •  Left ventricular diastolic function was indeterminate.  •  Peak velocity of the flow distal to the aortic valve is 150 cm/s. Aortic valve maximum pressure gradient is 9 mmHg. Aortic valve mean pressure gradient is 5 mmHg. Aortic valve dimensionless index is 0.9 .  •  Trace to mild tricuspid valve regurgitation is present.  •  Estimated right ventricular systolic pressure from tricuspid regurgitation is mildly elevated (35-45 mmHg). Calculated right ventricular systolic pressure from tricuspid regurgitation is 39 mmHg.          Active Hospital Problems    Diagnosis  POA   • **Acute metabolic encephalopathy [G93.41]  Yes   • Cystitis [N30.90]  Yes   • Splenic artery aneurysm (HCC) [I72.8]  Yes   • Atelectasis of left lung [J98.11]  Yes   • Hematuria [R31.9]  Yes   • Oral phase dysphagia [R13.11]  Unknown   • Moderate dementia due to Parkinson's disease, 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         Assessment & Plan     1. Acute hypoxemic and hypercapnic  respiratory failure continue weaning O2 as tolerated  2. COVID-19 infection and question pneumonia she is on  steroids will complete 10 days today is day 8 and completed remdesivir.   3. Pneumonia bibasilar some is more groundglass type more like I would expect with COVID but there is a lot of dense infiltrate as well suspicious for secondary infection question distribution if aspiration pneumonia.  Had an elevated procalcitonin also yesterday.  Completed course of Rocephin  4. UTI Aerococcus greater than 10 to the 6 hard to know a lot of blood in the urine as well.  She has completed a course of vancomycin  5. Anemia GI bleed posttransfusion hemoglobin relatively stable  6. Metabolic encephalopathy probably related to hypercapnia on some underlying dementia.  She does seem to be improving she has some underlying confusion so I think she is probably getting close to that level  7. Mildly elevated AST has normalized  8. Hyperglycemia may be secondary to steroids sliding scale insulin  9. Pulmonary hypertension mild by echocardiogram  10. Elevated D-dimer CT angiogram and lower extremity Dopplers negative  11. Hypertension some labile blood pressures overall better control  12. Parkinson's disease with reported parkinsonian dementia, improved with modification to her Sinemet dosing  13. Fluids/electrolytes/nutrition continue tube parades and free water sodium better  14. Oropharyngeal dysphagia unfortunately she did not do very well yesterday if she does not show significant improvement in the next several days we will need to talk to the family about goals here whether they want feeding tubes or not etc.  15. Hypertension blood pressure better      Plan for disposition:         Darvin Carrillo Jr, MD  03/01/23  14:22 EST    Time:

## 2023-03-01 NOTE — PROGRESS NOTES
"[facility] Clinical Pharmacy Services: Vancomycin Monitoring Note    Cindy Mejia is a 77 y.o. female who is on day 5/5 of pharmacy to dose vancomycin for Urinary Tract Infection.    Previous Vancomycin Dose: Intermittent dosing.    Updated Cultures and Sensitivities:  2/22: BCx-NGTD  2/22: UCx->100K Aerococcus viridans    Results from last 7 days   Lab Units 03/01/23  0856 02/27/23  0519 02/26/23  0832   VANCOMYCIN RM mcg/mL  --  11.60 10.90   VANCOMYCIN TR mcg/mL 10.80  --   --      Vitals/Labs  Ht: 152 cm (59.84\"); Wt: 42 kg (92 lb 8 oz)   Temp Readings from Last 1 Encounters:   02/28/23 99.9 °F (37.7 °C) (Oral)     Estimated Creatinine Clearance: 57.7 mL/min (A) (by C-G formula based on SCr of 0.55 mg/dL (L)).     Results from last 7 days   Lab Units 02/28/23  0800 02/28/23  0542 02/27/23  0826 02/26/23  0616   CREATININE mg/dL 0.55*  --  0.59 0.84   WBC 10*3/mm3  --  12.68* 14.56* 9.95     Assessment/Plan    -Therapy scheduled to end after 3/1 dose.     Continue Vancomycin  1000 mg iv q24 for total of 5 days.  Next Level Date and Time Duration has not been extended/  no more levels needed at this time as consult is complete.    Thank you for involving pharmacy in this patient's care. Please contact pharmacy with any questions or concerns.      Winnie Rodriguez, [facility]            "

## 2023-03-01 NOTE — NURSING NOTE
Spoke with patient at bedside, she is on telemetry unit now. She states she is still feeling quite weak and feeling depressed about not being able to go home yet. She is also feeling hungry and hopeful she will be cleared for a diet soon. She denies pain and shortness of breath, states she slept well last night. She is on 6L O2, she does desat during extended conversation but recovers with deep breathing. I encouraged frequent deep breathing and bed exercises to increase reserve and strength. PT is ordered but has not seen patient since being moved out of ICU. SLP following for dysphagia. Psychosocial support provided.     Also discussed with patient's spouse and daughter outside room. They have good understanding of situation and hopeful that patient will continue to improve, appreciative of palliative care support. Answered all questions and provided support, advised of availability.

## 2023-03-01 NOTE — CASE MANAGEMENT/SOCIAL WORK
Continued Stay Note  [facility]     Patient Name: Cindy Mejia  MRN: [id number]  Today's Date: 3/1/2023    Admit Date: 2/22/2023    Plan: Referral to [facility]   Discharge Plan     Row Name 03/01/23 1328       Plan    Plan Referral to [facility]    Patient/Family in Agreement with Plan yes    Plan Comments Spoke with patient at bedside, introduced self. Patient is concerned with current level of ADL. Patient is agreeable to Referral to [facility] d/t covid. Called spouse and explained that d/t covid status we have 1 SNF in [facility]; agreeable to referral. Referral to [facility]/ Jocelin [phone].               Discharge Codes    No documentation.               Expected Discharge Date and Time     Expected Discharge Date Expected Discharge Time    Feb 27, 2023             Dianna Fairbanks RN

## 2023-03-01 NOTE — NURSING NOTE
Mrs. Mejia is A&Ox4.  She has been more alert today.  Three brown bowel movements.  Potassium was high so Dr. Carrillo discontinued her IV fluids.  TF changed to pat VALERA.

## 2023-03-01 NOTE — NURSING NOTE
Pt transferred to room 619 with transport staff.  On 6l NC. Report called to floor by day shift RN.

## 2023-03-01 NOTE — PLAN OF CARE
Goal Outcome Evaluation:      A/O x4, remains on 6L NC, NSR on monitor. Multiple liquid BMs this shift, incontinence care provided. Pt c/o burning with urination, UA ordered. Tube feeds continue. Family updated multiple times. VSS.

## 2023-03-01 NOTE — THERAPY EVALUATION
Patient Name: Cindy Mejia  : 1945    MRN: [id number]                              Today's Date: 3/1/2023       Admit Date: 2023    Visit Dx:     ICD-10-CM ICD-9-CM   1. Acute metabolic encephalopathy  G93.41 348.31   2. Acute UTI  N39.0 599.0   3. Parkinson's disease (HCC)  G20 332.0     Patient Active Problem List   Diagnosis   • Anxiety   • Tubular adenoma   • Tremor of both hands   • B12 deficiency   • Orthostatic hypotension due to Parkinson's disease (HCC)   • Action tremor   • Mixed stress and urge urinary incontinence   • Urinary tract infection without hematuria   • Moderate malnutrition (HCC)   • Dyskinesia   • Balance problems   • Osteopenia of multiple sites   • Age-related osteoporosis without current pathological fracture   • Urinary tract infection in female   • Acute UTI   • History of malignant neoplasm of large intestine   • Symptomatic hypotension   • Parkinson disease (HCC)   • Acute metabolic encephalopathy   • Cystitis   • Splenic artery aneurysm (HCC)   • Atelectasis of left lung   • Moderate dementia due to Parkinson's disease, with mood disturbance (HCC)   • Hematuria   • Oral phase dysphagia     Past Medical History:   Diagnosis Date   • Age-related osteoporosis without current pathological fracture 2022   • Anxiety 2016   • Arthritis    • Asymptomatic varicose veins of both lower extremities 2019   • Atelectasis of left lung 2023   • Carcinoma of colon (HCC) 2018   • Colon polyp    • Constipation    • Menopause 2017   • Mixed stress and urge urinary incontinence    • Other hemorrhoids 2018   • Other rosacea 2018   • Parkinson's disease (HCC)    • Uterine prolapse 2016     Past Surgical History:   Procedure Laterality Date   • BLADDER SURGERY     • COLONOSCOPY N/A 2017    Procedure: COLONOSCOPY, polypectomy;  Surgeon: Rabia Mcelroy MD;  Location: [facility];  Service:    • COLONOSCOPY N/A 2017    Procedure:  COLONOSCOPY, polypectomy, biopsy, sclerotherapy injection;  Surgeon: Rabia Mcelroy MD;  Location: [facility] OR;  Service:    • HYSTERECTOMY     • TUBAL ABDOMINAL LIGATION        General Information     Row Name 03/01/23 1631          Physical Therapy Time and Intention    Document Type evaluation  -PC     Mode of Treatment physical therapy  -PC     Row Name 03/01/23 1631          General Information    Prior Level of Function independent:;gait;all household mobility;min assist:;ADL's  pt stated that she used a rwx for household distances  -PC     Existing Precautions/Restrictions fall;oxygen therapy device and L/min  -PC     Barriers to Rehab medically complex;previous functional deficit  -PC     Row Name 03/01/23 1631          Living Environment    People in Home spouse  -PC     Row Name 03/01/23 1631          Cognition    Orientation Status (Cognition) oriented x 3  -PC     Row Name 03/01/23 1631          Safety Issues, Functional Mobility    Impairments Affecting Function (Mobility) endurance/activity tolerance;strength;motor planning;postural/trunk control;balance;shortness of breath  -PC           User Key  (r) = Recorded By, (t) = Taken By, (c) = Cosigned By    Initials Name Provider Type    PC Vanna Spicer, PT Physical Therapist               Mobility     Row Name 03/01/23 1633          Bed Mobility    Supine-Sit [place] (Bed Mobility) moderate assist (50% patient effort)  -PC     Sit-Supine [place] (Bed Mobility) moderate assist (50% patient effort);2 person assist  -PC     Assistive Device (Bed Mobility) head of bed elevated;draw sheet;bed rails  -PC     Row Name 03/01/23 1633          Sit-Stand Transfer    Sit-Stand [place] (Transfers) moderate assist (50% patient effort);2 person assist  -PC     Comment, (Sit-Stand Transfer) HHA x 2, perf L-R weight shifting in standing  -PC     Row Name 03/01/23 1633          Gait/Stairs (Locomotion)    [place] Level (Gait) moderate assist (50%  patient effort);2 person assist  -PC     Assistive Device (Gait) --  HHA x 2  -PC     Distance in Feet (Gait) 2-3 sidesteps to head of bed, physical cues for weight shifting  -PC     Bilateral Gait Deviations weight shift ability decreased  -PC           User Key  (r) = Recorded By, (t) = Taken By, (c) = Cosigned By    Initials Name Provider Type    PC Vanna Spicer, PT Physical Therapist               Obj/Interventions     Row Name 03/01/23 1635          Range of Motion Comprehensive    General Range of Motion bilateral lower extremity ROM WFL  -PC     Row Name 03/01/23 1635          Strength Comprehensive (MMT)    Comment, General Manual Muscle Testing (MMT) Assessment B LE 3-/5  -PC     Row Name 03/01/23 1635          Motor Skills    Therapeutic Exercise --  AP, LAQ, supine HS, QS. 5-10 reps  -PC     Row Name 03/01/23 1635          Balance    Static Sitting Balance contact guard  -PC     Position, Sitting Balance sitting edge of bed  -PC     Static Standing Balance moderate assist;minimal assist;2-person assist  -PC     Dynamic Standing Balance moderate assist;2-person assist  -PC     Position/Device Used, Standing Balance supported  -PC           User Key  (r) = Recorded By, (t) = Taken By, (c) = Cosigned By    Initials Name Provider Type    PC Vanna Spicer PT Physical Therapist               Goals/Plan     Row Name 03/01/23 1642          Bed Mobility Goal 1 (PT)    Activity/Assistive Device (Bed Mobility Goal 1, PT) sit to supine/supine to sit  -PC     [place] Level/Cues Needed (Bed Mobility Goal 1, PT) contact guard required  -PC     Time Frame (Bed Mobility Goal 1, PT) 2 weeks  -PC     Row Name 03/01/23 1642          Transfer Goal 1 (PT)    Activity/Assistive Device (Transfer Goal 1, PT) sit-to-stand/stand-to-sit  -PC     [place] Level/Cues Needed (Transfer Goal 1, PT) contact guard required  -PC     Time Frame (Transfer Goal 1, PT) 2 weeks  -PC     Row Name 03/01/23 1642          Gait  Training Goal 1 (PT)    Activity/Assistive Device (Gait Training Goal 1, PT) gait (walking locomotion);assistive device use  -PC     [place] Level (Gait Training Goal 1, PT) minimum assist (75% or more patient effort)  -PC     Distance (Gait Training Goal 1, PT) 25 ft  -PC     Time Frame (Gait Training Goal 1, PT) 2 weeks  -PC     Row Name 03/01/23 1642          Therapy Assessment/Plan (PT)    Planned Therapy Interventions (PT) bed mobility training;balance training;gait training;transfer training;strengthening  -PC           User Key  (r) = Recorded By, (t) = Taken By, (c) = Cosigned By    Initials Name Provider Type    PC Vanna Spicer, PT Physical Therapist               Clinical Impression     Row Name 03/01/23 1636          Pain    Pretreatment Pain Rating 0/10 - no pain  -PC     Row Name 03/01/23 1636          Plan of Care Review    Plan of Care Reviewed With patient  -PC     Outcome Evaluation Pt adm with AMS, UTI, COVID infection, PNA, required ICU due to hypoxia, found to have dysphagia, now with NG tube. Pt is now out of ICU, seen on 6S. Pt has a hx of Parkinson's, lives with spouse, uses a rwx at home. Pt presents with weakness and impaired functional mobility, she will benefit from PT to address. Pt was eager to participate, showed good effort with activity, pt was able to sit edge of bed, stand, and take a few sidesteps, needed assist of 2. Pt has potential to improve, anticipate that pt will need SNF at discharge  -     Row Name 03/01/23 1636          Therapy Assessment/Plan (PT)    Rehab Potential (PT) good, to achieve stated therapy goals  -     Criteria for Skilled Interventions Met (PT) yes;meets criteria  -PC     Therapy Frequency (PT) 5 times/wk  -PC     Row Name 03/01/23 1636          Vital Signs    Pre SpO2 (%) 95  -PC     O2 Delivery Pre Treatment supplemental O2  -PC     Intra SpO2 (%) --  not getting accurate reading, pt did not show signs of SOA  -PC     O2 Delivery Intra  Treatment supplemental O2  -PC     Post SpO2 (%) 92  -PC     O2 Delivery Post Treatment supplemental O2  -PC     Row Name 03/01/23 1636          Positioning and Restraints    Pre-Treatment Position in bed  -PC     Post Treatment Position bed  -PC     In Bed supine;call light within reach;encouraged to call for assist;exit alarm on  -PC           User Key  (r) = Recorded By, (t) = Taken By, (c) = Cosigned By    Initials Name Provider Type    PC Vanna Spicer, PT Physical Therapist               Outcome Measures     Row Name 03/01/23 1643          How much help from another person do you currently need...    Turning from your back to your side while in flat bed without using bedrails? 3  -PC     Moving from lying on back to sitting on the side of a flat bed without bedrails? 2  -PC     Moving to and from a bed to a chair (including a wheelchair)? 2  -PC     Standing up from a chair using your arms (e.g., wheelchair, bedside chair)? 2  -PC     Climbing 3-5 steps with a railing? 1  -PC     To walk in hospital room? 2  -PC     AM-PAC 6 Clicks Score (PT) 12  -PC     Highest level of mobility 4 --> Transferred to chair/commode  -PC           User Key  (r) = Recorded By, (t) = Taken By, (c) = Cosigned By    Initials Name Provider Type    PC Vanna Spicer, PT Physical Therapist                             Physical Therapy Education     Title: PT OT SLP Therapies (Done)     Topic: Physical Therapy (Done)     Point: Mobility training (Done)     Learning Progress Summary           Patient Acceptance, E,D, DU by PC at 3/1/2023 1645    Acceptance, E, VU,NR by DS at 2/26/2023 1800   Family Acceptance, E, VU,NR by DS at 2/26/2023 1800                   Point: Home exercise program (Done)     Learning Progress Summary           Patient Acceptance, E,D, DU by PC at 3/1/2023 1645                   Point: Body mechanics (Done)     Learning Progress Summary           Patient Acceptance, E,D, DU by PC at 3/1/2023 1645                    Point: Precautions (Done)     Learning Progress Summary           Patient Acceptance, E,D, DU by PC at 3/1/2023 1645                               User Key     Initials Effective Dates Name Provider Type Discipline    PC 06/16/21 -  Vanna Spicer PT Physical Therapist PT    DS 06/16/21 -  Kacie Camejo, TIGIST Registered Nurse Nurse              PT Recommendation and Plan  Planned Therapy Interventions (PT): bed mobility training, balance training, gait training, transfer training, strengthening  Plan of Care Reviewed With: patient  Outcome Evaluation: Pt adm with AMS, UTI, COVID infection, PNA, required ICU due to hypoxia, found to have dysphagia, now with NG tube. Pt is now out of ICU, seen on 6S. Pt has a hx of Parkinson's, lives with spouse, uses a rwx at home. Pt presents with weakness and impaired functional mobility, she will benefit from PT to address. Pt was eager to participate, showed good effort with activity, pt was able to sit edge of bed, stand, and take a few sidesteps, needed assist of 2. Pt has potential to improve, anticipate that pt will need SNF at discharge     Time Calculation:    PT Charges     Row Name 03/01/23 1646             Time Calculation    Start Time 1525  -PC      Stop Time 1553  -PC      Time Calculation (min) 28 min  -PC      PT Received On 03/01/23  -PC      PT - Next Appointment 03/02/23  -PC      PT Goal Re-Cert Due Date 03/15/23  -            User Key  (r) = Recorded By, (t) = Taken By, (c) = Cosigned By    Initials Name Provider Type    PC Vanna Spicer, PT Physical Therapist              Therapy Charges for Today     Code Description Service Date Service Provider Modifiers Qty    29958852898 HC PT EVAL HIGH COMPLEXITY 2 3/1/2023 Vanna Spicer, PT GP 1    43859874138 HC PT THER PROC EA 15 MIN 3/1/2023 Vanna Spicer, PT GP 1          PT G-Codes  Outcome Measure Options: AM-PAC 6 Clicks Daily Activity (OT)  AM-PAC 6 Clicks Score (PT): 12  AM-PAC 6 Clicks Score (OT):  11  Modified [place] Scale: 4 - Moderately severe disability.  Unable to walk without assistance, and unable to attend to own bodily needs without assistance.  PT Discharge Summary  Anticipated Discharge Disposition (PT): skilled nursing facility    Vanna Spicer, PT  3/1/2023

## 2023-03-01 NOTE — PLAN OF CARE
Goal Outcome Evaluation:           Progress: improving  Outcome Evaluation: New transfer from ICU this shift. Covid positive. Cortak in place. Isosource HN tube feeds going at 50 ml/hr with q1hr 50ml flushes. 6LNC. A&Ox4. Does have parkinsons so hands tremor frequently. Oral care completed. Pure wick in place and catheter and incontinence care completed. Spoke with  and daughter last night. -Ricardo is designated visitor due to covid guidelines. Accumax and SCDs in place. No complaints of pain.

## 2023-03-02 NOTE — THERAPY TREATMENT NOTE
Patient Name: Cindy Mejia  : 1945    MRN: [id number]                              Today's Date: 3/2/2023       Admit Date: 2023    Visit Dx:     ICD-10-CM ICD-9-CM   1. Acute metabolic encephalopathy  G93.41 348.31   2. Acute UTI  N39.0 599.0   3. Parkinson's disease (HCC)  G20 332.0     Patient Active Problem List   Diagnosis   • Anxiety   • Tubular adenoma   • Tremor of both hands   • B12 deficiency   • Orthostatic hypotension due to Parkinson's disease (HCC)   • Action tremor   • Mixed stress and urge urinary incontinence   • Urinary tract infection without hematuria   • Moderate malnutrition (HCC)   • Dyskinesia   • Balance problems   • Osteopenia of multiple sites   • Age-related osteoporosis without current pathological fracture   • Urinary tract infection in female   • Acute UTI   • History of malignant neoplasm of large intestine   • Symptomatic hypotension   • Parkinson disease (HCC)   • Acute metabolic encephalopathy   • Cystitis   • Splenic artery aneurysm (HCC)   • Atelectasis of left lung   • Moderate dementia due to Parkinson's disease, with mood disturbance (HCC)   • Hematuria   • Oral phase dysphagia     Past Medical History:   Diagnosis Date   • Age-related osteoporosis without current pathological fracture 2022   • Anxiety 2016   • Arthritis    • Asymptomatic varicose veins of both lower extremities 2019   • Atelectasis of left lung 2023   • Carcinoma of colon (HCC) 2018   • Colon polyp    • Constipation    • Menopause 2017   • Mixed stress and urge urinary incontinence    • Other hemorrhoids 2018   • Other rosacea 2018   • Parkinson's disease (HCC)    • Uterine prolapse 2016     Past Surgical History:   Procedure Laterality Date   • BLADDER SURGERY     • COLONOSCOPY N/A 2017    Procedure: COLONOSCOPY, polypectomy;  Surgeon: Rabia Mcelroy MD;  Location: [facility];  Service:    • COLONOSCOPY N/A 2017    Procedure:  COLONOSCOPY, polypectomy, biopsy, sclerotherapy injection;  Surgeon: Rabia Mcelroy MD;  Location: [facility];  Service:    • HYSTERECTOMY     • TUBAL ABDOMINAL LIGATION        General Information     Row Name 03/02/23 1451          OT Time and Intention    Document Type therapy note (daily note)  -BS     Mode of Treatment occupational therapy  -BS     Row Name 03/02/23 1451          General Information    Patient Profile Reviewed yes  -BS     Existing Precautions/Restrictions fall;oxygen therapy device and L/min  -BS     Barriers to Rehab medically complex;previous functional deficit  -BS     Row Name 03/02/23 1451          Cognition    Orientation Status (Cognition) oriented x 3  -BS     Row Name 03/02/23 1451          Safety Issues, Functional Mobility    Impairments Affecting Function (Mobility) endurance/activity tolerance;strength;postural/trunk control;balance;shortness of breath  -BS           User Key  (r) = Recorded By, (t) = Taken By, (c) = Cosigned By    Initials Name Provider Type    BS Sonia Purcell OT Occupational Therapist                 Mobility/ADL's     Row Name 03/02/23 1452          Bed Mobility    Bed Mobility rolling left;rolling right  -BS     Rolling Left [place] (Bed Mobility) 1 person to manage equipment;maximum assist (25% patient effort)  -BS     Rolling Right [place] (Bed Mobility) maximum assist (25% patient effort);1 person to manage equipment  -BS     Scooting/Bridging [place] (Bed Mobility) maximum assist (25% patient effort);verbal cues;2 person assist  -BS     Supine-Sit [place] (Bed Mobility) moderate assist (50% patient effort);2 person assist  -BS     Sit-Supine [place] (Bed Mobility) moderate assist (50% patient effort);2 person assist  -BS     Bed Mobility, Safety Issues decreased use of arms for pushing/pulling;decreased use of legs for bridging/pushing;impaired trunk control for bed mobility  -BS     Assistive Device (Bed Mobility) head of bed  elevated;draw sheet;bed rails  -BS     Row Name 03/02/23 1452          Transfers    Transfers sit-stand transfer;stand-sit transfer  -BS     Row Name 03/02/23 1452          Sit-Stand Transfer    Sit-Stand [place] (Transfers) moderate assist (50% patient effort);2 person assist;minimum assist (75% patient effort)  -BS     Comment, (Sit-Stand Transfer) BUE support. Weight shifting and stationary steps in standing  -BS     Row Name 03/02/23 1452          Stand-Sit Transfer    Stand-Sit [place] (Transfers) minimum assist (75% patient effort);2 person assist  -BS     Comment, (Stand-Sit Transfer) BUE support  -BS     Row Name 03/02/23 1452          Activities of Daily Living    BADL Assessment/Intervention toileting;upper body dressing  -BS     Row Name 03/02/23 1452          Toileting Assessment/Training    [place] Level (Toileting) adjust/manage clothing;change pad/brief;perform perineal hygiene;dependent (less than 25% patient effort)  -BS     Position (Toileting) supine  -BS     Row Name 03/02/23 1452          Upper Body Dressing Assessment/Training    [place] Level (Upper Body Dressing) doff;don;pajama/robe;moderate assist (50% patient effort)  -BS     Position (Upper Body Dressing) long sitting  -BS     Comment, (Upper Body Dressing) Hospital gown  -BS           User Key  (r) = Recorded By, (t) = Taken By, (c) = Cosigned By    Initials Name Provider Type    Sonia Patel OT Occupational Therapist               Obj/Interventions     Row Name 03/02/23 1459          Balance    Balance Assessment sitting static balance;sitting dynamic balance;standing static balance;standing dynamic balance  -BS     Static Sitting Balance minimal assist  -BS     Dynamic Sitting Balance minimal assist  -BS     Static Standing Balance minimal assist;moderate assist;2-person assist  -BS     Dynamic Standing Balance moderate assist;2-person assist  -BS     Position/Device Used, Standing Balance supported  BUE  support  -BS     Balance Interventions sitting;standing;supported;static;dynamic;sit to stand;occupation based/functional task;weight shifting activity  -BS           User Key  (r) = Recorded By, (t) = Taken By, (c) = Cosigned By    Initials Name Provider Type    Sonia Patel, OT Occupational Therapist               Goals/Plan    No documentation.                Clinical Impression     Row Name 03/02/23 1501          Pain Assessment    Pretreatment Pain Rating 0/10 - no pain  -BS     Posttreatment Pain Rating 0/10 - no pain  -BS     Row Name 03/02/23 1501          Plan of Care Review    Progress improving  -BS     Outcome Evaluation Pt agreeable to OT session this p.m. Pt participated in bed mobility with Mod Ax2, UBD tasks with Mod A, and was dependent for toileting tasks. Pt continues to present with decreased strength, functional endurance, and balance impacting ADL independence. Continue with skilled IPOT services per POC. Rec SNF at d/c to address current functional deficits  -BS     Row Name 03/02/23 1501          Therapy Assessment/Plan (OT)    Rehab Potential (OT) good, to achieve stated therapy goals  -BS     Criteria for Skilled Therapeutic Interventions Met (OT) yes;meets criteria;skilled treatment is necessary  -BS     Therapy Frequency (OT) 3 times/wk  -BS     Row Name 03/02/23 1501          Therapy Plan Review/Discharge Plan (OT)    Anticipated Discharge Disposition (OT) skilled nursing facility  -BS     Row Name 03/02/23 1501          Vital Signs    O2 Delivery Pre Treatment nasal cannula  -BS     O2 Delivery Intra Treatment nasal cannula  -BS     O2 Delivery Post Treatment nasal cannula  -BS     Pre Patient Position Supine  -BS     Intra Patient Position Standing  -BS     Post Patient Position Supine  -BS     Row Name 03/02/23 1501          Positioning and Restraints    Pre-Treatment Position in bed  -BS     Post Treatment Position bed  -BS     In Bed notified nsg;exit alarm on;encouraged to call  for assist;supine  -BS           User Key  (r) = Recorded By, (t) = Taken By, (c) = Cosigned By    Initials Name Provider Type    Sonia Patel OT Occupational Therapist               Outcome Measures     Row Name 03/02/23 1504          How much help from another is currently needed...    Putting on and taking off regular lower body clothing? 1  -BS     Bathing (including washing, rinsing, and drying) 2  -BS     Toileting (which includes using toilet bed pan or urinal) 2  -BS     Putting on and taking off regular upper body clothing 2  -BS     Taking care of personal grooming (such as brushing teeth) 2  -BS     Eating meals 2  -BS     AM-PAC 6 Clicks Score (OT) 11  -BS     Row Name 03/02/23 1504          Functional Assessment    Outcome Measure Options AM-PAC 6 Clicks Daily Activity (OT)  -BS           User Key  (r) = Recorded By, (t) = Taken By, (c) = Cosigned By    Initials Name Provider Type    Sonia Patel OT Occupational Therapist                Occupational Therapy Education     Title: PT OT SLP Therapies (Done)     Topic: Occupational Therapy (Done)     Point: ADL training (Done)     Description:   Instruct learner(s) on proper safety adaptation and remediation techniques during self care or transfers.   Instruct in proper use of assistive devices.              Learning Progress Summary           Patient Acceptance, E, VU,NR by DS at 2/26/2023 1800    Acceptance, E, VU by RENETTA at 2/23/2023 1528    Comment: role of OT, goals of care, xfer tech, d/c rec   Family Acceptance, E, VU,NR by DS at 2/26/2023 1800                   Point: Precautions (Done)     Description:   Instruct learner(s) on prescribed precautions during self-care and functional transfers.              Learning Progress Summary           Patient Acceptance, E, VU,NR by OVIDIO at 2/26/2023 1800    Acceptance, E, VU by RENETTA at 2/23/2023 1528    Comment: role of OT, goals of care, xfer tech, d/c rec   Family Acceptance, E, VU,NR by DS at 2/26/2023  1800                   Point: Body mechanics (Done)     Description:   Instruct learner(s) on proper positioning and spine alignment during self-care, functional mobility activities and/or exercises.              Learning Progress Summary           Patient Acceptance, E, VU,NR by OVIDIO at 2/26/2023 1800    Acceptance, E, VU by RENETTA at 2/23/2023 1528    Comment: role of OT, goals of care, xfer tech, d/c rec   Family Acceptance, E, VU,NR by OVIDIO at 2/26/2023 1800                               User Key     Initials Effective Dates Name Provider Type Discipline    OVIDIO 06/16/21 -  Kacie Camejo, RN Registered Nurse Nurse    RENETTA 08/20/21 -  Brie Schmidt OT Occupational Therapist OT              OT Recommendation and Plan  Therapy Frequency (OT): 3 times/wk  Plan of Care Review  Progress: improving  Outcome Evaluation: Pt agreeable to OT session this p.m. Pt participated in bed mobility with Mod Ax2, UBD tasks with Mod A, and was dependent for toileting tasks. Pt continues to present with decreased strength, functional endurance, and balance impacting ADL independence. Continue with skilled IPOT services per POC. Rec SNF at d/c to address current functional deficits     Time Calculation:    Time Calculation- OT     Row Name 03/02/23 1505             Time Calculation- OT    OT Start Time 1344  -BS      OT Stop Time 1430  -BS      OT Time Calculation (min) 46 min  -BS      Total Timed Code Minutes- OT 46 minute(s)  -BS      OT Received On 03/02/23  -BS      OT - Next Appointment 03/03/23  -BS         Timed Charges    37467 - OT Therapeutic Activity Minutes 20  -BS      97141 - OT Self Care/Mgmt Minutes 26  -BS         Total Minutes    Timed Charges Total Minutes 46  -BS       Total Minutes 46  -BS            User Key  (r) = Recorded By, (t) = Taken By, (c) = Cosigned By    Initials Name Provider Type    BS Sonia Purcell, OT Occupational Therapist              Therapy Charges for Today     Code Description Service Date Service  Provider Modifiers Qty    09550002842 HC OT SELF CARE/MGMT/TRAIN EA 15 MIN 3/2/2023 Sonia Purcell OT GO 2    30124831169 HC OT THERAPEUTIC ACT EA 15 MIN 3/2/2023 Sonia Purcell OT GO 1               Sonia Purcell OT  3/2/2023

## 2023-03-02 NOTE — THERAPY TREATMENT NOTE
Patient Name: Cindy Mejia  : 1945    MRN: [id number]                              Today's Date: 3/2/2023       Admit Date: 2023    Visit Dx:     ICD-10-CM ICD-9-CM   1. Acute metabolic encephalopathy  G93.41 348.31   2. Acute UTI  N39.0 599.0   3. Parkinson's disease (HCC)  G20 332.0     Patient Active Problem List   Diagnosis   • Anxiety   • Tubular adenoma   • Tremor of both hands   • B12 deficiency   • Orthostatic hypotension due to Parkinson's disease (HCC)   • Action tremor   • Mixed stress and urge urinary incontinence   • Urinary tract infection without hematuria   • Moderate malnutrition (HCC)   • Dyskinesia   • Balance problems   • Osteopenia of multiple sites   • Age-related osteoporosis without current pathological fracture   • Urinary tract infection in female   • Acute UTI   • History of malignant neoplasm of large intestine   • Symptomatic hypotension   • Parkinson disease (HCC)   • Acute metabolic encephalopathy   • Cystitis   • Splenic artery aneurysm (HCC)   • Atelectasis of left lung   • Moderate dementia due to Parkinson's disease, with mood disturbance (HCC)   • Hematuria   • Oral phase dysphagia     Past Medical History:   Diagnosis Date   • Age-related osteoporosis without current pathological fracture 2022   • Anxiety 2016   • Arthritis    • Asymptomatic varicose veins of both lower extremities 2019   • Atelectasis of left lung 2023   • Carcinoma of colon (HCC) 2018   • Colon polyp    • Constipation    • Menopause 2017   • Mixed stress and urge urinary incontinence    • Other hemorrhoids 2018   • Other rosacea 2018   • Parkinson's disease (HCC)    • Uterine prolapse 2016     Past Surgical History:   Procedure Laterality Date   • BLADDER SURGERY     • COLONOSCOPY N/A 2017    Procedure: COLONOSCOPY, polypectomy;  Surgeon: Rabia Mcelroy MD;  Location: [facility];  Service:    • COLONOSCOPY N/A 2017    Procedure:  COLONOSCOPY, polypectomy, biopsy, sclerotherapy injection;  Surgeon: Rabia Mcelroy MD;  Location: [facility];  Service:    • HYSTERECTOMY     • TUBAL ABDOMINAL LIGATION        General Information     Row Name 03/02/23 1535          Physical Therapy Time and Intention    Document Type therapy note (daily note)  -PC     Mode of Treatment physical therapy  -PC     Row Name 03/02/23 1535          General Information    Existing Precautions/Restrictions fall;oxygen therapy device and L/min  -PC     Row Name 03/02/23 1535          Cognition    Orientation Status (Cognition) oriented x 3  -PC     Row Name 03/02/23 1535          Safety Issues, Functional Mobility    Impairments Affecting Function (Mobility) endurance/activity tolerance;strength;motor planning;postural/trunk control;balance;shortness of breath  -PC           User Key  (r) = Recorded By, (t) = Taken By, (c) = Cosigned By    Initials Name Provider Type    PC Vanna Spicer, PT Physical Therapist               Mobility     Row Name 03/02/23 1536          Bed Mobility    Rolling Left [place] (Bed Mobility) maximum assist (25% patient effort)  -PC     Rolling Right [place] (Bed Mobility) maximum assist (25% patient effort)  -PC     Scooting/Bridging [place] (Bed Mobility) maximum assist (25% patient effort)  -PC     Supine-Sit [place] (Bed Mobility) moderate assist (50% patient effort)  -PC     Sit-Supine [place] (Bed Mobility) moderate assist (50% patient effort);2 person assist  -PC     Assistive Device (Bed Mobility) head of bed elevated;draw sheet;bed rails  -PC     Comment, (Bed Mobility) pt rolled left and right several times to change soiled brief prior to coming to edge of bed  -PC     Row Name 03/02/23 1536          Sit-Stand Transfer    Sit-Stand [place] (Transfers) moderate assist (50% patient effort);2 person assist  -PC     Comment, (Sit-Stand Transfer) HHA x 2 worked on weight shifting in standing, marching in place   -     Row Name 03/02/23 1536          Gait/Stairs (Locomotion)    [place] Level (Gait) moderate assist (50% patient effort);2 person assist  -PC     Assistive Device (Gait) --  HHA x 2  -PC     Distance in Feet (Gait) 2 steps forward/back, then 3 sidesteps to head of bed, short shuffling steps with narrow base of support, needing physical assist to initiate movement and for weight shifting  -PC     Deviations/Abnormal Patterns (Gait) festinating/shuffling;base of support, narrow;weight shifting decreased;stride length decreased  -PC     Bilateral Gait Deviations weight shift ability decreased  -PC           User Key  (r) = Recorded By, (t) = Taken By, (c) = Cosigned By    Initials Name Provider Type    Vanna Francisco PT Physical Therapist               Obj/Interventions     Row Name 03/02/23 1539          Motor Skills    Therapeutic Exercise --  worked on sitting tolerance, sit to stand x 3, standing posture  -PC           User Key  (r) = Recorded By, (t) = Taken By, (c) = Cosigned By    Initials Name Provider Type    PC Vanna Spicer, PT Physical Therapist               Goals/Plan    No documentation.                Clinical Impression     Row Name 03/02/23 1539          Pain    Pretreatment Pain Rating 0/10 - no pain  -PC     Row Name 03/02/23 1539          Plan of Care Review    Plan of Care Reviewed With patient  -PC     Outcome Evaluation Pt demonstrated good effort and eager to participate with PT, pt worked on bed mobility, sit to stand, and today she was able to take 2 steps forward/backward/sidesteps. Continues to require assist of 2, fatigues quickly, needed frequent rest breaks. Pt is appropriate to go to SNF at discharge  -     Row Name 03/02/23 1539          Vital Signs    Pre SpO2 (%) 95  -PC     O2 Delivery Pre Treatment supplemental O2  -PC     Intra SpO2 (%) 88  -PC     O2 Delivery Intra Treatment supplemental O2  -PC     Post SpO2 (%) 90  -PC     O2 Delivery Post Treatment  supplemental O2  -PC     Row Name 03/02/23 1539          Positioning and Restraints    Pre-Treatment Position in bed  -PC     Post Treatment Position bed  -PC     In Bed supine;exit alarm on;encouraged to call for assist;call light within reach  -PC           User Key  (r) = Recorded By, (t) = Taken By, (c) = Cosigned By    Initials Name Provider Type    PC Vanna Spicer, PT Physical Therapist               Outcome Measures     Row Name 03/02/23 1543          How much help from another person do you currently need...    Turning from your back to your side while in flat bed without using bedrails? 2  -PC     Moving from lying on back to sitting on the side of a flat bed without bedrails? 2  -PC     Moving to and from a bed to a chair (including a wheelchair)? 2  -PC     Standing up from a chair using your arms (e.g., wheelchair, bedside chair)? 2  -PC     Climbing 3-5 steps with a railing? 1  -PC     To walk in hospital room? 2  -PC     AM-PAC 6 Clicks Score (PT) 11  -PC     Highest level of mobility 4 --> Transferred to chair/commode  -PC     Row Name 03/02/23 1504          Functional Assessment    Outcome Measure Options AM-PAC 6 Clicks Daily Activity (OT)  -BS           User Key  (r) = Recorded By, (t) = Taken By, (c) = Cosigned By    Initials Name Provider Type    PC Vanna Spicer, PT Physical Therapist    Sonia Patel OT Occupational Therapist                             Physical Therapy Education     Title: PT OT SLP Therapies (Done)     Topic: Physical Therapy (Done)     Point: Mobility training (Done)     Learning Progress Summary           Patient Acceptance, E,D, DU by PC at 3/1/2023 1645    Acceptance, E, VU,NR by DS at 2/26/2023 1800   Family Acceptance, E, VU,NR by DS at 2/26/2023 1800                   Point: Home exercise program (Done)     Learning Progress Summary           Patient Acceptance, E,D, DU by PC at 3/1/2023 1645                   Point: Body mechanics (Done)     Learning Progress  Summary           Patient Acceptance, E,D, DU by PC at 3/1/2023 1645                   Point: Precautions (Done)     Learning Progress Summary           Patient Acceptance, E,D, DU by PC at 3/1/2023 1645                               User Key     Initials Effective Dates Name Provider Type Discipline    PC 06/16/21 -  Vanna Spicer PT Physical Therapist PT    DS 06/16/21 -  Kacie Camejo RN Registered Nurse Nurse              PT Recommendation and Plan  Planned Therapy Interventions (PT): bed mobility training, balance training, gait training, transfer training, strengthening  Plan of Care Reviewed With: patient  Outcome Evaluation: Pt demonstrated good effort and eager to participate with PT, pt worked on bed mobility, sit to stand, and today she was able to take 2 steps forward/backward/sidesteps. Continues to require assist of 2, fatigues quickly, needed frequent rest breaks. Pt is appropriate to go to SNF at discharge     Time Calculation:    PT Charges     Row Name 03/02/23 1545             Time Calculation    Start Time 1345  -PC      Stop Time 1430  -PC      Time Calculation (min) 45 min  -PC      PT Received On 03/02/23  -PC      PT - Next Appointment 03/03/23  -PC            User Key  (r) = Recorded By, (t) = Taken By, (c) = Cosigned By    Initials Name Provider Type    PC Vanna pSicer, PT Physical Therapist              Therapy Charges for Today     Code Description Service Date Service Provider Modifiers Qty    36086593517  PT EVAL HIGH COMPLEXITY 2 3/1/2023 Vanna Spicer, PT GP 1    02826490148 HC PT THER PROC EA 15 MIN 3/1/2023 Vanna Spicer, PT GP 1    03598634965 HC PT THER PROC EA 15 MIN 3/2/2023 Vanna Spicer, PT GP 3          PT G-Codes  Outcome Measure Options: AM-PAC 6 Clicks Daily Activity (OT)  AM-PAC 6 Clicks Score (PT): 11  AM-PAC 6 Clicks Score (OT): 11  Modified Jack Scale: 4 - Moderately severe disability.  Unable to walk without assistance, and unable to attend to own  bodily needs without assistance.  PT Discharge Summary  Anticipated Discharge Disposition (PT): skilled nursing facility    Vanna Spicer, PT  3/2/2023

## 2023-03-02 NOTE — THERAPY RE-EVALUATION
Acute Care - Speech Language Pathology   Swallow Re-Evaluation [facility]     Patient Name: Cindy Mejia  : 1945  MRN: [id number]  Today's Date: 3/2/2023               Admit Date: 2023    Visit Dx:     ICD-10-CM ICD-9-CM   1. Acute metabolic encephalopathy  G93.41 348.31   2. Acute UTI  N39.0 599.0   3. Parkinson's disease (HCC)  G20 332.0     Patient Active Problem List   Diagnosis   • Anxiety   • Tubular adenoma   • Tremor of both hands   • B12 deficiency   • Orthostatic hypotension due to Parkinson's disease (HCC)   • Action tremor   • Mixed stress and urge urinary incontinence   • Urinary tract infection without hematuria   • Moderate malnutrition (HCC)   • Dyskinesia   • Balance problems   • Osteopenia of multiple sites   • Age-related osteoporosis without current pathological fracture   • Urinary tract infection in female   • Acute UTI   • History of malignant neoplasm of large intestine   • Symptomatic hypotension   • Parkinson disease (HCC)   • Acute metabolic encephalopathy   • Cystitis   • Splenic artery aneurysm (HCC)   • Atelectasis of left lung   • Moderate dementia due to Parkinson's disease, with mood disturbance (HCC)   • Hematuria   • Oral phase dysphagia     Past Medical History:   Diagnosis Date   • Age-related osteoporosis without current pathological fracture 2022   • Anxiety 2016   • Arthritis    • Asymptomatic varicose veins of both lower extremities 2019   • Atelectasis of left lung 2023   • Carcinoma of colon (HCC) 2018   • Colon polyp    • Constipation    • Menopause 2017   • Mixed stress and urge urinary incontinence    • Other hemorrhoids 2018   • Other rosacea 2018   • Parkinson's disease (HCC)    • Uterine prolapse 2016     Past Surgical History:   Procedure Laterality Date   • BLADDER SURGERY     • COLONOSCOPY N/A 2017    Procedure: COLONOSCOPY, polypectomy;  Surgeon: Rabia Mcelroy MD;  Location: [facility] OR;   Service:    • COLONOSCOPY N/A 8/28/2017    Procedure: COLONOSCOPY, polypectomy, biopsy, sclerotherapy injection;  Surgeon: Rabia Mcelroy MD;  Location: [facility] OR;  Service:    • HYSTERECTOMY     • TUBAL ABDOMINAL LIGATION         SLP Recommendation and Plan  SLP Swallowing Diagnosis: R/O pharyngeal dysphagia (03/02/23 1100)  SLP Diet Recommendation: ice chips between meals after oral care, with supervision (03/02/23 1100)     SLP Rec. for Method of Medication Administration: meds via alternate route (03/02/23 1100)     Monitor for Signs of Aspiration: yes, notify SLP if any concerns (03/02/23 1100)  Recommended Diagnostics: reassess via VFSS (MBS) (03/02/23 1100)           Therapy Frequency (Swallow): PRN (03/02/23 1100)  Predicted Duration Therapy Intervention (Days): until discharge (03/02/23 1100)                                        Plan of Care Reviewed With: patient, spouse  Outcome Evaluation: Patient seen for re evaluation of swallow function. Pt now on 6 Ls HFNC, but quickly desaturates to the low 80s when talking or repositioning. No desaturations noted with PO trials. Oral mech exam was remarkable for rigid movements. Pt unable to feed self. Voice with spasmodic quality. No hx of speech therapy or dysphagia work up per patient or spouse. Spouse reports minimal dysphagia at baseline, but takes her meds with applesauce. Voice clear upon SLP's arrival, which is an improvement from last assessment. Intermittent voice change noted with ice, nectar via straw, and puree. Difficult to rate at times d/t dysphonia. No overt s/s of aspiration with nectar via spoon or cup. Pt is approaching PO readiness. Feel instrumental is indicated d/t difficulty with managing 02 saturations when talking and signs of aspiration noted this date. Will plan for next date. Continue ice chips after oral care.      SWALLOW EVALUATION (last 72 hours)     SLP Adult Swallow Evaluation     Row Name 03/02/23 1100 02/27/23 9318                 Rehab Evaluation    Document Type re-evaluation  - re-evaluation  -       Patient/Family/Caregiver Comments/Observations  present during end of session, discussed patient care over the phone as well  - --       Patient Effort excellent  -SH good  -          General Information    Patient Profile Reviewed yes  -SH yes  -SH       Pertinent History Of Current Problem UTI, COVID, PD  -SH --       Current Method of Nutrition nasogastric feedings  - --       Precautions/Limitations, Vision WFL;for purposes of eval  - --       Precautions/Limitations, Hearing WFL;for purposes of eval  -SH --       Prior Level of Function-Communication motor speech impairment;other (see comments)   reported no cog impairment  -SH --       Prior Level of Function-Swallowing other (see comments)  some trouble, meds in puree  - --       Plans/Goals Discussed with patient;agreed upon  - --       Barriers to Rehab medically complex  - --          Pain Scale: Numbers Pre/Post-Treatment    Pretreatment Pain Rating 0/10 - no pain  - --          Oral Motor Structure and Function    Dentition Assessment natural, present and adequate  - --          Oral Musculature and Cranial Nerve Assessment    Oral Motor General Assessment generalized oral motor weakness  - --          Clinical Swallow Eval    Clinical Swallow Evaluation Summary Patient seen for re evaluation of swallow function. Pt now on 6 Ls HFNC, but quickly desaturates to the low 80s when talking or repositioning. No desaturations noted with PO trials. Oral mech exam was remarkable for rigid movements. Pt unable to feed self. Voice with spasmodic quality. No hx of speech therapy or dysphagia work up per patient or spouse. Spouse reports minimal dysphagia at baseline, but takes her meds with applesauce. Voice clear upon SLP's arrival, which is an improvement from last assessment. Intermittent voice change noted with ice, nectar via straw, and puree.  Difficult to rate at times d/t dysphonia. No overt s/s of aspiration with nectar via spoon or cup. Pt is approaching PO readiness. Feel instrumental is indicated d/t difficulty with managing 02 saturations when talking and signs of aspiration noted this date. Will plan for next date. Continue ice chips after oral care.  - --          SLP Evaluation Clinical Impression    SLP Swallowing Diagnosis R/O pharyngeal dysphagia  - --          Recommendations    Therapy Frequency (Swallow) PRN  - --       Predicted Duration Therapy Intervention (Days) until discharge  - --       SLP Diet Recommendation ice chips between meals after oral care, with supervision  - --       Recommended Diagnostics reassess via VFSS (MBS)  - --       Oral Care Recommendations Oral Care BID/PRN;Before ice/water  - --       SLP Rec. for Method of Medication Administration meds via alternate route  - --       Monitor for Signs of Aspiration yes;notify SLP if any concerns  - --             User Key  (r) = Recorded By, (t) = Taken By, (c) = Cosigned By    Initials Name Effective Dates     Kacie Harrington MS CCC-SLP 06/16/21 -                 EDUCATION  The patient has been educated in the following areas:   Dysphagia (Swallowing Impairment).        SLP GOALS     Row Name 02/27/23 1500             (LTG) Swallow    (LTG) Swallow Patient will tolerate least restrictive diet with no overt s/sx of aspiration or penetration.  -      [place] (Swallow Long Term Goal) independently (over 90% accuracy)  -      Time Frame (Swallow Long Term Goal) by discharge  -      Progress/Outcomes (Swallow Long Term Goal) progress slower than expected  -      Comment (Swallow Long Term Goal) Patient seen for clinical swallow re assessment. Pt now on heated highflow: 60 Ls at 31% Fi02. Voice weak, wet voice at baseline. Pt able to clear with cued throat clear/reswallow. Variable swallow delay with ice chips. Pt required cues to swallow at  times. Intermittent weak cough and wet voice noted. Swallow appeared incomplete upon palpation. Further trials not presented d/t weak cough and overt s/s of aspiration with ice. SLP recs NPO. Can allow ice chips sparingly (no more than 3) after extensive oral care for patient comfort. Will follow for re evaluation as patient improves.  -            User Key  (r) = Recorded By, (t) = Taken By, (c) = Cosigned By    Initials Name Provider Type    Kacie Rios MS CCC-SLP Speech and Language Pathologist                   Time Calculation:    Time Calculation- SLP     Row Name 03/02/23 1103             Time Calculation- SLP    SLP Start Time 0945  -      SLP Received On 03/02/23  -         Untimed Charges    [id number] Treatment Swallow Minutes 75  -SH         Total Minutes    Untimed Charges Total Minutes 75  -SH       Total Minutes 75  -SH            User Key  (r) = Recorded By, (t) = Taken By, (c) = Cosigned By    Initials Name Provider Type     Kacie Harrington MS CCC-SLP Speech and Language Pathologist                Therapy Charges for Today     Code Description Service Date Service Provider Modifiers Qty    [id number]  ST TREATMENT SWALLOW 5 3/2/2023 Kacie Harrington MS CCC-SLP GN 1               Kacie Harrington MS CCC-ILIANA  3/2/2023

## 2023-03-02 NOTE — PLAN OF CARE
Goal Outcome Evaluation:  Plan of Care Reviewed With: patient, spouse           Outcome Evaluation: Patient seen for re evaluation of swallow function. Pt now on 6 Ls HFNC, but quickly desaturates to the low 80s when talking or repositioning. No desaturations noted with PO trials. Oral [facility] exam was remarkable for rigid movements. Pt unable to feed self. Voice with spasmodic quality. No hx of speech therapy or dysphagia work up per patient or spouse. Spouse reports minimal dysphagia at baseline, but takes her meds with applesauce. Voice clear upon SLP's arrival, which is an improvement from last assessment. Intermittent voice change noted with ice, nectar via straw, and puree. Difficult to rate at times d/t dysphonia. No overt s/s of aspiration with nectar via spoon or cup. Pt is approaching PO readiness. Feel instrumental is indicated d/t difficulty with managing 02 saturations when talking and signs of aspiration noted this date. Will plan for next date. Continue ice chips after oral care.      Patient was not wearing a face mask during this therapy encounter. Therapist used appropriate personal protective equipment including gown, eye protection, mask and gloves.  Mask used was N95/duckbill. Appropriate PPE was worn during the entire therapy session. Hand hygiene was completed before and after therapy session. Patient is in enhanced droplet precautions.

## 2023-03-02 NOTE — PROGRESS NOTES
Name: Cindy Mejia ADMIT: 2023   : 1945  PCP: Shannon Nguyen APRN    MRN: [id number] LOS: 7 days   AGE/SEX: 77 y.o. female  ROOM: [facility]     Subjective   Subjective   In bed, she is alert and oriented to person, place, event. She reports feeling hungry, speech note indicates she did well and they are planning VFSS tomorrow. WBC increased.          Objective   Objective   Vital Signs  Temp:  [97 °F (36.1 °C)-98.3 °F (36.8 °C)] 97 °F (36.1 °C)  Heart Rate:  [] 89  Resp:  [16-22] 20  BP: ()/(52-78) 136/62  SpO2:  [90 %-100 %] 100 %  on  Flow (L/min):  [5-6] 5;   Device (Oxygen Therapy): nasal cannula  Body mass index is 18.63 kg/m².  Physical Exam  Constitutional:       General: She is not in acute distress.     Appearance: Normal appearance. She is ill-appearing.   HENT:      Nose:      Comments: NG tube in place  Cardiovascular:      Rate and Rhythm: Regular rhythm. Tachycardia present.      Pulses: Normal pulses.      Heart sounds: No murmur heard.  Pulmonary:      Effort: Pulmonary effort is normal. No respiratory distress.      Breath sounds: Rhonchi present. No wheezing.   Abdominal:      General: Abdomen is flat. Bowel sounds are normal. There is no distension.      Palpations: Abdomen is soft.   Musculoskeletal:         General: No swelling or tenderness.      Right lower leg: No edema.      Left lower leg: No edema.   Skin:     General: Skin is warm and dry.   Neurological:      General: No focal deficit present.      Mental Status: She is alert and oriented to person, place, and time. Mental status is at baseline.      Comments: Bilateral upper extremity tremor, bruising of upper arms         Results Review     I reviewed the patient's new clinical results.  Results from last 7 days   Lab Units 23  1558 23  0805 23  2339 23  1617 23  0800 23  0542 23  1759 23  0826 23  1450 23  0616   WBC 10*3/mm3  --  24.65*   --   --   --  12.68*  --  14.56*  --  9.95   HEMOGLOBIN g/dL 9.3* 9.2* 8.7* 9.1*   < > 8.7*  8.6*   < > 7.6*  7.6*   < > 8.3*   PLATELETS 10*3/mm3  --  225  --   --   --  146  --  175  --  179    < > = values in this interval not displayed.     Results from last 7 days   Lab Units 03/02/23  0805 03/01/23  0856 02/28/23  0800 02/27/23  0826   SODIUM mmol/L 144 143 147* 156*   POTASSIUM mmol/L 5.0 4.6 5.7* 3.2*   CHLORIDE mmol/L 104 108* 119* 126*   CO2 mmol/L 32.8* 25.7 25.0 25.4   BUN mg/dL 21 17 21 36*   CREATININE mg/dL 0.51* 0.47* 0.55* 0.59   GLUCOSE mg/dL 120* 112* 254* 182*   Estimated Creatinine Clearance: 63.7 mL/min (A) (by C-G formula based on SCr of 0.51 mg/dL (L)).  Results from last 7 days   Lab Units 02/28/23  0800 02/26/23  0616 02/25/23 0322 02/24/23  1121   ALBUMIN g/dL 2.8* 2.9* 3.3* 3.6   BILIRUBIN mg/dL 0.4 0.4 <0.2 0.2   ALK PHOS U/L 44 49 70 74   AST (SGOT) U/L 21 28 57* 51*   ALT (SGPT) U/L 12 15 22 15     Results from last 7 days   Lab Units 03/02/23  0805 03/01/23  0856 02/28/23  0800 02/27/23  0826 02/27/23  0519 02/26/23  0616 02/25/23 0322 02/24/23  1121   CALCIUM mg/dL 8.6 7.9* 7.2* 6.8*  --   --   --  7.5*   ALBUMIN g/dL  --   --  2.8*  --   --  2.9* 3.3* 3.6   MAGNESIUM mg/dL 2.2  --   --   --  2.4  --   --   --    PHOSPHORUS mg/dL 3.2  --   --   --   --   --   --   --      Results from last 7 days   Lab Units 02/24/23  2046 02/24/23  1851 02/24/23  1403 02/24/23  1121 02/24/23  0555   PROCALCITONIN ng/mL  --   --   --  0.78* 0.43*   LACTATE mmol/L 2.0 2.1* 2.1* 2.1*  --      COVID19   Date Value Ref Range Status   02/22/2023 Detected (C) Not Detected - Ref. Range Final   09/30/2022 Not Detected Not Detected - Ref. Range Final     Glucose   Date/Time Value Ref Range Status   03/02/2023 1153 221 (H) 70 - 130 mg/dL Final     Comment:     Meter: [id number] : [id number] Ermelinda SNEED   03/02/2023 0606 143 (H) 70 - 130 mg/dL Final     Comment:     Meter: [id number] :  [id number] Skyla Mina SHAWNA   03/02/2023 0028 174 (H) 70 - 130 mg/dL Final     Comment:     Meter: [id number] : [id number] Skyla Mina SHAWNA   03/01/2023 1812 224 (H) 70 - 130 mg/dL Final     Comment:     Meter: [id number] : [id number] Velasquez Gamino CNA   03/01/2023 1221 205 (H) 70 - 130 mg/dL Final     Comment:     Meter: [id number] : [id number] Velasquez Gamino CNA   03/01/2023 0621 179 (H) 70 - 130 mg/dL Final     Comment:     Meter: [id number] : [id number] Carmelita White NA   03/01/2023 0004 207 (H) 70 - 130 mg/dL Final     Comment:     Meter: [id number] : [id number] Carmelita White NA       XR Chest 1 View  XR CHEST 1 VW-     HISTORY:  Hypoxemia, shortness of air, weakness, Covid positive.     COMPARISON:  Chest radiograph 2/26/2023     FINDINGS:    A single view of the chest was obtained. There is a new enteric tube  extending below the field-of-view. The cardiac silhouette and  mediastinal and hilar contours are not significantly changed. There is  calcific aortic atherosclerosis. Right infrahilar opacity is not  significantly changed. There is new predominantly linear opacity at the  left lung base, which could reflect atelectasis. There are trace  bilateral pleural effusions, new/progressed from 2/26/2023. Continued  follow-up imaging is recommended. The visualized osseous structures are  not significantly changed.     This report was finalized on 3/2/2023 1:23 PM by Dr. Ruchi Blair M.D.       Scheduled Medications  amLODIPine, 5 mg, Nasogastric, Q24H  carbidopa-levodopa, 1 tablet, Nasogastric, TID  dexamethasone, 6 mg, Intravenous, Daily  insulin lispro, 0-14 Units, Subcutaneous, Q6H  ipratropium-albuterol, 3 mL, Nebulization, Q4H - RT  pantoprazole, 40 mg, Intravenous, Q12H  pramipexole, 0.25 mg, Nasogastric, Nightly  vitamin B-12, 50 mcg, Nasogastric, Daily    Infusions   Diet  NPO Diet NPO Type: Strict NPO         I have personally reviewed:  [x]  Laboratory   []  Microbiology   [x]  Radiology  "  [x]  EKG/Telemetry   []  Cardiology/Vascular   []  Pathology   []  Records    Assessment/Plan     Active Hospital Problems    Diagnosis  POA   • **Acute metabolic encephalopathy [G93.41]  Yes   • Cystitis [N30.90]  Yes   • Splenic artery aneurysm (HCC) [I72.8]  Yes   • Atelectasis of left lung [J98.11]  Yes   • Hematuria [R31.9]  Yes   • Oral phase dysphagia [R13.11]  Unknown   • Moderate dementia due to Parkinson's disease, 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       77 y.o. female admitted with Acute metabolic encephalopathy.    Acute hypoxemic/hypercapnic respiratory failure  COVID-19 pneumonia  Bibasilar pneumonia-possibly secondary bacterial versus aspiration  -Patient completed a course of Rocephin while in the unit  -Still requiring 6 L via nasal cannula, high risk for recurrent aspiration  -Pulm following  -Speech re-evaluated today- \"approaching PO\", VFSS tomorrow  -Completed remdesivir, remains on dexamethasone for COVID  - WBC up to 24- on steroids- otherwise on stable O2 requirements, CXR not signifcantly worse; UA bland; continue to monitor patient and recheck cbc in am    Aerococcus UTI  -Patient completed a course of vancomycin    Anemia  GI bleed/questionable melena  -GI following however they do not recommended EGD/C-scope at this time given her significant O2 requirements (6L) and high risk of requiring intubation  -Trend H&H, transfuse as needed    Altered mental status/metabolic encephalopathy  -Patient improving    Hypertension  -Continue amlodipine    Parkinson's with Parkinson's dementia  -Continue Sinemet, Mirapex    Oropharyngeal dysphagia  -Currently on tube feeds, VFSS tomorrow.    · SCDs for DVT prophylaxis.  · DNR.  · Discussed with patient and nursing staff.  · Anticipate discharge to SNU facility timing yet to " be determined.      Anabell Cannon MD  [facility]  03/02/23  17:41 EST    I wore protective equipment throughout this patient encounter including gloves and protective eyewear.  Hand hygiene was performed before donning protective equipment and after removal when leaving the room.

## 2023-03-02 NOTE — PROGRESS NOTES
LOS: 7 days   Patient Care Team:  Shannon Nguyen APRN as PCP - General (Family Medicine)  Ant Baxter MD as Consulting Physician (Neurology)  Rabia Mcelroy MD as Consulting Physician (Gastroenterology)    Subjective   Pulmonary following for respiratory failure   Patient has acute hypoxemic and hypercapnic respiratory failure, she has acute COVID-19 infection underlying parkinsonian dementia, anemia and pneumonia.      Patient main complaint is not eating.  Breathing comfortably not short of breath she was on 5 L O2  Review of Systems:          Objective     Vital Signs  Vital Sign Min/Max for last 24 hours  Temp  Min: 97 °F (36.1 °C)  Max: 98.3 °F (36.8 °C)   BP  Min: 97/52  Max: 155/78   Pulse  Min: 89  Max: 106   Resp  Min: 16  Max: 22   SpO2  Min: 90 %  Max: 100 %   Flow (L/min)  Min: 5  Max: 6   Weight  Min: 43 kg (94 lb 14.4 oz)  Max: 43 kg (94 lb 14.4 oz)        Ventilator/Non-Invasive Ventilation Settings (From admission, onward)     Start     Ordered    02/24/23 0758  NIPPV-IPPV / BIPAP / CPAP  Until Discontinued        Question Answer Comment   Indication: Acute Respiratory Failure    Type: AVAPS-AE    Rate 16    VT (mL) 500    EPAP Min (cm H2O) 8    EPAP Max (cm H2O) 15    Max Pressure (cm H2O) 30    Pressure Support Min (cm H2O) 6    Pressure Support Max (cm H2O) 15    Titrate for SPO2 90%        02/24/23 0757                             Body mass index is 18.63 kg/m².  I/O last 3 completed shifts:  In: 0   Out: 2875 [Urine:2875]  I/O this shift:  In: 3807 [Other:1906; NG/GT:1901]  Out: 600 [Urine:600]        Physical Exam:  General Appearance: Elderly thin almost cachectic appearing white female she is on 5 L nasal cannula O2 oxygen saturations 97% does not appear in any distress  Eyes: Conjunctiva are clear and anicteric  ENT: Mucous membranes are dry no erythema no exudates, nasal septum midline she has a nasoenteric tube in the left nare.  Neck: No adenopathy no  thyromegaly no jugular venous distension, trachea midline  Lungs: Clear nonlabored symmetric expansion no wheezes rales or rhonchi  Cardiac: Regular rate rhythm no murmur  Abdomen: Soft no palpable hepatosplenomegaly or masses  : Not examined  Musculoskeletal: She does not have a whole lot of muscle mass  Skin: Warm and dry no jaundice no petechiae  Neuro: She is awake and alert she is following commands to move her extremities.  Did not have a lot of resting tremor today  Extremities/P Vascular: No clubbing no cyanosis trace lower extremity edema, palpable radial and dorsalis pedis pulses  MSE: She seems to be a little better mood today she is more talkative more interactive.       Labs:  Results from last 7 days   Lab Units 03/02/23  0805 03/01/23  0856 02/28/23  0800 02/27/23  0826 02/27/23  0519 02/26/23  0616 02/25/23  0322 02/24/23  1121 02/24/23  0555   GLUCOSE mg/dL 120* 112* 254* 182*  --   --   --  133*  --    SODIUM mmol/L 144 143 147* 156*  --   --   --  146*  --    POTASSIUM mmol/L 5.0 4.6 5.7* 3.2*  --   --   --  4.4  --    MAGNESIUM mg/dL 2.2  --   --   --  2.4  --   --   --   --    CO2 mmol/L 32.8* 25.7 25.0 25.4  --   --   --  27.0  --    CHLORIDE mmol/L 104 108* 119* 126*  --   --   --  110*  --    ANION GAP mmol/L 7.2 9.3 3.0* 4.6*  --   --   --  9.0  --    CREATININE mg/dL 0.51* 0.47* 0.55* 0.59  --  0.84 0.86 0.94 0.64   BUN mg/dL 21 17 21 36*  --   --   --  34*  --    BUN / CREAT RATIO  41.2* 36.2* 38.2* 61.0*  --   --   --  36.2*  --    CALCIUM mg/dL 8.6 7.9* 7.2* 6.8*  --   --   --  7.5*  --    ALK PHOS U/L  --   --  44  --   --  49 70 74 72   TOTAL PROTEIN g/dL  --   --  5.1*  --   --  5.1* 5.9* 6.1 6.3   ALT (SGPT) U/L  --   --  12  --   --  15 22 15 7   AST (SGOT) U/L  --   --  21  --   --  28 57* 51* 38*   BILIRUBIN mg/dL  --   --  0.4  --   --  0.4 <0.2 0.2 <0.2   ALBUMIN g/dL  --   --  2.8*  --   --  2.9* 3.3* 3.6 3.5   GLOBULIN gm/dL  --   --  2.3  --   --   --   --  2.5  --       Estimated Creatinine Clearance: 63.7 mL/min (A) (by C-G formula based on SCr of 0.51 mg/dL (L)).      Results from last 7 days   Lab Units 03/02/23  0805 03/01/23  2339 03/01/23  1617 03/01/23  0750 03/01/23  0054 02/28/23  1822 02/28/23  0800 02/28/23  0542 02/27/23  1759 02/27/23  0826 02/26/23  1450 02/26/23  0616 02/25/23  0127 02/24/23  1121   WBC 10*3/mm3 24.65*  --   --   --   --   --   --  12.68*  --  14.56*  --  9.95  --  8.89   RBC 10*6/mm3 3.20*  --   --   --   --   --   --  2.90*  --  2.56*  --  2.78*  --  3.52*   HEMOGLOBIN g/dL 9.2* 8.7* 9.1* 8.6* 9.4* 9.0* 8.8* 8.7*  8.6*   < > 7.6*  7.6*   < > 8.3*   < > 10.5*   HEMATOCRIT % 28.3* 26.8* 26.8* 25.1* 27.8* 26.4* 27.1* 26.1*  26.1*   < > 22.8*  22.8*   < > 24.3*   < > 32.6*   MCV fL 88.4  --   --   --   --   --   --  90.0  --  89.1  --  87.4  --  92.6   MCH pg 28.8  --   --   --   --   --   --  30.0  --  29.7  --  29.9  --  29.8   MCHC g/dL 32.5  --   --   --   --   --   --  33.3  --  33.3  --  34.2  --  32.2   RDW % 13.9  --   --   --   --   --   --  14.3  --  13.8  --  13.4  --  12.9   RDW-SD fl 44.3  --   --   --   --   --   --  46.8  --  44.9  --  43.1  --  43.6   MPV fL 11.1  --   --   --   --   --   --  11.0  --  10.4  --  10.9  --  11.2   PLATELETS 10*3/mm3 225  --   --   --   --   --   --  146  --  175  --  179  --  158   NEUTROS ABS 10*3/mm3 23.42*  --   --   --   --   --   --   --   --   --   --   --   --  7.56*   NRBC /100 WBC 1.0*  --   --   --   --   --   --   --   --   --   --   --   --   --     < > = values in this interval not displayed.     Results from last 7 days   Lab Units 02/28/23  1321   PH, ARTERIAL pH units 7.444   PO2 ART mm Hg 74.9*   PCO2, ARTERIAL mm Hg 31.3*   HCO3 ART mmol/L 21.5*                 Results from last 7 days   Lab Units 02/24/23  2046 02/24/23  1851 02/24/23  1403 02/24/23  1121 02/24/23  0555   LACTATE mmol/L 2.0 2.1* 2.1* 2.1*  --    PROCALCITONIN ng/mL  --   --   --  0.78* 0.43*         Microbiology  Results (last 10 days)     Procedure Component Value - Date/Time    Respiratory Panel PCR w/COVID-19(SARS-CoV-2) [facility]/HELIO/JUAN/[facility] In-House, NP Swab in UTM/VTM, 3-4 HR TAT - Swab, Nasopharynx [[id number]]  (Abnormal) Collected: 02/22/23 2332    Lab Status: Final result Specimen: Swab from Nasopharynx Updated: 02/23/23 0052     ADENOVIRUS, PCR Not Detected     Coronavirus 229E Not Detected     Coronavirus HKU1 Not Detected     Coronavirus NL63 Not Detected     Coronavirus OC43 Not Detected     COVID19 Detected     Human Metapneumovirus Not Detected     Human Rhinovirus/Enterovirus Not Detected     Influenza A PCR Not Detected     Influenza B PCR Not Detected     Parainfluenza Virus 1 Not Detected     Parainfluenza Virus 2 Not Detected     Parainfluenza Virus 3 Not Detected     Parainfluenza Virus 4 Not Detected     RSV, PCR Not Detected     Bordetella pertussis pcr Not Detected     Bordetella parapertussis PCR Not Detected     Chlamydophila pneumoniae PCR Not Detected     Mycoplasma pneumo by PCR Not Detected    Narrative:      In the setting of a positive respiratory panel with a viral infection PLUS a negative procalcitonin without other underlying concern for bacterial infection, consider observing off antibiotics or discontinuation of antibiotics and continue supportive care. If the respiratory panel is positive for atypical bacterial infection (Bordetella pertussis, Chlamydophila pneumoniae, or Mycoplasma pneumoniae), consider antibiotic de-escalation to target atypical bacterial infection.    Blood Culture - Blood, Arm, Right [[id number]]  (Normal) Collected: 02/22/23 2124    Lab Status: Final result Specimen: Blood from Arm, Right Updated: 02/27/23 2131     Blood Culture No growth at 5 days    Blood Culture - Blood, Arm, Right [[id number]]  (Normal) Collected: 02/22/23 2124    Lab Status: Final result Specimen: Blood from Arm, Right Updated: 02/27/23 2131     Blood Culture No growth at 5 days    Urine  Culture - Urine, Straight Cath [[id number]]  (Abnormal) Collected: 02/22/23 1945    Lab Status: Final result Specimen: Urine from Straight Cath Updated: 02/25/23 0241     Urine Culture >100,000 CFU/mL Aerococcus viridans     Comment:   Aerococcus viridans is usually susceptible to vancomycin. Resistance has been reported to the fluoroquinolones and beta-lactams. Routine susceptibility testing is not recommended. Please call lab to request further workup.       Narrative:      Colonization of the urinary tract without infection is common. Treatment is discouraged unless the patient is symptomatic, pregnant, or undergoing an invasive urologic procedure.              amLODIPine, 5 mg, Nasogastric, Q24H  carbidopa-levodopa, 1 tablet, Nasogastric, TID  dexamethasone, 6 mg, Intravenous, Daily  insulin lispro, 0-14 Units, Subcutaneous, Q6H  ipratropium-albuterol, 3 mL, Nebulization, Q4H - RT  pantoprazole, 40 mg, Intravenous, Q12H  pramipexole, 0.25 mg, Nasogastric, Nightly  vitamin B-12, 50 mcg, Nasogastric, Daily           Diagnostics:  Adult Transthoracic Echo Complete W/ Cont if Necessary Per Protocol    Result Date: 2/24/2023  •  Left ventricular systolic function is hyperdynamic (EF > 70%). Calculated left ventricular EF = 74.2% •  Left ventricular wall thickness is consistent with mild concentric hypertrophy. •  The left ventricular cavity is small in size. •  The following left ventricular wall segments are hyperkinetic: basal anterior, basal anterolateral, basal inferolateral, basal inferior, basal inferoseptal, basal anteroseptal, mid anterior, mid anterolateral, mid inferolateral, mid inferior, mid inferoseptal, mid anteroseptal, apical anterior, apical lateral, apical inferior, apical septal and apex. •  Left ventricular diastolic function was indeterminate. •  Peak velocity of the flow distal to the aortic valve is 150 cm/s. Aortic valve maximum pressure gradient is 9 mmHg. Aortic valve mean pressure gradient  is 5 mmHg. Aortic valve dimensionless index is 0.9 . •  Trace to mild tricuspid valve regurgitation is present. •  Estimated right ventricular systolic pressure from tricuspid regurgitation is mildly elevated (35-45 mmHg). Calculated right ventricular systolic pressure from tricuspid regurgitation is 39 mmHg.     CT Abdomen Pelvis Without Contrast    Result Date: 2/22/2023  CT CHEST WITHOUT CONTRAST: CT ABDOMEN AND PELVIS WITHOUT CONTRAST  HISTORY: Hypoxia and urinary tract infection  COMPARISON: None available.  TECHNIQUE: Axial CT imaging was obtained from the thoracic inlet through the symphysis pubis. No IV contrast was administered.  FINDINGS: CT OF THE CHEST: The patient is noted to have debris within the bronchus intermedius, with extension into the right middle lobe and right lower lobe. There is some associated atelectasis. There is additional debris which is noted within the bronchus to the left lower lobe, again, with some atelectasis noted. The thyroid gland, trachea, and esophagus are unremarkable. There are coronary artery calcifications. There is no pleural or pericardial effusion. Mediastinal lymph nodes are not pathologically enlarged. There is some deformity of the superior endplate of T3, age indeterminate, although favored to be chronic. MRI or bone scan would be more sensitive for assessment of acuity.  CT ABDOMEN AND PELVIS: No suspicious hepatic lesions are seen. There is a peripherally calcified splenic artery aneurysm, measuring up to 1.1 cm. There is a right adrenal myelolipoma, measuring up to 3.2 x 2.7 cm. Gallbladder appears unremarkable. Pancreas is within normal limits. Calcified granulomata are seen within the spleen. Punctate nonobstructing stones are noted within both kidneys. Single largest on the right measures up to 7 mm. Single largest on the left measures 3 mm. There are simple appearing left renal cyst. No additional follow-up is necessary. Urinary bladder is thick walled,  with adjacent perivesical stranding, in keeping with diabetes. Patient does appear to have pelvic floor laxity, with cystocele noted. There is no bowel obstruction. There is some air seen within the urinary bladder, which may be related to catheterization. There is no evidence of appendicitis. No acute osseous abnormalities are seen.       1. Patient is noted to have debris within the bronchus intermedius and bronchus to the left lower lobe, with associated mild atelectasis. Patient may benefit from bronchoscopy. 2. Peripherally calcified splenic artery aneurysm, measuring up to 1.1 cm. Follow-up CT in one year is suggested. 3. Right renal adrenal myelolipoma. 4. Thick-walled appearance to the urinary bladder, with adjacent perivesical stranding, suggesting cystitis. In addition, the patient appears to have pelvic floor laxity, with cystocele.  Radiation dose reduction techniques were utilized, including automated exposure control and exposure modulation based on body size.  This report was finalized on 2/22/2023 9:33 PM by Dr. Araceli Martinez M.D.      CT Chest Without Contrast Diagnostic    Result Date: 2/22/2023  CT CHEST WITHOUT CONTRAST: CT ABDOMEN AND PELVIS WITHOUT CONTRAST  HISTORY: Hypoxia and urinary tract infection  COMPARISON: None available.  TECHNIQUE: Axial CT imaging was obtained from the thoracic inlet through the symphysis pubis. No IV contrast was administered.  FINDINGS: CT OF THE CHEST: The patient is noted to have debris within the bronchus intermedius, with extension into the right middle lobe and right lower lobe. There is some associated atelectasis. There is additional debris which is noted within the bronchus to the left lower lobe, again, with some atelectasis noted. The thyroid gland, trachea, and esophagus are unremarkable. There are coronary artery calcifications. There is no pleural or pericardial effusion. Mediastinal lymph nodes are not pathologically enlarged. There is some  deformity of the superior endplate of T3, age indeterminate, although favored to be chronic. MRI or bone scan would be more sensitive for assessment of acuity.  CT ABDOMEN AND PELVIS: No suspicious hepatic lesions are seen. There is a peripherally calcified splenic artery aneurysm, measuring up to 1.1 cm. There is a right adrenal myelolipoma, measuring up to 3.2 x 2.7 cm. Gallbladder appears unremarkable. Pancreas is within normal limits. Calcified granulomata are seen within the spleen. Punctate nonobstructing stones are noted within both kidneys. Single largest on the right measures up to 7 mm. Single largest on the left measures 3 mm. There are simple appearing left renal cyst. No additional follow-up is necessary. Urinary bladder is thick walled, with adjacent perivesical stranding, in keeping with diabetes. Patient does appear to have pelvic floor laxity, with cystocele noted. There is no bowel obstruction. There is some air seen within the urinary bladder, which may be related to catheterization. There is no evidence of appendicitis. No acute osseous abnormalities are seen.       1. Patient is noted to have debris within the bronchus intermedius and bronchus to the left lower lobe, with associated mild atelectasis. Patient may benefit from bronchoscopy. 2. Peripherally calcified splenic artery aneurysm, measuring up to 1.1 cm. Follow-up CT in one year is suggested. 3. Right renal adrenal myelolipoma. 4. Thick-walled appearance to the urinary bladder, with adjacent perivesical stranding, suggesting cystitis. In addition, the patient appears to have pelvic floor laxity, with cystocele.  Radiation dose reduction techniques were utilized, including automated exposure control and exposure modulation based on body size.  This report was finalized on 2/22/2023 9:33 PM by Dr. Araceli Martinez M.D.      CT Angiogram Chest    Result Date: 2/24/2023  CT ANGIOGRAM OF THE CHEST. MULTIPLE CORONAL, SAGITTAL, AND 3-D  RECONSTRUCTIONS.  HISTORY: 77-year-old female with shortness of breath. Covid.  TECHNIQUE: Radiation dose reduction techniques were utilized, including automated exposure control and exposure modulation based on body size. CT angiogram of the chest was performed following the administration of IV contrast. Multiple coronal, sagittal, and 3-D reconstruction images were obtained. Compared with previous chest CT 02/22/2023.  FINDINGS: Some of the distal pulmonary artery branches are well seen or opacified, but there is no convincing evidence for pulmonary thromboemboli. There is near complete opacification of multiple left lower lobe bronchi and to a lesser degree at the right lower lobe and right middle lobe. There are mixed density, but predominantly, dense peribronchial airspace consolidations at both lower lobes and to a lesser degree at the right middle lobe. The consolidations extend to the periphery where there is a combination of groundglass and nodular opacities. These are also seen at the posterior segment of the right upper lobe. There is a stable sub-6 mm left apical pulmonary nodule, image 20. There are no pleural or pericardial effusions. There is no lymphadenopathy within the chest. There is no hiatal hernia. There is no new abnormality at the visualized upper abdomen. Peripherally calcified 1.1 cm splenic artery aneurysm at the splenic hilum is again noted.      1. There is no convincing evidence for pulmonary thromboemboli. 2. Extensive multifocal aspiration pneumonia. Fairly extensive opacification of bronchi at both lower lobes and right middle lobe and development of dense peribronchial pneumonia at both lower lobes and to a lesser degree the right middle lobe, and there is much less dense pneumonia peripherally at the lower lobes, right middle lobe, and the dependent aspect of the right upper lobe.  This report was finalized on 2/24/2023 4:09 PM by Dr. Penny Kilpatrick M.D.      XR Chest 1  View    Result Date: 2/24/2023  XR CHEST 1 VW-  HISTORY:  Shortness of breath, fatigue.  COMPARISON:  Chest radiograph 2/22/2023. CT chest 2/22/2023.  FINDINGS:  A single view of the chest was obtained. The cardiac silhouette is partially obscured. There is calcific aortic atherosclerosis. There are new airspace and prominent interstitial opacities in the mid and lower lungs concerning for edema and/or multifocal pneumonia in the appropriate clinical setting. Short-term follow-up imaging is recommended. There is no large pleural effusion. The visualized osseous structures are unchanged.  This report was finalized on 2/24/2023 7:35 AM by Dr. Ruchi Blair M.D.      XR Chest 1 View    Result Date: 2/22/2023  XR CHEST 1 VW-  02/22/2023  HISTORY: Fatigue. Weakness.  Heart size is at the upper limits of normal. Lungs appear free of acute infiltrates. Tiny calcified granulomas are seen in the left midlung. Bony structures appear unremarkable.      1. Borderline cardiomegaly.  This report was finalized on 2/22/2023 6:27 PM by Dr. Alex Brumfield M.D.      Results for orders placed during the hospital encounter of 02/22/23    Adult Transthoracic Echo Complete W/ Cont if Necessary Per Protocol    Interpretation Summary  •  Left ventricular systolic function is hyperdynamic (EF > 70%). Calculated left ventricular EF = 74.2%  •  Left ventricular wall thickness is consistent with mild concentric hypertrophy.  •  The left ventricular cavity is small in size.  •  The following left ventricular wall segments are hyperkinetic: basal anterior, basal anterolateral, basal inferolateral, basal inferior, basal inferoseptal, basal anteroseptal, mid anterior, mid anterolateral, mid inferolateral, mid inferior, mid inferoseptal, mid anteroseptal, apical anterior, apical lateral, apical inferior, apical septal and apex.  •  Left ventricular diastolic function was indeterminate.  •  Peak velocity of the flow distal to the aortic valve is  150 cm/s. Aortic valve maximum pressure gradient is 9 mmHg. Aortic valve mean pressure gradient is 5 mmHg. Aortic valve dimensionless index is 0.9 .  •  Trace to mild tricuspid valve regurgitation is present.  •  Estimated right ventricular systolic pressure from tricuspid regurgitation is mildly elevated (35-45 mmHg). Calculated right ventricular systolic pressure from tricuspid regurgitation is 39 mmHg.          Active Hospital Problems    Diagnosis  POA   • **Acute metabolic encephalopathy [G93.41]  Yes   • Cystitis [N30.90]  Yes   • Splenic artery aneurysm (HCC) [I72.8]  Yes   • Atelectasis of left lung [J98.11]  Yes   • Hematuria [R31.9]  Yes   • Oral phase dysphagia [R13.11]  Unknown   • Moderate dementia due to Parkinson's disease, 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         Assessment & Plan     1. Acute hypoxemic and hypercapnic respiratory failure continue weaning O2 as tolerated  2. COVID-19 infection and question pneumonia she is on  steroids will complete 10 days today is day 9 and completed remdesivir.   3. Pneumonia bibasilar some is more groundglass type more like I would expect with COVID but there is a lot of dense infiltrate as well suspicious for secondary infection question distribution if aspiration pneumonia.  Had an elevated procalcitonin also yesterday.  Completed course of Rocephin  4. UTI Aerococcus greater than 10 to the 6 hard to know a lot of blood in the urine as well.  She has completed a course of vancomycin  5. Anemia GI bleed posttransfusion hemoglobin relatively stable  6. Metabolic encephalopathy probably related to hypercapnia on some underlying dementia.  She does seem to be improving she has some underlying confusion so I think she is probably getting close to that level  7. Mildly elevated AST has  normalized  8. Hyperglycemia may be secondary to steroids sliding scale insulin  9. Pulmonary hypertension mild by echocardiogram  10. Elevated D-dimer CT angiogram and lower extremity Dopplers negative  11. Hypertension some labile blood pressures overall better control  12. Parkinson's disease with reported parkinsonian dementia, improved with modification to her Sinemet dosing  13. Fluids/electrolytes/nutrition continue tube parades and free water sodium better  14. Oropharyngeal dysphagia unfortunately she did not do very well yesterday if she does not show significant improvement in the next several days we will need to talk to the family about goals here whether they want feeding tubes or not etc.  15. Hypertension blood pressure better      Plan for disposition:         Darvin Carrillo Jr, MD  03/02/23  17:01 EST    Time:

## 2023-03-02 NOTE — PLAN OF CARE
Goal Outcome Evaluation:  Plan of Care Reviewed With: patient           Outcome Evaluation: Pt demonstrated good effort and eager to participate with PT, pt worked on bed mobility, sit to stand, and today she was able to take 2 steps forward/backward/sidesteps. Continues to require assist of 2, fatigues quickly, needed frequent rest breaks. Pt is appropriate to go to SNF at discharge

## 2023-03-02 NOTE — PLAN OF CARE
Goal Outcome Evaluation:           Progress: improving  Outcome Evaluation: Pt agreeable to OT session this p.m. Pt participated in bed mobility with Mod Ax2, UBD tasks with Mod A, and was dependent for toileting tasks. Pt continues to present with decreased strength, functional endurance, and balance impacting ADL independence. Continue with skilled IPOT services per POC. Rec SNF at d/c to address current functional deficits

## 2023-03-02 NOTE — PLAN OF CARE
Goal Outcome Evaluation:           Progress: improving  Outcome Evaluation: Rested well through the shift with no complaints of pain, remains on 6 L NC. [place] in place TF at goal rate of 50. Oral care provided, few ice chips given for comfort per diet order and she did well with those. Pure wick in place with good urine output. Frequent repositioning provided. No complaints of dysuria this shift.

## 2023-03-02 NOTE — PROGRESS NOTES
Gastroenterology   Inpatient Progress Note    Reason for Follow Up:  Melena    Subjective  Interval History:   Hemoglobin stable.  Patient denies nausea, vomiting, abdominal pain.    Brown stool noted per nursing.  Tube feeds infusing per Dobbhoff tube.  Repeat swallow study planned for tomorrow.    Current Facility-Administered Medications:   •  acetaminophen (TYLENOL) tablet 650 mg, 650 mg, Oral, Q4H PRN **OR** acetaminophen (TYLENOL) 160 MG/5ML solution 650 mg, 650 mg, Oral, Q4H PRN, 650 mg at 02/27/23 1516 **OR** acetaminophen (TYLENOL) suppository 650 mg, 650 mg, Rectal, Q4H PRN, Darvin Carrillo Jr., MD  •  amLODIPine (NORVASC) tablet 5 mg, 5 mg, Nasogastric, Q24H, Darvin Carrillo Jr., MD, 5 mg at 03/02/23 0805  •  calcium gluconate 1g/50ml 0.675% NaCl IV SOLN, 1 g, Intravenous, PRN **AND** calcium gluconate 2 g in sodium chloride 0.9 % 500 mL IVPB, 2 g, Intravenous, PRN **AND** Calcium, , , PRN, Darvin Carrillo Jr., MD  •  carbidopa-levodopa (SINEMET)  MG per tablet 1 tablet, 1 tablet, Nasogastric, TID, Darvin Carrillo Jr., MD, 1 tablet at 03/02/23 0805  •  dexamethasone (DECADRON) injection 6 mg, 6 mg, Intravenous, Daily, Darvin Carrillo Jr., MD, 6 mg at 03/02/23 0806  •  dextrose (D50W) (25 g/50 mL) IV injection 25 g, 25 g, Intravenous, Q15 Min PRN, Darvin Carrillo Jr., MD  •  dextrose (GLUTOSE) oral gel 15 g, 15 g, Oral, Q15 Min PRN, Darvin Carrillo Jr., MD  •  glucagon (GLUCAGEN) injection 1 mg, 1 mg, Intramuscular, Q15 Min PRN, Darvin Carrillo Jr., MD  •  insulin lispro (ADMELOG) injection 0-14 Units, 0-14 Units, Subcutaneous, Q6H, Darvin Carrillo Jr., MD, 3 Units at 03/02/23 0050  •  ipratropium-albuterol (DUO-NEB) nebulizer solution 3 mL, 3 mL, Nebulization, Q2H PRN, Darvin Carrillo Jr., MD  •  ipratropium-albuterol (DUO-NEB) nebulizer solution 3 mL, 3 mL, Nebulization, Q4H - RT, Darvin Carrillo Jr., MD, 3 mL at 03/02/23 0752  •  labetalol  (NORMODYNE,TRANDATE) injection 20 mg, 20 mg, Intravenous, Q10 Min PRN, Darvin Carrillo Jr., MD, 20 mg at 02/28/23 1337  •  Magnesium Sulfate - Total Dose 4 grams - Magnesium 1 or Less, 4 g, Intravenous, PRN **OR** Magnesium Sulfate - Total Dose 3 grams - Magnesium 1.1 - 1.5, 1 g, Intravenous, PRN **OR** Magnesium Sulfate - Total Dose 2 grams - Magnesium 1.6 - 1.9, 2 g, Intravenous, PRN, Darvin Carrillo Jr., MD  •  nitroglycerin (NITROSTAT) SL tablet 0.4 mg, 0.4 mg, Sublingual, Q5 Min PRN, Darvin Carrillo Jr., MD  •  ondansetron (ZOFRAN) injection 4 mg, 4 mg, Intravenous, Q6H PRN, Darvin Carrillo Jr., MD  •  pantoprazole (PROTONIX) injection 40 mg, 40 mg, Intravenous, Q12H, Darvin Carrillo Jr., MD, 40 mg at 03/02/23 0806  •  potassium chloride (K-DUR,KLOR-CON) ER tablet 40 mEq, 40 mEq, Oral, PRN **OR** potassium chloride (KLOR-CON) packet 40 mEq, 40 mEq, Oral, PRN, 40 mEq at 02/27/23 1510 **OR** potassium chloride 10 mEq in 100 mL IVPB, 10 mEq, Intravenous, Q1H PRN, Darvin Carrillo Jr., MD  •  potassium phosphate 21 mmol in sodium chloride 0.9 % 250 mL infusion, 21 mmol, Intravenous, PRN **OR** potassium phosphate 15 mmol in sodium chloride 0.9 % 100 mL infusion, 15 mmol, Intravenous, PRN **OR** potassium phosphate 9 mmol in sodium chloride 0.9 % 100 mL infusion, 9 mmol, Intravenous, PRN, Darvin Carrillo Jr., MD  •  pramipexole (MIRAPEX) tablet 0.25 mg, 0.25 mg, Nasogastric, Nightly, Darvin Carrillo Jr., MD, 0.25 mg at 03/01/23 2044  •  sodium chloride 0.9 % flush 10 mL, 10 mL, Intravenous, PRN, Darvin Carrillo Jr., MD  •  vitamin B-12 (CYANOCOBALAMIN) tablet 50 mcg, 50 mcg, Nasogastric, Daily, Darvin Carrillo Jr., MD, 50 mcg at 03/02/23 0806  Review of Systems:               The following systems were reviewed and negative;  gastrointestinal    Objective     Vital Signs  Temp:  [97 °F (36.1 °C)-98.3 °F (36.8 °C)] 98 °F (36.7 °C)  Heart Rate:  [] 90  Resp:  [16-20]  20  BP: ()/(52-82) 155/78  Body mass index is 18.63 kg/m².                  Physical Exam:              General: patient awake, alert and cooperative frail, on 6 L nasal cannula, Dobbhoff tube present              Eyes: no scleral icterus              Skin: warm and dry, not jaundiced              Abdomen: soft, normal bowel sounds, nontender, nondistended              Psychiatric: Appropriate affect and behavior                Results Review:                I reviewed the patient's new clinical results.    Results from last 7 days   Lab Units 03/02/23  0805 03/01/23  2339 03/01/23  1617 02/28/23  0800 02/28/23  0542 02/27/23  1759 02/27/23  0826   WBC 10*3/mm3 24.65*  --   --   --  12.68*  --  14.56*   HEMOGLOBIN g/dL 9.2* 8.7* 9.1*   < > 8.7*  8.6*   < > 7.6*  7.6*   HEMATOCRIT % 28.3* 26.8* 26.8*   < > 26.1*  26.1*   < > 22.8*  22.8*   PLATELETS 10*3/mm3 225  --   --   --  146  --  175    < > = values in this interval not displayed.     Results from last 7 days   Lab Units 03/02/23  0805 03/01/23  0856 02/28/23  0800 02/27/23  0826 02/26/23  0616 02/25/23  0322   SODIUM mmol/L 144 143 147*   < >  --   --    POTASSIUM mmol/L 5.0 4.6 5.7*   < >  --   --    CHLORIDE mmol/L 104 108* 119*   < >  --   --    CO2 mmol/L 32.8* 25.7 25.0   < >  --   --    BUN mg/dL 21 17 21   < >  --   --    CREATININE mg/dL 0.51* 0.47* 0.55*   < > 0.84 0.86   CALCIUM mg/dL 8.6 7.9* 7.2*   < >  --   --    BILIRUBIN mg/dL  --   --  0.4  --  0.4 <0.2   ALK PHOS U/L  --   --  44  --  49 70   ALT (SGPT) U/L  --   --  12  --  15 22   AST (SGOT) U/L  --   --  21  --  28 57*   GLUCOSE mg/dL 120* 112* 254*   < >  --   --     < > = values in this interval not displayed.         Lab Results   Lab Value Date/Time    LIPASE 15 06/20/2022 1610       Radiology:  XR Chest 1 View   Final Result         Electronically signed by Nando Welsh MD on 02-26-23 at 0604      CT Angiogram Chest   Final Result   1. There is no convincing evidence for  pulmonary thromboemboli.   2. Extensive multifocal aspiration pneumonia. Fairly extensive   opacification of bronchi at both lower lobes and right middle lobe and   development of dense peribronchial pneumonia at both lower lobes and to   a lesser degree the right middle lobe, and there is much less dense   pneumonia peripherally at the lower lobes, right middle lobe, and the   dependent aspect of the right upper lobe.       This report was finalized on 2/24/2023 4:09 PM by Dr. Penny Kilpatrick M.D.          XR Chest 1 View   Final Result      CT Abdomen Pelvis Without Contrast   Final Result       1. Patient is noted to have debris within the bronchus intermedius and   bronchus to the left lower lobe, with associated mild atelectasis.   Patient may benefit from bronchoscopy.   2. Peripherally calcified splenic artery aneurysm, measuring up to 1.1   cm. Follow-up CT in one year is suggested.   3. Right renal adrenal myelolipoma.   4. Thick-walled appearance to the urinary bladder, with adjacent   perivesical stranding, suggesting cystitis. In addition, the patient   appears to have pelvic floor laxity, with cystocele.       Radiation dose reduction techniques were utilized, including automated   exposure control and exposure modulation based on body size.       This report was finalized on 2/22/2023 9:33 PM by Dr. Araceli Martinez M.D.          CT Chest Without Contrast Diagnostic   Final Result       1. Patient is noted to have debris within the bronchus intermedius and   bronchus to the left lower lobe, with associated mild atelectasis.   Patient may benefit from bronchoscopy.   2. Peripherally calcified splenic artery aneurysm, measuring up to 1.1   cm. Follow-up CT in one year is suggested.   3. Right renal adrenal myelolipoma.   4. Thick-walled appearance to the urinary bladder, with adjacent   perivesical stranding, suggesting cystitis. In addition, the patient   appears to have pelvic floor laxity, with  cystocele.       Radiation dose reduction techniques were utilized, including automated   exposure control and exposure modulation based on body size.       This report was finalized on 2/22/2023 9:33 PM by Dr. Araceli Martinez M.D.          XR Chest 1 View   Final Result   1. Borderline cardiomegaly.       This report was finalized on 2/22/2023 6:27 PM by Dr. Alex Brumfield M.D.          XR Chest 1 View    (Results Pending)       Assessment & Plan     Active Hospital Problems    Diagnosis    • **Acute metabolic encephalopathy    • Cystitis    • Splenic artery aneurysm (HCC)    • Atelectasis of left lung    • Hematuria    • Oral phase dysphagia    • Moderate dementia due to Parkinson's disease, with mood disturbance (HCC)    • Parkinson disease (HCC)    • Mixed stress and urge urinary incontinence        Assessment:   Anemia  Rectal bleeding, a few days ago, brown stool noted per nursing, none currently  COVID-19  Acute hypoxemia and respiratory failure, patient is DO NOT INTUBATE  Pneumonia  Metabolic encephalopathy  Parkinson's disease  Dysphagia    Plan:  · Hemoglobin stable, no evidence of melanic or hematochezia  · Oral phase dysphagia, Dobbhoff tube with tube feeds.  Parkinson's, speech therapy evaluation to be repeated tomorrow  · No plans for endoscopic evaluation at this time given patient is too frail and too ill and would need to be intubated and no evidence of active ongoing GI bleed at this time.    · GI will sign off but can be available at any time for any change in clinical course or if we may be of any additional assistance.    I discussed the patients findings and my recommendations with patient, family and nursing staff.           Chica CARTER  [facility] ([facility] [address] [address]

## 2023-03-03 NOTE — PLAN OF CARE
Goal Outcome Evaluation:  Plan of Care Reviewed With: patient        Progress: no change   A&Ox4. Patient oxygen sats decreased to low 80s; increased oxygen from 4.5L NC to 15L humidifed high flow. Pulmonary, LHA, and RT aware. Chest xray to r/o aspiration. ID consulted. Assist x1 to chair today. Glucose monitored. Video swallow complete. Tube feeds restarted. VSS. No patient complaint at this time. All needs met.

## 2023-03-03 NOTE — MBS/VFSS/FEES
Acute Care - Speech Language Pathology   Swallow Initial Evaluation [facility]     Patient Name: Cindy Mejia  : 1945  MRN: [id number]  Today's Date: 3/3/2023               Admit Date: 2023    Visit Dx:     ICD-10-CM ICD-9-CM   1. Acute metabolic encephalopathy  G93.41 348.31   2. Acute UTI  N39.0 599.0   3. Parkinson's disease (HCC)  G20 332.0     Patient Active Problem List   Diagnosis   • Anxiety   • Tubular adenoma   • Tremor of both hands   • B12 deficiency   • Orthostatic hypotension due to Parkinson's disease (HCC)   • Action tremor   • Mixed stress and urge urinary incontinence   • Urinary tract infection without hematuria   • Moderate malnutrition (HCC)   • Dyskinesia   • Balance problems   • Osteopenia of multiple sites   • Age-related osteoporosis without current pathological fracture   • Urinary tract infection in female   • Acute UTI   • History of malignant neoplasm of large intestine   • Symptomatic hypotension   • Parkinson disease (HCC)   • Acute metabolic encephalopathy   • Cystitis   • Splenic artery aneurysm (HCC)   • Atelectasis of left lung   • Moderate dementia due to Parkinson's disease, with mood disturbance (HCC)   • Hematuria   • Oral phase dysphagia     Past Medical History:   Diagnosis Date   • Age-related osteoporosis without current pathological fracture 2022   • Anxiety 2016   • Arthritis    • Asymptomatic varicose veins of both lower extremities 2019   • Atelectasis of left lung 2023   • Carcinoma of colon (HCC) 2018   • Colon polyp    • Constipation    • Menopause 2017   • Mixed stress and urge urinary incontinence    • Other hemorrhoids 2018   • Other rosacea 2018   • Parkinson's disease (HCC)    • Uterine prolapse 2016     Past Surgical History:   Procedure Laterality Date   • BLADDER SURGERY     • COLONOSCOPY N/A 2017    Procedure: COLONOSCOPY, polypectomy;  Surgeon: Rabia Mcelroy MD;  Location: [facility]  "OR;  Service:    • COLONOSCOPY N/A 8/28/2017    Procedure: COLONOSCOPY, polypectomy, biopsy, sclerotherapy injection;  Surgeon: Rabia Mcelroy MD;  Location: [facility] OR;  Service:    • HYSTERECTOMY     • TUBAL ABDOMINAL LIGATION         SLP Recommendation and Plan  Video swallow study completed - pt on 15 L at time of completion, cognitively appropriate, with all results reviewed prior to pt return to room. Pt presents with a severe oropharyngeal dysphagia, characterized by silent aspiration of puree and HTL. Multiple swallows across both consistencies, unable to clear moderate residue. Minimal laryngeal vestibular closure, airway protection and no airway reactions present to aspirated material. Multiple mechanical factors include cervical lordosis/hyperextension despite repositioning, NGT presence. UES relaxation and clearance limited likely combination r/t mechanical factors and severity of hyolaryngeal weakness.       Recommend: NPO with enteral means of nutrition/hydration/medication administration versus discussion for plan of care.       Pt will require intensive dysphagia therapy services in setting of severe oropharyngeal dysphagia. Repeat instrumental required secondary to silent aspiration present. Question timeline given severity - will determine based on clinical improvement and participation.    SWALLOW EVALUATION (last 72 hours)     SLP Adult Swallow Evaluation     Row Name 03/03/23 1400       Rehab Evaluation    Document Type evaluation  -AB    Subjective Information complains of  \"I want soup and a cherry coke\"  -AB    Patient Observations alert;cooperative;agree to therapy  -AB    Patient/Family/Caregiver Comments/Observations pt seen in fluoro suite, all covid precautions maintained throughout. extensive review of study s/p completion  -AB    Patient Effort excellent  -AB    Symptoms Noted During/After Treatment none  -AB       General Information    Patient Profile Reviewed yes  -AB    Current " Method of Nutrition nasogastric feedings;NPO  -AB       MBS/VFSS Interpretation    VFSS Summary Radiologist present - Dr. Brumfield. Video fluoroscopic swallow study completed. Pt seated 90 degree hip flexion, visualized in a lateral position. Trials assessed included: HTL by tsp, puree by tsp. Cervical lordosis, neck hyperextended despite repositioning for head neutralized. NGT present. Lingual movements slow and disorganized. Multiple swallows to clear across all consistencies 2-4 with HTL, 6-7 with puree. Premature spillage to pyriform with HTL by tsp, puree. Positioning, NGT presence, and overall pharyngeal weakness resulting in absent deflection of epiglottis. Minimal epiglottic movement impacting vestibular closure. Moderate valleculae residue following HTL. Moderate diffuse pharyngeal residue with puree, penetration and aspiration of residuals from posterior epiglottis and poor airway closure as listed below. Multiple swallows reduce, but do not clear all residue. Base of tongue retraction adequate. Hyolaryngeal elevation significantly decreased. UES relaxation significantly reduced, may be partially related to pharyngeal weakness, does result in bolus retention in pharynx. Penetration from bolus head in pyriform for initial HTL by tsp, continues to deepen to VF across x4 additional swallows to aid clearance. Between fluoroscopy frames, large quantity visualized at vocal folds level and in trachea. Similar presentation second trial HTL by tsp, penetration initial trial, deepens aspiration second swallow to clear. Puree first trial, deep penetration to vocal folds for initial swallow, deepening between multiple (x6) swallows to clear, aspiration for final. Second trial puree, deep penetration initially to vocal folds, large quantity aspirate from material on vocal folds, continued aspiration of residuals.  Cued cough can decrease depth of penetrate/aspirate material, cannot eject. No volitional cough or airway  entry indicators present indicating silent aspiration.  -AB       SLP Communication to Radiology    Severity Level of Dysphagia severe dysphagia;oral dysfunction;pharyngeal dysfunction  -AB    Consistencies Aspirated/Penetrated aspirated;all consistencies tested  -AB    Summary Statement Radiologist present - Dr. Brumfield. Video fluoroscopic swallow study completed. Pt seated 90 degree hip flexion, visualized in a lateral position. Trials assessed included: HTL by tsp, puree by tsp. Cervical lordosis, neck hyperextended despite repositioning for head neutralized. NGT present. Lingual movements slow and disorganized. Multiple swallows to clear across all consistencies 2-4 with HTL, 6-7 with puree. Premature spillage to pyriform with HTL by tsp, puree. Positioning, NGT presence, and overall pharyngeal weakness resulting in absent deflection of epiglottis. Minimal epiglottic movement impacting vestibular closure. Moderate valleculae residue following HTL. Moderate diffuse pharyngeal residue with puree, penetration and aspiration of residuals from posterior epiglottis and poor airway closure as listed below. Multiple swallows reduce, but do not clear all residue. Base of tongue retraction adequate. Hyolaryngeal elevation significantly decreased. UES relaxation significantly reduced, may be partially related to pharyngeal weakness, does result in bolus retention in pharynx. Penetration from bolus head in pyriform for initial HTL by tsp, continues to deepen to VF across x4 additional swallows to aid clearance. Between fluoroscopy frames, large quantity visualized at vocal folds level and in trachea. Similar presentation second trial HTL by tsp, penetration initial trial, deepens aspiration second swallow to clear. Puree first trial, deep penetration to vocal folds for initial swallow, deepening between multiple (x6) swallows to clear, aspiration for final. Second trial puree, deep penetration initially to vocal folds,  large quantity aspirate from material on vocal folds, continued aspiration of residuals.  Cued cough can decrease depth of penetrate/aspirate material, cannot eject. No volitional cough or airway entry indicators present indicating silent aspiration.  -AB       SLP Evaluation Clinical Impression    SLP Swallowing Diagnosis severe;oral dysphagia;pharyngeal dysphagia  -AB    Rehab Potential/Prognosis, Swallowing re-evaluate goals as necessary  -AB       SLP Treatment Clinical Impressions    Care Plan Review evaluation/treatment results reviewed;patient/other agree to care plan;care plan/treatment goals reviewed;risks/benefits reviewed;current/potential barriers reviewed  -AB       Recommendations    Therapy Frequency (Swallow) PRN  -AB    Predicted Duration Therapy Intervention (Days) until discharge  -AB    SLP Diet Recommendation NPO  -AB    Recommended Diagnostics reassess via VFSS (MBS);reassess via FEES  given silent aspiration  -AB    Recommended Precautions and Strategies general aspiration precautions  -AB    Oral Care Recommendations Oral Care BID/PRN  -AB    SLP Rec. for Method of Medication Administration meds via alternate route  -AB    Monitor for Signs of Aspiration none - silent aspiration present  -AB       Swallow Goals (SLP)    Swallow STGs pharyngeal strengthening exercise goal selection (SLP)  -AB    Pharyngeal Strengthening Exercise Goal Selection (SLP) pharyngeal strengthening exercise, SLP goal 1  -AB       (LTG) Swallow    (LTG) Swallow Patient will tolerate least restrictive diet with no overt s/sx of aspiration or penetration.  -AB    [place] (Swallow Long Term Goal) independently (over 90% accuracy)  -AB    Time Frame (Swallow Long Term Goal) by discharge  -AB    Progress/Outcomes (Swallow Long Term Goal) progress slower than expected  -AB       (STG) Pharyngeal Strengthening Exercise Goal 1 (SLP)    Activity (Pharyngeal Strengthening Goal 1, SLP) increase superior movement of the  hyolaryngeal complex;increase closure at entrance to airway/closure of airway at glottis  -AB    Increase Superior Movement of the Hyolaryngeal Complex hard effortful swallow;Mendelsohn;chin tuck against resistance (CTAR)  -AB    Increase Closure at Entrance to Airway/Closure of Airway at Glottis supraglottic swallow;falsetto;effortful pitch glide (falsetto + pharyngeal squeeze);super-supraglottic swallow;breath hold exercises  -AB    [place]/Accuracy (Pharyngeal Strengthening Goal 1, SLP) independently (over 90% accuracy)  -AB    Time Frame (Pharyngeal Strengthening Goal 1, SLP) short term goal (STG)  -AB    Progress/Outcomes (Pharyngeal Strengthening Goal 1, SLP) new goal  -AB          User Key  (r) = Recorded By, (t) = Taken By, (c) = Cosigned By    Initials Name Effective Dates    AB Briana Rader, MS CCC-SLP 12/27/22 -                 EDUCATION  The patient has been educated in the following areas:   Dysphagia (Swallowing Impairment) NPO rationale.        SLP GOALS     Row Name 03/03/23 1400             (LTG) Swallow    (LTG) Swallow Patient will tolerate least restrictive diet with no overt s/sx of aspiration or penetration.  -AB      [place] (Swallow Long Term Goal) independently (over 90% accuracy)  -AB      Time Frame (Swallow Long Term Goal) by discharge  -AB      Progress/Outcomes (Swallow Long Term Goal) progress slower than expected  -AB         (STG) Pharyngeal Strengthening Exercise Goal 1 (SLP)    Activity (Pharyngeal Strengthening Goal 1, SLP) increase superior movement of the hyolaryngeal complex;increase closure at entrance to airway/closure of airway at glottis  -AB      Increase Superior Movement of the Hyolaryngeal Complex hard effortful swallow;Mendelsohn;chin tuck against resistance (CTAR)  -AB      Increase Closure at Entrance to Airway/Closure of Airway at Glottis supraglottic swallow;falsetto;effortful pitch glide (falsetto + pharyngeal squeeze);super-supraglottic  swallow;breath hold exercises  -AB      [place]/Accuracy (Pharyngeal Strengthening Goal 1, SLP) independently (over 90% accuracy)  -AB      Time Frame (Pharyngeal Strengthening Goal 1, SLP) short term goal (STG)  -AB      Progress/Outcomes (Pharyngeal Strengthening Goal 1, SLP) new goal  -AB            User Key  (r) = Recorded By, (t) = Taken By, (c) = Cosigned By    Initials Name Provider Type    Briana Aviles MS CCC-SLP Speech and Language Pathologist                   Time Calculation:    Time Calculation- SLP     Row Name 03/03/23 1512             Time Calculation- SLP    SLP Received On 03/03/23  -AB            User Key  (r) = Recorded By, (t) = Taken By, (c) = Cosigned By    Initials Name Provider Type    Briana Aviles, MS CCC-SLP Speech and Language Pathologist                Therapy Charges for Today     Code Description Service Date Service Provider Modifiers Qty    [id number]  ST MOTION FLUORO EVAL SWALLOW 6 3/3/2023 Briana Rader, MS CCC-SLP GN 1               Briana Rader MS CCC-ILIANA  3/3/2023

## 2023-03-03 NOTE — PROGRESS NOTES
LOS: 8 days   Patient Care Team:  Shannon Nguyen APRN as PCP - General (Family Medicine)  Ant Baxter MD as Consulting Physician (Neurology)  Rabia Mcelroy MD as Consulting Physician (Gastroenterology)    Subjective   Pulmonary following for respiratory failure   Patient has acute hypoxemic and hypercapnic respiratory failure, she has acute COVID-19 infection underlying parkinsonian dementia, anemia and pneumonia.      Patient had desaturation this morning she is up on 15 L high flow nasal cannula.  She is pleasant but she is a little confused  Review of Systems:          Objective     Vital Signs  Vital Sign Min/Max for last 24 hours  Temp  Min: 97.1 °F (36.2 °C)  Max: 97.6 °F (36.4 °C)   BP  Min: 110/58  Max: 138/62   Pulse  Min: 69  Max: 100   Resp  Min: 18  Max: 20   SpO2  Min: 84 %  Max: 98 %   Flow (L/min)  Min: 4  Max: 15   Weight  Min: 42.4 kg (93 lb 6.4 oz)  Max: 42.4 kg (93 lb 6.4 oz)        Ventilator/Non-Invasive Ventilation Settings (From admission, onward)     Start     Ordered    02/24/23 0758  NIPPV-IPPV / BIPAP / CPAP  Until Discontinued        Question Answer Comment   Indication: Acute Respiratory Failure    Type: AVAPS-AE    Rate 16    VT (mL) 500    EPAP Min (cm H2O) 8    EPAP Max (cm H2O) 15    Max Pressure (cm H2O) 30    Pressure Support Min (cm H2O) 6    Pressure Support Max (cm H2O) 15    Titrate for SPO2 90%        02/24/23 0757                             Body mass index is 18.34 kg/m².  I/O last 3 completed shifts:  In: 3807 [Other:1906; NG/GT:1901]  Out: 3775 [Urine:3775]  I/O this shift:  In: 1133 [Other:577; NG/GT:556]  Out: -         Physical Exam:  General Appearance: Elderly thin almost cachectic appearing white female she is on 15 L nasal cannula O2 oxygen saturations 93% does not appear in any distress  Eyes: Conjunctiva are clear and anicteric  ENT: Mucous membranes are dry no erythema no exudates, nasal septum midline she has a nasoenteric tube in the  left nare.  Neck: No adenopathy no thyromegaly no jugular venous distension, trachea midline  Lungs: She has some crackles in the lung bases bilaterally  Cardiac: Regular rate rhythm no murmur  Abdomen: Soft no palpable hepatosplenomegaly or masses  : Not examined  Musculoskeletal: She does not have a whole lot of muscle mass  Skin: Warm and dry no jaundice no petechiae  Neuro: She is awake and alert she is a little confused she is following commands to move her extremities.  Did not have a lot of resting tremor today.  Extremities/P Vascular: No clubbing no cyanosis trace lower extremity edema, palpable radial and dorsalis pedis pulses  MSE: She is a little depressed today       Labs:  Results from last 7 days   Lab Units 03/03/23  0701 03/02/23  0805 03/01/23  0856 02/28/23  0800 02/27/23  0826 02/27/23  0519 02/26/23  0616 02/25/23  0322   GLUCOSE mg/dL 92 120* 112* 254* 182*  --   --   --    SODIUM mmol/L 143 144 143 147* 156*  --   --   --    POTASSIUM mmol/L 4.9 5.0 4.6 5.7* 3.2*  --   --   --    MAGNESIUM mg/dL 2.3 2.2  --   --   --  2.4  --   --    CO2 mmol/L 33.0* 32.8* 25.7 25.0 25.4  --   --   --    CHLORIDE mmol/L 103 104 108* 119* 126*  --   --   --    ANION GAP mmol/L 7.0 7.2 9.3 3.0* 4.6*  --   --   --    CREATININE mg/dL 0.49* 0.51* 0.47* 0.55* 0.59  --  0.84 0.86   BUN mg/dL 24* 21 17 21 36*  --   --   --    BUN / CREAT RATIO  49.0* 41.2* 36.2* 38.2* 61.0*  --   --   --    CALCIUM mg/dL 8.5* 8.6 7.9* 7.2* 6.8*  --   --   --    ALK PHOS U/L  --   --   --  44  --   --  49 70   TOTAL PROTEIN g/dL  --   --   --  5.1*  --   --  5.1* 5.9*   ALT (SGPT) U/L  --   --   --  12  --   --  15 22   AST (SGOT) U/L  --   --   --  21  --   --  28 57*   BILIRUBIN mg/dL  --   --   --  0.4  --   --  0.4 <0.2   ALBUMIN g/dL  --   --   --  2.8*  --   --  2.9* 3.3*   GLOBULIN gm/dL  --   --   --  2.3  --   --   --   --      Estimated Creatinine Clearance: 65.4 mL/min (A) (by C-G formula based on SCr of 0.49 mg/dL  (L)).      Results from last 7 days   Lab Units 03/03/23  1606 03/03/23  0701 03/02/23  2347 03/02/23  1558 03/02/23  0805 03/01/23  2339 03/01/23  1617 02/28/23  0800 02/28/23  0542 02/27/23  1759 02/27/23  0826 02/26/23  1450 02/26/23  0616   WBC 10*3/mm3  --  26.63*  --   --  24.65*  --   --   --  12.68*  --  14.56*  --  9.95   RBC 10*6/mm3  --  3.24*  --   --  3.20*  --   --   --  2.90*  --  2.56*  --  2.78*   HEMOGLOBIN g/dL 10.3* 9.7* 9.1* 9.3* 9.2* 8.7* 9.1*   < > 8.7*  8.6*   < > 7.6*  7.6*   < > 8.3*   HEMATOCRIT % 30.1* 28.7* 27.4* 27.6* 28.3* 26.8* 26.8*   < > 26.1*  26.1*   < > 22.8*  22.8*   < > 24.3*   MCV fL  --  88.6  --   --  88.4  --   --   --  90.0  --  89.1  --  87.4   MCH pg  --  29.9  --   --  28.8  --   --   --  30.0  --  29.7  --  29.9   MCHC g/dL  --  33.8  --   --  32.5  --   --   --  33.3  --  33.3  --  34.2   RDW %  --  13.7  --   --  13.9  --   --   --  14.3  --  13.8  --  13.4   RDW-SD fl  --  43.5  --   --  44.3  --   --   --  46.8  --  44.9  --  43.1   MPV fL  --  10.6  --   --  11.1  --   --   --  11.0  --  10.4  --  10.9   PLATELETS 10*3/mm3  --  249  --   --  225  --   --   --  146  --  175  --  179   NEUTROS ABS 10*3/mm3  --  23.43*  --   --  23.42*  --   --   --   --   --   --   --   --    NRBC /100 WBC  --  0.0  --   --  1.0*  --   --   --   --   --   --   --   --     < > = values in this interval not displayed.     Results from last 7 days   Lab Units 02/28/23  1321   PH, ARTERIAL pH units 7.444   PO2 ART mm Hg 74.9*   PCO2, ARTERIAL mm Hg 31.3*   HCO3 ART mmol/L 21.5*                 Results from last 7 days   Lab Units 03/03/23  0701 02/24/23 2046 02/24/23  1851   LACTATE mmol/L  --  2.0 2.1*   PROCALCITONIN ng/mL 0.07  --   --          Microbiology Results (last 10 days)     Procedure Component Value - Date/Time    Respiratory Panel PCR w/COVID-19(SARS-CoV-2) ALEXANDRA/HELIO/JUAN/PAD/COR/MAD/MANE In-House, NP Swab in [facility] 3-4 HR TAT - Swab, Nasopharynx [[id number]]  (Abnormal)  Collected: 02/22/23 2332    Lab Status: Final result Specimen: Swab from Nasopharynx Updated: 02/23/23 0052     ADENOVIRUS, PCR Not Detected     Coronavirus 229E Not Detected     Coronavirus HKU1 Not Detected     Coronavirus NL63 Not Detected     Coronavirus OC43 Not Detected     COVID19 Detected     Human Metapneumovirus Not Detected     Human Rhinovirus/Enterovirus Not Detected     Influenza A PCR Not Detected     Influenza B PCR Not Detected     Parainfluenza Virus 1 Not Detected     Parainfluenza Virus 2 Not Detected     Parainfluenza Virus 3 Not Detected     Parainfluenza Virus 4 Not Detected     RSV, PCR Not Detected     Bordetella pertussis pcr Not Detected     Bordetella parapertussis PCR Not Detected     Chlamydophila pneumoniae PCR Not Detected     Mycoplasma pneumo by PCR Not Detected    Narrative:      In the setting of a positive respiratory panel with a viral infection PLUS a negative procalcitonin without other underlying concern for bacterial infection, consider observing off antibiotics or discontinuation of antibiotics and continue supportive care. If the respiratory panel is positive for atypical bacterial infection (Bordetella pertussis, Chlamydophila pneumoniae, or Mycoplasma pneumoniae), consider antibiotic de-escalation to target atypical bacterial infection.    Blood Culture - Blood, Arm, Right [[id number]]  (Normal) Collected: 02/22/23 2124    Lab Status: Final result Specimen: Blood from Arm, Right Updated: 02/27/23 2131     Blood Culture No growth at 5 days    Blood Culture - Blood, Arm, Right [[id number]]  (Normal) Collected: 02/22/23 2124    Lab Status: Final result Specimen: Blood from Arm, Right Updated: 02/27/23 2131     Blood Culture No growth at 5 days    Urine Culture - Urine, Straight Cath [[id number]]  (Abnormal) Collected: 02/22/23 1945    Lab Status: Final result Specimen: Urine from Straight Cath Updated: 02/25/23 0241     Urine Culture >100,000 CFU/mL Aerococcus viridans      Comment:   Aerococcus viridans is usually susceptible to vancomycin. Resistance has been reported to the fluoroquinolones and beta-lactams. Routine susceptibility testing is not recommended. Please call lab to request further workup.       Narrative:      Colonization of the urinary tract without infection is common. Treatment is discouraged unless the patient is symptomatic, pregnant, or undergoing an invasive urologic procedure.              amLODIPine, 5 mg, Nasogastric, Q24H  carbidopa-levodopa, 1 tablet, Nasogastric, TID  dexamethasone, 6 mg, Intravenous, Daily  insulin lispro, 0-14 Units, Subcutaneous, Q6H  ipratropium-albuterol, 3 mL, Nebulization, Q4H - RT  pantoprazole, 40 mg, Intravenous, Q12H  pramipexole, 0.25 mg, Nasogastric, Nightly  vitamin B-12, 50 mcg, Nasogastric, Daily           Diagnostics:  Adult Transthoracic Echo Complete W/ Cont if Necessary Per Protocol    Result Date: 2/24/2023  •  Left ventricular systolic function is hyperdynamic (EF > 70%). Calculated left ventricular EF = 74.2% •  Left ventricular wall thickness is consistent with mild concentric hypertrophy. •  The left ventricular cavity is small in size. •  The following left ventricular wall segments are hyperkinetic: basal anterior, basal anterolateral, basal inferolateral, basal inferior, basal inferoseptal, basal anteroseptal, mid anterior, mid anterolateral, mid inferolateral, mid inferior, mid inferoseptal, mid anteroseptal, apical anterior, apical lateral, apical inferior, apical septal and apex. •  Left ventricular diastolic function was indeterminate. •  Peak velocity of the flow distal to the aortic valve is 150 cm/s. Aortic valve maximum pressure gradient is 9 mmHg. Aortic valve mean pressure gradient is 5 mmHg. Aortic valve dimensionless index is 0.9 . •  Trace to mild tricuspid valve regurgitation is present. •  Estimated right ventricular systolic pressure from tricuspid regurgitation is mildly elevated (35-45 mmHg).  Calculated right ventricular systolic pressure from tricuspid regurgitation is 39 mmHg.     CT Abdomen Pelvis Without Contrast    Result Date: 2/22/2023  CT CHEST WITHOUT CONTRAST: CT ABDOMEN AND PELVIS WITHOUT CONTRAST  HISTORY: Hypoxia and urinary tract infection  COMPARISON: None available.  TECHNIQUE: Axial CT imaging was obtained from the thoracic inlet through the symphysis pubis. No IV contrast was administered.  FINDINGS: CT OF THE CHEST: The patient is noted to have debris within the bronchus intermedius, with extension into the right middle lobe and right lower lobe. There is some associated atelectasis. There is additional debris which is noted within the bronchus to the left lower lobe, again, with some atelectasis noted. The thyroid gland, trachea, and esophagus are unremarkable. There are coronary artery calcifications. There is no pleural or pericardial effusion. Mediastinal lymph nodes are not pathologically enlarged. There is some deformity of the superior endplate of T3, age indeterminate, although favored to be chronic. MRI or bone scan would be more sensitive for assessment of acuity.  CT ABDOMEN AND PELVIS: No suspicious hepatic lesions are seen. There is a peripherally calcified splenic artery aneurysm, measuring up to 1.1 cm. There is a right adrenal myelolipoma, measuring up to 3.2 x 2.7 cm. Gallbladder appears unremarkable. Pancreas is within normal limits. Calcified granulomata are seen within the spleen. Punctate nonobstructing stones are noted within both kidneys. Single largest on the right measures up to 7 mm. Single largest on the left measures 3 mm. There are simple appearing left renal cyst. No additional follow-up is necessary. Urinary bladder is thick walled, with adjacent perivesical stranding, in keeping with diabetes. Patient does appear to have pelvic floor laxity, with cystocele noted. There is no bowel obstruction. There is some air seen within the urinary bladder, which may  be related to catheterization. There is no evidence of appendicitis. No acute osseous abnormalities are seen.       1. Patient is noted to have debris within the bronchus intermedius and bronchus to the left lower lobe, with associated mild atelectasis. Patient may benefit from bronchoscopy. 2. Peripherally calcified splenic artery aneurysm, measuring up to 1.1 cm. Follow-up CT in one year is suggested. 3. Right renal adrenal myelolipoma. 4. Thick-walled appearance to the urinary bladder, with adjacent perivesical stranding, suggesting cystitis. In addition, the patient appears to have pelvic floor laxity, with cystocele.  Radiation dose reduction techniques were utilized, including automated exposure control and exposure modulation based on body size.  This report was finalized on 2/22/2023 9:33 PM by Dr. Araceli Martinez M.D.      CT Chest Without Contrast Diagnostic    Result Date: 2/22/2023  CT CHEST WITHOUT CONTRAST: CT ABDOMEN AND PELVIS WITHOUT CONTRAST  HISTORY: Hypoxia and urinary tract infection  COMPARISON: None available.  TECHNIQUE: Axial CT imaging was obtained from the thoracic inlet through the symphysis pubis. No IV contrast was administered.  FINDINGS: CT OF THE CHEST: The patient is noted to have debris within the bronchus intermedius, with extension into the right middle lobe and right lower lobe. There is some associated atelectasis. There is additional debris which is noted within the bronchus to the left lower lobe, again, with some atelectasis noted. The thyroid gland, trachea, and esophagus are unremarkable. There are coronary artery calcifications. There is no pleural or pericardial effusion. Mediastinal lymph nodes are not pathologically enlarged. There is some deformity of the superior endplate of T3, age indeterminate, although favored to be chronic. MRI or bone scan would be more sensitive for assessment of acuity.  CT ABDOMEN AND PELVIS: No suspicious hepatic lesions are seen. There  is a peripherally calcified splenic artery aneurysm, measuring up to 1.1 cm. There is a right adrenal myelolipoma, measuring up to 3.2 x 2.7 cm. Gallbladder appears unremarkable. Pancreas is within normal limits. Calcified granulomata are seen within the spleen. Punctate nonobstructing stones are noted within both kidneys. Single largest on the right measures up to 7 mm. Single largest on the left measures 3 mm. There are simple appearing left renal cyst. No additional follow-up is necessary. Urinary bladder is thick walled, with adjacent perivesical stranding, in keeping with diabetes. Patient does appear to have pelvic floor laxity, with cystocele noted. There is no bowel obstruction. There is some air seen within the urinary bladder, which may be related to catheterization. There is no evidence of appendicitis. No acute osseous abnormalities are seen.       1. Patient is noted to have debris within the bronchus intermedius and bronchus to the left lower lobe, with associated mild atelectasis. Patient may benefit from bronchoscopy. 2. Peripherally calcified splenic artery aneurysm, measuring up to 1.1 cm. Follow-up CT in one year is suggested. 3. Right renal adrenal myelolipoma. 4. Thick-walled appearance to the urinary bladder, with adjacent perivesical stranding, suggesting cystitis. In addition, the patient appears to have pelvic floor laxity, with cystocele.  Radiation dose reduction techniques were utilized, including automated exposure control and exposure modulation based on body size.  This report was finalized on 2/22/2023 9:33 PM by Dr. Araceli Martinez M.D.      CT Angiogram Chest    Result Date: 2/24/2023  CT ANGIOGRAM OF THE CHEST. MULTIPLE CORONAL, SAGITTAL, AND 3-D RECONSTRUCTIONS.  HISTORY: 77-year-old female with shortness of breath. Covid.  TECHNIQUE: Radiation dose reduction techniques were utilized, including automated exposure control and exposure modulation based on body size. CT angiogram  of the chest was performed following the administration of IV contrast. Multiple coronal, sagittal, and 3-D reconstruction images were obtained. Compared with previous chest CT 02/22/2023.  FINDINGS: Some of the distal pulmonary artery branches are well seen or opacified, but there is no convincing evidence for pulmonary thromboemboli. There is near complete opacification of multiple left lower lobe bronchi and to a lesser degree at the right lower lobe and right middle lobe. There are mixed density, but predominantly, dense peribronchial airspace consolidations at both lower lobes and to a lesser degree at the right middle lobe. The consolidations extend to the periphery where there is a combination of groundglass and nodular opacities. These are also seen at the posterior segment of the right upper lobe. There is a stable sub-6 mm left apical pulmonary nodule, image 20. There are no pleural or pericardial effusions. There is no lymphadenopathy within the chest. There is no hiatal hernia. There is no new abnormality at the visualized upper abdomen. Peripherally calcified 1.1 cm splenic artery aneurysm at the splenic hilum is again noted.      1. There is no convincing evidence for pulmonary thromboemboli. 2. Extensive multifocal aspiration pneumonia. Fairly extensive opacification of bronchi at both lower lobes and right middle lobe and development of dense peribronchial pneumonia at both lower lobes and to a lesser degree the right middle lobe, and there is much less dense pneumonia peripherally at the lower lobes, right middle lobe, and the dependent aspect of the right upper lobe.  This report was finalized on 2/24/2023 4:09 PM by Dr. Penny Kilpatrick M.D.      XR Chest 1 View    Result Date: 2/24/2023  XR CHEST 1 VW-  HISTORY:  Shortness of breath, fatigue.  COMPARISON:  Chest radiograph 2/22/2023. CT chest 2/22/2023.  FINDINGS:  A single view of the chest was obtained. The cardiac silhouette is partially obscured.  There is calcific aortic atherosclerosis. There are new airspace and prominent interstitial opacities in the mid and lower lungs concerning for edema and/or multifocal pneumonia in the appropriate clinical setting. Short-term follow-up imaging is recommended. There is no large pleural effusion. The visualized osseous structures are unchanged.  This report was finalized on 2/24/2023 7:35 AM by Dr. Ruchi Blair M.D.      XR Chest 1 View    Result Date: 2/22/2023  XR CHEST 1 VW-  02/22/2023  HISTORY: Fatigue. Weakness.  Heart size is at the upper limits of normal. Lungs appear free of acute infiltrates. Tiny calcified granulomas are seen in the left midlung. Bony structures appear unremarkable.      1. Borderline cardiomegaly.  This report was finalized on 2/22/2023 6:27 PM by Dr. Alex Brumfield M.D.      Results for orders placed during the hospital encounter of 02/22/23    Adult Transthoracic Echo Complete W/ Cont if Necessary Per Protocol    Interpretation Summary  •  Left ventricular systolic function is hyperdynamic (EF > 70%). Calculated left ventricular EF = 74.2%  •  Left ventricular wall thickness is consistent with mild concentric hypertrophy.  •  The left ventricular cavity is small in size.  •  The following left ventricular wall segments are hyperkinetic: basal anterior, basal anterolateral, basal inferolateral, basal inferior, basal inferoseptal, basal anteroseptal, mid anterior, mid anterolateral, mid inferolateral, mid inferior, mid inferoseptal, mid anteroseptal, apical anterior, apical lateral, apical inferior, apical septal and apex.  •  Left ventricular diastolic function was indeterminate.  •  Peak velocity of the flow distal to the aortic valve is 150 cm/s. Aortic valve maximum pressure gradient is 9 mmHg. Aortic valve mean pressure gradient is 5 mmHg. Aortic valve dimensionless index is 0.9 .  •  Trace to mild tricuspid valve regurgitation is present.  •  Estimated right ventricular systolic  pressure from tricuspid regurgitation is mildly elevated (35-45 mmHg). Calculated right ventricular systolic pressure from tricuspid regurgitation is 39 mmHg.          Active Hospital Problems    Diagnosis  POA   • **Acute metabolic encephalopathy [G93.41]  Yes   • Cystitis [N30.90]  Yes   • Splenic artery aneurysm (HCC) [I72.8]  Yes   • Atelectasis of left lung [J98.11]  Yes   • Hematuria [R31.9]  Yes   • Oral phase dysphagia [R13.11]  Unknown   • Moderate dementia due to Parkinson's disease, 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     This morning's chest x-ray reviewed little worsening bibasilar infiltrates atelectasis    Assessment & Plan     1. Acute hypoxemic and hypercapnic respiratory failure patient acute decline in her oxygen this morning probably aspiration again with the worsening bibasilar infiltrates and atelectasis.  2. COVID-19 infection and question pneumonia she is on  steroids will complete 10 days today is day 10 and completed remdesivir.   3. Pneumonia bibasilar recurrent worsening bibasilar infiltrates IDs note reviewed probably aspiration pneumonitis watch for development of pneumonia.  4. UTI Aerococcus greater than 10 to the 6 hard to know a lot of blood in the urine as well.  She has completed a course of vancomycin  5. Anemia GI bleed posttransfusion hemoglobin relatively stable  6. Metabolic encephalopathy probably related to hypercapnia on some underlying dementia.  She does seem to be improving she has some underlying confusion so I think she is probably getting close to that level  7. Mildly elevated AST has normalized  8. Hyperglycemia may be secondary to steroids sliding scale insulin  9. Pulmonary hypertension mild by echocardiogram  10. Elevated D-dimer CT angiogram and lower extremity Dopplers  negative  11. Hypertension some labile blood pressures overall better control  12. Parkinson's disease with reported parkinsonian dementia, improved with modification to her Sinemet dosing  13. Fluids/electrolytes/nutrition continue tube parades and free water sodium better  14. Oropharyngeal dysphagia unfortunately this may be the biggest factor in her recovery and survival.  15. Hypertension blood pressure better      Plan for disposition: Family is talking with the patient trying to decide the next course of action that she get a feeding tube or not that she go to palliative care or not.  I do not think feeding tube is going to prevent her aspirations and I explained that to the family I would recommend letting her eat and drink which is what she is begging for and pursuing a palliative course.        Darvin Carrillo Jr, MD  03/03/23  17:44 EST    Time:

## 2023-03-03 NOTE — NURSING NOTE
Spoke with patient's spouse, daughter, son and son in law by phone. We discussed results of swallow study and goals of care regarding diet. We discussed PEG tube and comfort diet in detail. We discussed inpatient palliative care unit and medications used to alleviate suffering. Family is taking some time to process and discuss and will let primary RN know of decision.     Updated Dr. Cannon and Marisela ESCOTO.

## 2023-03-03 NOTE — THERAPY TREATMENT NOTE
Patient Name: Cindy Mejia  : 1945    MRN: [id number]                              Today's Date: 3/3/2023       Admit Date: 2023    Visit Dx:     ICD-10-CM ICD-9-CM   1. Acute metabolic encephalopathy  G93.41 348.31   2. Acute UTI  N39.0 599.0   3. Parkinson's disease (HCC)  G20 332.0     Patient Active Problem List   Diagnosis   • Anxiety   • Tubular adenoma   • Tremor of both hands   • B12 deficiency   • Orthostatic hypotension due to Parkinson's disease (HCC)   • Action tremor   • Mixed stress and urge urinary incontinence   • Urinary tract infection without hematuria   • Moderate malnutrition (HCC)   • Dyskinesia   • Balance problems   • Osteopenia of multiple sites   • Age-related osteoporosis without current pathological fracture   • Urinary tract infection in female   • Acute UTI   • History of malignant neoplasm of large intestine   • Symptomatic hypotension   • Parkinson disease (HCC)   • Acute metabolic encephalopathy   • Cystitis   • Splenic artery aneurysm (HCC)   • Atelectasis of left lung   • Moderate dementia due to Parkinson's disease, with mood disturbance (HCC)   • Hematuria   • Oral phase dysphagia     Past Medical History:   Diagnosis Date   • Age-related osteoporosis without current pathological fracture 2022   • Anxiety 2016   • Arthritis    • Asymptomatic varicose veins of both lower extremities 2019   • Atelectasis of left lung 2023   • Carcinoma of colon (HCC) 2018   • Colon polyp    • Constipation    • Menopause 2017   • Mixed stress and urge urinary incontinence    • Other hemorrhoids 2018   • Other rosacea 2018   • Parkinson's disease (HCC)    • Uterine prolapse 2016     Past Surgical History:   Procedure Laterality Date   • BLADDER SURGERY     • COLONOSCOPY N/A 2017    Procedure: COLONOSCOPY, polypectomy;  Surgeon: Rabia Mcelroy MD;  Location: [facility];  Service:    • COLONOSCOPY N/A 2017    Procedure:  COLONOSCOPY, polypectomy, biopsy, sclerotherapy injection;  Surgeon: Rabia Mcelroy MD;  Location: [facility];  Service:    • HYSTERECTOMY     • TUBAL ABDOMINAL LIGATION        General Information     Row Name 03/03/23 1010          OT Time and Intention    Document Type therapy note (daily note)  -     Mode of Treatment occupational therapy;co-treatment;physical therapy  -     Row Name 03/03/23 1010          General Information    Patient Profile Reviewed yes  -     Existing Precautions/Restrictions fall;oxygen therapy device and L/min  -     Row Name 03/03/23 1010          Cognition    Orientation Status (Cognition) oriented x 3  -     Row Name 03/03/23 1010          Safety Issues, Functional Mobility    Impairments Affecting Function (Mobility) endurance/activity tolerance;strength;motor planning;postural/trunk control;balance;shortness of breath  -     Comment, Safety Issues/Impairments (Mobility) nonskid socks and gait belt donned for therapy  -           User Key  (r) = Recorded By, (t) = Taken By, (c) = Cosigned By    Initials Name Provider Type     Hanny Baum, OT Occupational Therapist                 Mobility/ADL's     Row Name 03/03/23 1021          Bed Mobility    Supine-Sit [place] (Bed Mobility) moderate assist (50% patient effort);2 person assist  -     Comment, (Bed Mobility) UIC at the end of session  -     Row Name 03/03/23 1021          Sit-Stand Transfer    Sit-Stand [place] (Transfers) moderate assist (50% patient effort);2 person assist  -     Assistive Device (Sit-Stand Transfers) --  HHAx2  -     Row Name 03/03/23 1021          Functional Mobility    Functional Mobility- Ind. Level moderate assist (50% patient effort);2 person assist required  -     Functional Mobility- Device --  HHAx2  -     Functional Mobility-Distance (Feet) --  small shuffled steps to bedside chair  -     Row Name 03/03/23 1021          Grooming Assessment/Training     [place] Level (Grooming) wash face, hands;standby assist  -     Position (Grooming) supported sitting  -           User Key  (r) = Recorded By, (t) = Taken By, (c) = Cosigned By    Initials Name Provider Type    Hanny Mullins OT Occupational Therapist               Obj/Interventions     Row Name 03/03/23 1026          Shoulder (Therapeutic Exercise)    Shoulder AROM (Therapeutic Exercise) flexion;extension;bilateral;10 repetitions;sitting  -     Row Name 03/03/23 1026          Motor Skills    Therapeutic Exercise shoulder  -     Row Name 03/03/23 1026          Balance    Static Sitting Balance contact guard;minimal assist  -     Dynamic Sitting Balance minimal assist  -KA     Position, Sitting Balance sitting edge of bed  -     Static Standing Balance minimal assist;moderate assist;2-person assist  -     Dynamic Standing Balance moderate assist;2-person assist  -     Balance Interventions standing;occupation based/functional task;sitting  -           User Key  (r) = Recorded By, (t) = Taken By, (c) = Cosigned By    Initials Name Provider Type    Hanny Mullins OT Occupational Therapist               Goals/Plan    No documentation.                Clinical Impression     Row Name 03/03/23 1027          Pain Assessment    Pretreatment Pain Rating 0/10 - no pain  -KA     Posttreatment Pain Rating 0/10 - no pain  -KA     Row Name 03/03/23 1027          Plan of Care Review    Plan of Care Reviewed With patient  -     Outcome Evaluation Pt seen for OT/PT cotreat due to pt's decreased endurance/activity tolerance. Pt on 15L hif[place] upon arrival. Pt required mod Ax2 for transition from supine to sit. Performed BUE ther ex seated EOB to work on strength and endurance. O2 remained in 90s throughout ther ex. Pt stood with mod Ax2 and O2 dropped to 83%. Required seated rest break and pt recovered quickly to mid 90s with breathing techniques. She then stood again and took small shuffled steps  to bedside chair with mod Ax2 and HHAx2. O2 ~94% after transfer to chair. Pt continues to benefit from skiled OT to address deficits and maximize independence with ADLs.  -     Row Name 03/03/23 1027          Therapy Plan Review/Discharge Plan (OT)    Anticipated Discharge Disposition (OT) [facility] nursing [facility]  -     Row Name 03/03/23 1027          Vital Signs    Pre Patient Position Supine  -KA     Intra Patient Position Standing  -KA     Post Patient Position Sitting  -     Row Name 03/03/23 1027          Positioning and Restraints    Pre-Treatment Position in bed  -KA     Post Treatment Position chair  -KA     In Chair reclined;call light within reach;encouraged to call for assist;with family/caregiver  -           User Key  (r) = Recorded By, (t) = Taken By, (c) = Cosigned By    Initials Name Provider Type    Hanny Mullins OT Occupational Therapist               Outcome Measures     Row Name 03/03/23 1031          How much help from another is currently needed...    Putting on and taking off regular lower body clothing? 1  -KA     Bathing (including washing, rinsing, and drying) 2  -KA     Toileting (which includes using toilet bed pan or urinal) 2  -KA     Putting on and taking off regular upper body clothing 2  -KA     Taking care of personal grooming (such as brushing teeth) 2  -KA     Eating meals 2  -KA     AM-PAC 6 Clicks Score (OT) 11  -     Row Name 03/03/23 1031          Functional Assessment    Outcome Measure Options AM-PAC 6 Clicks Daily Activity (OT)  -           User Key  (r) = Recorded By, (t) = Taken By, (c) = Cosigned By    Initials Name Provider Type    Hanny Mullins OT Occupational Therapist                Occupational Therapy Education     Title: PT OT SLP Therapies (Done)     Topic: Occupational Therapy (Done)     Point: ADL training (Done)     Description:   Instruct learner(s) on proper safety adaptation and remediation techniques during self care or  transfers.   Instruct in proper use of assistive devices.              Learning Progress Summary           Patient Acceptance, E, VU,NR by DS at 2/26/2023 1800    Acceptance, E, VU by MW at 2/23/2023 1528    Comment: role of OT, goals of care, xfer tech, d/c rec   Family Acceptance, E, VU,NR by DS at 2/26/2023 1800                   Point: Precautions (Done)     Description:   Instruct learner(s) on prescribed precautions during self-care and functional transfers.              Learning Progress Summary           Patient Acceptance, E, VU,NR by DS at 2/26/2023 1800    Acceptance, E, VU by MW at 2/23/2023 1528    Comment: role of OT, goals of care, xfer tech, d/c rec   Family Acceptance, E, VU,NR by DS at 2/26/2023 1800                   Point: Body mechanics (Done)     Description:   Instruct learner(s) on proper positioning and spine alignment during self-care, functional mobility activities and/or exercises.              Learning Progress Summary           Patient Acceptance, E, VU,NR by DS at 2/26/2023 1800    Acceptance, E, VU by MW at 2/23/2023 1528    Comment: role of OT, goals of care, xfer tech, d/c rec   Family Acceptance, E, VU,NR by DS at 2/26/2023 1800                               User Key     Initials Effective Dates Name Provider Type Discipline    OVIDIO 06/16/21 -  Kacie Camejo, TIGIST Registered Nurse Nurse     08/20/21 -  Brie Schmidt OT Occupational Therapist OT              OT Recommendation and Plan     Plan of Care Review  Plan of Care Reviewed With: patient  Outcome Evaluation: Pt seen for OT/PT cotreat due to pt's decreased endurance/activity tolerance. Pt on 15L hif[place] upon arrival. Pt required mod Ax2 for transition from supine to sit. Performed BUE ther ex seated EOB to work on strength and endurance. O2 remained in 90s throughout ther ex. Pt stood with mod Ax2 and O2 dropped to 83%. Required seated rest break and pt recovered quickly to mid 90s with breathing techniques. She then stood  again and took small shuffled steps to bedside chair with mod Ax2 and HHAx2. O2 ~94% after transfer to chair. Pt continues to benefit from skiled OT to address deficits and maximize independence with ADLs.     Time Calculation:    Time Calculation- OT     Row Name 03/03/23 1032             Time Calculation- OT    OT Start Time 0924  -KA      OT Stop Time 0952  -KA      OT Time Calculation (min) 28 min  -KA      Total Timed Code Minutes- OT 28 minute(s)  -KA      OT Received On 03/03/23  -KA      OT - Next Appointment 03/06/23  -KA         Timed Charges    47931 - OT Therapeutic Activity Minutes 28  -KA         Total Minutes    Timed Charges Total Minutes 28  -KA       Total Minutes 28  -KA            User Key  (r) = Recorded By, (t) = Taken By, (c) = Cosigned By    Initials Name Provider Type    Hanny Mullins OT Occupational Therapist              Therapy Charges for Today     Code Description Service Date Service Provider Modifiers Qty    84438413265 HC OT THERAPEUTIC ACT EA 15 MIN 3/3/2023 Hanny Baum OT GO 2               Hanny Baum OT  3/3/2023

## 2023-03-03 NOTE — THERAPY TREATMENT NOTE
Patient Name: Cindy Mejia  : 1945    MRN: [id number]                              Today's Date: 3/3/2023       Admit Date: 2023    Visit Dx:     ICD-10-CM ICD-9-CM   1. Acute metabolic encephalopathy  G93.41 348.31   2. Acute UTI  N39.0 599.0   3. Parkinson's disease (HCC)  G20 332.0     Patient Active Problem List   Diagnosis   • Anxiety   • Tubular adenoma   • Tremor of both hands   • B12 deficiency   • Orthostatic hypotension due to Parkinson's disease (HCC)   • Action tremor   • Mixed stress and urge urinary incontinence   • Urinary tract infection without hematuria   • Moderate malnutrition (HCC)   • Dyskinesia   • Balance problems   • Osteopenia of multiple sites   • Age-related osteoporosis without current pathological fracture   • Urinary tract infection in female   • Acute UTI   • History of malignant neoplasm of large intestine   • Symptomatic hypotension   • Parkinson disease (HCC)   • Acute metabolic encephalopathy   • Cystitis   • Splenic artery aneurysm (HCC)   • Atelectasis of left lung   • Moderate dementia due to Parkinson's disease, with mood disturbance (HCC)   • Hematuria   • Oral phase dysphagia     Past Medical History:   Diagnosis Date   • Age-related osteoporosis without current pathological fracture 2022   • Anxiety 2016   • Arthritis    • Asymptomatic varicose veins of both lower extremities 2019   • Atelectasis of left lung 2023   • Carcinoma of colon (HCC) 2018   • Colon polyp    • Constipation    • Menopause 2017   • Mixed stress and urge urinary incontinence    • Other hemorrhoids 2018   • Other rosacea 2018   • Parkinson's disease (HCC)    • Uterine prolapse 2016     Past Surgical History:   Procedure Laterality Date   • BLADDER SURGERY     • COLONOSCOPY N/A 2017    Procedure: COLONOSCOPY, polypectomy;  Surgeon: Rabia Mcelroy MD;  Location: [facility];  Service:    • COLONOSCOPY N/A 2017    Procedure:  COLONOSCOPY, polypectomy, biopsy, sclerotherapy injection;  Surgeon: Rabia Mcelroy MD;  Location: [facility];  Service:    • HYSTERECTOMY     • TUBAL ABDOMINAL LIGATION        General Information     Row Name 03/03/23 1146          Physical Therapy Time and Intention    Document Type therapy note (daily note)  -     Mode of Treatment physical therapy;co-treatment;occupational therapy  -     Row Name 03/03/23 1146          General Information    Existing Precautions/Restrictions fall;oxygen therapy device and L/min  -     Row Name 03/03/23 1146          Cognition    Orientation Status (Cognition) oriented x 3  -     Row Name 03/03/23 1146          Safety Issues, Functional Mobility    Impairments Affecting Function (Mobility) endurance/activity tolerance;strength;motor planning;postural/trunk control;balance;shortness of breath  -           User Key  (r) = Recorded By, (t) = Taken By, (c) = Cosigned By    Initials Name Provider Type     Jeimy Cary PTA Physical Therapist Assistant               Mobility     Row Name 03/03/23 1147          Bed Mobility    Supine-Sit [place] (Bed Mobility) moderate assist (50% patient effort);2 person assist;nonverbal cues (demo/gesture);verbal cues  -     Sit-Supine [place] (Bed Mobility) not tested  -     Assistive Device (Bed Mobility) bed rails;head of bed elevated;draw sheet  -     Row Name 03/03/23 1147          Sit-Stand Transfer    Sit-Stand [place] (Transfers) moderate assist (50% patient effort);2 person assist  -     Assistive Device (Sit-Stand Transfers) --  HHAX2  -EB     Comment, (Sit-Stand Transfer) 2 STS transfers, initial stand pt felt dizzy and O2 dropped  to  83% on 15L, pt sat down and O2 stats quickly recovered to mid 90s. Pt's 2nd stand improved as far as O2 stats and dizziness.  -     Row Name 03/03/23 1147          Gait/Stairs (Locomotion)    [place] Level (Gait) moderate assist (50% patient effort);2 person assist   -EB     Assistive Device (Gait) --  HHAX2  -EB     Distance in Feet (Gait) 6 steps to the chair  -EB     Deviations/Abnormal Patterns (Gait) festinating/shuffling  -EB     Bilateral Gait Deviations weight shift ability decreased  -EB           User Key  (r) = Recorded By, (t) = Taken By, (c) = Cosigned By    Initials Name Provider Type    Jeimy Cristobal PTA Physical Therapist Assistant               Obj/Interventions     Row Name 03/03/23 1159          Motor Skills    Therapeutic Exercise --  BLE: LAQs, seated marches, AP (X10)  -EB           User Key  (r) = Recorded By, (t) = Taken By, (c) = Cosigned By    Initials Name Provider Type    Jeimy Cristobal PTA Physical Therapist Assistant               Goals/Plan    No documentation.                Clinical Impression     Row Name 03/03/23 115          Plan of Care Review    Plan of Care Reviewed With patient  -EB     Progress improving  -EB     Outcome Evaluation Pt seen for PT/OT co treat today to maximize functional mobility. Pt on 15L of O2 today. Pt required ModAX2 for supine to sit and for STS transfers. Pt completed 2 STS transfers with pt feeling dizzy and O2 Stats decreased to 83% initially. Pt took seated rest break on EOB and O2 stats increased quickly back in the 90s. Pt completed a 2nd stand and took a few steps to the chair. Pt able to complete bilateral LE exercises with decreased ROM. Will continue to progress pt as able.  -EB     Row Name 03/03/23 1155          Therapy Assessment/Plan (PT)    Therapy Frequency (PT) 6 times/wk  -EB     Row Name 03/03/23 1159          Vital Signs    O2 Delivery Pre Treatment supplemental O2  -EB     Intra SpO2 (%) 83  -EB     O2 Delivery Intra Treatment supplemental O2  -EB     Post SpO2 (%) 94  -EB     O2 Delivery Post Treatment supplemental O2  -EB     Row Name 03/03/23 1154          Positioning and Restraints    Pre-Treatment Position in bed  -EB     Post Treatment Position chair  -EB     In Chair reclined;call  light within reach;encouraged to call for assist;exit alarm on  -EB           User Key  (r) = Recorded By, (t) = Taken By, (c) = Cosigned By    Initials Name Provider Type    Jeimy Cristobal PTA Physical Therapist Assistant               Outcome Measures     Row Name 03/03/23 1257          How much help from another person do you currently need...    Turning from your back to your side while in flat bed without using bedrails? 2  -EB     Moving from lying on back to sitting on the side of a flat bed without bedrails? 2  -EB     Moving to and from a bed to a chair (including a wheelchair)? 2  -EB     Standing up from a chair using your arms (e.g., wheelchair, bedside chair)? 2  -EB     Climbing 3-5 steps with a railing? 1  -EB     To walk in hospital room? 2  -EB     AM-PAC 6 Clicks Score (PT) 11  -EB     Highest level of mobility 4 --> Transferred to chair/commode  -     Row Name 03/03/23 1257          Modified [place] Scale    Modified [place] Scale 4 - Moderately severe disability.  Unable to walk without assistance, and unable to attend to own bodily needs without assistance.  -     Row Name 03/03/23 1031          Functional Assessment    Outcome Measure Options AM-PAC 6 Clicks Daily Activity (OT)  -           User Key  (r) = Recorded By, (t) = Taken By, (c) = Cosigned By    Initials Name Provider Type    Jeimy Cristobal PTA Physical Therapist Assistant    Hanny Mullins OT Occupational Therapist                             Physical Therapy Education     Title: PT OT SLP Therapies (Done)     Topic: Physical Therapy (Done)     Point: Mobility training (Done)     Learning Progress Summary           Patient Acceptance, E, VU by YOEL at 3/3/2023 1257    Acceptance, E,D, DU by PC at 3/1/2023 1645    Acceptance, E, VU,NR by OVIDIO at 2/26/2023 1800   Family Acceptance, E, VU,NR by OVIDIO at 2/26/2023 1800                   Point: Home exercise program (Done)     Learning Progress Summary           Patient  Acceptance, E, VU by EB at 3/3/2023 1257    Acceptance, E,D, DU by PC at 3/1/2023 1645                   Point: Body mechanics (Done)     Learning Progress Summary           Patient Acceptance, E, VU by EB at 3/3/2023 1257    Acceptance, E,D, DU by PC at 3/1/2023 1645                   Point: Precautions (Done)     Learning Progress Summary           Patient Acceptance, E, VU by EB at 3/3/2023 1257    Acceptance, E,D, DU by PC at 3/1/2023 1645                               User Key     Initials Effective Dates Name Provider Type Discipline    PC 06/16/21 -  Vanna Spicer PT Physical Therapist PT    EB 02/14/23 -  Jeimy Cary PTA Physical Therapist Assistant PT    DS 06/16/21 -  Kacie Camejo RN Registered Nurse Nurse              PT Recommendation and Plan     Plan of Care Reviewed With: patient  Progress: improving  Outcome Evaluation: Pt seen for PT/OT co treat today to maximize functional mobility. Pt on 15L of O2 today. Pt required ModAX2 for supine to sit and for STS transfers. Pt completed 2 STS transfers with pt feeling dizzy and O2 Stats decreased to 83% initially. Pt took seated rest break on EOB and O2 stats increased quickly back in the 90s. Pt completed a 2nd stand and took a few steps to the chair. Pt able to complete bilateral LE exercises with decreased ROM. Will continue to progress pt as able.     Time Calculation:    PT Charges     Row Name 03/03/23 1142             Time Calculation    Start Time 0924  -EB      Stop Time 0952  -EB      Time Calculation (min) 28 min  -EB      PT Received On 03/03/23  -EB      PT - Next Appointment 03/04/23  -EB         Time Calculation- PT    Total Timed Code Minutes- PT 28 minute(s)  -EB            User Key  (r) = Recorded By, (t) = Taken By, (c) = Cosigned By    Initials Name Provider Type    EB Jeimy Cary PTA Physical Therapist Assistant              Therapy Charges for Today     Code Description Service Date Service Provider Modifiers Qty     95851355122  PT THER PROC EA 15 MIN 3/3/2023 Jeimy Cary, PTA GP 1    26982823999 HC PT THERAPEUTIC ACT EA 15 MIN 3/3/2023 Jeiym Cary, PTA GP 1    67460874413 HC PT THER SUPP EA 15 MIN 3/3/2023 Jeimy Cary, PTA GP 1          PT G-Codes  Outcome Measure Options: AM-PAC 6 Clicks Daily Activity (OT)  AM-PAC 6 Clicks Score (PT): 11  AM-PAC 6 Clicks Score (OT): 11  Modified [place] Scale: 4 - Moderately severe disability.  Unable to walk without assistance, and unable to attend to own bodily needs without assistance.       Jeimy Cary PTA  3/3/2023

## 2023-03-03 NOTE — PLAN OF CARE
Goal Outcome Evaluation:  Plan of Care Reviewed With: patient           Outcome Evaluation: Pt seen for OT/PT cotreat due to pt's decreased endurance/activity tolerance. Pt on 15L [place] upon arrival. Pt required mod Ax2 for transition from supine to sit. Performed BUE ther ex seated EOB to work on strength and endurance. O2 remained in 90s throughout ther ex. Pt stood with mod Ax2 and O2 dropped to 83%. Required seated rest break and pt recovered quickly to mid 90s with breathing techniques. She then stood again and took small shuffled steps to bedside chair with mod Ax2 and HHAx2. O2 ~94% after transfer to chair. Pt continues to benefit from skiled OT to address deficits and maximize independence with ADLs.

## 2023-03-03 NOTE — PLAN OF CARE
Goal Outcome Evaluation:  Plan of Care Reviewed With: patient           Outcome Evaluation: Pt educated on importance of adhering to NPO diet d/t aspiration risk - pt requesting food and water. maintained O2 sats > 90% on 4lpm NC throughout shift. noted loose stools, incontinence care provided. purewick in place, assisted with turns/bed mobility throughout shift.  Denies any needs.

## 2023-03-03 NOTE — PLAN OF CARE
Goal Outcome Evaluation:  Plan of Care Reviewed With: patient        Progress: no change  Outcome Evaluation: Video swallow study completed - pt on 15 L at time of completion, cognitively appropriate, with all results reviewed prior to pt return to room. Pt presents with a severe oropharyngeal dysphagia, characterized by silent aspiration of puree and HTL. Multiple swallows across both consistencies, unable to clear moderate residue. Minimal laryngeal vestibular closure, airway protection and no airway reactions present to aspirated material. Multiple mechanical factors include cervical lordosis/hyperextension despite repositioning, NGT presence. UES relaxation and clearance limited likely combination r/t mechanical factors and severity of hyolaryngeal weakness.       Recommend: NPO with enteral means of nutrition/hydration/medication administration versus discussion for plan of care.       Pt will require intensive dysphagia therapy services in setting of severe oropharyngeal dysphagia. Repeat instrumental required secondary to silent aspiration present. Question timeline given severity - will determine based on clinical improvement and participation.

## 2023-03-03 NOTE — NURSING NOTE
Discussed restarting tube feeds with pulmonary physician post chest xray. MD advised this RN to discuss with family that patient has most likely aspirated again; hx of aspiration. This RN to speak to patient and . No patient c/o vomiting, spitting up, or needing to be suctioned, okay to restart feeds at this time per physician.

## 2023-03-03 NOTE — PLAN OF CARE
Goal Outcome Evaluation:  Plan of Care Reviewed With: patient        Progress: improving  Outcome Evaluation: Pt seen for PT/OT co treat today to maximize functional mobility. Pt on 15L of O2 today. Pt required ModAX2 for supine to sit and for STS transfers. Pt completed 2 STS transfers with pt feeling dizzy and O2 Stats decreased to 83% initially. Pt took seated rest break on EOB and O2 stats increased quickly back in the 90s. Pt completed a 2nd stand and took a few steps to the chair. Pt able to complete bilateral LE exercises with decreased ROM. Will continue to progress pt as able.    ..Patient was not wearing a face mask during this therapy encounter. Therapist used appropriate personal protective equipment including gown, eye protection, mask and gloves.  Mask used was N95/duckbill. Appropriate PPE was worn during the entire therapy session. Hand hygiene was completed before and after therapy session. Patient is in enhanced droplet precautions.

## 2023-03-03 NOTE — CONSULTS
Referring Provider: Ubaldo Argueta MD  Reason for Consultation: Leukocytosis  Chief Complaint   Patient presents with   • Fatigue         Subjective   History of present illness: Patient is a 77-year-old female with past medical history of colon cancer, Parkinson's disease, and dementia who presented with altered mental status and hypoxia.  ID consulted for leukocytosis.    On admission patient was started on ceftriaxone and remdesivir for COVID-19 and suspected urinary tract infection.  Urine culture grew out Aerococcus and she was started on vancomycin.  She completed out a 6-day course of ceftriaxone and 5 days of remdesivir for suspected pneumonia in the setting of elevated procalcitonin.  Hypoxia improved with weaning of her oxygen requirements and she was continued on steroids throughout her stay.  She has had slowly increasing leukocytosis over that time on steroids.  Was febrile in the middle of her stay but has been afebrile since 2/28.    Today patient states she continues to feel slightly short of breath but no worse than yesterday.  Denies any fevers or chills.  Denies any productive cough.  States she feels as though she is not able to cough stuff up.  Has had some trouble with swallowing.  Denies any abdominal pain, nausea, vomiting or diarrhea.  States she has some pain with bowel movements but no pain with urination.    Past Medical History:   Diagnosis Date   • Age-related osteoporosis without current pathological fracture 04/21/2022   • Anxiety 09/22/2016   • Arthritis    • Asymptomatic varicose veins of both lower extremities 09/24/2019   • Atelectasis of left lung 2/22/2023   • Carcinoma of colon (HCC) 01/25/2018   • Colon polyp    • Constipation    • Menopause 03/23/2017   • Mixed stress and urge urinary incontinence    • Other hemorrhoids 01/25/2018   • Other rosacea 01/25/2018   • Parkinson's disease (HCC)    • Uterine prolapse 06/21/2016       Past Surgical History:   Procedure  Laterality Date   • BLADDER SURGERY     • COLONOSCOPY N/A 5/22/2017    Procedure: COLONOSCOPY, polypectomy;  Surgeon: Rabia Mcelroy MD;  Location: [facility] OR;  Service:    • COLONOSCOPY N/A 8/28/2017    Procedure: COLONOSCOPY, polypectomy, biopsy, sclerotherapy injection;  Surgeon: Rabia Mcelroy MD;  Location: [facility] OR;  Service:    • HYSTERECTOMY     • TUBAL ABDOMINAL LIGATION         family history includes Breast cancer in her mother; Heart disease (age of onset: 60) in her brother; Pancreatic cancer in her father; Thyroid disease in her sister.     reports that she has never smoked. She has never used smokeless tobacco. She reports that she does not drink alcohol and does not use drugs.     Allergies   Allergen Reactions   • Penicillins Rash       Medication:  Antibiotics:  Anti-Infectives (From admission, onward)    Ordered     Dose/Rate Route Frequency Start Stop    02/27/23 0910  vancomycin (VANCOCIN) 1000 mg/200 mL dextrose 5% IVPB        Ordering Provider: Darvin Carrillo Jr., MD    1,000 mg  over 60 Minutes Intravenous Every 24 Hours 02/27/23 1000 03/01/23 1352    02/26/23 0953  vancomycin 750 mg in sodium chloride 0.9 % 250 mL IVPB-VTB        Ordering Provider: Darvin Carrillo Jr., MD    750 mg  over 45 Minutes Intravenous Once 02/26/23 1100 02/26/23 1250    02/25/23 0952  vancomycin (VANCOCIN) 1000 mg/200 mL dextrose 5% IVPB        Ordering Provider: Darvin Carrillo Jr., MD    20 mg/kg × 44.5 kg Intravenous Once 02/25/23 1100 02/25/23 1226    02/25/23 0946  Pharmacy to dose vancomycin        Ordering Provider: Darvin Carrillo Jr., MD     Does not apply Continuous PRN 02/25/23 0946 03/02/23 0945    02/23/23 0215  remdesivir 100 mg in sodium chloride 0.9 % 250 mL IVPB (powder vial)        Ordering Provider: Milton Sprague MD   See Hypers[facility] for full Linked Orders Report.    100 mg  over 60 Minutes Intravenous Every 24 Hours 02/24/23 0400 02/27/23 0412    02/22/23 2155  cefTRIAXone  (ROCEPHIN) 1 g in sodium chloride 0.9 % 100 mL IVPB-VTB        Ordering Provider: Milton Sprague MD    1 g  200 mL/hr over 30 Minutes Intravenous Every 24 Hours 02/23/23 2100 02/27/23 2202 02/23/23 0215  remdesivir 200 mg in sodium chloride 0.9 % 290 mL IVPB (powder vial)        Ordering Provider: Kaitlynn Ponce APRN See [facility] for full Linked Orders Report.    200 mg  over 60 Minutes Intravenous Every 24 Hours 02/23/23 0400 02/23/23 0440    02/22/23 2044  cefTRIAXone (ROCEPHIN) 1 g in sodium chloride 0.9 % 100 mL IVPB-VTB        Ordering Provider: Eusebio Jones MD    1 g  200 mL/hr over 30 Minutes Intravenous Once 02/22/23 2046 02/22/23 2203            Objective     Physical Exam:   Vital Signs   Temp:  [97.1 °F (36.2 °C)-97.6 °F (36.4 °C)] 97.2 °F (36.2 °C)  Heart Rate:  [] 91  Resp:  [18-20] 20  BP: (110-138)/(56-62) 110/58    GENERAL: Chronically ill-appearing.  HEENT: Oropharynx is clear. Hearing is grossly normal.   EYES: PERRL. No conjunctival injection. No lid lag.   HEART: Regular rate and regular rhythm. No peripheral edema.   LUNGS: Normal work of breathing on high flow nasal cannula.  GI: Soft, nontender, nondistended.   SKIN: Warm and dry without cutaneous eruptions   PSYCHIATRIC: Appropriate mood, affect.    Results Review:   I reviewed the patient's new clinical results.  I reviewed the patient's new imaging results and agree with the interpretation.  I reviewed the patient's other test results and agree with the interpretation    Lab Results   Component Value Date    WBC 26.63 (H) 03/03/2023    HGB 9.7 (L) 03/03/2023    HCT 28.7 (L) 03/03/2023    MCV 88.6 03/03/2023     03/03/2023       Lab Results   Component Value Date    VANCOTROUGH 10.80 03/01/2023    VANCORANDOM 11.60 02/27/2023       Lab Results   Component Value Date    GLUCOSE 92 03/03/2023    BUN 24 (H) 03/03/2023    CREATININE 0.49 (L) 03/03/2023    EGFRIFNONA 121 06/17/2021    EGFRIFAFRI 146 06/17/2021     BCR 49.0 (H) 03/03/2023    CO2 33.0 (H) 03/03/2023    CALCIUM 8.5 (L) 03/03/2023    PROTENTOTREF 7.1 03/22/2022    ALBUMIN 2.8 (L) 02/28/2023    LABIL2 1.5 03/22/2022    AST 21 02/28/2023    ALT 12 02/28/2023         Estimated Creatinine Clearance: 65.4 mL/min (A) (by C-G formula based on SCr of 0.49 mg/dL (L)).      Microbiology:  2/22 urine culture Aerococcus viridans  2/20 blood cultures no growth  2/22 respiratory panel positive for COVID-19    Radiology:  2/24 CT angiogram of the chest with report reviewed and no evidence of PE but extensive multifocal aspiration pneumonia.    Assessment   #Leukocytosis  #COVID-19 pneumonia  #Acute hypoxic respiratory failure  #Aspiration  #Parkinson's  #Dementia    Leukocytosis seems to be driven by her steroid use and is it has continued to increase while on steroids throughout her stay. She is status post a treatment course with ceftriaxone for pneumonia.  Other infection markers have trended positively with resolution of her fever and procalcitonin.      For COVID-19, she is status post 5 days of remdesivir and currently receiving steroids for hypoxia.  Agree with pulmonary's plan for steroids.  Would not have any further indication for immunosuppressive agents at this time.    Swallow study pending today.  I suspect she is going to have a degree of aspiration and will continue to be at risk.  Aspiration pneumonitis would be high on the differential as well since her procalcitonin has remained normal.  Does not seem to have an indication for antibiotics at this time but could consider repeat therapy for aspiration pneumonia if she were to develop fever or elevated procalcitonin in the near future.  I suspect currently her hypoxia may be driven by aspiration pneumonitis.    Thank you for this consult.  ID will be available if needed for any further assistance.

## 2023-03-03 NOTE — PROGRESS NOTES
Name: Cindy Mejia ADMIT: 2023   : 1945  PCP: Shannon Nguyen APRN    MRN: [id number] LOS: 8 days   AGE/SEX: 77 y.o. female  ROOM: [facility]     Subjective   Subjective   In recliner, she is alert and oriented to person, place, event. She reports feeling hungry and wants to eat. WBC is increased to 26. Discussed with  and daughter/her life partner in the hallway.          Objective   Objective   Vital Signs  Temp:  [97.1 °F (36.2 °C)-97.6 °F (36.4 °C)] 97.2 °F (36.2 °C)  Heart Rate:  [] 100  Resp:  [18-20] 20  BP: (110-138)/(56-62) 110/58  SpO2:  [90 %-98 %] 91 %  on  Flow (L/min):  [4-15] 13;   Device (Oxygen Therapy): humidified;high-flow nasal cannula  Body mass index is 18.34 kg/m².  Physical Exam  Constitutional:       General: She is not in acute distress.     Appearance: Normal appearance. She is ill-appearing.   HENT:      Nose:      Comments: NG tube in place  Cardiovascular:      Rate and Rhythm: Regular rhythm. Tachycardia present.      Pulses: Normal pulses.      Heart sounds: No murmur heard.  Pulmonary:      Effort: Pulmonary effort is normal. No respiratory distress.      Breath sounds: Rhonchi present. No wheezing.   Abdominal:      General: Abdomen is flat. Bowel sounds are normal. There is no distension.      Palpations: Abdomen is soft.   Musculoskeletal:         General: No swelling or tenderness.      Right lower leg: No edema.      Left lower leg: No edema.   Skin:     General: Skin is warm and dry.   Neurological:      General: No focal deficit present.      Mental Status: She is alert and oriented to person, place, and time. Mental status is at baseline.      Comments: Bilateral upper extremity tremor, bruising of upper arms         Results Review     I reviewed the patient's new clinical results.  Results from last 7 days   Lab Units 23  0701 23  2347 23  1558 23  0805 23  0800 23  0542 23  1759 23  0826    WBC 10*3/mm3 26.63*  --   --  24.65*  --  12.68*  --  14.56*   HEMOGLOBIN g/dL 9.7* 9.1* 9.3* 9.2*   < > 8.7*  8.6*   < > 7.6*  7.6*   PLATELETS 10*3/mm3 249  --   --  225  --  146  --  175    < > = values in this interval not displayed.     Results from last 7 days   Lab Units 03/03/23  0701 03/02/23  0805 03/01/23  0856 02/28/23  0800   SODIUM mmol/L 143 144 143 147*   POTASSIUM mmol/L 4.9 5.0 4.6 5.7*   CHLORIDE mmol/L 103 104 108* 119*   CO2 mmol/L 33.0* 32.8* 25.7 25.0   BUN mg/dL 24* 21 17 21   CREATININE mg/dL 0.49* 0.51* 0.47* 0.55*   GLUCOSE mg/dL 92 120* 112* 254*   Estimated Creatinine Clearance: 65.4 mL/min (A) (by C-G formula based on SCr of 0.49 mg/dL (L)).  Results from last 7 days   Lab Units 02/28/23  0800 02/26/23  0616 02/25/23  0322   ALBUMIN g/dL 2.8* 2.9* 3.3*   BILIRUBIN mg/dL 0.4 0.4 <0.2   ALK PHOS U/L 44 49 70   AST (SGOT) U/L 21 28 57*   ALT (SGPT) U/L 12 15 22     Results from last 7 days   Lab Units 03/03/23  0701 03/02/23  0805 03/01/23  0856 02/28/23  0800 02/27/23  0826 02/27/23  0519 02/26/23  0616 02/25/23  0322   CALCIUM mg/dL 8.5* 8.6 7.9* 7.2*   < >  --   --   --    ALBUMIN g/dL  --   --   --  2.8*  --   --  2.9* 3.3*   MAGNESIUM mg/dL 2.3 2.2  --   --   --  2.4  --   --    PHOSPHORUS mg/dL 4.4 3.2  --   --   --   --   --   --     < > = values in this interval not displayed.     Results from last 7 days   Lab Units 03/03/23  0701 02/24/23 2046 02/24/23  1851   PROCALCITONIN ng/mL 0.07  --   --    LACTATE mmol/L  --  2.0 2.1*     COVID19   Date Value Ref Range Status   02/22/2023 Detected (C) Not Detected - Ref. Range Final   09/30/2022 Not Detected Not Detected - Ref. Range Final     Glucose   Date/Time Value Ref Range Status   03/03/2023 1220 125 70 - 130 mg/dL Final     Comment:     Meter: [id number] : [id number] Maury Keely NA   03/03/2023 0614 102 70 - 130 mg/dL Final     Comment:     Meter: [id number] : [id number] Rony Beasley NA   03/03/2023 0015 177 (H) 70  - 130 mg/dL Final     Comment:     Meter: [id number] : [id number] Rony Beasley NA   03/02/2023 1812 197 (H) 70 - 130 mg/dL Final     Comment:     Meter: [id number] : [id number] Ermelinda Worrell NA   03/02/2023 1153 221 (H) 70 - 130 mg/dL Final     Comment:     Meter: [id number] : [id number] Ermelinda Worrell NA   03/02/2023 0606 143 (H) 70 - 130 mg/dL Final     Comment:     Meter: [id number] : [id number] O'Pipo Mina SHAWNA   03/02/2023 0028 174 (H) 70 - 130 mg/dL Final     Comment:     Meter: [id number] : [id number] ROBBIE'Pipo Mina SHAWNA       XR Chest 1 View  Narrative: XR CHEST 1 VW-     HISTORY: Female who is 77 years-old,  increased oxygen requirement,  possible aspiration     TECHNIQUE: Frontal view of the chest     COMPARISON: 03/02/2023     FINDINGS: NG tube appears stable, partly included. The heart size is  normal. Aorta is tortuous, calcified. Pulmonary vasculature is  unremarkable. Atelectasis or infiltrate in the lower lungs, appears  increased on the left. Persistent minimal pleural effusions. No  pneumothorax. No acute osseous process.     Impression: Atelectasis or infiltrate in the lower lungs, appears  increased on the left. Persistent minimal pleural effusions.     This report was finalized on 3/3/2023 12:11 PM by Dr. Mehdi Gr M.D.       Scheduled Medications  amLODIPine, 5 mg, Nasogastric, Q24H  carbidopa-levodopa, 1 tablet, Nasogastric, TID  dexamethasone, 6 mg, Intravenous, Daily  insulin lispro, 0-14 Units, Subcutaneous, Q6H  ipratropium-albuterol, 3 mL, Nebulization, Q4H - RT  pantoprazole, 40 mg, Intravenous, Q12H  pramipexole, 0.25 mg, Nasogastric, Nightly  vitamin B-12, 50 mcg, Nasogastric, Daily    Infusions   Diet  NPO Diet NPO Type: Strict NPO         I have personally reviewed:  [x]  Laboratory   []  Microbiology   [x]  Radiology   [x]  EKG/Telemetry   []  Cardiology/Vascular   []  Pathology   []  Records    Assessment/Plan     Active Hospital Problems    Diagnosis   POA   • **Acute metabolic encephalopathy [G93.41]  Yes   • Cystitis [N30.90]  Yes   • Splenic artery aneurysm (HCC) [I72.8]  Yes   • Atelectasis of left lung [J98.11]  Yes   • Hematuria [R31.9]  Yes   • Oral phase dysphagia [R13.11]  Unknown   • Moderate dementia due to Parkinson's disease, 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       77 y.o. female admitted with Acute metabolic encephalopathy.    Acute hypoxemic/hypercapnic respiratory failure  COVID-19 pneumonia  Bibasilar pneumonia-possibly secondary bacterial versus aspiration  - Patient completed a course of Rocephin while in the unit  - Pulm following  - patient increased to 15L today- she denies feeling short of breath  - VFSS completed today- pt aspirated with all consistencies trialed  - Completed remdesivir, remains on dexamethasone for COVID  - WBC increased to 26; appreciate ID seeing patient, agree this is likely steroid-induced leukocytosis, increase in O2 requirement likely related to aspiration pneumonitis    Aerococcus UTI  -Patient completed a course of vancomycin    Anemia  GI bleed/questionable melena  -GI following however they do not recommended EGD/C-scope at this time given her significant O2 requirements (6L) and high risk of requiring intubation  -Trend H&H, transfuse as needed    Altered mental status/metabolic encephalopathy  -Patient improving    Hypertension  -Continue amlodipine    Parkinson's with Parkinson's dementia  -Continue Sinemet, Mirapex    Oropharyngeal dysphagia  -Currently on tube feeds, VFSS failed  - will discuss with family- pt wants to eat; family not inerested inPEG- would recommend eating for comfort; have reached out to speech for safest modified diet; will d/w fam    · SCDs for DVT prophylaxis.  · DNR.  · Discussed with patient and nursing  staff. And Janeth tolentino/ palliative care and family.   · Anticipate discharge to SNU facility timing yet to be determined.      Anabell Cannon MD  [facility]  03/03/23  15:35 EST    I wore protective equipment throughout this patient encounter including gloves and protective eyewear.  Hand hygiene was performed before donning protective equipment and after removal when leaving the room.

## 2023-03-04 NOTE — NURSING NOTE
Report called to [facility] ext [phone], report was given to TIGIST Mireles who will be assuming care of the patient when she arrives to . The patient's  is at bedside and he was made aware that the patient would be transferring to room 480. Patient is resting with her eyes closed at this time.

## 2023-03-04 NOTE — PROGRESS NOTES
Saturations 86% on 15 LPM high flow nasal cannula. Breathing treatment administered with no improvement in saturation and 15 LPM NRB added over top of HFNC.

## 2023-03-04 NOTE — PROGRESS NOTES
Name: Cindy Mejia ADMIT: 2023   : 1945  PCP: Shannon Nguyen APRN    MRN: [id number] LOS: 9 days   AGE/SEX: 77 y.o. female  ROOM: [facility]     Subjective   Subjective   In bed, she reports she wants her NG tube out and she wants to be allowed to eat.  I asked her if she wants to be transferred to a more comfortable room and she states yes.  She denies any pain or shortness of breath.  She is wearing high flow nasal cannula with overlying nonrebreather saturating around 90%.         Objective   Objective   Vital Signs  Temp:  [97 °F (36.1 °C)-97.8 °F (36.6 °C)] 97.8 °F (36.6 °C)  Heart Rate:  [] 104  Resp:  [16-20] 20  BP: (100-125)/(48-80) 115/52  SpO2:  [84 %-100 %] 90 %  on  Flow (L/min):  [6-15] 15;   Device (Oxygen Therapy): humidified;high-flow nasal cannula;nonrebreather mask  Body mass index is 17.75 kg/m².  Physical Exam  Constitutional:       General: She is not in acute distress.     Appearance: Normal appearance. She is ill-appearing.   HENT:      Nose:      Comments: NG tube in place  Cardiovascular:      Rate and Rhythm: Regular rhythm. Tachycardia present.      Pulses: Normal pulses.      Heart sounds: No murmur heard.  Pulmonary:      Effort: Pulmonary effort is normal. No respiratory distress.      Breath sounds: Rhonchi present. No wheezing.   Abdominal:      General: Abdomen is flat. Bowel sounds are normal. There is no distension.      Palpations: Abdomen is soft.   Musculoskeletal:         General: No swelling or tenderness.      Right lower leg: No edema.      Left lower leg: No edema.   Skin:     General: Skin is warm and dry.   Neurological:      General: No focal deficit present.      Mental Status: She is alert and oriented to person, place, and time. Mental status is at baseline.      Comments: Bilateral upper extremity tremor, bruising of upper arms         Results Review     I reviewed the patient's new clinical results.  Results from last 7 days   Lab Units  03/04/23  0811 03/04/23  0550 03/04/23  0034 03/03/23  1606 03/03/23  0701 03/02/23  1558 03/02/23  0805 02/28/23  0800 02/28/23  0542   WBC 10*3/mm3  --  23.07*  --   --  26.63*  --  24.65*  --  12.68*   HEMOGLOBIN g/dL 9.5* 9.1* 8.6* 10.3* 9.7*   < > 9.2*   < > 8.7*  8.6*   PLATELETS 10*3/mm3  --  256  --   --  249  --  225  --  146    < > = values in this interval not displayed.     Results from last 7 days   Lab Units 03/04/23  0550 03/03/23  0701 03/02/23  0805 03/01/23  0856   SODIUM mmol/L 137 143 144 143   POTASSIUM mmol/L 4.5 4.9 5.0 4.6   CHLORIDE mmol/L 98 103 104 108*   CO2 mmol/L 33.1* 33.0* 32.8* 25.7   BUN mg/dL 27* 24* 21 17   CREATININE mg/dL 0.44* 0.49* 0.51* 0.47*   GLUCOSE mg/dL 148* 92 120* 112*   Estimated Creatinine Clearance: 70.4 mL/min (A) (by C-G formula based on SCr of 0.44 mg/dL (L)).  Results from last 7 days   Lab Units 02/28/23  0800 02/26/23  0616   ALBUMIN g/dL 2.8* 2.9*   BILIRUBIN mg/dL 0.4 0.4   ALK PHOS U/L 44 49   AST (SGOT) U/L 21 28   ALT (SGPT) U/L 12 15     Results from last 7 days   Lab Units 03/04/23  0550 03/03/23  0701 03/02/23  0805 03/01/23  0856 02/28/23  0800 02/27/23  0826 02/27/23  0519 02/26/23  0616   CALCIUM mg/dL 8.6 8.5* 8.6 7.9* 7.2*   < >  --   --    ALBUMIN g/dL  --   --   --   --  2.8*  --   --  2.9*   MAGNESIUM mg/dL 2.3 2.3 2.2  --   --   --  2.4  --    PHOSPHORUS mg/dL 4.1 4.4 3.2  --   --   --   --   --     < > = values in this interval not displayed.     Results from last 7 days   Lab Units 03/03/23  0701   PROCALCITONIN ng/mL 0.07     COVID19   Date Value Ref Range Status   02/22/2023 Detected (C) Not Detected - Ref. Range Final   09/30/2022 Not Detected Not Detected - Ref. Range Final     Glucose   Date/Time Value Ref Range Status   03/04/2023 1206 143 (H) 70 - 130 mg/dL Final     Comment:     Meter: [id number] : [id number] Marc SNEED   03/04/2023 0623 168 (H) 70 - 130 mg/dL Final     Comment:     Meter: [id number] : [id number] Rony  Gale NA   03/03/2023 2344 175 (H) 70 - 130 mg/dL Final     Comment:     Meter: [id number] : [id number] Rony Beasley NA   03/03/2023 2119 204 (H) 70 - 130 mg/dL Final     Comment:     Meter: [id number] : [id number] Rony Beasley NA   03/03/2023 1608 159 (H) 70 - 130 mg/dL Final     Comment:     Meter: [id number] : [id number] Diana Stuart NA   03/03/2023 1220 125 70 - 130 mg/dL Final     Comment:     Meter: [id number] : [id number] Maury Riggs NA   03/03/2023 0614 102 70 - 130 mg/dL Final     Comment:     Meter: [id number] : [id number] Rony Beasley NA       FL Video Swallow With Speech Single Contrast  VIDEO ESOPHAGRAM 03/03/2023     HISTORY: Dysphagia.     Fluoroscopy was used during performance of the video esophagram by  speech pathology.     Radiologist present ? Dr. Brumfield. Video fluoroscopic swallow study  completed. Pt seated 90 degree hip flexion, visualized in a lateral  position. Trials assessed included: HTL by tsp, puree by tsp. Cervical  lordosis, neck hyperextended despite repositioning for head neutralized.  NGT present. Lingual movements slow and disorganized. Multiple swallows  to clear across all consistencies 2-4 with HTL, 6-7 with puree.  Premature spillage to pyriform with HTL by tsp, puree. Positioning, NGT  presence, and overall pharyngeal weakness resulting in absent deflection  of epiglottis. Minimal epiglottic movement impacting vestibular closure.  Moderate valleculae residue following HTL. Moderate diffuse pharyngeal  residue with puree, penetration and aspiration of residuals from  posterior epiglottis and poor airway closure as listed below. Multiple  swallows reduce, but do not clear all residue. Base of tongue retraction  adequate. Hyolaryngeal elevation significantly decreased. UES relaxation  significantly reduced, may be partially related to pharyngeal weakness,  does result in bolus retention in pharynx. Penetration from bolus head  in pyriform for initial  HTL by tsp, continues to deepen to VF across x4  additional swallows to aid clearance. Between fluoroscopy frames, large  quantity visualized at vocal folds level and in trachea. Similar  presentation second trial HTL by tsp, penetration initial trial, deepens  aspiration second swallow to clear. Puree first trial, deep penetration  to vocal folds for initial swallow, deepening between multiple (x6)  swallows to clear, aspiration for final. Second trial puree, deep  penetration initially to vocal folds, large quantity aspirate from  material on vocal folds, continued aspiration of residuals. ?Cued cough  can decrease depth of penetrate/aspirate material, cannot eject. No  volitional cough or airway entry indicators present indicating silent  aspiration. Please see full speech pathology report.     FLUOROSCOPY TIME: One minute 30 seconds, 2559 images.     XR Chest 1 View  Narrative: XR CHEST 1 VW-     HISTORY: Female who is 77 years-old,  increased oxygen requirement,  possible aspiration     TECHNIQUE: Frontal view of the chest     COMPARISON: 03/02/2023     FINDINGS: NG tube appears stable, partly included. The heart size is  normal. Aorta is tortuous, calcified. Pulmonary vasculature is  unremarkable. Atelectasis or infiltrate in the lower lungs, appears  increased on the left. Persistent minimal pleural effusions. No  pneumothorax. No acute osseous process.     Impression: Atelectasis or infiltrate in the lower lungs, appears  increased on the left. Persistent minimal pleural effusions.     This report was finalized on 3/3/2023 12:11 PM by Dr. Mehdi Gr M.D.       Scheduled Medications   Infusions   Diet  Diet: Regular/House Diet; Texture: Regular Texture (IDDSI 7); Fluid Consistency: Thin (IDDSI 0)         I have personally reviewed:  [x]  Laboratory   []  Microbiology   [x]  Radiology   [x]  EKG/Telemetry   []  Cardiology/Vascular   []  Pathology   []  Records    Assessment/Plan     Active Hospital  Problems    Diagnosis  POA   • **Acute metabolic encephalopathy [G93.41]  Yes   • Cystitis [N30.90]  Yes   • Splenic artery aneurysm (HCC) [I72.8]  Yes   • Atelectasis of left lung [J98.11]  Yes   • Hematuria [R31.9]  Yes   • Oral phase dysphagia [R13.11]  Unknown   • Moderate dementia due to Parkinson's disease, 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       77 y.o. female admitted with Acute metabolic encephalopathy.    Parkinson's with Parkinson's dementia  Oropharyngeal dysphagia  Patient completed video swallow study yesterday with findings of severe oropharyngeal dysphagia.  Given her history of Parkinson's, it is questionable if the patient will ever regain her ability to swallow without aspiration.  Yesterday the patient had sudden increase in oxygen requirement from 5 L to 15 L.  Today she is requiring 15 L via high flow nasal cannula as well as nonrebreather overlying.  The patient has been asking to be allowed to eat for days and after discussing with the family they have elected to transfer her to the palliative care floor for full comfort measures.  The patient who is alert and oriented to her situation (although forgetful) is in agreement to transfer as well. She reports she wants her NG tube out, she wants to be allowed to eat and she wants to see her dog.  Transfer order in place, full comfort care orders placed, I have called bed board to expedite the transfer and discussed with the primary RN.    Hospital problems no longer being actively managed  Acute hypoxemic/hypercapnic respiratory failure  COVID-19 pneumonia  Bibasilar pneumonia-possibly secondary bacterial versus aspiration pneumonia/pneumonitis  Aerococcus UTI  Altered mental status/metabolic encephalopathy  Anemia  GI bleed/questionable melena  Hypertension      Anabell  MABEL Cannon MD  [facility]  03/04/23  12:56 EST    I wore protective equipment throughout this patient encounter including gloves and protective eyewear.  Hand hygiene was performed before donning protective equipment and after removal when leaving the room.

## 2023-03-04 NOTE — PLAN OF CARE
Goal Outcome Evaluation:  Plan of Care Reviewed With: patient, family        Progress: declining  Outcome Evaluation: PPS 20%. Pt transferred to [facility] for palliative care. Pt medicated with 2mg of morphine due to neck pain. 15L highflow NC. Family at bedside. No needs at this time.

## 2023-03-04 NOTE — PROGRESS NOTES
Note plan for comfort measures.  Pulmonary team will sign off.    Iglesia Galeas MD  03/04/23  15:36 EST

## 2023-03-05 PROBLEM — R13.10 DYSPHAGIA: Status: ACTIVE | Noted: 2023-01-01

## 2023-03-05 PROBLEM — U07.1 COVID-19 VIRUS INFECTION: Status: ACTIVE | Noted: 2023-01-01

## 2023-03-05 PROBLEM — J69.0 ASPIRATION PNEUMONIA (HCC): Status: ACTIVE | Noted: 2023-01-01

## 2023-03-05 PROBLEM — J96.02 ACUTE RESPIRATORY FAILURE WITH HYPERCAPNIA: Status: ACTIVE | Noted: 2023-01-01

## 2023-03-05 PROBLEM — G20.A1 PARKINSON'S DISEASE: Status: ACTIVE | Noted: 2023-01-01

## 2023-03-05 PROBLEM — J96.01 ACUTE RESPIRATORY FAILURE WITH HYPOXIA (HCC): Status: ACTIVE | Noted: 2023-01-01

## 2023-03-05 PROBLEM — G20 PARKINSON'S DISEASE (HCC): Status: ACTIVE | Noted: 2023-01-01

## 2023-03-05 NOTE — PROGRESS NOTES
Name: Cindy Mejia ADMIT: 2023   : 1945  PCP: Shannon Nguyen APRN    MRN: [id number] LOS: 10 days   AGE/SEX: 77 y.o. female  ROOM: [facility]     Subjective   Subjective   Resting in bed,  at bedside. Appears mildly uncomfortable. Willing to accept morphine to try and improve comfort.          Objective   Objective   Vital Signs  Temp:  [97.4 °F (36.3 °C)-98.2 °F (36.8 °C)] 97.4 °F (36.3 °C)  Heart Rate:  [] 87  Resp:  [20] 20  BP: (105-113)/(49-59) 105/59  SpO2:  [86 %-98 %] 93 %  on  Flow (L/min):  [15] 15;   Device (Oxygen Therapy): high-flow nasal cannula  Body mass index is 17.75 kg/m².  Physical Exam  Constitutional:       General: She is not in acute distress.     Appearance: Normal appearance. She is ill-appearing.   Cardiovascular:      Pulses: Normal pulses.      Heart sounds: No murmur heard.  Pulmonary:      Breath sounds: Rhonchi present.      Comments: On hi-flow nasal cannula  Abdominal:      General: Abdomen is flat. There is no distension.   Musculoskeletal:         General: No swelling or tenderness.      Right lower leg: No edema.      Left lower leg: No edema.   Skin:     General: Skin is warm and dry.   Neurological:      General: No focal deficit present.      Mental Status: She is alert and oriented to person, place, and time. Mental status is at baseline.      Comments: Bilateral upper extremity tremor, bruising of upper arms         Results Review     I reviewed the patient's new clinical results.  Results from last 7 days   Lab Units 23  0811 23  0550 23  0034 23  1606 23  0701 23  1558 23  0805 23  0800 23  0542   WBC 10*3/mm3  --  23.07*  --   --  26.63*  --  24.65*  --  12.68*   HEMOGLOBIN g/dL 9.5* 9.1* 8.6* 10.3* 9.7*   < > 9.2*   < > 8.7*  8.6*   PLATELETS 10*3/mm3  --  256  --   --  249  --  225  --  146    < > = values in this interval not displayed.     Results from last 7 days   Lab Units  03/04/23  0550 03/03/23  0701 03/02/23  0805 03/01/23  0856   SODIUM mmol/L 137 143 144 143   POTASSIUM mmol/L 4.5 4.9 5.0 4.6   CHLORIDE mmol/L 98 103 104 108*   CO2 mmol/L 33.1* 33.0* 32.8* 25.7   BUN mg/dL 27* 24* 21 17   CREATININE mg/dL 0.44* 0.49* 0.51* 0.47*   GLUCOSE mg/dL 148* 92 120* 112*   Estimated Creatinine Clearance: 70.4 mL/min (A) (by C-G formula based on SCr of 0.44 mg/dL (L)).  Results from last 7 days   Lab Units 02/28/23  0800   ALBUMIN g/dL 2.8*   BILIRUBIN mg/dL 0.4   ALK PHOS U/L 44   AST (SGOT) U/L 21   ALT (SGPT) U/L 12     Results from last 7 days   Lab Units 03/04/23  0550 03/03/23  0701 03/02/23  0805 03/01/23  0856 02/28/23  0800 02/27/23  0826 02/27/23  0519   CALCIUM mg/dL 8.6 8.5* 8.6 7.9* 7.2*   < >  --    ALBUMIN g/dL  --   --   --   --  2.8*  --   --    MAGNESIUM mg/dL 2.3 2.3 2.2  --   --   --  2.4   PHOSPHORUS mg/dL 4.1 4.4 3.2  --   --   --   --     < > = values in this interval not displayed.     Results from last 7 days   Lab Units 03/03/23  0701   PROCALCITONIN ng/mL 0.07     COVID19   Date Value Ref Range Status   02/22/2023 Detected (C) Not Detected - Ref. Range Final   09/30/2022 Not Detected Not Detected - Ref. Range Final     Glucose   Date/Time Value Ref Range Status   03/04/2023 1206 143 (H) 70 - 130 mg/dL Final     Comment:     Meter: [id number] : [id number] Marc Pedroza NEDRA   03/04/2023 0623 168 (H) 70 - 130 mg/dL Final     Comment:     Meter: [id number] : [id number] Rony Beasley    03/03/2023 2344 175 (H) 70 - 130 mg/dL Final     Comment:     Meter: [id number] : [id number] Rony Beasley    03/03/2023 2119 204 (H) 70 - 130 mg/dL Final     Comment:     Meter: [id number] : [id number] Rony Beasley    03/03/2023 1608 159 (H) 70 - 130 mg/dL Final     Comment:     Meter: [id number] : [id number] Diana Stuart    03/03/2023 1220 125 70 - 130 mg/dL Final     Comment:     Meter: [id number] : [id number] Maury Riggs    03/03/2023 0614 102 70 -  130 mg/dL Final     Comment:     Meter: [id number] : [id number] Rony SNEED       FL Video Swallow With Speech Single Contrast  VIDEO ESOPHAGRAM 03/03/2023     HISTORY: Dysphagia.     Fluoroscopy was used during performance of the video esophagram by  speech pathology.     Radiologist present ? Dr. Brumfield. Video fluoroscopic swallow study  completed. Pt seated 90 degree hip flexion, visualized in a lateral  position. Trials assessed included: HTL by tsp, puree by tsp. Cervical  lordosis, neck hyperextended despite repositioning for head neutralized.  NGT present. Lingual movements slow and disorganized. Multiple swallows  to clear across all consistencies 2-4 with HTL, 6-7 with puree.  Premature spillage to pyriform with HTL by tsp, puree. Positioning, NGT  presence, and overall pharyngeal weakness resulting in absent deflection  of epiglottis. Minimal epiglottic movement impacting vestibular closure.  Moderate valleculae residue following HTL. Moderate diffuse pharyngeal  residue with puree, penetration and aspiration of residuals from  posterior epiglottis and poor airway closure as listed below. Multiple  swallows reduce, but do not clear all residue. Base of tongue retraction  adequate. Hyolaryngeal elevation significantly decreased. UES relaxation  significantly reduced, may be partially related to pharyngeal weakness,  does result in bolus retention in pharynx. Penetration from bolus head  in pyriform for initial HTL by tsp, continues to deepen to VF across x4  additional swallows to aid clearance. Between fluoroscopy frames, large  quantity visualized at vocal folds level and in trachea. Similar  presentation second trial HTL by tsp, penetration initial trial, deepens  aspiration second swallow to clear. Puree first trial, deep penetration  to vocal folds for initial swallow, deepening between multiple (x6)  swallows to clear, aspiration for final. Second trial puree, deep  penetration initially to  vocal folds, large quantity aspirate from  material on vocal folds, continued aspiration of residuals. ?Cued cough  can decrease depth of penetrate/aspirate material, cannot eject. No  volitional cough or airway entry indicators present indicating silent  aspiration. Please see full speech pathology report.     FLUOROSCOPY TIME: One minute 30 seconds, 2559 images.     XR Chest 1 View  Narrative: XR CHEST 1 VW-     HISTORY: Female who is 77 years-old,  increased oxygen requirement,  possible aspiration     TECHNIQUE: Frontal view of the chest     COMPARISON: 03/02/2023     FINDINGS: NG tube appears stable, partly included. The heart size is  normal. Aorta is tortuous, calcified. Pulmonary vasculature is  unremarkable. Atelectasis or infiltrate in the lower lungs, appears  increased on the left. Persistent minimal pleural effusions. No  pneumothorax. No acute osseous process.     Impression: Atelectasis or infiltrate in the lower lungs, appears  increased on the left. Persistent minimal pleural effusions.     This report was finalized on 3/3/2023 12:11 PM by Dr. Mehdi Gr M.D.       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       77 y.o. female admitted with Acute metabolic encephalopathy.    Parkinson's with  Parkinson's dementia  Oropharyngeal dysphagia  Patient completed video swallow study with findings of severe oropharyngeal dysphagia.  Given her history of Parkinson's, it is questionable if the patient will ever regain her ability to swallow without aspiration.  The patient had sudden increase in oxygen requirement from 5 L to 15 L on 3/3 and continued to require increased oxygen.  The patient has been asking to be allowed to eat for days and after discussing with the family they have elected to transfer her to the palliative care floor for full comfort measures.  The patient who is alert and oriented to her situation (although forgetful) was in agreement to transfer as well. She reports she wants her NG tube out, she wants to be allowed to eat and she wants to see her dog.      Patient transferred to palliative care floor on 3/4, she remains alert and oriented, she reports she is comfortable however primary RN reports that she is grimacing with movement.  I discussed with the patient that I would like to treat her with pain medication and that we would use a low-dose so as not to sedate her as she would like to continue to talk with her family who is visiting today.  She is in agreement.  Discussed with  who is in the room as well.  Hospice consult is pending.    Hospital problems no longer being actively managed  Acute hypoxemic/hypercapnic respiratory failure  COVID-19 pneumonia  Bibasilar pneumonia-possibly secondary bacterial versus aspiration pneumonia/pneumonitis  Aerococcus UTI  Altered mental status/metabolic encephalopathy  Anemia  GI bleed/questionable melena  Hypertension      Anabell Cannon MD  [facility]  03/05/23  09:39 EST    I wore protective equipment throughout this patient encounter including gloves and protective eyewear.  Hand hygiene was performed before donning protective equipment and after removal when leaving the room.

## 2023-03-05 NOTE — PLAN OF CARE
Goal Outcome Evaluation:  Plan of Care Reviewed With: patient, family        Progress: declining  Outcome Evaluation: PPS 20%. Pt has received x2 doses of morphine 2mg this shift for pain and discomfort. Family at bedside and encouraging pt to take pain medication to be comfortable. Pt to be switched to [facility] and Cristobal to take over tomorrow.  No needs at this time.

## 2023-03-05 NOTE — DISCHARGE SUMMARY
Patient Name: Cindy Mejia  : 1945  MRN: [id number]    Date of Admission: 2023  Date of Discharge:  3/5/2023  Primary Care Physician: Shannon Nguyen APRN      Chief Complaint:   Fatigue      Discharge Diagnoses     Active Hospital Problems    Diagnosis  POA   • **Acute metabolic encephalopathy [G93.41]  Yes   • Cystitis [N30.90]  Yes   • Splenic artery aneurysm (HCC) [I72.8]  Yes   • Atelectasis of left lung [J98.11]  Yes   • Hematuria [R31.9]  Yes   • Oral phase dysphagia [R13.11]  Unknown   • Moderate dementia due to Parkinson's disease, 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        Hospital Course     Ms. Mejia is a 77 y.o. female with a history of Parkinson's disease who presented to [facility] initially complaining of weakness, confusion, cough, wheezing.  Please see the admitting history and physical for further details.  She was found to have UTI and COVID-19 infection and was admitted to the hospital for further evaluation and treatment.  Early in her hospital course the patient developed acute hypoxic/hypercapnic respiratory failure and was upgraded to the ICU. She was treated with dexamethasone and remdesivir for her COVID-19 infection as well as broad-spectrum antibiotics for for bacterial pneumonia as well as her UTI. She required multiple days on non-invasive positive pressure ventilation before being able to transfer out of the ICU. The patient worked with speech therapy and was noted to have severe oropharyngeal dysphagia. Given her history of Parkinson's, it is questionable if the patient will ever regain her ability to swallow without aspiration.  The patient had a sudden increase in oxygen requirement from 5 L to 15 L on 3/3 and continued to require increased oxygen.  The patient has  been asking to be allowed to eat for days and after further discussion with the family, they have elected to transfer her to the palliative care floor for full comfort measures.  The patient who is alert and oriented to her situation (although forgetful) was in agreement to transfer as well. She reports she wanted her NG tube out, she wanted to be allowed to eat and she wanted to see her dog. The patient and her family met with hospice and she was transitioned to a hospice scatter bed.        Day of Discharge     Subjective:  Resting in bed,  at bedside. Appears mildly uncomfortable. Willing to accept morphine to try and improve comfort.     Physical Exam:  Temp:  [97.4 °F (36.3 °C)-98.3 °F (36.8 °C)] 98.3 °F (36.8 °C)  Heart Rate:  [] 104  Resp:  [20] 20  BP: (104-105)/(50-59) 104/50  Body mass index is 17.75 kg/m².  Physical Exam  Constitutional:       General: She is not in acute distress.     Appearance: Normal appearance. She is ill-appearing.   Cardiovascular:      Pulses: Normal pulses.      Heart sounds: No murmur heard.  Pulmonary:      Breath sounds: Rhonchi present.      Comments: On hi-flow nasal cannula  Abdominal:      General: Abdomen is flat. There is no distension.   Musculoskeletal:         General: No swelling or tenderness.      Right lower leg: No edema.      Left lower leg: No edema.   Skin:     General: Skin is warm and dry.   Neurological:      General: No focal deficit present.      Mental Status: She is alert and oriented to person, place, and time. Mental status is at baseline.      Comments: Bilateral upper extremity tremor, bruising of upper arms         Consultants     Consult Orders (all) (From admission, onward)     Start     Ordered    03/04/23 1642  Inpatient Hospice / [facility] Consult  Once        Specialty:  Hospice and Palliative Medicine  Provider:  (Not yet assigned)    03/04/23 1642    03/04/23 1145  Inpatient Pet Visitation Consult  Once        Comments: Please  allow patient's dog to visit the palliative care unit.   Provider:  (Not yet assigned)    03/04/23 1146    03/03/23 1221  Inpatient Infectious Diseases Consult  Once        Specialty:  Infectious Diseases  Provider:  Iván Gomez MD    03/03/23 1220    02/25/23 1311  Inpatient Consult to Nutrition Services  Once        Provider:  (Not yet assigned)    02/25/23 1312    02/25/23 0127  Inpatient Gastroenterology Consult  Once        Specialty:  Gastroenterology  Provider:  Chad Sandoval MD    02/25/23 0130    02/23/23 1653  Inpatient Palliative Care Team Consult  Once        Provider:  (Not yet assigned)    02/23/23 1652    02/23/23 1650  Inpatient Cardiology Consult  Once        Specialty:  Cardiology  Provider:  Milton Baker MD    02/23/23 1649    02/23/23 0702  Inpatient Pulmonology Consult  [place] ,   Status:  Canceled        Specialty:  Pulmonary Disease  Provider:  Darvin Carrillo Jr., MD    02/22/23 2239    02/23/23 0702  Inpatient Pulmonology Consult  [place]         Specialty:  Pulmonary Disease  Provider:  Darvin Carrillo Jr., MD    02/22/23 2241 02/22/23 2247  Inpatient Nutrition Consult  Once        Provider:  (Not yet assigned)    02/22/23 2246    02/22/23 2246  Inpatient Case Management  Consult  Once        Provider:  (Not yet assigned)    02/22/23 2245 02/22/23 2045  [facility] (on-call MD unless specified) Details  Once        Specialty:  Hospitalist  Provider:  (Not yet assigned)    02/22/23 2045              Procedures     Imaging Results (All)     Procedure Component Value Units Date/Time    FL Video Swallow With Speech Single Contrast [[id number]] Collected: 03/03/23 1555     Updated: 03/03/23 1555    Narrative:      VIDEO ESOPHAGRAM 03/03/2023     HISTORY: Dysphagia.     Fluoroscopy was used during performance of the video esophagram by  speech pathology.     Radiologist present ? Dr. Brumfield. Video fluoroscopic swallow study  completed. Pt seated 90  degree hip flexion, visualized in a lateral  position. Trials assessed included: HTL by tsp, puree by tsp. Cervical  lordosis, neck hyperextended despite repositioning for head neutralized.  NGT present. Lingual movements slow and disorganized. Multiple swallows  to clear across all consistencies 2-4 with HTL, 6-7 with puree.  Premature spillage to pyriform with HTL by tsp, puree. Positioning, NGT  presence, and overall pharyngeal weakness resulting in absent deflection  of epiglottis. Minimal epiglottic movement impacting vestibular closure.  Moderate valleculae residue following HTL. Moderate diffuse pharyngeal  residue with puree, penetration and aspiration of residuals from  posterior epiglottis and poor airway closure as listed below. Multiple  swallows reduce, but do not clear all residue. Base of tongue retraction  adequate. Hyolaryngeal elevation significantly decreased. UES relaxation  significantly reduced, may be partially related to pharyngeal weakness,  does result in bolus retention in pharynx. Penetration from bolus head  in pyriform for initial HTL by tsp, continues to deepen to VF across x4  additional swallows to aid clearance. Between fluoroscopy frames, large  quantity visualized at vocal folds level and in trachea. Similar  presentation second trial HTL by tsp, penetration initial trial, deepens  aspiration second swallow to clear. Puree first trial, deep penetration  to vocal folds for initial swallow, deepening between multiple (x6)  swallows to clear, aspiration for final. Second trial puree, deep  penetration initially to vocal folds, large quantity aspirate from  material on vocal folds, continued aspiration of residuals. ?Cued cough  can decrease depth of penetrate/aspirate material, cannot eject. No  volitional cough or airway entry indicators present indicating silent  aspiration. Please see full speech pathology report.     FLUOROSCOPY TIME: One minute 30 seconds, 2559 images.       XR  Chest 1 View [[id number]] Collected: 03/03/23 1208     Updated: 03/03/23 1214    Narrative:      XR CHEST 1 VW-     HISTORY: Female who is 77 years-old,  increased oxygen requirement,  possible aspiration     TECHNIQUE: Frontal view of the chest     COMPARISON: 03/02/2023     FINDINGS: NG tube appears stable, partly included. The heart size is  normal. Aorta is tortuous, calcified. Pulmonary vasculature is  unremarkable. Atelectasis or infiltrate in the lower lungs, appears  increased on the left. Persistent minimal pleural effusions. No  pneumothorax. No acute osseous process.       Impression:      Atelectasis or infiltrate in the lower lungs, appears  increased on the left. Persistent minimal pleural effusions.     This report was finalized on 3/3/2023 12:11 PM by Dr. Mehdi Gr M.D.       XR Chest 1 View [[id number]] Collected: 03/02/23 1321     Updated: 03/02/23 1326    Narrative:      XR CHEST 1 VW-     HISTORY:  Hypoxemia, shortness of air, weakness, Covid positive.     COMPARISON:  Chest radiograph 2/26/2023     FINDINGS:    A single view of the chest was obtained. There is a new enteric tube  extending below the field-of-view. The cardiac silhouette and  mediastinal and hilar contours are not significantly changed. There is  calcific aortic atherosclerosis. Right infrahilar opacity is not  significantly changed. There is new predominantly linear opacity at the  left lung base, which could reflect atelectasis. There are trace  bilateral pleural effusions, new/progressed from 2/26/2023. Continued  follow-up imaging is recommended. The visualized osseous structures are  not significantly changed.     This report was finalized on 3/2/2023 1:23 PM by Dr. Ruchi Blair M.D.       XR Chest 1 View [[id number]] Collected: 02/26/23 0604     Updated: 02/26/23 0604    Narrative:        Patient: DEMETRA HAMILTON  Time Out: 06:04  Exam(s): FILM CXR 1 VIEW     EXAM:    XR Chest, 1 View    CLINICAL HISTORY:     Reason  for exam: Follow-up pneumonia and respiratory failure.    TECHNIQUE:    Frontal view of the chest.    COMPARISON:    No relevant prior studies available.    FINDINGS:    Lungs:  Lungs are borderline hyperinflated with slight primary   interstitial markings consistent with mild emphysema.  Questionable mild   infiltrate or atelectasis in the right infrahilar region.  The lungs are   otherwise clear.    Pleural space:  Unremarkable.  No pneumothorax.    Heart:  Unremarkable.  No cardiomegaly.    Mediastinum:  Unremarkable.    Bones joints:  Unremarkable.    Vasculature:  Mild calcification of a nondilated aortic arch.    Upper abdomen:  There is no pneumoperitoneum under the diaphragm.    IMPRESSION:         Lungs are borderline hyperinflated with slight primary interstitial   markings consistent with mild emphysema.  Questionable mild infiltrate or   atelectasis in the right infrahilar region.  The lungs are otherwise   clear.      Impression:          Electronically signed by Nando Welsh MD on 02-26-23 at 0604    CT Angiogram Chest [[id number]] Collected: 02/24/23 1533     Updated: 02/24/23 1612    Narrative:      CT ANGIOGRAM OF THE CHEST. MULTIPLE CORONAL, SAGITTAL, AND 3-D  RECONSTRUCTIONS.     HISTORY: 77-year-old female with shortness of breath. Covid.     TECHNIQUE: Radiation dose reduction techniques were utilized, including  automated exposure control and exposure modulation based on body size.   CT angiogram of the chest was performed following the administration of  IV contrast. Multiple coronal, sagittal, and 3-D reconstruction images  were obtained. Compared with previous chest CT 02/22/2023.     FINDINGS: Some of the distal pulmonary artery branches are well seen or  opacified, but there is no convincing evidence for pulmonary  thromboemboli. There is near complete opacification of multiple left  lower lobe bronchi and to a lesser degree at the right lower lobe and  right middle lobe. There are mixed  density, but predominantly, dense  peribronchial airspace consolidations at both lower lobes and to a  lesser degree at the right middle lobe. The consolidations extend to the  periphery where there is a combination of groundglass and nodular  opacities. These are also seen at the posterior segment of the right  upper lobe. There is a stable sub-6 mm left apical pulmonary nodule,  image 20. There are no pleural or pericardial effusions. There is no  lymphadenopathy within the chest. There is no hiatal hernia. There is no  new abnormality at the visualized upper abdomen. Peripherally calcified  1.1 cm splenic artery aneurysm at the splenic hilum is again noted.       Impression:      1. There is no convincing evidence for pulmonary thromboemboli.  2. Extensive multifocal aspiration pneumonia. Fairly extensive  opacification of bronchi at both lower lobes and right middle lobe and  development of dense peribronchial pneumonia at both lower lobes and to  a lesser degree the right middle lobe, and there is much less dense  pneumonia peripherally at the lower lobes, right middle lobe, and the  dependent aspect of the right upper lobe.     This report was finalized on 2/24/2023 4:09 PM by Dr. Penny Kilpatrick M.D.       XR Chest 1 View [[id number]] Collected: 02/24/23 0733     Updated: 02/24/23 0738    Narrative:      XR CHEST 1 VW-     HISTORY:  Shortness of breath, fatigue.     COMPARISON:  Chest radiograph 2/22/2023. CT chest 2/22/2023.     FINDINGS:    A single view of the chest was obtained. The cardiac silhouette is  partially obscured. There is calcific aortic atherosclerosis. There are  new airspace and prominent interstitial opacities in the mid and lower  lungs concerning for edema and/or multifocal pneumonia in the  appropriate clinical setting. Short-term follow-up imaging is  recommended. There is no large pleural effusion. The visualized osseous  structures are unchanged.     This report was finalized on 2/24/2023  7:35 AM by Dr. Ruchi Blair M.D.       CT Abdomen Pelvis Without Contrast [[id number]] Collected: 02/22/23 2119     Updated: 02/22/23 2136    Narrative:      CT CHEST WITHOUT CONTRAST: CT ABDOMEN AND PELVIS WITHOUT CONTRAST     HISTORY: Hypoxia and urinary tract infection     COMPARISON: None available.     TECHNIQUE: Axial CT imaging was obtained from the thoracic inlet through  the symphysis pubis. No IV contrast was administered.     FINDINGS:  CT OF THE CHEST: The patient is noted to have debris within the bronchus  intermedius, with extension into the right middle lobe and right lower  lobe. There is some associated atelectasis. There is additional debris  which is noted within the bronchus to the left lower lobe, again, with  some atelectasis noted. The thyroid gland, trachea, and esophagus are  unremarkable. There are coronary artery calcifications. There is no  pleural or pericardial effusion. Mediastinal lymph nodes are not  pathologically enlarged. There is some deformity of the superior  endplate of T3, age indeterminate, although favored to be chronic. MRI  or bone scan would be more sensitive for assessment of acuity.     CT ABDOMEN AND PELVIS: No suspicious hepatic lesions are seen. There is  a peripherally calcified splenic artery aneurysm, measuring up to 1.1  cm. There is a right adrenal myelolipoma, measuring up to 3.2 x 2.7 cm.  Gallbladder appears unremarkable. Pancreas is within normal limits.  Calcified granulomata are seen within the spleen. Punctate  nonobstructing stones are noted within both kidneys. Single largest on  the right measures up to 7 mm. Single largest on the left measures 3 mm.  There are simple appearing left renal cyst. No additional follow-up is  necessary. Urinary bladder is thick walled, with adjacent perivesical  stranding, in keeping with diabetes. Patient does appear to have pelvic  floor laxity, with cystocele noted. There is no bowel obstruction. There  is some air  seen within the urinary bladder, which may be related to  catheterization. There is no evidence of appendicitis. No acute osseous  abnormalities are seen.       Impression:         1. Patient is noted to have debris within the bronchus intermedius and  bronchus to the left lower lobe, with associated mild atelectasis.  Patient may benefit from bronchoscopy.  2. Peripherally calcified splenic artery aneurysm, measuring up to 1.1  cm. Follow-up CT in one year is suggested.  3. Right renal adrenal myelolipoma.  4. Thick-walled appearance to the urinary bladder, with adjacent  perivesical stranding, suggesting cystitis. In addition, the patient  appears to have pelvic floor laxity, with cystocele.     Radiation dose reduction techniques were utilized, including automated  exposure control and exposure modulation based on body size.     This report was finalized on 2/22/2023 9:33 PM by Dr. Araceli Martinez M.D.       CT Chest Without Contrast Diagnostic [[id number]] Collected: 02/22/23 2119     Updated: 02/22/23 2136    Narrative:      CT CHEST WITHOUT CONTRAST: CT ABDOMEN AND PELVIS WITHOUT CONTRAST     HISTORY: Hypoxia and urinary tract infection     COMPARISON: None available.     TECHNIQUE: Axial CT imaging was obtained from the thoracic inlet through  the symphysis pubis. No IV contrast was administered.     FINDINGS:  CT OF THE CHEST: The patient is noted to have debris within the bronchus  intermedius, with extension into the right middle lobe and right lower  lobe. There is some associated atelectasis. There is additional debris  which is noted within the bronchus to the left lower lobe, again, with  some atelectasis noted. The thyroid gland, trachea, and esophagus are  unremarkable. There are coronary artery calcifications. There is no  pleural or pericardial effusion. Mediastinal lymph nodes are not  pathologically enlarged. There is some deformity of the superior  endplate of T3, age indeterminate, although  favored to be chronic. MRI  or bone scan would be more sensitive for assessment of acuity.     CT ABDOMEN AND PELVIS: No suspicious hepatic lesions are seen. There is  a peripherally calcified splenic artery aneurysm, measuring up to 1.1  cm. There is a right adrenal myelolipoma, measuring up to 3.2 x 2.7 cm.  Gallbladder appears unremarkable. Pancreas is within normal limits.  Calcified granulomata are seen within the spleen. Punctate  nonobstructing stones are noted within both kidneys. Single largest on  the right measures up to 7 mm. Single largest on the left measures 3 mm.  There are simple appearing left renal cyst. No additional follow-up is  necessary. Urinary bladder is thick walled, with adjacent perivesical  stranding, in keeping with diabetes. Patient does appear to have pelvic  floor laxity, with cystocele noted. There is no bowel obstruction. There  is some air seen within the urinary bladder, which may be related to  catheterization. There is no evidence of appendicitis. No acute osseous  abnormalities are seen.       Impression:         1. Patient is noted to have debris within the bronchus intermedius and  bronchus to the left lower lobe, with associated mild atelectasis.  Patient may benefit from bronchoscopy.  2. Peripherally calcified splenic artery aneurysm, measuring up to 1.1  cm. Follow-up CT in one year is suggested.  3. Right renal adrenal myelolipoma.  4. Thick-walled appearance to the urinary bladder, with adjacent  perivesical stranding, suggesting cystitis. In addition, the patient  appears to have pelvic floor laxity, with cystocele.     Radiation dose reduction techniques were utilized, including automated  exposure control and exposure modulation based on body size.     This report was finalized on 2/22/2023 9:33 PM by Dr. Araceli Martinez M.D.       XR Chest 1 View [[id number]] Collected: 02/22/23 1826     Updated: 02/22/23 1830    Narrative:      XR CHEST 1 VW-  02/22/2023      HISTORY: Fatigue. Weakness.     Heart size is at the upper limits of normal. Lungs appear free of acute  infiltrates. Tiny calcified granulomas are seen in the left midlung.  Bony structures appear unremarkable.       Impression:      1. Borderline cardiomegaly.     This report was finalized on 2/22/2023 6:27 PM by Dr. Alex Brumfield M.D.             Pertinent Labs     Results from last 7 days   Lab Units 03/04/23  0811 03/04/23  0550 03/04/23  0034 03/03/23  1606 03/03/23  0701 03/02/23  1558 03/02/23  0805 02/28/23  0800 02/28/23  0542   WBC 10*3/mm3  --  23.07*  --   --  26.63*  --  24.65*  --  12.68*   HEMOGLOBIN g/dL 9.5* 9.1* 8.6* 10.3* 9.7*   < > 9.2*   < > 8.7*  8.6*   PLATELETS 10*3/mm3  --  256  --   --  249  --  225  --  146    < > = values in this interval not displayed.     Results from last 7 days   Lab Units 03/04/23  0550 03/03/23  0701 03/02/23  0805 03/01/23  0856   SODIUM mmol/L 137 143 144 143   POTASSIUM mmol/L 4.5 4.9 5.0 4.6   CHLORIDE mmol/L 98 103 104 108*   CO2 mmol/L 33.1* 33.0* 32.8* 25.7   BUN mg/dL 27* 24* 21 17   CREATININE mg/dL 0.44* 0.49* 0.51* 0.47*   GLUCOSE mg/dL 148* 92 120* 112*   Estimated Creatinine Clearance: 70.4 mL/min (A) (by C-G formula based on SCr of 0.44 mg/dL (L)).  Results from last 7 days   Lab Units 02/28/23  0800   ALBUMIN g/dL 2.8*   BILIRUBIN mg/dL 0.4   ALK PHOS U/L 44   AST (SGOT) U/L 21   ALT (SGPT) U/L 12     Results from last 7 days   Lab Units 03/04/23  0550 03/03/23  0701 03/02/23  0805 03/01/23  0856 02/28/23  0800 02/27/23  0826 02/27/23  0519   CALCIUM mg/dL 8.6 8.5* 8.6 7.9* 7.2*   < >  --    ALBUMIN g/dL  --   --   --   --  2.8*  --   --    MAGNESIUM mg/dL 2.3 2.3 2.2  --   --   --  2.4   PHOSPHORUS mg/dL 4.1 4.4 3.2  --   --   --   --     < > = values in this interval not displayed.               Invalid input(s): LDLCALC        Test Results Pending at Discharge       Discharge Details        Discharge Medications      ASK your doctor about  these medications      Instructions Start Date   carbidopa-levodopa  MG per tablet  Commonly known as: SINEMET   1 tablet, Oral, 2 Times Daily      pramipexole 0.25 MG tablet  Commonly known as: MIRAPEX   0.25 mg, Oral, Nightly      vitamin B-12 100 MCG tablet  Commonly known as: CYANOCOBALAMIN   50 mcg, Oral, Daily             Allergies   Allergen Reactions   • Penicillins Rash         Discharge Disposition:  Swing Bed w/Planned Readmission    Discharge Diet:  Diet Order   Procedures   • Diet: Regular/House Diet; Texture: Regular Texture (IDDSI 7); Fluid Consistency: Thin (IDDSI 0)       Discharge Activity:       CODE STATUS:    Code Status and Medical Interventions:   Ordered at: 03/04/23 1147     Level Of Support Discussed With:    Patient    Next of Kin (If No Surrogate)     Code Status (Patient has no pulse and is not breathing):    No CPR (Do Not Attempt to Resuscitate)     Medical Interventions (Patient has pulse or is breathing):    Comfort Measures       Future Appointments   Date Time Provider Department Center   3/20/2023  9:30 AM NURSE/MA [facility]   3/27/2023 11:30 AM Shannon Nguyen APRN [facility]      Follow-up Information     Shannon Nguyen APRN .    Specialties: Family Medicine, Internal Medicine, Emergency Medicine  Contact information:  [address]  [place] [address] [phone]                         Time Spent on Discharge:  I spent greater than 30 minutes on this discharge activity which included: face-to-face encounter with the patient, reviewing the data in the system, coordination of the care with the nursing staff as well as consultants, documentation, and entering orders.       Anabell Cannon MD  [facility]  03/05/23  18:10 EST

## 2023-03-05 NOTE — SIGNIFICANT NOTE
03/05/23 0737   OTHER   Discipline physical therapist   Rehab Time/Intention   Session Not Performed unable to treat, medical status change  (plans noted for comfort measures only, PT will sign off)

## 2023-03-05 NOTE — PLAN OF CARE
Goal Outcome Evaluation:  Plan of Care Reviewed With: patient, family        Progress: no change  Outcome Evaluation: Pt is alert, confused/forgetful. Spouse at bedside through the night assisting pt with needs. Pt has denied pain and SOA through the night. Carrasco cath placed for comfort and to keep pt anat area dry and allow excoriation to heal. Pt taking few sips of water through the night. Will continue to monitor for comfort.

## 2023-03-05 NOTE — H&P
Patient Name:  Cindy Mejia  YOB: 1945  MRN:  [id number]  Admit Date:  3/5/2023  Patient Care Team:  Shannon Nguyen APRN as PCP - General (Family Medicine)  Ant Baxter MD as Consulting Physician (Neurology)  Rabia Mcelroy MD as Consulting Physician (Gastroenterology)      Subjective   History Present Illness     No chief complaint on file.    Ms. Mejia is a 77 y.o. female with a history of Parkinson's disease who presented to [facility] initially complaining of weakness, confusion, cough, wheezing.  She was found to have UTI and COVID-19 infection and was admitted to the hospital for further evaluation and treatment.  Early in her hospital course the patient developed acute hypoxic/hypercapnic respiratory failure and was upgraded to the ICU. She was treated with dexamethasone and remdesivir for her COVID-19 infection as well as broad-spectrum antibiotics for for bacterial pneumonia as well as her UTI. She required multiple days on non-invasive positive pressure ventilation before being able to transfer out of the ICU. The patient worked with speech therapy and was noted to have severe oropharyngeal dysphagia. Given her history of Parkinson's, it is questionable if the patient will ever regain her ability to swallow without aspiration.  The patient had a sudden increase in oxygen requirement from 5 L to 15 L on 3/3 and continued to require increased oxygen.  The patient has been asking to be allowed to eat for days and after further discussion with the family, they have elected to transfer her to the palliative care floor for full comfort measures.  The patient who is alert and oriented to her situation (although forgetful) was in agreement to transfer as well.     Personal History     Past Medical History:   Diagnosis Date   • Age-related osteoporosis without current pathological fracture 04/21/2022   • Anxiety 09/22/2016   • Arthritis    • Asymptomatic  varicose veins of both lower extremities 09/24/2019   • Atelectasis of left lung 2/22/2023   • Carcinoma of colon (HCC) 01/25/2018   • Colon polyp    • Constipation    • Menopause 03/23/2017   • Mixed stress and urge urinary incontinence    • Other hemorrhoids 01/25/2018   • Other rosacea 01/25/2018   • Parkinson's disease (HCC)    • Uterine prolapse 06/21/2016     Past Surgical History:   Procedure Laterality Date   • BLADDER SURGERY     • COLONOSCOPY N/A 5/22/2017    Procedure: COLONOSCOPY, polypectomy;  Surgeon: Rabia Mcelroy MD;  Location: [facility] OR;  Service:    • COLONOSCOPY N/A 8/28/2017    Procedure: COLONOSCOPY, polypectomy, biopsy, sclerotherapy injection;  Surgeon: Rabia Mcelroy MD;  Location: [facility] OR;  Service:    • HYSTERECTOMY     • TUBAL ABDOMINAL LIGATION       Family History   Problem Relation Age of Onset   • Breast cancer Mother    • Pancreatic cancer Father    • Thyroid disease Sister    • Heart disease Brother 60     Social History     Tobacco Use   • Smoking status: Never   • Smokeless tobacco: Never   Vaping Use   • Vaping Use: Never used   Substance Use Topics   • Alcohol use: No   • Drug use: No     Current Facility-Administered Medications on File Prior to Encounter   Medication Dose Route Frequency Provider Last Rate Last Admin   • [DISCONTINUED] acetaminophen (TYLENOL) 160 MG/5ML solution 650 mg  650 mg Oral Q4H PRN Anabell Cannon MD       • [DISCONTINUED] acetaminophen (TYLENOL) suppository 650 mg  650 mg Rectal Q4H PRN Anabell Cannon MD       • [DISCONTINUED] acetaminophen (TYLENOL) tablet 650 mg  650 mg Oral Q4H PRN Anabell Cannon MD       • [DISCONTINUED] carbidopa-levodopa (SINEMET)  MG per tablet 1 tablet  1 tablet Oral Q12H Anabell Cannon MD   1 tablet at 03/05/23 1032   • [DISCONTINUED] diphenoxylate-atropine (LOMOTIL) 2.5-0.025 MG per tablet 1 tablet  1 tablet Oral Q2H PRN Anabell Cannon MD       • [DISCONTINUED] Glycerin-Hypromellose-  (ARTIFICIAL TEARS) 0.2-0.2-1 % ophthalmic solution solution 1 drop  1 drop Both Eyes Q30 Min Anabell Baker MD       • [DISCONTINUED] glycopyrrolate (ROBINUL) injection 0.2 mg  0.2 mg Intravenous Q2H Anabell Baker MD       • [DISCONTINUED] glycopyrrolate (ROBINUL) injection 0.2 mg  0.2 mg Subcutaneous Q2H Anabell Baker MD       • [DISCONTINUED] glycopyrrolate (ROBINUL) injection 0.4 mg  0.4 mg Intravenous Q2H Anabell Baker MD       • [DISCONTINUED] glycopyrrolate (ROBINUL) injection 0.4 mg  0.4 mg Subcutaneous Q2H Anabell Baker MD       • [DISCONTINUED] HYDROmorphone (DILAUDID) injection 0.5 mg  0.5 mg Intravenous Q1H Anabell Baker MD       • [DISCONTINUED] HYDROmorphone (DILAUDID) injection 1 mg  1 mg Intravenous Q1H Anabell Baker MD       • [DISCONTINUED] ketorolac (TORADOL) injection 15 mg  15 mg Intravenous Q6H PRAnabell Persaud MD       • [DISCONTINUED] LORazepam (ATIVAN) 2 MG/ML concentrated solution 0.5 mg  0.5 mg Sublingual Q1H Anabell Baker MD       • [DISCONTINUED] LORazepam (ATIVAN) 2 MG/ML concentrated solution 1 mg  1 mg Sublingual Q1H Anabell Baker MD       • [DISCONTINUED] LORazepam (ATIVAN) 2 MG/ML concentrated solution 2 mg  2 mg Sublingual Q1H Anabell Baker MD       • [DISCONTINUED] LORazepam (ATIVAN) injection 0.5 mg  0.5 mg Intravenous Q1H Anabell Baker MD       • [DISCONTINUED] LORazepam (ATIVAN) injection 0.5 mg  0.5 mg Subcutaneous Q1H Anabell Baker MD       • [DISCONTINUED] LORazepam (ATIVAN) injection 1 mg  1 mg Intravenous Q1H Anabell Baker MD       • [DISCONTINUED] LORazepam (ATIVAN) injection 1 mg  1 mg Subcutaneous Q1H PRN Anabell Cannon MD       • [DISCONTINUED] LORazepam (ATIVAN) injection 2 mg  2 mg Intravenous Q1H PRN Anabell Cannon MD       • [DISCONTINUED] LORazepam (ATIVAN) injection 2 mg  2 mg Subcutaneous Q1H PRAnabell Persaud MD        • [DISCONTINUED] morphine concentrated solution 10 mg  10 mg Sublingual Q1H PRN Anabell Cannon MD       • [DISCONTINUED] morphine concentrated solution 5 mg  5 mg Sublingual Q1H PRN Anabell Cannon MD       • [DISCONTINUED] morphine injection 2 mg  2 mg Intravenous Q1H PRN Anabell Cannon MD   2 mg at 03/05/23 1443   • [DISCONTINUED] morphine injection 4 mg  4 mg Intravenous Q1H PRN Anabell Cannon MD       • [DISCONTINUED] ondansetron (ZOFRAN) injection 4 mg  4 mg Intravenous Q6H PRN Anabell Cannon MD       • [DISCONTINUED] ondansetron (ZOFRAN) tablet 4 mg  4 mg Oral Q6H PRN Anabell Cannon MD       • [DISCONTINUED] sodium chloride 0.9 % flush 10 mL  10 mL Intravenous PRN Darvin Carrillo Jr., MD   10 mL at 03/03/23 0905     Current Outpatient Medications on File Prior to Encounter   Medication Sig Dispense Refill   • carbidopa-levodopa (SINEMET)  MG per tablet Take 1 tablet by mouth 2 (Two) Times a Day.     • pramipexole (MIRAPEX) 0.25 MG tablet Take 0.25 mg by mouth Every Night.     • vitamin B-12 (CYANOCOBALAMIN) 100 MCG tablet Take 50 mcg by mouth Daily.       Allergies   Allergen Reactions   • Penicillins Rash       Objective    Objective     Vital Signs  Temp:  [97.4 °F (36.3 °C)-98.3 °F (36.8 °C)] 98.3 °F (36.8 °C)  Heart Rate:  [] 104  Resp:  [20] 20  BP: (104-105)/(50-59) 104/50  SpO2:  [93 %] 93 %  on  Flow (L/min):  [15] 15;   Device (Oxygen Therapy): high-flow nasal cannula  There is no height or weight on file to calculate BMI.    Physical Exam  Constitutional:       General: She is not in acute distress.     Appearance: Normal appearance. She is ill-appearing.   Cardiovascular:      Pulses: Normal pulses.      Heart sounds: No murmur heard.  Pulmonary:      Breath sounds: Rhonchi present.      Comments: On hi-flow nasal cannula  Abdominal:      General: Abdomen is flat. There is no distension.   Musculoskeletal:         General: No swelling or tenderness.       Right lower leg: No edema.      Left lower leg: No edema.   Skin:     General: Skin is warm and dry.   Neurological:      General: No focal deficit present.      Mental Status: She is alert and oriented to person, place, and time. Mental status is at baseline.      Comments: Bilateral upper extremity tremor, bruising of upper arms         Lab Results (last 24 hours)     ** No results found for the last 24 hours. **          Imaging Results (Last 24 Hours)     ** No results found for the last 24 hours. **          Results for orders placed during the hospital encounter of 02/22/23    Adult Transthoracic Echo Complete W/ Cont if Necessary Per Protocol    Interpretation Summary  •  Left ventricular systolic function is hyperdynamic (EF > 70%). Calculated left ventricular EF = 74.2%  •  Left ventricular wall thickness is consistent with mild concentric hypertrophy.  •  The left ventricular cavity is small in size.  •  The following left ventricular wall segments are hyperkinetic: basal anterior, basal anterolateral, basal inferolateral, basal inferior, basal inferoseptal, basal anteroseptal, mid anterior, mid anterolateral, mid inferolateral, mid inferior, mid inferoseptal, mid anteroseptal, apical anterior, apical lateral, apical inferior, apical septal and apex.  •  Left ventricular diastolic function was indeterminate.  •  Peak velocity of the flow distal to the aortic valve is 150 cm/s. Aortic valve maximum pressure gradient is 9 mmHg. Aortic valve mean pressure gradient is 5 mmHg. Aortic valve dimensionless index is 0.9 .  •  Trace to mild tricuspid valve regurgitation is present.  •  Estimated right ventricular systolic pressure from tricuspid regurgitation is mildly elevated (35-45 mmHg). Calculated right ventricular systolic pressure from tricuspid regurgitation is 39 mmHg.      No orders to display        Assessment/Plan     Active Hospital Problems    Diagnosis  POA   • **Parkinson's disease (HCC) [G20]   Yes   • COVID-19 virus infection [U07.1]  Unknown   • Acute respiratory failure with hypoxia (HCC) [J96.01]  Unknown   • Acute respiratory failure with hypercapnia (HCC) [J96.02]  Unknown   • Aspiration pneumonia (HCC) [J69.0]  Unknown   • Dysphagia [R13.10]  Unknown   • UTI (urinary tract infection) [N39.0]  Unknown      Resolved Hospital Problems   No resolved problems to display.       Ms. Mejia is a 77 y.o. with a history of Parkinson's who was admitted for weakness/cough and found to have acute hypoxic/hypercapnic respiratory failure 2/2 to COVID19 pneumonia with bacterial pneumonia/aspiration pneumonia and has transitioned to a hospice scatterbed.     Continue symptom management with current IV pain and anxiety medications. Discussed at length at bedside with  and patient today. Patient seemed visibly uncomfortable but was refusing medication for pain. We discussed pain meds intent was not to cause sedation and we would start with low doses to see how she would tolerate. Patient agreeable to try low-dose morphine.    · I discussed the patient's findings and my recommendations with patient, spouse and nursing staff.    Code Status - Hospice scatterbed/comfort care       Anabell Cannon MD  [facility]  03/05/23  18:30 EST

## 2023-03-05 NOTE — CONSULTS
hospitals consult received. Patient is eligible for GIP/scatter bed servicees under Dx of Parkinsons (G20) Related comorbids include dysphagia, UTI, CoVid, Acute hypoxic & hypercapneic resp failure, Asp PNA. All other diagnoses unrelated. Met with family outside of room, overview of services provided & consents for hospitals signed. Updated staff RN Aline who confirms she spoke with Dr Cannon & is agreeable to dc readmit to a scatter bed today. Thank you for referral & allowing us to participate in patient's care.      Gini Thapa RN, BSN   Referral & Admission Coordinator   [facility] [address]

## 2023-03-06 NOTE — CASE MANAGEMENT/SOCIAL WORK
Case Management Discharge Note      Final Note: discharged to hospice scattered bed         Selected Continued Care - Discharged on 3/5/2023 Admission date: 2/22/2023 - Discharge disposition: Swing Bed w/Planned Readmission    Destination    No services have been selected for the patient.           Durable Medical Equipment    No services have been selected for the patient.           Dialysis/Infusion    No services have been selected for the patient.           Home Medical Care    No services have been selected for the patient.           Therapy    No services have been selected for the patient.           Community Resources    No services have been selected for the patient.           Community & DME    No services have been selected for the patient.                    Final Discharge Disposition Code: 61 - hospital-based swing bed

## 2023-03-06 NOTE — PLAN OF CARE
Goal Outcome Evaluation:  Plan of Care Reviewed With: patient, spouse   Patient is alert to person and place. She responds to voice. 2 mg of morphine given twice for complaints of generalized pain.  remains at the bedside will continue to monitor.

## 2023-03-06 NOTE — PROGRESS NOTES
Case Management Discharge Note      Final Note: The patient  on 3/6/23 @ 11:37. BKathe Marr RN CCP.         Selected Continued Care - Admitted Since 3/5/2023     Destination Coordination complete.    Service Provider Selected Services Address Phone Fax Patient Preferred    [facility] [address], [facility] [id number] [phone] --          Durable Medical Equipment    No services have been selected for the patient.              Dialysis/Infusion    No services have been selected for the patient.              Home Medical Care    No services have been selected for the patient.              Therapy    No services have been selected for the patient.              Community Resources    No services have been selected for the patient.              Community & DME    No services have been selected for the patient.                Selected Continued Care - Prior Encounters Includes continued care and service providers with selected services from prior encounters from 2022 to 3/6/2023    Discharged on 3/5/2023 Admission date: 2023 - Discharge disposition: Swing Bed w/Planned Readmission    Destination     Service Provider Selected Services Address Phone Fax Patient Preferred    [facility] [address], [facility] [id number] [phone] --                         Final Discharge Disposition Code: 41 -  in medical facility

## 2023-03-06 NOTE — DISCHARGE SUMMARY
Name: Cindy Mejia  :  1945  MRN: [id number]         Primary Care Physician: Shannon Nguyen APRN      Date of Admission:  3/5/2023  Date and Time of Death:  3/6/2023 at 11:37 AM    Principle Cause of Death:   Parkinson's disease (HCC)    Secondary Diagnoses:         UTI (urinary tract infection)    Dysphagia    COVID-19 virus infection    Acute respiratory failure with hypoxia (HCC)    Acute respiratory failure with hypercapnia (HCC)    Aspiration pneumonia (HCC)        Hospital Course  Patient was a 77 y.o. female who presented to [facility] with weakness, confusion, cough, wheezing. Please see the admitting history and physical for further details. She was found to have UTI and COVID-19 infection and was admitted to the hospital for further evaluation and treatment.  Early during her hospital course the patient developed acute hypoxic/hypercapnic respiratory failure and was upgraded to the ICU. She was treated with dexamethasone and remdesivir for her COVID-19 infection as well as broad-spectrum antibiotics for for bacterial pneumonia as well as her UTI. She required multiple days on non-invasive positive pressure ventilation before being able to transfer out of the ICU. The patient worked with speech therapy and was noted to have severe oropharyngeal dysphagia. Given her history of Parkinson's, her dysphagia was likely not going to improve and she would have continued to have lifelong issues with aspiration.  The patient had a sudden increase in oxygen requirement from 5 L to 15 L on 3/3/23 and continued to require increased oxygen.  The patient had been asking to be allowed to eat for days and after further discussion with the family, they elected to transfer her to the palliative care floor for full comfort measures.  The patient who was alert and oriented to her situation was in agreement to transfer as well. Her symptoms were managed with IV pain and anxiety medications. She  passed away on 3/6/23 at 11:37 am.     Anabell Cannon MD  03/06/23  18:47 EST

## 2023-03-06 NOTE — PROGRESS NOTES
Case Management Discharge Note      Final Note: admitted to a [facility] scattered bed on 3/5/23. WILEY Marr RN, CCP         Selected Continued Care - Discharged on 3/5/2023 Admission date: 2/22/2023 - Discharge disposition: Swing Bed w/Planned Readmission    Destination Coordination complete.    Service Provider Selected Services Address Phone Fax Patient Preferred    [facility] [address], [facility] [address] [phone] --          Durable Medical Equipment    No services have been selected for the patient.              Dialysis/Infusion    No services have been selected for the patient.              Home Medical Care    No services have been selected for the patient.              Therapy    No services have been selected for the patient.              Community Resources    No services have been selected for the patient.              Community & DME    No services have been selected for the patient.                       Final Discharge Disposition Code: 51 - hospice medical facility

## 2023-03-06 NOTE — PROGRESS NOTES
Discharge Planning Assessment  [facility]     Patient Name: Cindy Mejia  MRN: [id number]  Today's Date: 3/6/2023    Admit Date: 3/5/2023        Discharge Needs Assessment    No documentation.                Discharge Plan     Row Name 03/06/23 1652       Plan    Plan Comments The patient transferred to [facility] from [address] [place] [address] on 3/4/23. The patient is palliative. Admitted to [facility] on 3/5/23. WILEY Marr RN [facility]              Continued Care and Services - Admitted Since 3/5/2023     Destination Coordination complete.    Service Provider Request Status Selected Services Address Phone Fax Patient Preferred    [facility]  Selected Inpatient Hospice [address] [place] [id number] [phone] --            Selected Continued Care - Prior Encounters Includes continued care and service providers with selected services from prior encounters from 12/5/2022 to 3/6/2023    Discharged on 3/5/2023 Admission date: 2/22/2023 - Discharge disposition: Swing Bed w/Planned Readmission    Destination     Service Provider Selected Services Address Phone Fax Patient Preferred    [facility] [address] [place] [id number] [phone] --                    Expected Discharge Date and Time     Expected Discharge Date Expected Discharge Time    Mar 6, 2023          Demographic Summary    No documentation.                Functional Status    No documentation.                Psychosocial    No documentation.                Abuse/Neglect    No documentation.                Legal    No documentation.                Substance Abuse    No documentation.                Patient Forms    No documentation.                   Yanci Marr RN

## (undated) DEVICE — SYR LUER SLPTP 50ML

## (undated) DEVICE — SYR LL 3CC

## (undated) DEVICE — SPNG GZ WOVN 4X4IN 12PLY 10/BX STRL

## (undated) DEVICE — FRCP BX RADJAW4 NDL 2.8 240CM LG OG BX40

## (undated) DEVICE — SNAR POLYP CAPTIFLX WD OVL 27MM 240CM

## (undated) DEVICE — BW-412T DISP COMBO CLEANING BRUSH: Brand: SINGLE USE COMBINATION CLEANING BRUSH

## (undated) DEVICE — Device

## (undated) DEVICE — CONMED FLEXITIP DISPOSABLE SCLEROTHERAPY NEEDLE, OPTIC YELLOW COLONOSCOPE NEEDLE, 5 MM X 230 CM, 25 GM: Brand: FLEXITIP

## (undated) DEVICE — PAD GRND E/S W/CORD SPLT A/

## (undated) DEVICE — GLV SURG SENSICARE MICRO PF LF 6 STRL

## (undated) DEVICE — HYBRID TUBING/CAP SET FOR OLYMPUS® SCOPES: Brand: ERBE

## (undated) DEVICE — GOWN ISOL W/THUMB UNIV BLU BX/15

## (undated) DEVICE — MASK,FACE,SHIELD,BLUE,ANTI FOG,TIES: Brand: MEDLINE

## (undated) DEVICE — Device: Brand: DEFENDO AIR/WATER/SUCTION AND BIOPSY VALVE

## (undated) DEVICE — ENDOSCOPY PORT CONNECTOR FOR OLYMPUS® SCOPES: Brand: ERBE

## (undated) DEVICE — SUCTION CANISTER, 3000CC,SAFELINER: Brand: DEROYAL

## (undated) DEVICE — JACKT LAB KNIT COLR LG BLU

## (undated) DEVICE — VIAL FORMALIN CAP 10P 40ML

## (undated) DEVICE — MARKR SPOT ENDOSCOPIC LESION INJ